# Patient Record
Sex: FEMALE | Race: ASIAN | NOT HISPANIC OR LATINO | Employment: UNEMPLOYED | URBAN - METROPOLITAN AREA
[De-identification: names, ages, dates, MRNs, and addresses within clinical notes are randomized per-mention and may not be internally consistent; named-entity substitution may affect disease eponyms.]

---

## 2021-04-27 ENCOUNTER — IMMUNIZATION (OUTPATIENT)
Dept: NURSING | Facility: CLINIC | Age: 26
End: 2021-04-27
Payer: COMMERCIAL

## 2021-04-27 PROCEDURE — 0001A PR COVID VAC PFIZER DIL RECON 30 MCG/0.3 ML IM: CPT

## 2021-04-27 PROCEDURE — 91300 PR COVID VAC PFIZER DIL RECON 30 MCG/0.3 ML IM: CPT

## 2021-05-02 ENCOUNTER — HEALTH MAINTENANCE LETTER (OUTPATIENT)
Age: 26
End: 2021-05-02

## 2021-05-21 ENCOUNTER — IMMUNIZATION (OUTPATIENT)
Dept: NURSING | Facility: CLINIC | Age: 26
End: 2021-05-21
Attending: INTERNAL MEDICINE
Payer: COMMERCIAL

## 2021-05-21 PROCEDURE — 91300 PR COVID VAC PFIZER DIL RECON 30 MCG/0.3 ML IM: CPT

## 2021-05-21 PROCEDURE — 0002A PR COVID VAC PFIZER DIL RECON 30 MCG/0.3 ML IM: CPT

## 2021-10-05 ENCOUNTER — APPOINTMENT (OUTPATIENT)
Dept: RADIOLOGY | Facility: HOSPITAL | Age: 26
DRG: 305 | End: 2021-10-05
Attending: EMERGENCY MEDICINE
Payer: COMMERCIAL

## 2021-10-05 ENCOUNTER — HOSPITAL ENCOUNTER (INPATIENT)
Facility: HOSPITAL | Age: 26
LOS: 4 days | Discharge: HOME | DRG: 305 | End: 2021-10-09
Attending: EMERGENCY MEDICINE | Admitting: INTERNAL MEDICINE
Payer: COMMERCIAL

## 2021-10-05 ENCOUNTER — APPOINTMENT (OUTPATIENT)
Dept: ULTRASOUND IMAGING | Facility: HOSPITAL | Age: 26
DRG: 305 | End: 2021-10-05
Attending: INTERNAL MEDICINE
Payer: COMMERCIAL

## 2021-10-05 DIAGNOSIS — G43.909 MIGRAINE WITHOUT STATUS MIGRAINOSUS, NOT INTRACTABLE, UNSPECIFIED MIGRAINE TYPE: ICD-10-CM

## 2021-10-05 DIAGNOSIS — N17.9 ACUTE KIDNEY INJURY (H): ICD-10-CM

## 2021-10-05 DIAGNOSIS — I10 ACCELERATED HYPERTENSION: ICD-10-CM

## 2021-10-05 DIAGNOSIS — I10 BENIGN ESSENTIAL HYPERTENSION: Primary | ICD-10-CM

## 2021-10-05 LAB
ALBUMIN MFR UR ELPH: 448.9 MG/DL
ALBUMIN SERPL-MCNC: 2.6 G/DL (ref 3.5–5)
ALBUMIN UR-MCNC: 300 MG/DL
ALP SERPL-CCNC: 66 U/L (ref 45–120)
ALT SERPL W P-5'-P-CCNC: <9 U/L (ref 0–45)
ANION GAP SERPL CALCULATED.3IONS-SCNC: 7 MMOL/L (ref 5–18)
APPEARANCE UR: ABNORMAL
AST SERPL W P-5'-P-CCNC: 12 U/L (ref 0–40)
ATRIAL RATE - MUSE: 105 BPM
BACTERIA #/AREA URNS HPF: ABNORMAL /HPF
BASOPHILS # BLD AUTO: 0.1 10E3/UL (ref 0–0.2)
BASOPHILS # BLD AUTO: 0.1 10E3/UL (ref 0–0.2)
BASOPHILS NFR BLD AUTO: 1 %
BASOPHILS NFR BLD AUTO: 1 %
BILIRUB SERPL-MCNC: 0.3 MG/DL (ref 0–1)
BILIRUB UR QL STRIP: NEGATIVE
BNP SERPL-MCNC: 149 PG/ML (ref 0–64)
BUN SERPL-MCNC: 35 MG/DL (ref 8–22)
C3 SERPL-MCNC: 140 MG/DL (ref 83–177)
C4 SERPL-MCNC: 42 MG/DL (ref 19–59)
CALCIUM SERPL-MCNC: 8.9 MG/DL (ref 8.5–10.5)
CHLORIDE BLD-SCNC: 110 MMOL/L (ref 98–107)
CO2 SERPL-SCNC: 21 MMOL/L (ref 22–31)
COLOR UR AUTO: ABNORMAL
CREAT SERPL-MCNC: 5.72 MG/DL (ref 0.6–1.1)
CREAT UR-MCNC: 38 MG/DL
DIASTOLIC BLOOD PRESSURE - MUSE: NORMAL MMHG
EOSINOPHIL # BLD AUTO: 0.1 10E3/UL (ref 0–0.7)
EOSINOPHIL # BLD AUTO: 0.1 10E3/UL (ref 0–0.7)
EOSINOPHIL NFR BLD AUTO: 1 %
EOSINOPHIL NFR BLD AUTO: 2 %
ERYTHROCYTE [DISTWIDTH] IN BLOOD BY AUTOMATED COUNT: 13.3 % (ref 10–15)
ERYTHROCYTE [DISTWIDTH] IN BLOOD BY AUTOMATED COUNT: 13.4 % (ref 10–15)
GFR SERPL CREATININE-BSD FRML MDRD: 9 ML/MIN/1.73M2
GLUCOSE BLD-MCNC: 90 MG/DL (ref 70–125)
GLUCOSE UR STRIP-MCNC: NEGATIVE MG/DL
HAPTOGLOB SERPL-MCNC: 149 MG/DL (ref 33–171)
HBV SURFACE AG SERPL QL IA: NONREACTIVE
HCG SERPL QL: NEGATIVE
HCT VFR BLD AUTO: 36.1 % (ref 35–47)
HCT VFR BLD AUTO: 39.2 % (ref 35–47)
HGB BLD-MCNC: 11.3 G/DL (ref 11.7–15.7)
HGB BLD-MCNC: 12.3 G/DL (ref 11.7–15.7)
HGB UR QL STRIP: ABNORMAL
HIV 1+2 AB+HIV1 P24 AG SERPL QL IA: NEGATIVE
HOLD SPECIMEN: NORMAL
IMM GRANULOCYTES # BLD: 0 10E3/UL
IMM GRANULOCYTES # BLD: 0.1 10E3/UL
IMM GRANULOCYTES NFR BLD: 0 %
IMM GRANULOCYTES NFR BLD: 1 %
INTERPRETATION ECG - MUSE: NORMAL
KETONES UR STRIP-MCNC: NEGATIVE MG/DL
LDH SERPL L TO P-CCNC: 296 U/L (ref 125–220)
LEUKOCYTE ESTERASE UR QL STRIP: NEGATIVE
LYMPHOCYTES # BLD AUTO: 1.4 10E3/UL (ref 0.8–5.3)
LYMPHOCYTES # BLD AUTO: 1.6 10E3/UL (ref 0.8–5.3)
LYMPHOCYTES NFR BLD AUTO: 14 %
LYMPHOCYTES NFR BLD AUTO: 20 %
MCH RBC QN AUTO: 26.9 PG (ref 26.5–33)
MCH RBC QN AUTO: 27 PG (ref 26.5–33)
MCHC RBC AUTO-ENTMCNC: 31.3 G/DL (ref 31.5–36.5)
MCHC RBC AUTO-ENTMCNC: 31.4 G/DL (ref 31.5–36.5)
MCV RBC AUTO: 86 FL (ref 78–100)
MCV RBC AUTO: 86 FL (ref 78–100)
MONOCYTES # BLD AUTO: 0.4 10E3/UL (ref 0–1.3)
MONOCYTES # BLD AUTO: 0.5 10E3/UL (ref 0–1.3)
MONOCYTES NFR BLD AUTO: 4 %
MONOCYTES NFR BLD AUTO: 6 %
MUCOUS THREADS #/AREA URNS LPF: PRESENT /LPF
NEUTROPHILS # BLD AUTO: 5.8 10E3/UL (ref 1.6–8.3)
NEUTROPHILS # BLD AUTO: 8.1 10E3/UL (ref 1.6–8.3)
NEUTROPHILS NFR BLD AUTO: 71 %
NEUTROPHILS NFR BLD AUTO: 79 %
NITRATE UR QL: NEGATIVE
NRBC # BLD AUTO: 0 10E3/UL
NRBC # BLD AUTO: 0 10E3/UL
NRBC BLD AUTO-RTO: 0 /100
NRBC BLD AUTO-RTO: 0 /100
P AXIS - MUSE: 52 DEGREES
PH UR STRIP: 7 [PH] (ref 5–7)
PLATELET # BLD AUTO: 270 10E3/UL (ref 150–450)
PLATELET # BLD AUTO: 273 10E3/UL (ref 150–450)
POTASSIUM BLD-SCNC: 4.6 MMOL/L (ref 3.5–5)
PR INTERVAL - MUSE: 112 MS
PROT SERPL-MCNC: 6.4 G/DL (ref 6–8)
PROT/CREAT 24H UR: 11.81 MG/MG CR
QRS DURATION - MUSE: 66 MS
QT - MUSE: 342 MS
QTC - MUSE: 452 MS
R AXIS - MUSE: -18 DEGREES
RBC # BLD AUTO: 4.19 10E6/UL (ref 3.8–5.2)
RBC # BLD AUTO: 4.57 10E6/UL (ref 3.8–5.2)
RBC URINE: 27 /HPF
RETICS # AUTO: 0.08 10E6/UL (ref 0.01–0.11)
RETICS/RBC NFR AUTO: 1.7 % (ref 0.8–2.7)
SARS-COV-2 RNA RESP QL NAA+PROBE: NEGATIVE
SODIUM SERPL-SCNC: 138 MMOL/L (ref 136–145)
SP GR UR STRIP: 1.01 (ref 1–1.03)
SQUAMOUS EPITHELIAL: 29 /HPF
SYSTOLIC BLOOD PRESSURE - MUSE: NORMAL MMHG
T AXIS - MUSE: 59 DEGREES
TROPONIN I SERPL-MCNC: 0.01 NG/ML (ref 0–0.29)
TROPONIN I SERPL-MCNC: 0.02 NG/ML (ref 0–0.29)
TROPONIN I SERPL-MCNC: 0.02 NG/ML (ref 0–0.29)
UROBILINOGEN UR STRIP-MCNC: <2 MG/DL
VENTRICULAR RATE- MUSE: 105 BPM
WBC # BLD AUTO: 10.1 10E3/UL (ref 4–11)
WBC # BLD AUTO: 8.1 10E3/UL (ref 4–11)
WBC URINE: 4 /HPF

## 2021-10-05 PROCEDURE — 86160 COMPLEMENT ANTIGEN: CPT | Performed by: INTERNAL MEDICINE

## 2021-10-05 PROCEDURE — 250N000011 HC RX IP 250 OP 636: Performed by: INTERNAL MEDICINE

## 2021-10-05 PROCEDURE — 250N000013 HC RX MED GY IP 250 OP 250 PS 637: Performed by: INTERNAL MEDICINE

## 2021-10-05 PROCEDURE — 36415 COLL VENOUS BLD VENIPUNCTURE: CPT | Performed by: INTERNAL MEDICINE

## 2021-10-05 PROCEDURE — 86256 FLUORESCENT ANTIBODY TITER: CPT | Performed by: INTERNAL MEDICINE

## 2021-10-05 PROCEDURE — 84484 ASSAY OF TROPONIN QUANT: CPT | Performed by: INTERNAL MEDICINE

## 2021-10-05 PROCEDURE — 86255 FLUORESCENT ANTIBODY SCREEN: CPT | Performed by: INTERNAL MEDICINE

## 2021-10-05 PROCEDURE — 84156 ASSAY OF PROTEIN URINE: CPT | Performed by: INTERNAL MEDICINE

## 2021-10-05 PROCEDURE — 258N000003 HC RX IP 258 OP 636: Performed by: INTERNAL MEDICINE

## 2021-10-05 PROCEDURE — 80053 COMPREHEN METABOLIC PANEL: CPT | Performed by: EMERGENCY MEDICINE

## 2021-10-05 PROCEDURE — 99222 1ST HOSP IP/OBS MODERATE 55: CPT | Performed by: INTERNAL MEDICINE

## 2021-10-05 PROCEDURE — 71046 X-RAY EXAM CHEST 2 VIEWS: CPT

## 2021-10-05 PROCEDURE — 84155 ASSAY OF PROTEIN SERUM: CPT | Performed by: INTERNAL MEDICINE

## 2021-10-05 PROCEDURE — 84703 CHORIONIC GONADOTROPIN ASSAY: CPT | Performed by: INTERNAL MEDICINE

## 2021-10-05 PROCEDURE — 99285 EMERGENCY DEPT VISIT HI MDM: CPT | Mod: 25

## 2021-10-05 PROCEDURE — 83615 LACTATE (LD) (LDH) ENZYME: CPT | Performed by: INTERNAL MEDICINE

## 2021-10-05 PROCEDURE — 86038 ANTINUCLEAR ANTIBODIES: CPT | Performed by: INTERNAL MEDICINE

## 2021-10-05 PROCEDURE — 96376 TX/PRO/DX INJ SAME DRUG ADON: CPT

## 2021-10-05 PROCEDURE — 83010 ASSAY OF HAPTOGLOBIN QUANT: CPT | Performed by: INTERNAL MEDICINE

## 2021-10-05 PROCEDURE — 81001 URINALYSIS AUTO W/SCOPE: CPT | Performed by: INTERNAL MEDICINE

## 2021-10-05 PROCEDURE — 87635 SARS-COV-2 COVID-19 AMP PRB: CPT | Performed by: EMERGENCY MEDICINE

## 2021-10-05 PROCEDURE — 87389 HIV-1 AG W/HIV-1&-2 AB AG IA: CPT | Performed by: INTERNAL MEDICINE

## 2021-10-05 PROCEDURE — 85025 COMPLETE CBC W/AUTO DIFF WBC: CPT | Performed by: INTERNAL MEDICINE

## 2021-10-05 PROCEDURE — C9803 HOPD COVID-19 SPEC COLLECT: HCPCS

## 2021-10-05 PROCEDURE — 36415 COLL VENOUS BLD VENIPUNCTURE: CPT | Performed by: EMERGENCY MEDICINE

## 2021-10-05 PROCEDURE — 85045 AUTOMATED RETICULOCYTE COUNT: CPT | Performed by: INTERNAL MEDICINE

## 2021-10-05 PROCEDURE — 99223 1ST HOSP IP/OBS HIGH 75: CPT | Mod: AI | Performed by: INTERNAL MEDICINE

## 2021-10-05 PROCEDURE — 83516 IMMUNOASSAY NONANTIBODY: CPT | Performed by: INTERNAL MEDICINE

## 2021-10-05 PROCEDURE — 84484 ASSAY OF TROPONIN QUANT: CPT | Performed by: EMERGENCY MEDICINE

## 2021-10-05 PROCEDURE — 99207 PR CONSULT E&M CHANGED TO INITIAL LEVEL: CPT | Performed by: INTERNAL MEDICINE

## 2021-10-05 PROCEDURE — 200N000001 HC R&B ICU

## 2021-10-05 PROCEDURE — 93005 ELECTROCARDIOGRAM TRACING: CPT | Performed by: EMERGENCY MEDICINE

## 2021-10-05 PROCEDURE — 85025 COMPLETE CBC W/AUTO DIFF WBC: CPT | Performed by: EMERGENCY MEDICINE

## 2021-10-05 PROCEDURE — 250N000009 HC RX 250: Performed by: INTERNAL MEDICINE

## 2021-10-05 PROCEDURE — 76770 US EXAM ABDO BACK WALL COMP: CPT

## 2021-10-05 PROCEDURE — 84165 PROTEIN E-PHORESIS SERUM: CPT | Mod: TC | Performed by: INTERNAL MEDICINE

## 2021-10-05 PROCEDURE — 250N000011 HC RX IP 250 OP 636: Performed by: EMERGENCY MEDICINE

## 2021-10-05 PROCEDURE — 87340 HEPATITIS B SURFACE AG IA: CPT | Performed by: INTERNAL MEDICINE

## 2021-10-05 PROCEDURE — 86803 HEPATITIS C AB TEST: CPT | Performed by: INTERNAL MEDICINE

## 2021-10-05 PROCEDURE — 86225 DNA ANTIBODY NATIVE: CPT | Performed by: INTERNAL MEDICINE

## 2021-10-05 PROCEDURE — 86706 HEP B SURFACE ANTIBODY: CPT | Performed by: INTERNAL MEDICINE

## 2021-10-05 PROCEDURE — 93975 VASCULAR STUDY: CPT

## 2021-10-05 PROCEDURE — 96374 THER/PROPH/DIAG INJ IV PUSH: CPT

## 2021-10-05 PROCEDURE — 83880 ASSAY OF NATRIURETIC PEPTIDE: CPT | Performed by: EMERGENCY MEDICINE

## 2021-10-05 PROCEDURE — 250N000013 HC RX MED GY IP 250 OP 250 PS 637: Performed by: EMERGENCY MEDICINE

## 2021-10-05 RX ORDER — HEPARIN SODIUM 5000 [USP'U]/.5ML
5000 INJECTION, SOLUTION INTRAVENOUS; SUBCUTANEOUS EVERY 8 HOURS
Status: DISCONTINUED | OUTPATIENT
Start: 2021-10-05 | End: 2021-10-06

## 2021-10-05 RX ORDER — AMOXICILLIN 250 MG
2 CAPSULE ORAL 2 TIMES DAILY PRN
Status: DISCONTINUED | OUTPATIENT
Start: 2021-10-05 | End: 2021-10-09 | Stop reason: HOSPADM

## 2021-10-05 RX ORDER — LIDOCAINE 40 MG/G
CREAM TOPICAL
Status: DISCONTINUED | OUTPATIENT
Start: 2021-10-05 | End: 2021-10-09 | Stop reason: HOSPADM

## 2021-10-05 RX ORDER — ONDANSETRON 4 MG/1
4 TABLET, ORALLY DISINTEGRATING ORAL EVERY 6 HOURS PRN
Status: DISCONTINUED | OUTPATIENT
Start: 2021-10-05 | End: 2021-10-09 | Stop reason: HOSPADM

## 2021-10-05 RX ORDER — CARVEDILOL 12.5 MG/1
12.5 TABLET ORAL 2 TIMES DAILY WITH MEALS
Status: DISCONTINUED | OUTPATIENT
Start: 2021-10-05 | End: 2021-10-09 | Stop reason: HOSPADM

## 2021-10-05 RX ORDER — ATENOLOL 25 MG/1
25 TABLET ORAL DAILY
Status: DISCONTINUED | OUTPATIENT
Start: 2021-10-05 | End: 2021-10-05

## 2021-10-05 RX ORDER — METOPROLOL TARTRATE 25 MG/1
25 TABLET, FILM COATED ORAL ONCE
Status: COMPLETED | OUTPATIENT
Start: 2021-10-05 | End: 2021-10-05

## 2021-10-05 RX ORDER — HYDRALAZINE HYDROCHLORIDE 20 MG/ML
2 INJECTION INTRAMUSCULAR; INTRAVENOUS ONCE
Status: COMPLETED | OUTPATIENT
Start: 2021-10-05 | End: 2021-10-05

## 2021-10-05 RX ORDER — IBUPROFEN 200 MG
200-600 TABLET ORAL EVERY 4 HOURS PRN
Status: ON HOLD | COMMUNITY
End: 2021-10-08

## 2021-10-05 RX ORDER — AMLODIPINE BESYLATE 5 MG/1
10 TABLET ORAL DAILY
Status: DISCONTINUED | OUTPATIENT
Start: 2021-10-05 | End: 2021-10-09 | Stop reason: HOSPADM

## 2021-10-05 RX ORDER — HYDRALAZINE HYDROCHLORIDE 20 MG/ML
5 INJECTION INTRAMUSCULAR; INTRAVENOUS ONCE
Status: COMPLETED | OUTPATIENT
Start: 2021-10-05 | End: 2021-10-05

## 2021-10-05 RX ORDER — ACETAMINOPHEN 325 MG/1
325-650 TABLET ORAL EVERY 6 HOURS PRN
Status: ON HOLD | COMMUNITY
End: 2023-12-11

## 2021-10-05 RX ORDER — HYDRALAZINE HYDROCHLORIDE 20 MG/ML
10 INJECTION INTRAMUSCULAR; INTRAVENOUS EVERY 4 HOURS PRN
Status: DISCONTINUED | OUTPATIENT
Start: 2021-10-05 | End: 2021-10-09 | Stop reason: HOSPADM

## 2021-10-05 RX ORDER — AMOXICILLIN 250 MG
1 CAPSULE ORAL 2 TIMES DAILY PRN
Status: DISCONTINUED | OUTPATIENT
Start: 2021-10-05 | End: 2021-10-09 | Stop reason: HOSPADM

## 2021-10-05 RX ORDER — SODIUM CHLORIDE 9 MG/ML
INJECTION, SOLUTION INTRAVENOUS CONTINUOUS
Status: DISCONTINUED | OUTPATIENT
Start: 2021-10-05 | End: 2021-10-05

## 2021-10-05 RX ORDER — SUMATRIPTAN 50 MG/1
50 TABLET, FILM COATED ORAL
COMMUNITY
End: 2023-11-27

## 2021-10-05 RX ORDER — ONDANSETRON 2 MG/ML
4 INJECTION INTRAMUSCULAR; INTRAVENOUS EVERY 6 HOURS PRN
Status: DISCONTINUED | OUTPATIENT
Start: 2021-10-05 | End: 2021-10-09 | Stop reason: HOSPADM

## 2021-10-05 RX ORDER — CLONIDINE HYDROCHLORIDE 0.1 MG/1
0.1 TABLET ORAL EVERY 8 HOURS PRN
Status: DISCONTINUED | OUTPATIENT
Start: 2021-10-05 | End: 2021-10-09 | Stop reason: HOSPADM

## 2021-10-05 RX ORDER — ACETAMINOPHEN 325 MG/1
650 TABLET ORAL EVERY 4 HOURS PRN
Status: DISCONTINUED | OUTPATIENT
Start: 2021-10-05 | End: 2021-10-09 | Stop reason: HOSPADM

## 2021-10-05 RX ORDER — HYDRALAZINE HYDROCHLORIDE 20 MG/ML
10 INJECTION INTRAMUSCULAR; INTRAVENOUS EVERY 6 HOURS PRN
Status: DISCONTINUED | OUTPATIENT
Start: 2021-10-05 | End: 2021-10-05

## 2021-10-05 RX ADMIN — ACETAMINOPHEN 650 MG: 325 TABLET ORAL at 19:31

## 2021-10-05 RX ADMIN — NICARDIPINE HYDROCHLORIDE 2.5 MG/HR: 0.2 INJECTION, SOLUTION INTRAVENOUS at 18:28

## 2021-10-05 RX ADMIN — HYDRALAZINE HYDROCHLORIDE 2 MG: 20 INJECTION INTRAMUSCULAR; INTRAVENOUS at 12:16

## 2021-10-05 RX ADMIN — CARVEDILOL 12.5 MG: 12.5 TABLET, FILM COATED ORAL at 18:48

## 2021-10-05 RX ADMIN — ATENOLOL 25 MG: 25 TABLET ORAL at 17:18

## 2021-10-05 RX ADMIN — NICARDIPINE HYDROCHLORIDE 14 MG/HR: 0.2 INJECTION, SOLUTION INTRAVENOUS at 21:36

## 2021-10-05 RX ADMIN — SODIUM CHLORIDE: 9 INJECTION, SOLUTION INTRAVENOUS at 14:56

## 2021-10-05 RX ADMIN — METOPROLOL TARTRATE 25 MG: 25 TABLET, FILM COATED ORAL at 11:06

## 2021-10-05 RX ADMIN — ONDANSETRON 4 MG: 2 INJECTION INTRAMUSCULAR; INTRAVENOUS at 17:17

## 2021-10-05 RX ADMIN — AMLODIPINE BESYLATE 10 MG: 5 TABLET ORAL at 14:55

## 2021-10-05 RX ADMIN — HEPARIN SODIUM 5000 UNITS: 10000 INJECTION, SOLUTION INTRAVENOUS; SUBCUTANEOUS at 20:30

## 2021-10-05 RX ADMIN — HYDRALAZINE HYDROCHLORIDE 10 MG: 20 INJECTION INTRAMUSCULAR; INTRAVENOUS at 15:44

## 2021-10-05 RX ADMIN — HYDRALAZINE HYDROCHLORIDE 5 MG: 20 INJECTION INTRAMUSCULAR; INTRAVENOUS at 18:50

## 2021-10-05 ASSESSMENT — ACTIVITIES OF DAILY LIVING (ADL)
DIFFICULTY_COMMUNICATING: NO
DRESSING/BATHING_DIFFICULTY: NO
WALKING_OR_CLIMBING_STAIRS_DIFFICULTY: NO
TOILETING_ISSUES: NO
WEAR_GLASSES_OR_BLIND: NO
FALL_HISTORY_WITHIN_LAST_SIX_MONTHS: NO
CONCENTRATING,_REMEMBERING_OR_MAKING_DECISIONS_DIFFICULTY: NO
DOING_ERRANDS_INDEPENDENTLY_DIFFICULTY: NO
DIFFICULTY_EATING/SWALLOWING: NO
HEARING_DIFFICULTY_OR_DEAF: NO
PATIENT_/_FAMILY_COMMUNICATION_STYLE: SPOKEN LANGUAGE (ENGLISH OR BILINGUAL)

## 2021-10-05 NOTE — PHARMACY-ADMISSION MEDICATION HISTORY
Pharmacy Note - Admission Medication History    Pertinent Provider Information: none     ______________________________________________________________________    Prior To Admission (PTA) med list completed and updated in EMR.       PTA Med List   Medication Sig Last Dose     acetaminophen (TYLENOL) 325 MG tablet Take 325-650 mg by mouth every 6 hours as needed for mild pain Unknown at Unknown time     ibuprofen (ADVIL/MOTRIN) 200 MG tablet Take 200-600 mg by mouth every 4 hours as needed for mild pain Unknown at Unknown time     SUMAtriptan (IMITREX) 50 MG tablet Take 50 mg by mouth at onset of headache for migraine Unknown at Unknown time       Information source(s): Patient and CareEverywhere/SureScripts  Method of interview communication: in-person    Summary of Changes to PTA Med List  New: none  Discontinued: none  Changed: none    Patient was asked about OTC/herbal products specifically.  PTA med list reflects this.    In the past week, patient estimated taking medication this percent of the time:  N/A    Allergies were reviewed, assessed, and updated with the patient.      Patient did not bring any medications to the hospital and can't retrieve from home. No multi-dose medications are available for use during hospital stay.     The information provided in this note is only as accurate as the sources available at the time of the update(s).    Thank you for the opportunity to participate in the care of this patient.    Trever Banda [facility]  10/5/2021 1:43 PM

## 2021-10-05 NOTE — LETTER
[facility]  [address] [place] [address]  Phone: [phone]  Fax: [phone]    October 8, 2021        Nuzhat Lopez  [address] [place] [facility] [address]          To whom it may concern:    RE: Nuzhat Lopez    This is to verify that Nuzhat Lopez was admitted in [facility] from 10/5/21 to 10/8/21.    Please contact me for questions or concerns.      Sincerely,        Jax Larsno MD

## 2021-10-05 NOTE — ED PROVIDER NOTES
EMERGENCY DEPARTMENT ENCOUNTER            IMPRESSION:  Accelerated hypertension  Acute kidney injury  Chest pain      MEDICAL DECISION MAKING:  Patient evaluated for chest pain.  She has a history of migraines and has been taking Imitrex.  She recently noted elevated blood pressure.    On exam her blood pressure is significantly elevated.  She is in no distress.  Cardiopulmonary and neuro exam are normal.  No lower extremity edema    She was placed on cardiac monitor which showed persistent elevated blood pressure.    She was given an oral dose of Lopressor.    EKG showed sinus tachycardia but no ischemia or arrhythmia    Chemistry notable for elevated BUN and creatinine.    CBC shows subtle anemia    BNP elevated at 149, troponin detectable at 0.02 but negative    Chest x-ray unremarkable.    Patient's blood pressure remained elevated and she was given a dose of hydralazine.    Patient will be admitted to the hospitalist for inpatient evaluation      =================================================================  CHIEF COMPLAINT:  Chief Complaint   Patient presents with     Hypertension     Chest Pain         HPI  Nuzhat Lopez is a 25 year old female with a history of migraines who presents to the ED by walk in accompanied by her sister for evaluation of chest pain.    Patient states that she was started on Imitrex a couple of weeks ago for migraines. She takes the Imitrex as needed and she has only taken 2 doses since it was prescribed; she last took this medication 4 days ago. Shortly after taking the Imitrex each time, patient reports that she developed central chest pain, which she describes as her chest feeling tight. She denies any associated difficulty breathing. Her chest pain is unchanged with taking deep breaths. Patient notes that her chest pain is worse when she has a headache. She also feels nauseated currently. Additionally, patient states that she checked her blood pressure yesterday and it was  around 200 systolic. She denies leg swelling or additional symptoms at this time. No reported family history of heart problems. She denies chance of pregnancy.      Social history: Nonsmoker.        I, Deborah Virgen, am serving as a scribe to document services personally performed by Dr. Marco Moran MD, based on my observation and the provider's statements to me. I, Dr. Marco Moran MD attest that Deborah New is acting in a scribe capacity, has observed my performance of the services and has documented them in accordance with my direction.      REVIEW OF SYSTEMS   Constitutional: Denies fever, chills, unintentional weight loss or fatigue   Eyes: Denies visual changes or discharge    HENT: Denies sore throat, ear pain or neck pain  Respiratory: Denies cough or shortness of breath    Cardiovascular: Positive for chest pain and elevated blood pressure. Denies palpitations or leg swelling  GI: Positive for nausea. Denies abdominal pain, vomiting, or dark, bloody stools.    : Denies hematuria, dysuria, or flank pain  Musculoskeletal: Denies any new back pain or new muscle/joint pains  Skin: Denies rash or wound  Neurologic: Positive for headache. Denies new weakness, focal weakness, or sensory changes    Lymphatic: Denies swollen glands    Psychiatric: Denies depression, suicidal ideation or homicidal ideation.    Remainder of systems reviewed, unless noted in HPI all others negative.      PAST MEDICAL HISTORY:  History reviewed. No pertinent past medical history.    PAST SURGICAL HISTORY:  Past Surgical History:   Procedure Laterality Date     NO PAST SURGERIES           CURRENT MEDICATIONS:    HYDROcodone-acetaminophen 5-325 mg per tablet  ondansetron (ZOFRAN ODT) 4 MG disintegrating tablet        ALLERGIES:  No Known Allergies    FAMILY HISTORY:  History reviewed. No pertinent family history.    SOCIAL HISTORY:   Social History     Socioeconomic History     Marital status: Single     Spouse name: Not on file      Number of children: Not on file     Years of education: Not on file     Highest education level: Not on file   Occupational History     Not on file   Tobacco Use     Smoking status: Never Smoker   Substance and Sexual Activity     Alcohol use: No     Drug use: No     Sexual activity: Not on file   Other Topics Concern     Not on file   Social History Narrative     Not on file     Social Determinants of Health     Financial Resource Strain:      Difficulty of Paying Living Expenses:    Food Insecurity:      Worried About Running Out of Food in the Last Year:      Ran Out of Food in the Last Year:    Transportation Needs:      Lack of Transportation (Medical):      Lack of Transportation (Non-Medical):    Physical Activity:      Days of Exercise per Week:      Minutes of Exercise per Session:    Stress:      Feeling of Stress :    Social Connections:      Frequency of Communication with Friends and Family:      Frequency of Social Gatherings with Friends and Family:      Attends Jainism Services:      Active Member of Clubs or Organizations:      Attends Club or Organization Meetings:      Marital Status:    Intimate Partner Violence:      Fear of Current or Ex-Partner:      Emotionally Abused:      Physically Abused:      Sexually Abused:        PHYSICAL EXAM:    BP (!) 185/132   Pulse 79   Temp 98.7  F (37.1  C) (Tympanic)   Resp 18   Wt 81.6 kg (180 lb)   LMP 09/16/2021   SpO2 100%   BMI 34.01 kg/m      Constitutional: Awake, alert, in no acute distress  Head: Normocephalic, atraumatic.  ENT: Mucous membranes moist. Posterior oropharynx appears normal.  Eyes: Pupils midrange and reactive ,no conjunctival discharge  Neck: No lymphadenopathy, no stridor, supple, soft tissue swelling  Respiratory: Respirations even, unlabored. Lungs clear to ascultation bilaterally, in no acute respiratory distress.  Cardiovascular: Regular rate and rhythm.+2 radial pulses, equal bilaterally.  No murmurs.   GI: Abdomen soft,  non-tender to palpation in all 4 quadrants. No guarding or rebound. Bowel sounds intact on all 4 quadrants.   Back: No CVA tenderness.    Musculoskeletal: Moves all 4 extremities equally, strength symmetrical on bilateral uppers and lowers.  No peripheral edema  Integument: Warm, dry. No rash. No bruising or petechiae.  Lymphatic: No cervical lymphadenopathy  Neurologic: Alert & oriented x 3. Normal speech. Grossly normal motor and sensory function. No focal deficits noted.  Psychiatric: Normal mood and affect. Normal judgement.    ED COURSE:    10:32 AM I met with the patient for the initial interview and physical examination. Discussed plan for treatment and workup in the ED. PPE: Provider wore surgical mask.  12:00 PM I rechecked and updated the patient.  12:18 PM I discussed the case with hospitalist, Dr. Larson.       LAB:  All pertinent labs reviewed and interpreted.      RADIOLOGY:  Reviewed all pertinent imaging. Please see official radiology report.  XR Chest 2 Views   Final Result   IMPRESSION: Normal cardiac and mediastinal contours. The lungs are symmetrically inflated and are clear. No pleural effusion or pneumothorax. PATIENT       Upper abdomen is unremarkable.       CONCLUSION:    Normal chest.            EKG:    Performed at: 10:13    Impression: Sinus tachycardia. Otherwise normal ECG.     Rate: 105  Rhythm: sinus  Axis: -18  MT Interval: 112  QRS Interval: 66  QTc Interval: 452  Comparison: No previous ECGs available      I have independently reviewed and interpreted the EKG(s) documented above.      MEDICATIONS GIVEN IN THE EMERGENCY:  Medications   metoprolol tartrate (LOPRESSOR) tablet 25 mg (25 mg Oral Given 10/5/21 1106)   hydrALAZINE (APRESOLINE) injection 2 mg (2 mg Intravenous Given 10/5/21 1216)           NEW PRESCRIPTIONS STARTED AT TODAY'S ER VISIT:  New Prescriptions    No medications on file          FINAL DIAGNOSIS:    ICD-10-CM    1. Accelerated hypertension  I10    2. Acute kidney  injury (H)  N17.9             At the conclusion of the encounter I discussed the results of all of the tests and the disposition. The questions were answered. The patient or family acknowledged understanding and was agreeable with the care plan.     NAME: Nuzhat Lopez  AGE: 25 year old female  YOB: 1995  MRN: [id number]  EVALUATION DATE & TIME: 10/5/2021 10:22 AM    PCP: No primary care provider on file.    ED PROVIDER: Marco Moran M.D.      Deborah MAURO, am serving as a scribe to document services personally performed by Dr. Marco Moran based on my observation and the provider's statements to me. I, Marco Moran MD attest that Deborah New is acting in a scribe capacity, has observed my performance of the services and has documented them in accordance with my direction.    Marco Moran M.D.  Emergency Medicine  [facility] EMERGENCY DEPARTMENT  [facility] [facility] [address]  [phone]  Dept: [phone]  10/5/2021       Marco Moran MD  10/05/21 [id number]

## 2021-10-05 NOTE — H&P
[facility]    History and Physical - Hospitalist Service       Date of Admission:  10/5/2021    Assessment & Plan      Nuzhat Lopez is a 25 year old female with a history of migraine headache who presents to the ED for evaluation of elevated blood presure.     Hypertensive urgency: No history of hypertension.  Elevated to 201/121 in ER.   - Telemetry  - Start amlodipine and atenolol  - Hydralazine and clonidine prn for now. Adjust medication based on BP  - Echocardiogram     Acute kidney injury: Creatinine 5.72 on admission. Unknown baseline. Electrolytes are normal.  - Check UA  - US kidney and doppler evaluation of renal artery.   - Start gentle IVF  - Monitor creatinine  - Nephrology consult       Diet:  Renal diet  DVT Prophylaxis: Low Risk/Ambulatory with no VTE prophylaxis indicated  Lockhart Catheter: Not present  Central Lines: None  Code Status:  Full code      Disposition Plan   Expected discharge: 1-2 days     The patient's care was discussed with the Bedside Nurse, Care Coordinator/ and Patient.    Jax Larson MD  [facility]  Securely message with the Vocera Web Console (learn more here)  Text page via [employer] Paging/Directory      ______________________________________________________________________    Chief Complaint   Elevated blood pressure    History is obtained from the patient    History of Present Illness   Nuzhat Lopez is a 25 year old female with a history of migraine headache who presents to the ED for evaluation of elevated blood presure. Patient reports that she has a history of migraine and she normally takes Imitrex.  Patient reports that about 3 weeks ago, she had an episode of migraine headache associated with nauseous and vomiting.  She took Imitrex 50 mg.  After she took the Imitrex, she felt chest tightness.  4 days ago, her migraine headache returned.  She took another dose of Imitrex 50 mg.  Similar chest pain returned  afterwards. Patient reports that her sister is an NP.  Her sister told her that she needs to monitor her blood pressure.  She normally checks her blood pressure occasionally.  Yesterday, when she checked her blood pressure, she found that it was elevated to the 200s.  Last time she checked her blood pressure was about 2 months ago. At that time, her systolic blood pressure was 120s. She then decided to come to ER for evaluation.  In ER today, her blood pressure was elevated to 201/121. Lab test shows creatinine 5.72.  Patient reports no fever, chest pain, shortness of breath, nauseous, vomiting, diarrhea, edema, hematuria, joint swelling and joint pain.  She reports no family history of hypertension and chronic kidney disease.  She reports taking 500 mg of Tylenol and ibuprofen for her headache, but no chronic NSAID use.  She did not take any other medication.  She does not take oral contraceptives.  She reports no alcohol and recreational drug use.      Review of Systems    The 10 point Review of Systems is negative other than noted in the HPI or here.     Past Medical History    I have reviewed this patient's medical history and updated it with pertinent information if needed.   Migraine headache    Past Surgical History   I have reviewed this patient's surgical history and updated it with pertinent information if needed.  Past Surgical History:   Procedure Laterality Date     NO PAST SURGERIES         Social History   I have reviewed this patient's social history and updated it with pertinent information if needed.  Social History     Tobacco Use     Smoking status: Never Smoker   Substance Use Topics     Alcohol use: No     Drug use: No       Family History   No family history of hypertension, heart disease and CKD.    Prior to Admission Medications   Prior to Admission Medications   Prescriptions Last Dose Informant Patient Reported? Taking?   HYDROcodone-acetaminophen 5-325 mg per tablet   No No   Sig:  [HYDROCODONE-ACETAMINOPHEN 5-325 MG PER TABLET] Take 1 tablet by mouth every 8 (eight) hours as needed for pain.   ondansetron (ZOFRAN ODT) 4 MG disintegrating tablet   No No   Sig: [ONDANSETRON (ZOFRAN ODT) 4 MG DISINTEGRATING TABLET] Take 1 tablet (4 mg total) by mouth every 8 (eight) hours as needed for nausea.      Facility-Administered Medications: None     Allergies   No Known Allergies    Physical Exam   Vital Signs: Temp: 98.7  F (37.1  C) Temp src: Tympanic BP: (!) 185/132 Pulse: 79   Resp: 18 SpO2: 100 % O2 Device: None (Room air)    Weight: 180 lbs 0 oz    General appearance: not in acute distress  HEENT: PERRL, EOMI  Lungs: Clear breath sounds in bilateral lung fields  Cardiovascular: Regular rate and rhythm, normal S1-S2  Abdomen: Soft, non tender, no distension  Musculoskeletal: No joint swelling  Skin: No rash and no edema  Neurology: AAO ×3.  Cranial nerves II - XII normal.  Normal muscle strength in all four extremities.    Data   Data reviewed today: I reviewed all medications, new labs and imaging results over the last 24 hours. I    Recent Labs   Lab 10/05/21  1054   WBC 8.1   HGB 11.3*   MCV 86         POTASSIUM 4.6   CHLORIDE 110*   CO2 21*   BUN 35*   CR 5.72*   ANIONGAP 7   DEEPTI 8.9   GLC 90   ALBUMIN 2.6*   PROTTOTAL 6.4   BILITOTAL 0.3   ALKPHOS 66   ALT <9   AST 12     Chest XR: normal chest

## 2021-10-05 NOTE — PROGRESS NOTES
Pt arrived to the unit about 4pm. bp 189/117. Nephrology called around 515pm inquiring about pt. Pt c/o nausea, tightness in chest and headache, notified MD. Pt received prn zofran, emesis a short time later.  Re-check bp  was 175/124. Pt recei ed atenolol. Recheck 1745 was 193/128. MD paged.

## 2021-10-05 NOTE — CONSULTS
NEPHROLOGY CONSULTATION    Nuzhat Lopez  [address] [place] [address]  [phone] (home)   25 year old female  [id number]  PMD: No Ref-Primary, Physician    CC: I was asked to see Nuzhat Lopez by Dr. Larson to evaluate her hypertension, NAHUM.    ASSESSMENT AND RECOMMENDATIONS:  1. NAHUM: ?due to NSAIDS(less likely) vs malignant hypertension, vs. GN vs TMA. Her UA showed hematuria and 300 of proteinuria. Last known creatinine was 0.92mg/dl in 12/2017.    -Check UPCR, complement levels, VIANNEY, dsDNA, ANCA, anti-GBM, HIV, Hep B, Hep C, LDH, Hapto, peripheral smear, pregnancy test   -NPO after MN for kidney biopsy    2. Hypertensive urgency: Her blood pressure remains very elevated despite IV metoprolol and hydralazine.    -Perhaps related to her renal disease   -Stop atenolol   -Start coreg 12.5mg BID   -Continue amlodipine 10mg daily   -PRN metoprolol/hydralazine prn SBP>170   -I would transfer to the ICU for more aggressive BP control with nipride gtt given active chest pain, nausea, vomiting.       3. Chest pain: probably due to her uncontrolled hypertension. I would transfer to the ICU for nipride gtt.    Discussed with RN Dr. Fragoso, and Dr. Larson.      Matt Méndez MD  [facility] Office: [phone]    HPI: Nuzhat Lopez 25 year old woman who I am asked to see for management of NAHUM and HTN. She presented to the ED today with headaches and found to be quite hypertensive.. She has a history of migraines, the last of which was 3 weeks ago for which she takes imitrex. She also had n/v/chest pain at that time. She had onset again about 3-4 days ago of a headache. She checked her BP at home and systolic BP in 200s.  Her blood pressure was elevated to 201/121 in the ED today. Her creatinine was 5.72mg/dl.Her potassium 4.6. Her UA showed hematuria and proteinuria. Last known creatinine was 0.92 in 2017. CXR normal. She does not have a family history of kidney disease. She reports taking  Tylenol and ibuprofen for her headache, but does not endorse chronic NSAID use. She did not take any other medication. She denies illicit drug use. She reports current nausea, just vomited before I walked in the room. She also reports active chest pain. She denies fever, dyspnea, hemoptysis, hematuria, tea-colored urine, rash, joint swelling, and edema.     ROS: Other than above, a comprehensive ROS was negative.        PMH:  Patient Active Problem List   Diagnosis     Accelerated hypertension     Acute kidney injury (H)       Current Facility-Administered Medications   Medication     amLODIPine (NORVASC) tablet 10 mg     carvedilol (COREG) tablet 12.5 mg     cloNIDine (CATAPRES) tablet 0.1 mg     hydrALAZINE (APRESOLINE) injection 10 mg     lidocaine (LMX4) cream     lidocaine 1 % 0.1-1 mL     melatonin tablet 1 mg     ondansetron (ZOFRAN-ODT) ODT tab 4 mg    Or     ondansetron (ZOFRAN) injection 4 mg     senna-docusate (SENOKOT-S/PERICOLACE) 8.6-50 MG per tablet 1 tablet    Or     senna-docusate (SENOKOT-S/PERICOLACE) 8.6-50 MG per tablet 2 tablet     sodium chloride (PF) 0.9% PF flush 3 mL     sodium chloride (PF) 0.9% PF flush 3 mL     sodium chloride 0.9% infusion         Allergies: No Known Allergies    Social History:   Social History     Socioeconomic History     Marital status: Single     Spouse name: Not on file     Number of children: Not on file     Years of education: Not on file     Highest education level: Not on file   Occupational History     Not on file   Tobacco Use     Smoking status: Never Smoker   Substance and Sexual Activity     Alcohol use: No     Drug use: No     Sexual activity: Not on file   Other Topics Concern     Not on file   Social History Narrative     Not on file     Social Determinants of Health     Financial Resource Strain:      Difficulty of Paying Living Expenses:    Food Insecurity:      Worried About Running Out of Food in the Last Year:      Ran Out of Food in the Last  "Year:    Transportation Needs:      Lack of Transportation (Medical):      Lack of Transportation (Non-Medical):    Physical Activity:      Days of Exercise per Week:      Minutes of Exercise per Session:    Stress:      Feeling of Stress :    Social Connections:      Frequency of Communication with Friends and Family:      Frequency of Social Gatherings with Friends and Family:      Attends Church Services:      Active Member of Clubs or Organizations:      Attends Club or Organization Meetings:      Marital Status:    Intimate Partner Violence:      Fear of Current or Ex-Partner:      Emotionally Abused:      Physically Abused:      Sexually Abused:          Family History: no known kidney disease.    Physical Exam:  Vital signs:  Temp: 98.4  F (36.9  C) Temp src: Oral BP: (!) 175/124 Pulse: 108   Resp: 16 SpO2: 98 % O2 Device: None (Room air)     Weight: 81.6 kg (180 lb)  Estimated body mass index is 34.01 kg/m  as calculated from the following:    Height as of 12/11/17: 1.549 m (5' 1\").    Weight as of this encounter: 81.6 kg (180 lb).       GENERAL: NAD  HEENT: NCAT, pupils equal, sclerae not icteric, MMM  NECK: Supple.Trachea midline.   LYMPHATIC: No cervical lymphadenopathy  LUNGS: no respiratory distress  HEART: no leg edema.   ABDOMEN: Soft, NT,   PSYCH: Alert, normal affect  NEURO:  sensation to light touch intact  MUSC/SKEL: normal muscle mass, no joint effusions evident  SKIN: No rash     Potassium (mmol/L)   Date Value   10/05/2021 4.6     Chloride (mmol/L)   Date Value   10/05/2021 110 (H)     Urea Nitrogen (mg/dL)   Date Value   10/05/2021 35 (H)     Albumin (g/dL)   Date Value   10/05/2021 2.6 (L)     Hemoglobin (g/dL)   Date Value   10/05/2021 11.3 (L)   .    Above labs reviewed by me       Matt Méndez MD  "

## 2021-10-05 NOTE — CONSULTS
CRITICAL CARE CONSULT:    Assessment/Plan:  25F without significant PMhx, presented to the ER with high BP, nausea/vomiting, and headaches. Transferring to ICU with hypertensive emergency requiring nicardipine drip    RESP:  No issues, on room air    SpO2 goal 94-96%    Encourage OOB, PT/OT, push IS when able    CV:  Hypertensive emergency, SBP >200s with end-organ dysfunction/damage including neurologic disturances, renal failure, chest pain.     SBP goal <160, avoid rapid drops    Initiate nicardipine gtt ASAP    Check echo (ordered)    Troponin negative, continue to trend     Cont norvasc, coreg scheduled    Cont clonidine, hydralazine prn     NEURO:  Headaches due to HTN emergency.     Neuro exam non focal. No need for CT head at this time    BP control as above    Tylenol prn pain    Avoid NSAID    Cautious use of narcotics    Avoid benzo's if able    GI:  No issues other than n/v due to hypertensive emergency    Renal diet    NPO pMN for possible renal biopsy tomorrow    Bowel regimen prn     RENAL:  NAHUM, last baseline Scr 2017 was normal. Renal note reviewed, c/f SLE nephritis, malignant HTN, GN vs. TMA. UA showed hemturia, proteinuria. No obstructive uropathy on renal US    Appreciate renal eval    Follow Scr    Renal biopsy tomorrow? NPO pMN    No martinez needed, can use purewick if needed.     F/u multiple autoimmune serologies, complement levels, smear, HIV, hepatitis testing.    Cont NS @75cc/hr per renal     ID:  No issues    Monitor off abx    HEMATOLOGIC:  Mild anemia.    hgb >7    ENDOCRINE:  No issues currently.     FSBG checks, insulin sliding scale/drip per ICU protocol     ICU PROPHYLAXIS:    HSQ    Lines/Drains/Tubes:  PIV    CODE STATUS, DISPOSITION, FAMILY COMMUNICATION: full code  Updated pt and her sister    Restraints  Not needed        CCx: hypertensive emergency    HPI: 25F without significant PMhx, presented to the ER with high BP, nausea/vomiting, and headaches.  She is not pregnant.  No  significant medical or family history.   Normal renal function in 2017, with Scr 0.9  SBP 190s-200s despite beta blocker, clonidine, hydralazine, norvasc.  Transferring to ICU for cardene drip after [place] with [facility] [place]. She's been making urine; appears yellow.     Patient states she feels a bit better with SBP in 180s.     Past Medical History:  .History reviewed. No pertinent past medical history.      Past Surgical History:  Past Surgical History:   Procedure Laterality Date     NO PAST SURGERIES         Social History:  Social History     Socioeconomic History     Marital status: Single     Spouse name: Not on file     Number of children: Not on file     Years of education: Not on file     Highest education level: Not on file   Occupational History     Not on file   Tobacco Use     Smoking status: Never Smoker   Substance and Sexual Activity     Alcohol use: No     Drug use: No     Sexual activity: Not on file   Other Topics Concern     Not on file   Social History Narrative     Not on file     Social Determinants of Health     Financial Resource Strain:      Difficulty of Paying Living Expenses:    Food Insecurity:      Worried About Running Out of Food in the Last Year:      Ran Out of Food in the Last Year:    Transportation Needs:      Lack of Transportation (Medical):      Lack of Transportation (Non-Medical):    Physical Activity:      Days of Exercise per Week:      Minutes of Exercise per Session:    Stress:      Feeling of Stress :    Social Connections:      Frequency of Communication with Friends and Family:      Frequency of Social Gatherings with Friends and Family:      Attends [employer] Services:      Active Member of Clubs or Organizations:      Attends Club or Organization Meetings:      Marital Status:    Intimate Partner Violence:      Fear of Current or Ex-Partner:      Emotionally Abused:      Physically Abused:      Sexually Abused:        Family History:  History reviewed. No  pertinent family history.    Allergies:  No Known Allergies    MAR Reviewed      Physical Exam:  Vent settings for last 24 hours:     BP (!) 193/128   Pulse 110   Temp 98.4  F (36.9  C) (Oral)   Resp 16   Wt 81.6 kg (180 lb)   LMP 09/16/2021   SpO2 98%   BMI 34.01 kg/m      Intake/output data:  No intake or output data in the 24 hours ending 10/05/21 1826    Physical Exam  Gen: awake,alert, oriented, no distress  HEENT: NT, no JULIETA  CV: RRR, no m/g/r  Resp: CTAB  Abd: soft, nontender, BS+  Skin: no rashes or lesions  Ext: no edema  Neuro: PERRL, nonfocal exam    LAB:  @24HOURRESULTS@    IMAGING:  [unfilled]    Critical care attestation: not critically ill

## 2021-10-05 NOTE — ED TRIAGE NOTES
Patient arrives by private car for evaluation of hypertension.  Patient reports she was started on Imitrex a couple weeks ago and states she has taken 2 tablets and feels chest pain within 1 hour after taking the medication.  Reports chest pain now.

## 2021-10-06 ENCOUNTER — APPOINTMENT (OUTPATIENT)
Dept: CARDIOLOGY | Facility: HOSPITAL | Age: 26
DRG: 305 | End: 2021-10-06
Attending: INTERNAL MEDICINE
Payer: COMMERCIAL

## 2021-10-06 LAB
ALBUMIN SERPL-MCNC: 2.4 G/DL (ref 3.5–5)
ANION GAP SERPL CALCULATED.3IONS-SCNC: 6 MMOL/L (ref 5–18)
BI-PLANE LVEF ECHO: NORMAL
BUN SERPL-MCNC: 37 MG/DL (ref 8–22)
CALCIUM SERPL-MCNC: 8.7 MG/DL (ref 8.5–10.5)
CHLORIDE BLD-SCNC: 111 MMOL/L (ref 98–107)
CO2 SERPL-SCNC: 20 MMOL/L (ref 22–31)
CREAT SERPL-MCNC: 5.39 MG/DL (ref 0.6–1.1)
GFR SERPL CREATININE-BSD FRML MDRD: 10 ML/MIN/1.73M2
GLUCOSE BLD-MCNC: 107 MG/DL (ref 70–125)
HBV SURFACE AB SERPLBLD QL IA.RAPID: POSITIVE
HCV AB SERPL QL IA: NEGATIVE
PATH REPORT.COMMENTS IMP SPEC: NORMAL
PATH REPORT.COMMENTS IMP SPEC: NORMAL
PATH REPORT.FINAL DX SPEC: NORMAL
PATH REPORT.MICROSCOPIC SPEC OTHER STN: NORMAL
PATH REPORT.RELEVANT HX SPEC: NORMAL
PHOSPHATE SERPL-MCNC: 5.3 MG/DL (ref 2.5–4.5)
POTASSIUM BLD-SCNC: 4.9 MMOL/L (ref 3.5–5)
SODIUM SERPL-SCNC: 137 MMOL/L (ref 136–145)

## 2021-10-06 PROCEDURE — 250N000013 HC RX MED GY IP 250 OP 250 PS 637: Performed by: INTERNAL MEDICINE

## 2021-10-06 PROCEDURE — 99207 PR NO CHARGE FIRST FOLLOW UP PS: CPT | Performed by: INTERNAL MEDICINE

## 2021-10-06 PROCEDURE — 93306 TTE W/DOPPLER COMPLETE: CPT | Mod: 26 | Performed by: GENERAL ACUTE CARE HOSPITAL

## 2021-10-06 PROCEDURE — 210N000001 HC R&B IMCU HEART CARE

## 2021-10-06 PROCEDURE — 255N000002 HC RX 255 OP 636: Performed by: INTERNAL MEDICINE

## 2021-10-06 PROCEDURE — 80069 RENAL FUNCTION PANEL: CPT | Performed by: INTERNAL MEDICINE

## 2021-10-06 PROCEDURE — 85060 BLOOD SMEAR INTERPRETATION: CPT | Performed by: PATHOLOGY

## 2021-10-06 PROCEDURE — 36415 COLL VENOUS BLD VENIPUNCTURE: CPT | Performed by: INTERNAL MEDICINE

## 2021-10-06 PROCEDURE — 99233 SBSQ HOSP IP/OBS HIGH 50: CPT | Performed by: INTERNAL MEDICINE

## 2021-10-06 PROCEDURE — 250N000011 HC RX IP 250 OP 636: Performed by: INTERNAL MEDICINE

## 2021-10-06 RX ORDER — NALOXONE HYDROCHLORIDE 0.4 MG/ML
0.2 INJECTION, SOLUTION INTRAMUSCULAR; INTRAVENOUS; SUBCUTANEOUS
Status: DISCONTINUED | OUTPATIENT
Start: 2021-10-06 | End: 2021-10-09 | Stop reason: HOSPADM

## 2021-10-06 RX ORDER — OXYCODONE HYDROCHLORIDE 5 MG/1
5 TABLET ORAL EVERY 4 HOURS PRN
Status: DISCONTINUED | OUTPATIENT
Start: 2021-10-06 | End: 2021-10-09 | Stop reason: HOSPADM

## 2021-10-06 RX ORDER — HEPARIN SODIUM 5000 [USP'U]/.5ML
5000 INJECTION, SOLUTION INTRAVENOUS; SUBCUTANEOUS EVERY 8 HOURS
Status: CANCELLED | OUTPATIENT
Start: 2021-10-06 | End: 2021-10-06

## 2021-10-06 RX ORDER — NALOXONE HYDROCHLORIDE 0.4 MG/ML
0.4 INJECTION, SOLUTION INTRAMUSCULAR; INTRAVENOUS; SUBCUTANEOUS
Status: DISCONTINUED | OUTPATIENT
Start: 2021-10-06 | End: 2021-10-09 | Stop reason: HOSPADM

## 2021-10-06 RX ADMIN — CARVEDILOL 12.5 MG: 12.5 TABLET, FILM COATED ORAL at 09:35

## 2021-10-06 RX ADMIN — CARVEDILOL 12.5 MG: 12.5 TABLET, FILM COATED ORAL at 16:46

## 2021-10-06 RX ADMIN — ONDANSETRON 4 MG: 2 INJECTION INTRAMUSCULAR; INTRAVENOUS at 05:36

## 2021-10-06 RX ADMIN — AMLODIPINE BESYLATE 10 MG: 5 TABLET ORAL at 12:18

## 2021-10-06 RX ADMIN — ACETAMINOPHEN 650 MG: 325 TABLET ORAL at 09:46

## 2021-10-06 RX ADMIN — ACETAMINOPHEN 650 MG: 325 TABLET ORAL at 05:36

## 2021-10-06 RX ADMIN — HEPARIN SODIUM 5000 UNITS: 10000 INJECTION, SOLUTION INTRAVENOUS; SUBCUTANEOUS at 05:32

## 2021-10-06 RX ADMIN — HEPARIN SODIUM 5000 UNITS: 10000 INJECTION, SOLUTION INTRAVENOUS; SUBCUTANEOUS at 12:32

## 2021-10-06 RX ADMIN — PERFLUTREN 3 ML: 6.52 INJECTION, SUSPENSION INTRAVENOUS at 07:30

## 2021-10-06 ASSESSMENT — ACTIVITIES OF DAILY LIVING (ADL): DEPENDENT_IADLS:: INDEPENDENT

## 2021-10-06 NOTE — PROGRESS NOTES
Intensivist brief update    Patient off cardene drip.  BP much better  Improvement in her symptoms  Will transfer her back to cardiac tele so we can free up her bed to take ICU patients boarding in the ER.  hospitalist to assume care  Nephrology following  Pulm/CC will sign off, pls call with additional questions.    Loyd (Eugene) MD [place]  [facility]  Pager [phone]  Clinic [phone]  Fax [phone]

## 2021-10-06 NOTE — PROGRESS NOTES
RENAL PROGRESS NOTE     CC:     ROS: No new issues present. No nausea. No vomiting. No headache. No fevers. No angina. No shortness of breath. No itching. No uremic symptoms.     Assessment and Plan:    1. Acute Kidney Injury. Baseline creatinine presumably normal, creatinine was 0.92 (12/11/2017). Presented with serum creatinine 5.72 (10/5), some improvement with creatinine 5.39 (10/6). Urine output not quantified. Heavy proteinuria on urinalysis.     Etiology remains unclear, malignant hypertension, vs. GN vs. TMA.    Peripheral Blood Smear to assess for TMA pending (10/6)    Heavy proteinuria with RBCs on urinalysis.     Needs a kidney biopsy - blood pressure is adequate now.o    NSAIDs still a possibility but will be a diagnosis of exclusion once other more likely etiologies are evaluated.    Check UPCR, complement levels, VIANNEY, dsDNA, ANCA, anti-GBM, HIV, Hep B, Hep C, LDH, Hapto, peripheral smear, pregnancy test    NPO now for a kidney biopsy  2. Hypertensive urgency: Better blood pressure control on amlodipine 10 mg daily, Carvedilol 12.5 bid and clonidine, hydralazine prn.    Off nicardipine drip at present  3. Chest pain. Resolved. Due to uncontrolled hypertension.       Condition and plan of care discussed with patient and family at the bedside.    Jonnie Ramos MD  Nephrology    PHYSICAL EXAM  /78   Pulse 89   Temp 98.4  F (36.9  C)   Resp 21   Wt 78.1 kg (172 lb 3.2 oz)   LMP 09/16/2021   SpO2 100%   BMI 32.54 kg/m    /78   Pulse 89   Temp 98.4  F (36.9  C)   Resp 21   Wt 78.1 kg (172 lb 3.2 oz)   LMP 09/16/2021   SpO2 100%   BMI 32.54 kg/m        Intake/Output Summary (Last 24 hours) at 10/6/2021 0918  Last data filed at 10/6/2021 0300  Gross per 24 hour   Intake 1017.25 ml   Output --   Net 1017.25 ml     Resp: 21    Wt Readings from Last 3 Encounters:   10/06/21 78.1 kg (172 lb 3.2 oz)       GENERAL: No acute distress.  HEAD: Normal  HEENT: Equal pupils. Normal hearing.  No eyelid edema.  CARDIOVASCULAR: No JVD present.  No peripheral edema  PULMONARY: No respiratory distress. No cyanosis  GASTROINTESTINAL: No ascites present  MSK: No muscle mass, no joint deformities  NEURO: Alert, no gross focal findings  PSYCHIATRIC: Adequate mood and interaction  SKIN: Pale, no jaundice, no rash.    LABORATORIES  Recent Labs   Lab 10/05/21  1854 10/05/21  1054   WBC 10.1 8.1   HGB 12.3 11.3*   HCT 39.2 36.1    273     Recent Labs   Lab 10/06/21  0311 10/05/21  1054    138   CO2 20* 21*   BUN 37* 35*   ALKPHOS  --  66   ALT  --  <9   AST  --  12     No results for input(s): INR, PTT in the last 168 hours.    Invalid input(s): APTT  No results for input(s): ABORH in the last 168 hours.  Invalid input(s): MG    RADIOLOGY REPORTS    ECHOCARDIOGRAM    MEDICATIONS    amLODIPine  10 mg Oral Daily     carvedilol  12.5 mg Oral BID w/meals     heparin ANTICOAGULANT  5,000 Units Subcutaneous Q8H     sodium chloride (PF)  3 mL Intracatheter Q8H     acetaminophen, cloNIDine, hydrALAZINE, lidocaine 4%, lidocaine (buffered or not buffered), melatonin, ondansetron **OR** ondansetron, senna-docusate **OR** senna-docusate, sodium chloride (PF)          Above laboratories and medications were personally reviewed during evaluation today.

## 2021-10-06 NOTE — PROGRESS NOTES
[facility]    Medicine Progress Note - Hospitalist Service       Date of Admission:  10/5/2021    Assessment & Plan           Nuzhat Lopez is a 25 year old female with a history of migraine headache who presents to the ED for evaluation of elevated blood presure.      Hypertensive emergency: No history of hypertension.  Elevated to 201/121 on admission with chest pain and headache. Transferred to the ICU for nicardipine drip. BP now improved.  Echocardiogram is unremarkable.  - Continue amlodipine and carvidelol  - Hydralazine and clonidine prn. Adjust medication based on BP    Acute kidney injury: Creatinine 5.72 on admission. Unknown baseline. Electrolytes are normal. UA shows proteinuria and RBC. US shows atrophic kidneys and no evidence of a renal artery stenosis.  Creatinine improved to 5.39 today.   - Per nephrology: plan CT guided renal biopsy tomorrow  - Continue gentle IVF  - Monitor creatinine  - Workup sent: VIANNEY, dsDNA, ANCA, anti-GBM. Follow up results         Diet: NPO per Anesthesia Guidelines for Procedure/Surgery Except for: Meds  Renal Diet (non-dialysis)    DVT Prophylaxis: Low Risk/Ambulatory with no VTE prophylaxis indicated  Lockhart Catheter: Not present  Central Lines: None  Code Status: Full Code      Disposition Plan   Expected discharge: 10/08/2021       The patient's care was discussed with the Bedside Nurse, Care Coordinator/, Patient and Patient's Family.    Jax Larson MD  Hospitalist Service  [facility]  Securely message with the Vocera Web Console (learn more here)  Text page via [employer] Paging/Directory      ______________________________________________________________________    Interval History   Patient reports having headache today. Her chest pain has resolved. Patient's sister was by the bedside. Plan of care discussed.    Data reviewed today: I reviewed all medications, new labs and imaging results over the last 24  hours.     Physical Exam   Vital Signs: Temp: 97.8  F (36.6  C) Temp src: Axillary BP: 123/82 Pulse: 98   Resp: 18 SpO2: 97 % O2 Device: None (Room air)    Weight: 172 lbs 3.2 oz    General appearance: not in acute distress  HEENT: PERRL, EOMI  Lungs: Clear breath sounds in bilateral lung fields  Cardiovascular: Regular rate and rhythm, normal S1-S2  Abdomen: Soft, non tender, no distension  Musculoskeletal: No joint swelling  Skin: No rash and no edema  Neurology: AAO ×3.  Cranial nerves II - XII normal.  Normal muscle strength in all four extremities.    Data   Recent Labs   Lab 10/06/21  0311 10/05/21  1854 10/05/21  1054   WBC  --  10.1 8.1   HGB  --  12.3 11.3*   MCV  --  86 86   PLT  --  270 273     --  138   POTASSIUM 4.9  --  4.6   CHLORIDE 111*  --  110*   CO2 20*  --  21*   BUN 37*  --  35*   CR 5.39*  --  5.72*   ANIONGAP 6  --  7   DEEPTI 8.7  --  8.9     --  90   ALBUMIN 2.4*  --  2.6*   PROTTOTAL  --   --  6.4   BILITOTAL  --   --  0.3   ALKPHOS  --   --  66   ALT  --   --  <9   AST  --   --  12

## 2021-10-06 NOTE — PLAN OF CARE
Problem: Hypertension Acute  Goal: Blood Pressure Within Desired Range  Outcome: Improving IV drip off , po meds used  Intervention: Normalize Blood Pressure  Recent Flowsheet Documentation  Taken 10/6/2021 0800 by Mee Lopez RN  Sensory Stimulation Regulation: television on  Medication Review/Management: medications reviewed

## 2021-10-06 NOTE — PLAN OF CARE
"  Problem: Hypertension Acute  Goal: Blood Pressure Within Desired Range  Outcome: Improving   Nicardipine gtt infusing. NSR on monitor.  BP goal met and stable.  Pt with small H/a but states\"much better\".  Denies other c/o. Alert and oriented. Family updated via phone.     "

## 2021-10-07 ENCOUNTER — APPOINTMENT (OUTPATIENT)
Dept: CT IMAGING | Facility: HOSPITAL | Age: 26
DRG: 305 | End: 2021-10-07
Attending: INTERNAL MEDICINE
Payer: COMMERCIAL

## 2021-10-07 LAB
ALBUMIN MFR UR ELPH: 403.2 MG/DL
ALBUMIN SERPL-MCNC: 2.5 G/DL (ref 3.5–5)
ALBUMIN UR-MCNC: 300 MG/DL
ANA SER QL IF: NEGATIVE
ANCA AB PATTERN SER IF-IMP: NORMAL
ANION GAP SERPL CALCULATED.3IONS-SCNC: 7 MMOL/L (ref 5–18)
APPEARANCE UR: CLEAR
BACTERIA #/AREA URNS HPF: ABNORMAL /HPF
BILIRUB UR QL STRIP: NEGATIVE
BUN SERPL-MCNC: 35 MG/DL (ref 8–22)
C-ANCA TITR SER IF: NORMAL {TITER}
CALCIUM SERPL-MCNC: 8.6 MG/DL (ref 8.5–10.5)
CHLORIDE BLD-SCNC: 110 MMOL/L (ref 98–107)
CO2 SERPL-SCNC: 21 MMOL/L (ref 22–31)
COLOR UR AUTO: COLORLESS
CREAT SERPL-MCNC: 5.86 MG/DL (ref 0.6–1.1)
CREAT UR-MCNC: 83 MG/DL
DSDNA AB SER-ACNC: 1.4 IU/ML
GFR SERPL CREATININE-BSD FRML MDRD: 9 ML/MIN/1.73M2
GLUCOSE BLD-MCNC: 96 MG/DL (ref 70–125)
GLUCOSE UR STRIP-MCNC: NEGATIVE MG/DL
HGB UR QL STRIP: ABNORMAL
INR PPP: 0.94 (ref 0.9–1.15)
KETONES UR STRIP-MCNC: NEGATIVE MG/DL
LEUKOCYTE ESTERASE UR QL STRIP: NEGATIVE
NITRATE UR QL: NEGATIVE
PH UR STRIP: 7 [PH] (ref 5–7)
PHOSPHATE SERPL-MCNC: 5.6 MG/DL (ref 2.5–4.5)
POTASSIUM BLD-SCNC: 4.5 MMOL/L (ref 3.5–5)
PROT/CREAT 24H UR: 4.86 MG/MG CR
RBC URINE: 7 /HPF
SODIUM SERPL-SCNC: 138 MMOL/L (ref 136–145)
SP GR UR STRIP: 1.01 (ref 1–1.03)
SQUAMOUS EPITHELIAL: 2 /HPF
TOTAL PROTEIN SERUM FOR ELP: 6.3 G/DL (ref 6–8)
UROBILINOGEN UR STRIP-MCNC: <2 MG/DL
WBC URINE: 2 /HPF

## 2021-10-07 PROCEDURE — 250N000013 HC RX MED GY IP 250 OP 250 PS 637: Performed by: INTERNAL MEDICINE

## 2021-10-07 PROCEDURE — 84156 ASSAY OF PROTEIN URINE: CPT | Performed by: INTERNAL MEDICINE

## 2021-10-07 PROCEDURE — 88313 SPECIAL STAINS GROUP 2: CPT | Performed by: INTERNAL MEDICINE

## 2021-10-07 PROCEDURE — 80069 RENAL FUNCTION PANEL: CPT | Performed by: INTERNAL MEDICINE

## 2021-10-07 PROCEDURE — 250N000011 HC RX IP 250 OP 636: Performed by: RADIOLOGY

## 2021-10-07 PROCEDURE — 210N000001 HC R&B IMCU HEART CARE

## 2021-10-07 PROCEDURE — 77012 CT SCAN FOR NEEDLE BIOPSY: CPT

## 2021-10-07 PROCEDURE — 88305 TISSUE EXAM BY PATHOLOGIST: CPT | Performed by: INTERNAL MEDICINE

## 2021-10-07 PROCEDURE — 81001 URINALYSIS AUTO W/SCOPE: CPT | Performed by: INTERNAL MEDICINE

## 2021-10-07 PROCEDURE — 85610 PROTHROMBIN TIME: CPT | Performed by: RADIOLOGY

## 2021-10-07 PROCEDURE — 99232 SBSQ HOSP IP/OBS MODERATE 35: CPT | Performed by: INTERNAL MEDICINE

## 2021-10-07 PROCEDURE — 272N000431 CT RENAL BIOPSY PERCUTANEOUS

## 2021-10-07 PROCEDURE — 36415 COLL VENOUS BLD VENIPUNCTURE: CPT | Performed by: INTERNAL MEDICINE

## 2021-10-07 RX ORDER — FENTANYL CITRATE 50 UG/ML
INJECTION, SOLUTION INTRAMUSCULAR; INTRAVENOUS PRN
Status: COMPLETED | OUTPATIENT
Start: 2021-10-07 | End: 2021-10-07

## 2021-10-07 RX ADMIN — MIDAZOLAM 1 MG: 1 INJECTION INTRAMUSCULAR; INTRAVENOUS at 13:36

## 2021-10-07 RX ADMIN — CARVEDILOL 12.5 MG: 12.5 TABLET, FILM COATED ORAL at 16:47

## 2021-10-07 RX ADMIN — CARVEDILOL 12.5 MG: 12.5 TABLET, FILM COATED ORAL at 09:15

## 2021-10-07 RX ADMIN — FENTANYL CITRATE 50 MCG: 50 INJECTION, SOLUTION INTRAMUSCULAR; INTRAVENOUS at 13:32

## 2021-10-07 RX ADMIN — AMLODIPINE BESYLATE 10 MG: 5 TABLET ORAL at 09:15

## 2021-10-07 RX ADMIN — FENTANYL CITRATE 50 MCG: 50 INJECTION, SOLUTION INTRAMUSCULAR; INTRAVENOUS at 13:38

## 2021-10-07 RX ADMIN — OXYCODONE HYDROCHLORIDE 5 MG: 5 TABLET ORAL at 18:50

## 2021-10-07 RX ADMIN — MIDAZOLAM 1 MG: 1 INJECTION INTRAMUSCULAR; INTRAVENOUS at 13:30

## 2021-10-07 NOTE — PROGRESS NOTES
[facility]    Medicine Progress Note - Hospitalist Service       Date of Admission:  10/5/2021    Assessment & Plan           Nuzhat Lopez is a 25 year old female with a history of migraine headache who presents to the ED for evaluation of elevated blood presure.      Hypertensive emergency: No history of hypertension.  Elevated to 201/121 on admission with chest pain and headache. Transferred to the ICU for nicardipine drip. BP now improved.  Echocardiogram is unremarkable.  - Continue amlodipine and carvidelol  - Hydralazine and clonidine prn. Adjust medication based on BP    Acute kidney injury: Creatinine 5.72 on admission. Unknown baseline. Electrolytes are normal. UA shows proteinuria and RBC. US shows atrophic kidneys and no evidence of a renal artery stenosis.  Creatinine improved to 5.86 today. Completed renal biopsy today.  - Follow up biopsy result  - Workup sent: VIANNEY, dsDNA, ANCA, anti-GBM. Follow up results  - Monitor creatinine  - Nephrology input appreciated       Diet: Advance Diet as Tolerated: Regular Diet Adult    DVT Prophylaxis: Low Risk/Ambulatory with no VTE prophylaxis indicated  Lockhart Catheter: Not present  Central Lines: None  Code Status: Full Code      Disposition Plan   Expected discharge: 10/08/2021       The patient's care was discussed with the Bedside Nurse, Care Coordinator/, Patient and Patient's Family.    Jax Larson MD  Hospitalist Service  [facility]  Securely message with the Vocera Web Console (learn more here)  Text page via [employer] Paging/Directory      ______________________________________________________________________    Interval History   Patient reports feeling a lot better today. No headache and no chest pain. Patient's sister was by the bedside today. Plan of are discussed in details.     Data reviewed today: I reviewed all medications, new labs and imaging results over the last 24 hours.     Physical Exam    Vital Signs: Temp: 98  F (36.7  C) Temp src: Oral BP: 139/89 Pulse: 96   Resp: 18 SpO2: 100 % O2 Device: None (Room air) Oxygen Delivery: 2 LPM  Weight: 180 lbs 0 oz    General appearance: not in acute distress  HEENT: PERRL, EOMI  Lungs: Clear breath sounds in bilateral lung fields  Cardiovascular: Regular rate and rhythm, normal S1-S2  Abdomen: Soft, non tender, no distension  Musculoskeletal: No joint swelling  Skin: No rash and no edema  Neurology: AAO ×3.  Cranial nerves II - XII normal.  Normal muscle strength in all four extremities.    Data   Recent Labs   Lab 10/07/21  0439 10/06/21  0311 10/05/21  1854 10/05/21  1054 10/05/21  1054   WBC  --   --  10.1  --  8.1   HGB  --   --  12.3  --  11.3*   MCV  --   --  86  --  86   PLT  --   --  270  --  273   INR 0.94  --   --   --   --     137  --   --  138   POTASSIUM 4.5 4.9  --   --  4.6   CHLORIDE 110* 111*  --   --  110*   CO2 21* 20*  --   --  21*   BUN 35* 37*  --   --  35*   CR 5.86* 5.39*  --   --  5.72*   ANIONGAP 7 6  --   --  7   DEEPTI 8.6 8.7  --   --  8.9   GLC 96 107  --   --  90   ALBUMIN 2.5* 2.4*  --    < > 2.6*   PROTTOTAL  --   --   --   --  6.4   BILITOTAL  --   --   --   --  0.3   ALKPHOS  --   --   --   --  66   ALT  --   --   --   --  <9   AST  --   --   --   --  12    < > = values in this interval not displayed.

## 2021-10-07 NOTE — PROGRESS NOTES
Care Management Follow Up    Length of Stay (days): 2    Expected Discharge Date: 10/08/2021     Concerns to be Addressed:     Kidney biopsy today, elevated bp, monitor labs, Neph following  Patient plan of care discussed at interdisciplinary rounds: Yes    Anticipated Discharge Disposition:  home     Anticipated Discharge Services:  none  Anticipated Discharge DME:      Patient/family educated on Medicare website which has current facility and service quality ratings:    Education Provided on the Discharge Plan:    Patient/Family in Agreement with the Plan:      Referrals Placed by CM/SW:    Private pay costs discussed: Not applicable    Additional Information:  No home care needs identified at discharge.      Lizbeth Ballard RN        General

## 2021-10-07 NOTE — PRE-PROCEDURE
GENERAL PRE-PROCEDURE:   Procedure:  CT guided renal biopsy with moderate sedation  Date/Time:  10/7/2021 1:14 PM    Written consent obtained?: Yes    Risks and benefits: Risks, benefits and alternatives were discussed    Consent given by:  Patient  Patient states understanding of procedure being performed: Yes    Patient's understanding of procedure matches consent: Yes    Procedure consent matches procedure scheduled: Yes    Expected level of sedation:  Moderate  Appropriately NPO:  Yes  ASA Class:  2  Mallampati  :  Grade 2- soft palate, base of uvula, tonsillar pillars, and portion of posterior pharyngeal wall visible  Lungs:  Lungs clear with good breath sounds bilaterally  Heart:  Normal heart sounds and rate  History & Physical reviewed:  History and physical reviewed and no updates needed  Statement of review:  I have reviewed the lab findings, diagnostic data, medications, and the plan for sedation

## 2021-10-07 NOTE — PROGRESS NOTES
Pt transported back to room 311 post renal biopsy.  Report given to receiving RN without questions or concerns.

## 2021-10-07 NOTE — PROCEDURES
[facility]    Procedure: Imaging Procedure Note    Date/Time: 10/7/2021 1:09 PM  Performed by: Tano Banuelos DO  Authorized by: Tano Banuelos DO     UNIVERSAL PROTOCOL   Site Marked: Yes  Prior Images Obtained and Reviewed:  Yes  Required items: Required blood products, implants, devices and special equipment available    Patient identity confirmed:  Verbally with patient and provided demographic data  Patient was reevaluated immediately before administering moderate or deep sedation or anesthesia  Confirmation Checklist:  Patient's identity using two indicators, relevant allergies, procedure was appropriate and matched the consent or emergent situation and correct equipment/implants were available  Time out: Immediately prior to the procedure a time out was called    Universal Protocol: the Joint Commission Universal Protocol was followed    Preparation: Patient was prepped and draped in usual sterile fashion    ESBL (mL):  0         ANESTHESIA    Local Anesthetic: Lidocaine 1% without epinephrine  Anesthetic Total (mL):  10      SEDATION    Patient Sedated: Yes    Sedation:  Fentanyl and midazolam  Vital signs: Vital signs monitored during sedation    See dictated procedure note for full details.  PROCEDURE   Patient Tolerance:  Patient tolerated the procedure well with no immediate complications  Describe Procedure: EXAM:  1. LEFT RENAL BIOPSY PERCUTANEOUS  2. CT-GUIDANCE  3. CONSCIOUS SEDATION    LOCATION: [facility]  DATE/TIME: 10/7/2021 1:09 PM    INDICATION: Acute kidney injury, hematuria, proteinuria.  TECHNIQUE: Dose reduction techniques were used.    PROCEDURE: Informed consent obtained. Time out performed. The patient was placed into prone position. Left flank was prepped and draped in sterile fashion. In addition to moderate sedation, 10 mL of 1% lidocaine was infused into the local soft tissues. Under CT guidance, a 17 gauge guiding needle  was placed along the renal cortex. Coaxial 18 gauge needle was used to make 3 core biopsies. Tissue submitted in routine renal biopsy tubes.    No complications.    SEDATION: Versed 2 mg. Fentanyl 100 mcg. The procedure was performed with administration intravenous conscious sedation with appropriate preoperative, intraoperative, and postoperative evaluation.    15 minutes of supervised face to face conscious sedation time was provided by a radiology nurse under my direct supervision.    IMPRESSION:  Successful CT-guided left renal biopsy.      Length of time physician/provider present for 1:1 monitoring during sedation: 15

## 2021-10-07 NOTE — PLAN OF CARE
Problem: Adult Inpatient Plan of Care  Goal: Plan of Care Review  Outcome: Improving     Pt denies pain, NPO since midnight for Kidney Biopsy today.  Urine sample sent down to lab.  Pt verbalized understanding of POC.

## 2021-10-07 NOTE — PROGRESS NOTES
RENAL PROGRESS NOTE     CC:     ROS: Since last visit, underwent renal biopsy. Remains quite sleepy following the procedure. No back pain. Denies sob and uremic symptoms.      Assessment and Plan:    1. Acute Kidney Injury. Baseline creatinine presumably normal, creatinine was 0.92 (12/11/2017). Presented with serum creatinine 5.72 (10/5), some improvement with creatinine 5.39 (10/6). Urine output not quantified. Heavy proteinuria on urinalysis. Cr up a bit today, likely due to decreased renal perfusion with well controlled BP.    Etiology remains unclear, malignant hypertension, vs. GN vs. TMA.    Peripheral Blood Smear normal    Heavy proteinuria with RBCs on urinalysis.     Needs a kidney biopsy - completed 10/7, results pending    NSAIDs still a possibility but will be a diagnosis of exclusion once other more likely etiologies are evaluated.    Check UPCR nephrotic range >11, complement levels normal. HIV neg, Hep B neg, Hep C neg Pending serologies: VIANNEY, dsDNA, ANCA, anti-GBM, LDH, Hapto    Renal US with small kidneys, suggesting long standing renal disease. Unlikely to regain much renal function. Will need close follow up after discharge. Will likely require dialysis within the next couple of months. Should be referred to transplant clinic asap after discharge.   2. Hypertensive urgency: Blood pressure well controled on amlodipine 10 mg daily, Carvedilol 12.5 bid     Off nicardipine drip at present  3. Chest pain. Resolved. Due to uncontrolled hypertension.       Condition and plan of care discussed with patient, though she was quite sleepy.    Lucille Joshi, EVELYN  [facility]  [phone] (office)  [phone] ([facility])      PHYSICAL EXAM  BP (!) 121/93 (BP Location: Right arm)   Pulse 81   Temp 98.4  F (36.9  C) (Oral)   Resp 16   Wt 81.6 kg (180 lb)   LMP 09/16/2021   SpO2 99%   BMI 34.01 kg/m    BP (!) 121/93 (BP Location: Right arm)   Pulse 81   Temp 98.4  F (36.9  C)  (Oral)   Resp 16   Wt 81.6 kg (180 lb)   LMP 09/16/2021   SpO2 99%   BMI 34.01 kg/m        Intake/Output Summary (Last 24 hours) at 10/6/2021 0918  Last data filed at 10/6/2021 0300  Gross per 24 hour   Intake 1017.25 ml   Output --   Net 1017.25 ml     Resp: 16    Wt Readings from Last 3 Encounters:   10/07/21 81.6 kg (180 lb)       GENERAL: No acute distress, laying flat in bed, sleepy. Well appearing  HEAD: Normal  HEENT: Equal pupils. Normal hearing. No eyelid edema.  CARDIOVASCULAR: RRR No JVD present.  No peripheral edema  PULMONARY: CTA, bilaterally. No respiratory distress. No cyanosis  GASTROINTESTINAL: No ascites present  MSK: Normal muscle mass, no joint deformities  NEURO: Alert, no gross focal findings  PSYCHIATRIC: Adequate mood and interaction  SKIN: Pale, no jaundice, no rash.    LABORATORIES  Recent Labs   Lab 10/05/21  1854 10/05/21  1054   WBC 10.1 8.1   HGB 12.3 11.3*   HCT 39.2 36.1    273     Recent Labs   Lab 10/07/21  0439 10/06/21  0311 10/05/21  1054    137 138   CO2 21* 20* 21*   BUN 35* 37* 35*   ALKPHOS  --   --  66   ALT  --   --  <9   AST  --   --  12     Recent Labs   Lab 10/07/21  0439   INR 0.94     No results for input(s): ABORH in the last 168 hours.  Invalid input(s): MG    RADIOLOGY REPORTS    ECHOCARDIOGRAM    MEDICATIONS    amLODIPine  10 mg Oral Daily     carvedilol  12.5 mg Oral BID w/meals     [START ON 10/8/2021] influenza quadrivalent (PF) vacc  0.5 mL Intramuscular Prior to discharge     sodium chloride (PF)  3 mL Intracatheter Q8H     acetaminophen, cloNIDine, hydrALAZINE, lidocaine 4%, lidocaine (buffered or not buffered), melatonin, naloxone **OR** naloxone **OR** naloxone **OR** naloxone, ondansetron **OR** ondansetron, oxyCODONE, senna-docusate **OR** senna-docusate, sodium chloride (PF)          Above laboratories and medications were personally reviewed during evaluation today.

## 2021-10-07 NOTE — PLAN OF CARE
Problem: Adult Inpatient Plan of Care  Goal: Optimal Comfort and Wellbeing  Outcome: Improving     Problem: Hypertension Acute  Goal: Blood Pressure Within Desired Range  Outcome: Improving     Pt's blood pressures have been 120's / 80's this evening.  She reports ongoing headaches that come and go throughout the day but denied having any pain each time this writer was present in the room.  Telemetry showing normal sinus rhythm.  Plan for kidney biopsy tomorrow.  Pt educated about not eating/drinking after midnight - she verbalized understanding.

## 2021-10-07 NOTE — PLAN OF CARE
Problem: Adult Inpatient Plan of Care  Goal: Optimal Comfort and Wellbeing  Outcome: Improving     Problem: Hypertension Acute  Goal: Blood Pressure Within Desired Range  Outcome: Improving     Pt denied pain stating she feels much better today.  BP in the 130's systolic.  Pt currently having renal biopsy.  Will continue to monitor pt when she returns.

## 2021-10-08 LAB
ALBUMIN PERCENT: 48.8 % (ref 51–67)
ALBUMIN SERPL ELPH-MCNC: 3.1 G/DL (ref 3.2–4.7)
ALPHA 1 PERCENT: 2.2 % (ref 2–4)
ALPHA 2 PERCENT: 14.9 % (ref 5–13)
ALPHA1 GLOB SERPL ELPH-MCNC: 0.1 G/DL (ref 0.1–0.3)
ALPHA2 GLOB SERPL ELPH-MCNC: 0.9 G/DL (ref 0.4–0.9)
ANION GAP SERPL CALCULATED.3IONS-SCNC: 6 MMOL/L (ref 5–18)
B-GLOBULIN SERPL ELPH-MCNC: 1.1 G/DL (ref 0.7–1.2)
BETA PERCENT: 16.8 % (ref 10–17)
BUN SERPL-MCNC: 39 MG/DL (ref 8–22)
CALCIUM SERPL-MCNC: 8.8 MG/DL (ref 8.5–10.5)
CHLORIDE BLD-SCNC: 108 MMOL/L (ref 98–107)
CO2 SERPL-SCNC: 22 MMOL/L (ref 22–31)
CREAT SERPL-MCNC: 6.19 MG/DL (ref 0.6–1.1)
GAMMA GLOB SERPL ELPH-MCNC: 1.1 G/DL (ref 0.6–1.4)
GAMMA GLOBULIN PERCENT: 17.3 % (ref 9–20)
GBM IGG SER IA-ACNC: <2.4 U/ML
GBM IGG SER IA-ACNC: NEGATIVE
GFR SERPL CREATININE-BSD FRML MDRD: 9 ML/MIN/1.73M2
GLUCOSE BLD-MCNC: 113 MG/DL (ref 70–125)
PATH ICD:: ABNORMAL
POTASSIUM BLD-SCNC: 5.3 MMOL/L (ref 3.5–5)
PROT PATTERN SERPL ELPH-IMP: ABNORMAL
REVIEWING PATHOLOGIST: ABNORMAL
SODIUM SERPL-SCNC: 136 MMOL/L (ref 136–145)
TOTAL PROTEIN SERUM FOR ELP (SYNCED VALUE): 6.3 G/DL

## 2021-10-08 PROCEDURE — G0008 ADMIN INFLUENZA VIRUS VAC: HCPCS | Performed by: INTERNAL MEDICINE

## 2021-10-08 PROCEDURE — 120N000004 HC R&B MS OVERFLOW

## 2021-10-08 PROCEDURE — 36415 COLL VENOUS BLD VENIPUNCTURE: CPT | Performed by: INTERNAL MEDICINE

## 2021-10-08 PROCEDURE — 99232 SBSQ HOSP IP/OBS MODERATE 35: CPT | Performed by: INTERNAL MEDICINE

## 2021-10-08 PROCEDURE — 250N000013 HC RX MED GY IP 250 OP 250 PS 637: Performed by: INTERNAL MEDICINE

## 2021-10-08 PROCEDURE — 250N000011 HC RX IP 250 OP 636: Performed by: INTERNAL MEDICINE

## 2021-10-08 PROCEDURE — 90686 IIV4 VACC NO PRSV 0.5 ML IM: CPT | Performed by: INTERNAL MEDICINE

## 2021-10-08 PROCEDURE — 82310 ASSAY OF CALCIUM: CPT | Performed by: INTERNAL MEDICINE

## 2021-10-08 RX ORDER — CARVEDILOL 12.5 MG/1
12.5 TABLET ORAL 2 TIMES DAILY WITH MEALS
Qty: 60 TABLET | Refills: 0 | Status: ON HOLD | OUTPATIENT
Start: 2021-10-08 | End: 2023-12-11

## 2021-10-08 RX ORDER — OXYCODONE HYDROCHLORIDE 5 MG/1
5 TABLET ORAL EVERY 6 HOURS PRN
Qty: 8 TABLET | Refills: 0 | Status: SHIPPED | OUTPATIENT
Start: 2021-10-08 | End: 2023-11-28

## 2021-10-08 RX ORDER — AMLODIPINE BESYLATE 10 MG/1
10 TABLET ORAL DAILY
Qty: 30 TABLET | Refills: 0 | Status: SHIPPED | OUTPATIENT
Start: 2021-10-09 | End: 2023-11-28

## 2021-10-08 RX ADMIN — INFLUENZA A VIRUS A/VICTORIA/2570/2019 IVR-215 (H1N1) ANTIGEN (FORMALDEHYDE INACTIVATED), INFLUENZA A VIRUS A/TASMANIA/503/2020 IVR-221 (H3N2) ANTIGEN (FORMALDEHYDE INACTIVATED), INFLUENZA B VIRUS B/PHUKET/3073/2013 ANTIGEN (FORMALDEHYDE INACTIVATED), AND INFLUENZA B VIRUS B/WASHINGTON/02/2019 ANTIGEN (FORMALDEHYDE INACTIVATED) 0.5 ML: 15; 15; 15; 15 INJECTION, SUSPENSION INTRAMUSCULAR at 13:17

## 2021-10-08 RX ADMIN — AMLODIPINE BESYLATE 10 MG: 5 TABLET ORAL at 09:31

## 2021-10-08 RX ADMIN — CARVEDILOL 12.5 MG: 12.5 TABLET, FILM COATED ORAL at 17:19

## 2021-10-08 RX ADMIN — CARVEDILOL 12.5 MG: 12.5 TABLET, FILM COATED ORAL at 09:32

## 2021-10-08 NOTE — PROGRESS NOTES
Renal  See note earlier today.    Follow up creatinine 6.19 (10/8) up from 5.86 (10/7) and 5.39 (10/6).  Potassium 5.3 with good urine output.    Will cancel discharge and await renal creatinine on 10/9, pathologist will call me on 10/9 in the morning with preliminary results of biopsy.    Discharge plans for tomorrow pending renal function results.     Discussed with patient and sister at the bedside.    Jonnie Ramos MD

## 2021-10-08 NOTE — DISCHARGE INSTRUCTIONS
Diet for Chronic Kidney Disease   Following a special diet when you have kidney disease can help you stay as healthy as possible. Your healthcare provider or dietitian should make a special diet plan just for you.    Eating right  Here are some good eating rules to follow:    Protein. Eating protein is important for your body. But too much protein can put a strain on your kidneys. Eating less protein may slow the progression of chronic kidney disease. Foods high in protein include meat, fish, eggs, cheese, and other dairy products. A registered dietitian can help you plan a diet that has the right amount of protein for you.    Sodium. Having too much salt in your diet can make your body hold onto (retain) water. Ask your provider or dietitian how much sodium per day you are allowed. This will help you prevent fluid buildup in your body (fluid retention). It can also help control high blood pressure. Learn to read food labels to know how much sodium is in one serving. Foods high in salt include processed meats, canned and boxed foods, sauces, salted chips and snacks, pickled foods, frozen dinners, and restaurant and fast food.    Fluids. If you have advanced kidney disease, you will need to limit the water and fluids you drink. If you don t, then too much water will build up in your body. The exact amount of fluid you can drink depends on how well your kidneys are working. Ask your provider how much water you can safely drink each day.    Potassium. In advanced kidney disease, your potassium level can get dangerously high. This affects your heart. It can cause an irregular heartbeat (arrhythmia). Ask your provider or dietitian if you should limit potassium in your diet. Foods high in potassium include dairy products (milk, yogurt, cheese), dried beans, bananas, oranges, potatoes, tomatoes, spinach, cantaloupe, honeydew melon, dried fruits, and nuts. Some salt substitutes also contain potassium, so be sure to read  the label before using.    Calcium. Calcium is important to build strong bones. But foods high in calcium are also high in phosphorus, which can take calcium from your bones. Limiting foods high in phosphorus will help keep calcium in your bones. Ask your provider how much calcium you should get each day.    Phosphorus. In advanced kidney disease, your phosphorus level can get dangerously high. This affects many systems in the body and can damage your heart. Limit your intake of phosphorus-rich foods. These include dried beans and peas, nuts, peanut butter, cocoa, beer, cola drinks, and dairy products.  [employer] last reviewed this educational content on 10/1/2019    4697-1865 [employer]. All rights reserved. This information is not intended as a substitute for professional medical care. Always follow your healthcare professional's instructions.

## 2021-10-08 NOTE — PLAN OF CARE
Patient denied pain and was able to sleep for majority of the night. Tele NSR. Patient safe while independent in the room. Blood pressures have improved; 113/68 and 127/87. Potential for discharge today; TBD: sister to transport.     Problem: Adult Inpatient Plan of Care  Goal: Optimal Comfort and Wellbeing  Outcome: Improving     Problem: Risk for Delirium  Goal: Improved Sleep  Outcome: Improving     Problem: Hypertension Acute  Goal: Blood Pressure Within Desired Range  Outcome: Improving  Intervention: Normalize Blood Pressure  Recent Flowsheet Documentation  Taken 10/8/2021 0057 by Lizbeth Thomas, RN  Medication Review/Management: medications reviewed

## 2021-10-08 NOTE — PLAN OF CARE
Problem: Hypertension Acute  Goal: Blood Pressure Within Desired Range  Outcome: Improving  Intervention: Normalize Blood Pressure     Problem: Adult Inpatient Plan of Care  Goal: Optimal Comfort and Wellbeing  Outcome: Improving     Tele Rhythm- Normal Sinus Rhythm. SBP- 120-140's, on scheduled Coreg. Pt is awake, alert and oriented x 4 this shift. Tolerated Fluids, diet advance to regular but has poor appetite. Bedrest x 6H. Biopsy site is covered with bandaid, no bleeding noted. C/o dizziness upon getting up after bedrest but stable on ambulation to the bathroom. PRN Oxycodone was given for migraine headache, effective.

## 2021-10-08 NOTE — PROGRESS NOTES
RENAL PROGRESS NOTE     CC:     ROS: Since last visit, underwent renal biopsy on 10/7.   Patient tells me that she is doing well today, she denies any chest pain, shortness of breath, no nausea, no vomiting, no abdominal pain, no headache, no uremic symptoms.  Denies any peripheral edema.    Sister at the bedside, sister at the bedside who is a nurse practitioner tells me that her blood pressure was completely normal 2 months ago    Assessment and Plan:    1. Acute Kidney Injury. Baseline creatinine presumably normal, creatinine was 0.92 (12/11/2017). Presented with serum creatinine 5.72 (10/5), some improvement with creatinine 5.39 (10/6) Creatinine 5.86 (10/7). Urine output not quantified. Heavy proteinuria on urinalysis. Cr up a bit today, likely due to decreased renal perfusion with well controlled BP.    Etiology remains unclear, kidney biopsy done on 10/7, result will be available on 10/9  (I spoke with [facility] today).    Peripheral Blood Smear normal    Heavy proteinuria with RBCs on urinalysis.     Kidney biopsy - completed 10/7, results pending    NSAIDs still a possibility but will be a diagnosis of exclusion once other more likely etiologies are evaluated.    Check UPCR nephrotic range >11    Complement levels normal. HIV neg, Hep B neg, Hep C neg     VIANNEY negative, dsDNA negative, ANCA negative    Anti-GBM pending.    Renal US with small kidneys, suggesting long standing renal disease. Unlikely to regain much renal function. Will need close follow up after discharge. Will likely require dialysis within the next couple of months. Should be referred to transplant clinic asa after discharge.     Check BMP today (10/8) if renal function remains stable patient could be discharged today, out office will call patient to schedule follow up with Dr. Ramos and laboratories done this coming week (mid week).  2. Hypertensive urgency: Blood pressure well controled on amlodipine 10 mg daily, Carvedilol  12.5 bid     Off nicardipine drip at present  3. Chest pain. Resolved. Due to uncontrolled hypertension.       Condition and plan of care discussed with patient and sister (Nurse Practitioner) at the bedside.  Discussed with Dr. Neo Ramos MD  [facility]  [phone] (office)        PHYSICAL EXAM  /71 (BP Location: Right arm)   Pulse 89   Temp 98.1  F (36.7  C) (Oral)   Resp 16   Wt 80.6 kg (177 lb 9.6 oz)   LMP 09/16/2021   SpO2 97%   BMI 33.56 kg/m        Intake/Output Summary (Last 24 hours) at 10/6/2021 0918  Last data filed at 10/6/2021 0300  Gross per 24 hour   Intake 1017.25 ml   Output --   Net 1017.25 ml     Resp: 16    Wt Readings from Last 3 Encounters:   10/08/21 80.6 kg (177 lb 9.6 oz)       GENERAL: No acute distress, laying flat in bed, sleepy. Well appearing  HEAD: Normal  HEENT: Equal pupils. Normal hearing. No eyelid edema.  CARDIOVASCULAR: RRR No JVD present.  No peripheral edema  PULMONARY: CTA, bilaterally. No respiratory distress. No cyanosis  GASTROINTESTINAL: No ascites present  MSK: Normal muscle mass, no joint deformities  NEURO: Alert, no gross focal findings  PSYCHIATRIC: Adequate mood and interaction  SKIN: Pale, no jaundice, no rash.    LABORATORIES  Recent Labs   Lab 10/05/21  1854 10/05/21  1054   WBC 10.1 8.1   HGB 12.3 11.3*   HCT 39.2 36.1    273     Recent Labs   Lab 10/07/21  0439 10/06/21  0311 10/05/21  1054    137 138   CO2 21* 20* 21*   BUN 35* 37* 35*   ALKPHOS  --   --  66   ALT  --   --  <9   AST  --   --  12     Recent Labs   Lab 10/07/21  0439   INR 0.94     No results for input(s): ABORH in the last 168 hours.  Invalid input(s): MG    RADIOLOGY REPORTS    ECHOCARDIOGRAM    MEDICATIONS    amLODIPine  10 mg Oral Daily     carvedilol  12.5 mg Oral BID w/meals     sodium chloride (PF)  3 mL Intracatheter Q8H     acetaminophen, cloNIDine, hydrALAZINE, lidocaine 4%, lidocaine (buffered or not buffered),  melatonin, naloxone **OR** naloxone **OR** naloxone **OR** naloxone, ondansetron **OR** ondansetron, oxyCODONE, senna-docusate **OR** senna-docusate, sodium chloride (PF)          Above laboratories and medications were personally reviewed during evaluation today.

## 2021-10-08 NOTE — PROGRESS NOTES
NUTRITION EDUCATION      REASON FOR ASSESSMENT:  Consult to educate on renal diet    CURRENT DIET:  Regular    NUTRITION DIAGNOSIS:  Food- and nutrition-related knowledge deficit R/t Acute kidney injury as evidenced by consult for renal diet education    INTERVENTIONS:    Nutrition Prescription:  Low potassium, low phosphorus, low sodium, no excessive protein    Implementation:      *  Nutrition Education (Content):   A)  Provided handout Renal diet: food lists, potassium sheet, survival guidelines    B)  Discussed decreased sodium, potassium , phosphorus and sodium.  No excessive protein (~ 5oz meat or protein/day)      *  Nutrition Education (Application):   A)  Discussed current eating habits and recommended alternative food choices      *  Anticipate good compliance      *  Diet Education - refer to Education Flowsheet    Goals:      *  Patient will verbalize understanding of diet by asking appropriate questions on the renal diet-met      *  All of the above goals met during the education session    Follow Up/Monitoring:      *  Provided RD contact information for future questions      *  Recommended Out-Patient Nutrition Referral, if further diet instructions are needed

## 2021-10-08 NOTE — PLAN OF CARE
Problem: Hypertension Acute  Goal: Blood Pressure Within Desired Range  Outcome: Improving     Problem: Adult Inpatient Plan of Care  Goal: Optimal Comfort and Wellbeing  Outcome: Improving     Pt denied pain today.  BP WNL.  Writer went to discharge pt and after paperwork reviewed Dr. Ramos came in and ordered to cancel discharge due to further increase in creatinine.  Dr. Larson updated on this.  Discharge cancelled.  Ok to leave IV's out and pt off telemetry.  Pt and sister updated and ok with staying another night.  BMP ordered for am.  Renal biopsy still pending.  Will continue to monitor pt for any changes.

## 2021-10-08 NOTE — PROGRESS NOTES
[facility]    Medicine Progress Note - Hospitalist Service       Date of Admission:  10/5/2021    Assessment & Plan           Nuzhat Lopez is a 25 year old female with a history of migraine headache who presents to the ED for evaluation of elevated blood presure.      Hypertensive emergency: No history of hypertension.  Elevated to 201/121 on admission with chest pain and headache. Transferred to the ICU for nicardipine drip. BP now improved.  Echocardiogram is unremarkable.  - Continue amlodipine and carvidelol  - Hydralazine and clonidine prn. Adjust medication based on BP    Acute kidney injury: Creatinine 5.72 on admission. Unknown baseline. Electrolytes are normal. UA shows proteinuria and RBC. US shows atrophic kidneys and no evidence of a renal artery stenosis. Completed renal biopsy on 10/7/21.  Creatinine increased to 6.15 today.   - Follow up biopsy result  - Workup: C3, C4, VIANNEY, dsDNA, ANCA, anti-GBM, hepatitis panel are all normal.   - Monitor creatinine  - Nephrology input appreciated    Migraine: Imitrex and oxycodone prn.        Diet: Regular Diet Adult  Diet    DVT Prophylaxis: Low Risk/Ambulatory with no VTE prophylaxis indicated  Lockhart Catheter: Not present  Central Lines: None  Code Status: Full Code      Disposition Plan   Expected discharge: 10/09/2021       The patient's care was discussed with the Bedside Nurse, Care Coordinator/, Patient and Patient's Family.    Jax Larson MD  Hospitalist Service  [facility]  Securely message with the Vocera Web Console (learn more here)  Text page via WayConnected Paging/Directory      ______________________________________________________________________    Interval History   Patient reports feeling well today. No headache and no chest pain.    Data reviewed today: I reviewed all medications, new labs and imaging results over the last 24 hours.     Physical Exam   Vital Signs: Temp: 97.8  F (36.6  C)  Temp src: Oral BP: 136/84 Pulse: 85   Resp: 18 SpO2: 100 % O2 Device: None (Room air)    Weight: 177 lbs 9.6 oz    General appearance: not in acute distress  HEENT: PERRL, EOMI  Lungs: Clear breath sounds in bilateral lung fields  Cardiovascular: Regular rate and rhythm, normal S1-S2  Abdomen: Soft, non tender, no distension  Musculoskeletal: No joint swelling  Skin: No rash and no edema  Neurology: AAO ×3.  Cranial nerves II - XII normal.  Normal muscle strength in all four extremities.    Data   Recent Labs   Lab 10/08/21  1258 10/07/21  0439 10/06/21  0311 10/05/21  1854 10/05/21  1054 10/05/21  1054   WBC  --   --   --  10.1  --  8.1   HGB  --   --   --  12.3  --  11.3*   MCV  --   --   --  86  --  86   PLT  --   --   --  270  --  273   INR  --  0.94  --   --   --   --     138 137  --    < > 138   POTASSIUM 5.3* 4.5 4.9  --    < > 4.6   CHLORIDE 108* 110* 111*  --    < > 110*   CO2 22 21* 20*  --    < > 21*   BUN 39* 35* 37*  --    < > 35*   CR 6.19* 5.86* 5.39*  --    < > 5.72*   ANIONGAP 6 7 6  --    < > 7   DEEPTI 8.8 8.6 8.7  --    < > 8.9    96 107  --    < > 90   ALBUMIN  --  2.5* 2.4*  --    < > 2.6*   PROTTOTAL  --   --   --   --   --  6.4   BILITOTAL  --   --   --   --   --  0.3   ALKPHOS  --   --   --   --   --  66   ALT  --   --   --   --   --  <9   AST  --   --   --   --   --  12    < > = values in this interval not displayed.

## 2021-10-09 VITALS
HEART RATE: 92 BPM | RESPIRATION RATE: 18 BRPM | OXYGEN SATURATION: 97 % | BODY MASS INDEX: 33.58 KG/M2 | WEIGHT: 177.7 LBS | DIASTOLIC BLOOD PRESSURE: 90 MMHG | SYSTOLIC BLOOD PRESSURE: 128 MMHG | TEMPERATURE: 98.5 F

## 2021-10-09 LAB
ANION GAP SERPL CALCULATED.3IONS-SCNC: 7 MMOL/L (ref 5–18)
BUN SERPL-MCNC: 40 MG/DL (ref 8–22)
CALCIUM SERPL-MCNC: 8.5 MG/DL (ref 8.5–10.5)
CHLORIDE BLD-SCNC: 109 MMOL/L (ref 98–107)
CO2 SERPL-SCNC: 20 MMOL/L (ref 22–31)
CREAT SERPL-MCNC: 5.97 MG/DL (ref 0.6–1.1)
GFR SERPL CREATININE-BSD FRML MDRD: 9 ML/MIN/1.73M2
GLUCOSE BLD-MCNC: 94 MG/DL (ref 70–125)
HOLD SPECIMEN: NORMAL
POTASSIUM BLD-SCNC: 4.8 MMOL/L (ref 3.5–5)
SODIUM SERPL-SCNC: 136 MMOL/L (ref 136–145)

## 2021-10-09 PROCEDURE — 250N000013 HC RX MED GY IP 250 OP 250 PS 637: Performed by: INTERNAL MEDICINE

## 2021-10-09 PROCEDURE — 99239 HOSP IP/OBS DSCHRG MGMT >30: CPT | Performed by: INTERNAL MEDICINE

## 2021-10-09 PROCEDURE — 80048 BASIC METABOLIC PNL TOTAL CA: CPT | Performed by: INTERNAL MEDICINE

## 2021-10-09 PROCEDURE — 36415 COLL VENOUS BLD VENIPUNCTURE: CPT | Performed by: INTERNAL MEDICINE

## 2021-10-09 RX ADMIN — CARVEDILOL 12.5 MG: 12.5 TABLET, FILM COATED ORAL at 09:09

## 2021-10-09 RX ADMIN — AMLODIPINE BESYLATE 10 MG: 5 TABLET ORAL at 09:09

## 2021-10-09 NOTE — PLAN OF CARE
Problem: Adult Inpatient Plan of Care  Goal: Plan of Care Review  Outcome: Improving  Goal: Patient-Specific Goal (Individualized)  Outcome: Improving  Goal: Absence of Hospital-Acquired Illness or Injury  Outcome: Improving  Intervention: Identify and Manage Fall Risk  Recent Flowsheet Documentation  Taken 10/8/2021 1620 by Shellie Pendleton RN  Safety Promotion/Fall Prevention:   room organization consistent   safety round/check completed  Intervention: Prevent Skin Injury  Recent Flowsheet Documentation  Taken 10/8/2021 1620 by Shellie Pendleton RN  Body Position: position changed independently  Goal: Optimal Comfort and Wellbeing  Outcome: Improving     Problem: Hypertension Acute  Goal: Blood Pressure Within Desired Range  Outcome: Improving   Pt is alert and oriented x 4, c/o neck pain, rated 5/10, does not want Tylenol, offered warm pack with relief. SBP in the 120's to 130's, DBP in the 80's to 90's. Last BP was 124/87. Pt is independent in the room. She is voiding good amount of urine. Will continue to monitor.

## 2021-10-09 NOTE — PLAN OF CARE
Problem: Hypertension Acute  Goal: Blood Pressure Within Desired Range  Outcome: Improving     Problem: Adult Inpatient Plan of Care  Goal: Optimal Comfort and Wellbeing  10/9/2021 0340 by Shellie Pendleton RN  Outcome: Improving  Pt still has neck pain, rated 2/10, warm pack applied with some relief. Pt slept through the night. Possible discharge today.

## 2021-10-09 NOTE — PROGRESS NOTES
Reviewed discharge medications and paperwork with pt.  Prescriptions for Amlodipine and Coreg faxed to pt's pharmacy for pick-up.  Paper prescription given to pt for Oxycodone.  Pt to schedule her own follow-up with primary MD to be seen in 3-5 days.  Nephrology office to call pt and schedule follow-up as well.  Number provided for Dr. Ramos's office.  Pt verbalized an understanding and was brought out to her sister's car via w/c and left at 1240.

## 2021-10-09 NOTE — DISCHARGE SUMMARY
[facility]  Hospitalist Discharge Summary      Date of Admission:  10/5/2021  Date of Discharge:  10/9/2021  Discharging Provider: Jax Larson MD      Discharge Diagnoses     Principal Problem:    Hypertensive emergency  Active Problems:    Acute kidney injury (H)    Migraine headache      Follow-ups Needed After Discharge   Follow-up Appointments     Follow-up and recommended labs and tests       Follow up with primary care provider, within 3-5 days for hospital   follow- up.  The following labs/tests are recommended: BMP.  Recheck   kidney function and electrolytes.    Follow up with Dr Jonnie Ramos, [facility], in one   week. His office will call you about the appointment. Tel: [phone].             Discharge Disposition   Discharged to home  Condition at discharge: Stable      Hospital Course     Nuzhat Lopez is a 25 year old female with a history of migraine headache who presents to the ED for evaluation of elevated blood presure. Patient reports that when she checked her blood pressure at home, it was elevated to the 200s. Patient does not have history of hypertension. In ER, Patient's blood pressure was elevated up to to 201/121. She was also found to have elevated creatinine to 5.72. After admission, patient's blood pressure was not controlled by iv medication. She also developed chest pain and headache. Patient was then transferred to the ICU for nicardipine drip. Blood pressure then improved to safe range. Patient was started amlodipine and carvedilol. Echocardiogram is unremarkable. US shows atrophic bilateral kidneys, no renal artery stenosis. Nephrology was consulted. Blood tests reveal normal C3, C4, VIANNEY, dsDNA, ANCA, anti-GBM and hepatitis panel. Her creatine was stable at the level of 5.7-6.0. Patient received renal biopsy on 10/8/21. Patient was discharged home in a stable condition. Patient will follow up with nephrology outpatient for the renal  biopsy result.    Consultations This Hospital Stay   NEPHROLOGY IP CONSULT  NUTRITION SERVICES ADULT IP CONSULT    Code Status   Prior    Time Spent on this Encounter   I, Jax Larson MD, personally saw the patient today and spent greater than 30 minutes discharging this patient.       Jax Larson MD  [facility]  [address] [place] [id number]  Phone: [phone]  Fax: [phone]  ______________________________________________________________________    Physical Exam   Vital Signs: Temp: 98.5  F (36.9  C) Temp src: Oral BP: (!) 128/90 Pulse: 92   Resp: 18 SpO2: 97 % O2 Device: None (Room air)    Weight: 177 lbs 11.2 oz    General appearance: not in acute distress  HEENT: PERRL, EOMI  Lungs: Clear breath sounds in bilateral lung fields  Cardiovascular: Regular rate and rhythm, normal S1-S2  Abdomen: Soft, non tender, no distension  Musculoskeletal: No joint swelling  Skin: No rash and no edema  Neurology: AAO ×3.  Cranial nerves II - XII normal.  Normal muscle strength in all four extremities.       Primary Care Physician   Physician No Ref-Primary    Discharge Orders      Reason for your hospital stay    *Admitted for uncontrolled hypertension with chest pain and acute renal failure.     Follow-up and recommended labs and tests     Follow up with primary care provider, within 3-5 days for hospital follow- up.  The following labs/tests are recommended: BMP.  Recheck kidney function and electrolytes.    Follow up with Dr Jonnie Ramos, [facility], in one week. His office will call you about the appointment. Tel: [phone].     Activity    Your activity upon discharge: activity as tolerated     Monitor and record    blood pressure daily     Discharge Instructions    Avoid NSAIDs, such as ibuprofen.     Diet    Follow this diet upon discharge: Orders Placed This Encounter      Renal Diet Adult       Significant Results and Procedures   Most  Recent 3 CBC's:Recent Labs   Lab Test 10/05/21  1854 10/05/21  1054   WBC 10.1 8.1   HGB 12.3 11.3*   MCV 86 86    273     Most Recent 3 BMP's:Recent Labs   Lab Test 10/09/21  0513 10/08/21  1258 10/07/21  0439    136 138   POTASSIUM 4.8 5.3* 4.5   CHLORIDE 109* 108* 110*   CO2 20* 22 21*   BUN 40* 39* 35*   CR 5.97* 6.19* 5.86*   ANIONGAP 7 6 7   DEEPTI 8.5 8.8 8.6   GLC 94 113 96     US kidney:  RIGHT KIDNEY: 8.6 x 4.0 x 4.4 cm. Atrophic with increased parenchymal echogenicity. No hydronephrosis or masses.     LEFT KIDNEY 9.0 x 4.6 x 3.7 cm. Atrophic with increased parenchymal echogenicity. No hydronephrosis or masses..      BLADDER: Normal.     RENAL DUPLEX:  Aortic not visualized.  Right Renal Artery PSV: Normal, less than 200 cm/s (Normal considered less than 200 cm/s.)  Right Intrarenal Resistive Index: Normal, 0.7 or less  Left Renal Artery PSV Normal, less than 200 cm/s (Normal considered less than 200 cm/s.)  Left Intrarenal Resistive Index: Normal, 0.7 or less                                                                    IMPRESSION:   1.  The kidneys are echogenic and slightly atrophic suggestive of medical renal disease.  2.  No evidence of a renal artery stenosis.    Echocardiogram:  1. Left ventricular size is normal. Mild concentric increase in wall  thickness. Systolic function is normal. The calculated left ventricular  ejection fraction is 61%.  2. Right ventricular size and systolic function are normal.  3. No hemodynamically significant valvular abnormalities.  4. No prior study available for comparison.      Discharge Medications   Discharge Medication List as of 10/9/2021 12:21 PM      START taking these medications    Details   amLODIPine (NORVASC) 10 MG tablet Take 1 tablet (10 mg) by mouth daily, Disp-30 tablet, R-0, E-Prescribe      carvedilol (COREG) 12.5 MG tablet Take 1 tablet (12.5 mg) by mouth 2 times daily (with meals), Disp-60 tablet, R-0, E-Prescribe      oxyCODONE  (ROXICODONE) 5 MG tablet Take 1 tablet (5 mg) by mouth every 6 hours as needed for moderate to severe pain, Disp-8 tablet, R-0, Local Print         CONTINUE these medications which have NOT CHANGED    Details   acetaminophen (TYLENOL) 325 MG tablet Take 325-650 mg by mouth every 6 hours as needed for mild pain, Historical      SUMAtriptan (IMITREX) 50 MG tablet Take 50 mg by mouth at onset of headache for migraine, Historical         STOP taking these medications       ibuprofen (ADVIL/MOTRIN) 200 MG tablet Comments:   Reason for Stopping:             Allergies   No Known Allergies

## 2021-10-09 NOTE — PROGRESS NOTES
RENAL PROGRESS NOTE     CC:     ROS: Since last visit, patient had renal biopsy on 10/7, Dr. Ramos spoke with renal pathologist this morning, patient is clear to discharge today with plan to follow up with [facility], our office will call her to schedule lab and follow up.  She will need close follow-up with her primary care provider also.  Reports she is doing great.  Denies chest pain, weakness, sob, n/v, abd pain, and dizziness.  No edema.  Seen with her sister by the bedside, we discussed at length the treatment goal, prognosis, and care plan.  Care also discussed with her RN.         Assessment and Plan:    1. Acute Kidney Injury. Baseline creatinine presumably normal, creatinine was 0.92 (12/11/2017). Presented with serum creatinine 5.72 (10/5), some improvement with creatinine 5.39 (10/6) Creatinine 5.86 (10/7). Urine output not quantified, reports good urine output. Heavy proteinuria on urinalysis. Cr down today.    Kidney biopsy done on 10/7, result Dr. Ramos spoke with renal pathologist this morning, she has long standing IgA nephropathy, about 60% of the glomeruli show fibrosis, there is also ATN which is certainly due to rapid blood pressure drop this admission.  Creatinine could improve some more, but clearly will may not normalize again.  Patient is clear to discharge from a purely renal standpoint, our office will contact for follow up.  She will also need close follow-up care at her primary care.  I discussed this with the patient and her sister.  She should have renal function panel done when visiting her PCP        Peripheral Blood Smear normal    Heavy proteinuria with RBCs on urinalysis.     NSAIDs still a possibility but will be a diagnosis of exclusion once other more likely etiologies are evaluated.    Check UPCR nephrotic range >11    Complement levels normal. HIV neg, Hep B neg, Hep C neg     VIANNEY negative, dsDNA negative, ANCA negative    Anti-GBM pending.    Renal US with small kidneys,  suggesting long standing renal disease. Unlikely to regain much renal function. Will need close follow up after discharge. Will likely require dialysis within the next couple of months. Should be referred to transplant clinic asap after discharge.     Check okay to discharge as noted above, our office will call patient to schedule follow up with Dr. Ramos and laboratories done this coming week (mid week).  2. Hypertensive urgency: Blood pressure well controled on amlodipine 10 mg daily, Carvedilol 12.5 bid     Off nicardipine drip at present, avoid aggressive bp control to allow renal perfusion.    3. Chest pain. Resolved. Due to uncontrolled hypertension.       Okay to discharge from renal perspective, follow-up as noted above.      Candida Reyes, DNP, CNP, MARLENE  [facility]  Pager: [phone]        PHYSICAL EXAM  BP (!) 128/90 (BP Location: Left arm)   Pulse 92   Temp 98.5  F (36.9  C) (Oral)   Resp 18   Wt 80.6 kg (177 lb 11.2 oz)   LMP 09/16/2021   SpO2 97%   BMI 33.58 kg/m        Intake/Output Summary (Last 24 hours) at 10/6/2021 0918  Last data filed at 10/6/2021 0300  Gross per 24 hour   Intake 1017.25 ml   Output --   Net 1017.25 ml     Resp: 18    Wt Readings from Last 3 Encounters:   10/09/21 80.6 kg (177 lb 11.2 oz)       GENERAL: No acute distress, sitting in bed with sister by the bedside.  Alert and interactive. Well appearing  HEAD: Normal  HEENT: Equal pupils. Normal hearing. No eyelid edema.  CARDIOVASCULAR: RRR No JVD present.  No peripheral edema  PULMONARY: CTA, bilaterally. No respiratory distress. No cyanosis  GASTROINTESTINAL: No ascites present  MSK: Normal muscle mass, no joint deformities  NEURO: Alert, no gross focal findings  PSYCHIATRIC: Adequate mood and interaction, pleasant today.  SKIN: Pale, no jaundice, no rash.    LABORATORIES  Recent Labs   Lab 10/05/21  1854 10/05/21  1054   WBC 10.1 8.1   HGB 12.3 11.3*   HCT 39.2 36.1    273     Recent Labs    Lab 10/09/21  0513 10/08/21  1258 10/07/21  0439 10/06/21  0311 10/05/21  1054    136 138   < > 138   CO2 20* 22 21*   < > 21*   BUN 40* 39* 35*   < > 35*   ALKPHOS  --   --   --   --  66   ALT  --   --   --   --  <9   AST  --   --   --   --  12    < > = values in this interval not displayed.     Recent Labs   Lab 10/07/21  0439   INR 0.94     No results for input(s): ABORH in the last 168 hours.  Invalid input(s): MG    RADIOLOGY REPORTS    ECHOCARDIOGRAM    MEDICATIONS    amLODIPine  10 mg Oral Daily     carvedilol  12.5 mg Oral BID w/meals     sodium chloride (PF)  3 mL Intracatheter Q8H     acetaminophen, cloNIDine, hydrALAZINE, lidocaine 4%, lidocaine (buffered or not buffered), melatonin, naloxone **OR** naloxone **OR** naloxone **OR** naloxone, ondansetron **OR** ondansetron, oxyCODONE, senna-docusate **OR** senna-docusate, sodium chloride (PF)          Above laboratories and medications were personally reviewed during evaluation today.

## 2021-10-10 PROBLEM — G43.909 MIGRAINE HEADACHE: Status: ACTIVE | Noted: 2021-10-10

## 2021-10-10 PROBLEM — I16.1 HYPERTENSIVE EMERGENCY: Status: ACTIVE | Noted: 2021-10-05

## 2021-10-22 LAB — SPECIMEN STATUS: NORMAL

## 2021-11-12 ENCOUNTER — TRANSFERRED RECORDS (OUTPATIENT)
Dept: HEALTH INFORMATION MANAGEMENT | Facility: CLINIC | Age: 26
End: 2021-11-12
Payer: COMMERCIAL

## 2021-11-12 ENCOUNTER — MEDICAL CORRESPONDENCE (OUTPATIENT)
Dept: HEALTH INFORMATION MANAGEMENT | Facility: CLINIC | Age: 26
End: 2021-11-12
Payer: COMMERCIAL

## 2021-11-12 LAB
CREATININE (EXTERNAL): 5.3 MG/DL (ref 0.55–1.02)
GFR ESTIMATED (EXTERNAL): 10 ML/MIN
GFR ESTIMATED (IF AFRICAN AMERICAN) (EXTERNAL): 12 ML/MIN
GLUCOSE (EXTERNAL): 98 MG/DL (ref 74–106)
POTASSIUM (EXTERNAL): 4.6 MMOL/L (ref 3.5–5.1)

## 2021-11-15 ENCOUNTER — REFERRAL (OUTPATIENT)
Dept: TRANSPLANT | Facility: CLINIC | Age: 26
End: 2021-11-15
Payer: COMMERCIAL

## 2021-11-15 ENCOUNTER — TRANSFERRED RECORDS (OUTPATIENT)
Dept: HEALTH INFORMATION MANAGEMENT | Facility: CLINIC | Age: 26
End: 2021-11-15
Payer: COMMERCIAL

## 2021-11-15 DIAGNOSIS — N02.B9 IGA NEPHROPATHY: ICD-10-CM

## 2021-11-15 DIAGNOSIS — I10 ESSENTIAL HYPERTENSION: ICD-10-CM

## 2021-11-15 DIAGNOSIS — N18.5 CHRONIC KIDNEY DISEASE, STAGE 5, KIDNEY FAILURE (H): Primary | ICD-10-CM

## 2021-11-15 DIAGNOSIS — N18.5 CHRONIC KIDNEY DISEASE, STAGE V (H): ICD-10-CM

## 2021-11-15 DIAGNOSIS — Z01.818 PRE-TRANSPLANT EVALUATION FOR KIDNEY TRANSPLANT: ICD-10-CM

## 2021-11-15 NOTE — LETTER
November 30, 2021      Nuzhat Lopez  [address]  [facility] [place] [address]      Dear Nuzhat Simmons,    Thank you for your interest in the Transplant Center at [facility]. We look forward to being a part of your care team and assisting you through the transplant process.    As we discussed, your transplant coordinator is Elke Helton (Kidney).  You may call your coordinator at any time with questions or concerns.  Your first scheduled call will be on December 9, 2021.  If this needs to change, call [phone].    Please complete the following.    1. Fill out and return the enclosed forms    Authorization for Electronic Communication    Authorization to Discuss Protected Health Information    Authorization for Release of Protected Health Information    Authorization for Care Everywhere Release of Information    2. Sign up for:    AudioTag, access to your electronic medical record (see enclosed pamphlet)    [employer], a transplant education website    You can use these tools to learn more about your transplant, communicate with your care team, and track your medical details      Sincerely,      Solid Organ Transplant  [facility]    cc: Referring Physician PCP

## 2021-11-15 NOTE — LETTER
November 30, 2021    Nuzhat Lopez  [address]  [place] [address]    Dear Nuzhat Simmons,    Thank you for your interest in the Transplant Center at [facility]. We look forward to being a part of your care team and assisting you through the transplant process.    As we discussed, your transplant coordinator is Elke Helton (Kidney).  You may call your coordinator at any time with questions or concerns.  Your first scheduled call will be on December 9, 2021.  If this needs to change, call [phone].    Please complete the following.    1. Fill out and return the enclosed forms    Authorization for Electronic Communication    Authorization to Discuss Protected Health Information    Authorization for Release of Protected Health Information    Authorization for Care Everywhere Release of Information    2. Sign up for:    [employer], access to your electronic medical record (see enclosed pamphlet)    [employer], a transplant education website    You can use these tools to learn more about your transplant, communicate with your care team, and track your medical details    Sincerely,    Solid Organ Transplant  [facility]    cc: Referring Physician PCP

## 2021-11-23 ENCOUNTER — IMMUNIZATION (OUTPATIENT)
Dept: NURSING | Facility: CLINIC | Age: 26
End: 2021-11-23
Payer: COMMERCIAL

## 2021-11-23 VITALS — HEIGHT: 61 IN | WEIGHT: 170 LBS | BODY MASS INDEX: 32.1 KG/M2

## 2021-11-23 PROCEDURE — 0004A PR COVID VAC PFIZER DIL RECON 30 MCG/0.3 ML IM: CPT

## 2021-11-23 PROCEDURE — 91300 PR COVID VAC PFIZER DIL RECON 30 MCG/0.3 ML IM: CPT

## 2021-11-23 ASSESSMENT — MIFFLIN-ST. JEOR: SCORE: 1448.49

## 2021-11-23 NOTE — TELEPHONE ENCOUNTER
PCP: Dr. Roro Block   Referring Provider: Dr. Jonnie Ramos  Referring Diagnosis: CKD Stage 5  Biopsy proven IgA Nephropathy    GFR/Date: 9 on 10/9/21    Is patient under the age of 65? yes  Is patient diabetic? No  Is patient on insulin?   Was patient offered a pancreas transplant referral? No    Is patient in a group home/assisted living? no  Does patient have a guardian? no    Referral intake process completed.  Patient is aware that after financial approval is received, medical records will be requested.   Patient confirmed for a callback from transplant coordinator on December 9, 2021. (within 2 weeks)  Tentative evaluation date Jan. 4, 2022. (within 4 weeks)    Confirmed coordinator will discuss evaluation process in more detail at the time of their call.   Patient is aware of the need to arrange age appropriate cancer screening, vaccinations, and dental care.  Reminded patient to complete questionnaire, complete medical records release, and review packet prior to evaluation visit .  Assessed patient for special needs (ie-wheelchair, assistance, guardian, and ):  none   Patient instructed to call [phone] with questions.     Patient gave verbal consent during intake call to obtain medical records and documents outside of [facility]:  yes

## 2021-12-09 NOTE — TELEPHONE ENCOUNTER
Reviewed pt's chart for pre-kidney transplant evaluation planning. Pt lives in [place], [place]. Referred to our center by Dr. Ramos. Pt has CKD stage 5 due to IgA nephropathy. GFR 10 on 12/3/21.  Biopsy proven. Pt is not on dialysis. Pt is not diabetic. Admission in October for migraine caused by hypertension (201/121), found to have CKD with a creatinine of 5.7.  No other history. BMI 33 on 11/17/21. Encouraged update to health maintenance. Patient is not sexually active, no pap smear ever completed. No use of alcohol, drugs or tobacco. Pt is independent w/ ADLs.  Pt lives w/ family and has support following transplant.     I also introduced [employer] and asked pt to create an account and view pre-kidney transplant videos for review with me following evaluation. Confirmed STD 1/4/22. Informed pt they will hear from scheduling to arrange the evaluation. Smartset orders entered.

## 2021-12-31 NOTE — PROGRESS NOTES
"Pt evaluated via billable telephone visit d/t COVID-19 restrictions. Time spent: 15 min    [facility] Solid Organ Transplant  Outpatient MNT: Kidney Transplant Evaluation    Current BMI: 32.1 (HT 61 in,  lbs/77 kg)- data from 11/23  BMI is within recommendation of <35 for kidney transplant     Time Spent: 15 minutes  Visit Type: Initial   Referring Physician: Eugene   Pt accompanied by: self     History of previous txp: none   Dialysis: no     Nutrition Assessment  Renal diet ed while IP in Oct. She reports eating a typical Asian diet w/ soy and oyster sauce, now avoiding a lot of these traditional foods since following a renal diet.     - Appetite: \"okay\", but food doesn't taste as good/less appealing than before   - Food allergies/intolerances: no   - Meal prep & grocery shopping: pt and mom cook   - Previous RD education: yes while IP  - Issues chewing or swallowing: no   - N/V/D/C: no   - Food access concerns: none     Vitamins, Supplements, Pertinent Meds: none   Herbal Medicines/Supplements: none     Edema: none     Weight hx: stable     Diet Recall  Breakfast None    Lunch White bread w/ jam    Dinner White rice and cabbage; some proteins- 1-2x/week    Snacks None; normally would snack on chips but has eliminated these with renal diet   Beverages Water    Alcohol None    Dining out None since following a renal diet      Physical Activity  Walking, 1-2x/week for 20-30 min (outside)     Labs  12/3 Phos 4.3 K 4.6     Nutrition Diagnosis  No nutrition diagnosis identified at this time     Nutrition Intervention  Nutrition education provided:  Discussed sodium intake (low sodium foods and drinks, seasoning food without salt and tips for low sodium diet).  Reviewed OK to have some chips in moderation, as she is eating very little and very low sodium. Reviewed need for protein daily and risks to cutting out protein, including muscle wasting, low energy, etc.     Reviewed post txp diet guidelines in brief " (will review in further detail post txp):  (1) Review of proper food safety measures d/t immunosuppressant therapy post-op and increased risk for food-borne illness    (2) Avoid the following post txp d/t risk for rejection, unknown effects on the organs, and/or potential interactions with immunosuppressants:  - Herbal, Chinese, holistic, chiropractic, natural, alternative medicines and supplements  - Detoxes and cleanses  - Weight loss pills  - Protein powders or other products with extracts or herbs (ie green tea extract)    (3) Med regimen and possible side effects    Patient Understanding: Pt verbalized understanding of education provided.  Expected Engagement: Good  Follow-Up Plans: PRN     Nutrition Goals  No nutrition goals identified at this time     Dione Murrieta, RD, LD, CCTD

## 2022-01-03 NOTE — PROGRESS NOTES
TRANSPLANT NEPHROLOGY RECIPIENT EVALUATION NOTE    Assessment and Plan:  # Kidney Transplant Evaluation: Patient is a excellent candidate overall. Benefits of a living donor transplant were discussed.    # CKD secondary to biopsy-proven IgA nephropathy: kidney biopsy completed on 10/7/2021 that showed:    IgA nephropathy with marked chronic changes including increased global  glomerulosclerosis and extensive tubular atrophy and interstitial fibrosis of the cortex sampled with associated chronic  inflammation. A component acute tubular necrosis is also present. No crescents/necrotizing lesions are observed.      sCr have since been ranging the in mid 5 range with correlating GFRs of 12-15 with nephrotic proteinuria of 3 g as of last month. Many potential donors.     # Cardiac Risk: asymptomatic with exertion. Needs ECHO and EKG only at this time given age and lack of other risk factors.       # BMI 32    # Health Maintenance: PAP: Not up to date and Dental: Up to date.    Discussed the risks and benefits of a transplant, including the risk of surgery and immunosuppression medications.  Patient's overall evaluation will be discussed in the Transplant Program's regular meeting with a final recommendation on the patients suitability for transplant to be made at that time.    Pending completion of the full evaluation, patient presently appears to be enough of an acceptable kidney transplant recipient candidate to have any potential kidney donors start the evaluation process.      Evaluation:  Nuzhat Lopez was seen in consultation at the request of Dr. Dino Knight for evaluation as a potential kidney transplant recipient.    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    History of Present Illness:         Kidney Disease Hx:    Nuzhat Lopez is a 26-year-old English-speaking Hmong female who was born in [facility] and moved to the   as a young child. She has history of CKD secondary to biopsy-proven IgA nephropathy. She has history of migraines. Her sister is a stroke nurse practitioner and encouraged her to check her blood pressures which she reports had been slightly elevated over the past 4 years. This past October she found her blood pressure to be significantly elevated at home and went to ED and was found to have a presenting serum creatinine 5.8 and heavy proteinuria of 3 g. Kidneys atrophic with increased echogenicity bilaterally on US measuring 8 x 4 cm on right and 9 x 4 cm on left.  A kidney biopsy completed on 10/7/2021 that showed:    IgA nephropathy with marked chronic changes including increased global  glomerulosclerosis and extensive tubular atrophy and interstitial fibrosis of the cortex sampled with associated chronic  inflammation. A component acute tubular necrosis is also present. No crescents/necrotizing lesions are observed.      Her sCr have since been ranging the the in mid 5 range with correlating GFRs of 12-15 with nephrotic proteinuria of 3 g as of last month.  Following by Dr. Finney.  She complains of worsening fatigue and decreased appetite but denies nausea and vomiting. Hypertension has since been well controlled with SBPs ranging 120s-130s while taking Amlodipine and Carvedilol. She has multiple potential donors in her family.            Kidney Disease Dx: IgA nephropathy       Biopsy Proven: Yes; as noted above         On Dialysis: No       Primary Nephrologist: Dr. Finney       H/o Kidney Stones: No       H/o Recurrent/Frequent UTI: No         Diabetic Hx: None           Cardiac/Vascular Disease Risk Factors:        Cardiac Risk Factors: Hypertension and CKD       Known CAD: No       Known PAD/Caludication Symptoms: No       Known Heart Failure: No       Arrhythmia: No       Pulmonary Hypertension: No       Valvular Disease: No       Other: None         Viral Serology Status       CMV IgG Antibody: Unknown        EBV IgG Antibody: Unknown         Volume Status/Weight:        Volume status: Not assessed       Weight:  Acceptable BMI       BMI: There is no height or weight on file to calculate BMI.         Functional Capacity/Frailty:    Working full time as a  for the homeless community. Not participating in regular strenuous exercise, but has no limitations with walking and can go up and down a slight of stair without exertional symptoms.     Fatigue/Decreased Energy: [] No [x] Yes    Chest Pain or SOB with Exertion: [x] No [] Yes    Significant Weight Change: [x] No [] Yes    Nausea, Vomiting or Diarrhea: [x] No [] Yes    Fever, Sweats or Chills:  [x] No [] Yes    Leg Swelling [x] No [] Yes        History of Cancer: None    Other Significant Medical Issues: None    # COVID Vaccination Up To Date: Yes    Potential Living Kidney Donors: Yes    Review of Systems:  A comprehensive review of systems was obtained and negative, except as noted in the HPI or PMH.    Past Medical History:   Medical record was reviewed and PMH was discussed with patient and noted below.  No past medical history on file.    Past Social History:   Past Surgical History:   Procedure Laterality Date     BIOPSY  2021    [facility]     NO PAST SURGERIES       Personal history of bleeding or anesthesia problems: No    Family History:  No family history on file.    Personal History:   Social History     Socioeconomic History     Marital status: Single     Spouse name: Not on file     Number of children: Not on file     Years of education: Not on file     Highest education level: Not on file   Occupational History     Not on file   Tobacco Use     Smoking status: Never Smoker     Smokeless tobacco: Never Used   Substance and Sexual Activity     Alcohol use: No     Drug use: No     Sexual activity: Not on file   Other Topics Concern     Parent/sibling w/ CABG, MI or angioplasty before 65F 55M? Not Asked   Social History Narrative      Not on file     Social Determinants of Health     Financial Resource Strain: Not on file   Food Insecurity: Not on file   Transportation Needs: Not on file   Physical Activity: Not on file   Stress: Not on file   Social Connections: Not on file   Intimate Partner Violence: Not on file   Housing Stability: Not on file       Allergies:  No Known Allergies    Medications:  Current Outpatient Medications   Medication Sig     acetaminophen (TYLENOL) 325 MG tablet Take 325-650 mg by mouth every 6 hours as needed for mild pain     amLODIPine (NORVASC) 10 MG tablet Take 1 tablet (10 mg) by mouth daily     carvedilol (COREG) 12.5 MG tablet Take 1 tablet (12.5 mg) by mouth 2 times daily (with meals)     oxyCODONE (ROXICODONE) 5 MG tablet Take 1 tablet (5 mg) by mouth every 6 hours as needed for moderate to severe pain     SUMAtriptan (IMITREX) 50 MG tablet Take 50 mg by mouth at onset of headache for migraine     No current facility-administered medications for this visit.       Vitals:  There were no vitals taken for this visit.    Exam:  GENERAL APPEARANCE: alert and no distress  HENT: no obvious abnormalities on appearance  RESP: breathing appears unremarkable with normal rate, no audible wheezing or cough and no apparent shortness of breath with conversation  MS: extremities normal - no gross deformities noted  SKIN: no apparent rash and normal skin tone  NEURO: speech is clear with no obvious neurological deficits  PSYCH: mentation appears normal and affect normal      Results:   No results found for this or any previous visit (from the past 336 hour(s)).

## 2022-01-04 ENCOUNTER — DOCUMENTATION ONLY (OUTPATIENT)
Dept: TRANSPLANT | Facility: CLINIC | Age: 27
End: 2022-01-04

## 2022-01-04 ENCOUNTER — VIRTUAL VISIT (OUTPATIENT)
Dept: TRANSPLANT | Facility: CLINIC | Age: 27
End: 2022-01-04
Attending: PHYSICIAN ASSISTANT
Payer: COMMERCIAL

## 2022-01-04 DIAGNOSIS — I10 ESSENTIAL HYPERTENSION: ICD-10-CM

## 2022-01-04 DIAGNOSIS — Z01.818 PRE-TRANSPLANT EVALUATION FOR KIDNEY TRANSPLANT: ICD-10-CM

## 2022-01-04 DIAGNOSIS — N02.B9 IGA NEPHROPATHY: ICD-10-CM

## 2022-01-04 DIAGNOSIS — N18.5 CHRONIC KIDNEY DISEASE, STAGE 5, KIDNEY FAILURE (H): ICD-10-CM

## 2022-01-04 DIAGNOSIS — N18.5 CHRONIC KIDNEY DISEASE, STAGE V (H): ICD-10-CM

## 2022-01-04 DIAGNOSIS — N18.5 CHRONIC KIDNEY DISEASE, STAGE V (H): Primary | ICD-10-CM

## 2022-01-04 PROCEDURE — 99204 OFFICE O/P NEW MOD 45 MIN: CPT | Mod: GT | Performed by: TRANSPLANT SURGERY

## 2022-01-04 PROCEDURE — 99215 OFFICE O/P EST HI 40 MIN: CPT | Mod: GT

## 2022-01-04 PROCEDURE — 99207 PR NO CHARGE COORDINATED CARE PS: CPT

## 2022-01-04 RX ORDER — SODIUM BICARBONATE 650 MG/1
TABLET ORAL
COMMUNITY
Start: 2021-11-15 | End: 2023-11-27

## 2022-01-04 NOTE — PROGRESS NOTES
Nuzhat Simmons is a 26 year old who is being evaluated via a billable video visit.      How would you like to obtain your AVS? MyChart  If the video visit is dropped, the invitation should be resent by: Text to cell phone: [phone]  Will anyone else be joining your video visit? No      Video Start Time: 8:30 am   Video-Visit Details    Type of service:  Video Visit    Video End Time:9:00 am     Originating Location (pt. Location): Home    Distant Location (provider location):  [facility]     Platform used for Video Visit: [employer]

## 2022-01-04 NOTE — PROGRESS NOTES
Kidney Transplant Referral - 11/15/2021  Discussed with patient the evaluation today. She stated her questions were answered by providers during PKE. She has not yet watched the MTP videos. A link was sent to her email with instructions on how to access the content.      Summary    Team s concerns/comments:  1) cardiac risk, no risk factors    Candidacy category: Green    Action/Plan:   1) complete eval with in person surgery visit, EKG, echo, chest x-ray and lab work   *no surgeon note    Expected Selection Meeting Discussion: 1/12/22

## 2022-01-04 NOTE — LETTER
1/4/2022     RE: Nuzhat Lopez  [address]  [facility] [address]    Dear Colleague,    Thank you for referring your patient, Nuzhat Lopez, to the [facility]. Please see a copy of my visit note below.    Nuzhat Simmons is a 26 year old who is being evaluated via a billable video visit.      How would you like to obtain your AVS? MyChart  If the video visit is dropped, the invitation should be resent by: Text to cell phone: [phone]  Will anyone else be joining your video visit? No    Video Start Time: 9:40am  Video-Visit Details  Type of service:  Video Visit  Video End Time:10:05am  Originating Location (pt. Location): Home  Distant Location (provider location):  [facility]   Platform used for Video Visit: [employer]    Transplant Surgery Consult Note     Medical record number: [id number]  YOB: 1995,   Consult requested by Dr. Ramos for evaluation of kidney transplant candidacy.    Assessment and Recommendations:Ms. Lopez appears to be a excellent candidate for kidney transplantation and has a good understanding of the risks and benefits of this approach to the management of renal failure. The following issues should be addressed prior to finalizing her transplant candidacy:     Ms. 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    Dietician consult ordered: Yes  Social work consult ordered: Yes  Transplant listing labs ordered to include HLA, ABOx2, Cr, etc.  Obtain past medical records  Up-to-date cancer screening    The majority of our visit was spent in counselling, discussing the medical and surgical risks of kidney transplantation. We talked about the pros and cons of transplantation vs. dialysis.  We discussed the fact that it was important to think about the pros and cons of each treatment option and make  an active decision.  We also discussed the fact that the two were interconnected and that if the transplant failed, it is possible that dialysis might be necessary before another transplant.  We also discussed the need for a lifetime commitment to immunosuppressive therapy and the risks associated with the anti-rejection drugs.     We also discussed the fact that if choosing to have a transplant, a second important decision was a living versus a  donor transplant.  We talked about the pros and cons of each option - the advantage of a  donor transplant being not asking someone to go through the living donor operation, the disadvantage, 5-6 years waiting; the advantage of a living donor transplant, much shorter time to transplant and significantly better long-term outcomes, the disadvantage being the risk to the donor.  Although I didn't express an opinion regarding transplantation or dialysis, I suggested that if opting for a transplant, we would strongly recommend a living donor transplant.  She says there are a number of potential donors.    We discussed the benefit of a preemptive transplant.     We discussed the fact that a living donor does not have to be a direct match and that if there was a donor who met the criteria but was not a match, there was an option of participating in the national paired exchange system.  I described how the system would work.    She has potential donors, but is hesitant to have them go through the donor procedure.  We discussed donor risks and our donor evaluation process.     Ms. Lopez asked good questions and her candidacy will be reviewed at our Multidisciplinary Selection Committee. Thank you for the opportunity to participate in Ms. Lopez's care.    Total time: 35 minutes        Dino Knight MD  Surgical Director, Kidney Transplantation                                                                                                     ---------------------------------------------------------------------------------------------------    Past medical history includes:  CKD, stage 5  Ig A nephropathy   Kidney biopsy   migraine     Past Medical History:   Diagnosis Date     Anemia in chronic kidney disease      CKD (chronic kidney disease) stage 5, GFR less than 15 ml/min (H)      HTN (hypertension)      IgA nephropathy      Metabolic acidosis      Past Surgical History:   Procedure Laterality Date     BIOPSY  2021    [facility]     NO PAST SURGERIES       Family History   Problem Relation Age of Onset     Impaired Fasting Glucose Mother      Kidney Disease No family hx of      Hypertension No family hx of      Cancer No family hx of      Social History     Socioeconomic History     Marital status: Single     Spouse name: Not on file     Number of children: Not on file     Years of education: Not on file     Highest education level: Not on file   Occupational History     Not on file   Tobacco Use     Smoking status: Never Smoker     Smokeless tobacco: Never Used   Substance and Sexual Activity     Alcohol use: No     Drug use: No     Sexual activity: Not on file   Other Topics Concern     Parent/sibling w/ CABG, MI or angioplasty before 65F 55M? Not Asked   Social History Narrative     Not on file     Social Determinants of Health     Financial Resource Strain: Not on file   Food Insecurity: Not on file   Transportation Needs: Not on file   Physical Activity: Not on file   Stress: Not on file   Social Connections: Not on file   Intimate Partner Violence: Not on file   Housing Stability: Not on file       ROS:   CONSTITUTIONAL:  No fevers or chills  EYES: negative for icterus  ENT:  negative for hearing loss, tinnitus and sore throat  RESPIRATORY:  negative for cough, sputum, dyspnea  CARDIOVASCULAR:  negative for chest pain   GASTROINTESTINAL:  negative for nausea, vomiting, diarrhea or constipation  GENITOURINARY:  negative for  incontinence, dysuria, bladder emptying problems  HEME:  No easy bruising  INTEGUMENT:  negative for rash and pruritus  NEURO:  Negative for headache, seizure disorder  Allergies:   No Known Allergies  Medications:  Prescription Medications as of 1/11/2022       Rx Number Disp Refills Start End Last Dispensed Date Next Fill Date Owning Pharmacy    acetaminophen (TYLENOL) 325 MG tablet            Sig: Take 325-650 mg by mouth every 6 hours as needed for mild pain    Class: Historical    Route: Oral    amLODIPine (NORVASC) 10 MG tablet  30 tablet 0 10/9/2021    [facility] [address] [place] [address] [facility]    Sig: Take 1 tablet (10 mg) by mouth daily    Class: E-Prescribe    Route: Oral    carvedilol (COREG) 12.5 MG tablet  60 tablet 0 10/8/2021    [facility] [address] [place] [address] [facility]    Sig: Take 1 tablet (12.5 mg) by mouth 2 times daily (with meals)    Class: E-Prescribe    Route: Oral    oxyCODONE (ROXICODONE) 5 MG tablet  8 tablet 0 10/8/2021        Sig: Take 1 tablet (5 mg) by mouth every 6 hours as needed for moderate to severe pain    Class: Local Print    Earliest Fill Date: 10/8/2021    Route: Oral    sodium bicarbonate 650 MG tablet    11/15/2021    [facility] [address] [place], [place] [address] [facility]    Sig: sodium bicarbonate 650 mg tablet    Class: Historical    SUMAtriptan (IMITREX) 50 MG tablet            Sig: Take 50 mg by mouth at onset of headache for migraine    Class: Historical    Route: Oral      Exam:   Constitutional - A&O in NAD.   Eyes - no redness or discharge.  Sclera anicteric  Respiratory - no cough, no labored breathing  Musculoskeletal - range of motion normal  Skin - no discoloration, no jaundice  Neurological - no tremors.  No facial droop or dysarthria  Psychiatric - normal mood and affect  The rest of a comprehensive physical examination is  deferred due to PHE (public health emergency) video visit restrictions     Diagnostics:   No results found for this or any previous visit (from the past 672 hour(s)).  No results found for: CPRA    Again, thank you for allowing me to participate in the care of your patient.    Sincerely,  MARIA EUGENIA

## 2022-01-04 NOTE — LETTER
1/4/2022     RE: Nuzhat Lopez  [address] [facility] [address]    Dear Colleague,    Thank you for referring your patient, Nuzhat Lopez, to the [facility]. Please see a copy of my visit note below.    Psychosocial Assessment For Kidney Transplantation  Patient Name/ Age: Nuzhat Lopez 26 year old   Medical Record #: [id number]  Duration of Interview:     30 min  Process:   Phone Interview                (counseling < 50%)   Present at Appointment: Patient         : Ngozi Méndez   [facility]  Outpatient Kidney/Pancreas/[facility] Transplant Program   [address] [place] [id number]  [email or web address]  Office: [phone] I Fax: [phone] Date:  January 4, 2022        Type of transplant: Kidney     Donor type:   Nuzhat has several family members that have expressed interest in donation   relative and friend   Prior Transplants:    No Status of Transplant:           Current Living Situation    Location:   [address] [place] [address]  With Whom: lives with their family       Family/ Social Support:    Nuzhat resides with her parents and 10 siblings (oldest 30 and youngest 8 years) available, helpful   Committed Relationship:    Nuzhat is single  Stable/Supportive   Other Supports:   Nuzhat has several relatives and friends that live locally ([place]/[facility]) available, helpful       Activities/ Functional Ability    Current Level: independent with ADL's     Transportation drives self       Vocational/Employment/Financial     Employment   full time   Job Description       Income  Nuzhat works full time as a   Salary/wages   Insurance    PREFERRED ONE     At this time, patient can afford medication costs:  Yes  Private Insurance       Medical Status    Current Mode of Treatment for ESRD None   Complications None       Behavioral    Tobacco Use No Chemical Dependency No          Psychiatric Impairment No      Reading Ability: Good  Education Level: Masters Degree Recent Legal History No      Coping Style/Strategies: Music, singing, relaxation        Ability to Adhere to Complex Medical Regime: Yes     Adherence History:        Education  _X_ Medicare  _X_ Rehabilitation  _X_ Donor issues  _X_ Community resources  _X_ Post discharge housing  _X_ Financial resources  _X_ Medical insurance options  _X_ Psych adjustment  _X_ Family adjustment  _X_ Health Care Directive Provided Education   Psychosocial Risks of Transplant Reviewed and Discussed:  _X_ Increased stress related to emotional,            family, social, employment or financial           situation  _X_ Effect on work and/or disability benefits  _X_ Effect on future health and life           insurance  _X_ Transplant outcome expectations may           not be met  _X_ Mental Health Risks: anxiety,           depression, PTSD, guilt, grief and           chronic fatigue     Notable Items:   None noted.       Final Evaluation/Assessment   Patient seemed to process information well. Appeared well informed, motivated and able to follow post transplant requirements. Behavior was appropriate during interview. Has adequate income and insurance coverage. Adequate social support. No major contraindications noted for transplant.  At this time patient appears to understand the risks and benefits of transplant.    Nuzhat was alert, cooperative and engaged. Nuzhat has a vast support system of many relatives and friends. Nuzhat recently graduated with her Masters in Social Work and works full time as a .       Recommendation  Acceptable- Nuzhat is a very appropriate transplant candidate with emotional and financial supports in place.    Selection Criteria Met:  Plan for support Yes   Chemical Dependence Yes   Smoking No  Mental Health No  Adequate Finances Yes    Signature: Ngozi [place] [facility]  Outpatient Kidney/Pancreas/[facility] [place]  Transplant Program   [address] [place] [address]  [place] [address]  [email or web address]  [email or web address]  Office: [phone] I Fax: [phone]   Title: Clinical    Again, thank you for allowing me to participate in the care of your patient.      Sincerely,    MARIA EUGENIA

## 2022-01-04 NOTE — LETTER
1/4/2022     RE: Nuzhat Lopez  [address]  [facility] [address]    Dear Colleague,    Thank you for referring your patient, Nuzhat Lopez, to the [facility] TRANSPLANT [facility]IN[facility]. Please see a copy of my visit note below.    TRANSPLANT NEPHROLOGY RECIPIENT EVALUATION NOTE    Assessment and Plan:  # Kidney Transplant Evaluation: Patient is a excellent candidate overall. Benefits of a living donor transplant were discussed.    # CKD secondary to biopsy-proven IgA nephropathy: kidney biopsy completed on 10/7/2021 that showed:    IgA nephropathy with marked chronic changes including increased global  glomerulosclerosis and extensive tubular atrophy and interstitial fibrosis of the cortex sampled with associated chronic  inflammation. A component acute tubular necrosis is also present. No crescents/necrotizing lesions are observed.      sCr have since been ranging the in mid 5 range with correlating GFRs of 12-15 with nephrotic proteinuria of 3 g as of last month. Many potential donors.     # Cardiac Risk: asymptomatic with exertion. Needs ECHO and EKG only at this time given age and lack of other risk factors.       # BMI 32    # Health Maintenance: PAP: Not up to date and Dental: Up to date.    Discussed the risks and benefits of a transplant, including the risk of surgery and immunosuppression medications.  Patient's overall evaluation will be discussed in the Transplant Program's regular meeting with a final recommendation on the patients suitability for transplant to be made at that time.    Pending completion of the full evaluation, patient presently appears to be enough of an acceptable kidney transplant recipient candidate to have any potential kidney donors start the evaluation process.      Evaluation:  Nuzhat Lopez was seen in consultation at the request of Dr. Dino Knight for evaluation as a potential kidney transplant recipient.    Reason for Visit:  Nuzhat Lopez is a 26 year old female with ESKD  from IgA nephropathy, who presents for kidney transplant evaluation.    History of Present Illness:         Kidney Disease Hx:    Nuzhat Lopez is a 26-year-old English-speaking Hmong female who was born in [facility] and moved [place] the [place] a young child. She has history of CKD secondary to biopsy-proven IgA nephropathy. She has history of migraines. Her sister is a stroke nurse practitioner and encouraged her to check her blood pressures which she reports had been slightly elevated over the past 4 years. This past October she found her blood pressure to be significantly elevated at home and went to ED and was found to have a presenting serum creatinine 5.8 and heavy proteinuria of 3 g. Kidneys atrophic with increased echogenicity bilaterally on US measuring 8 x 4 cm on right and 9 x 4 cm on left.  A kidney biopsy completed on 10/7/2021 that showed:    IgA nephropathy with marked chronic changes including increased global  glomerulosclerosis and extensive tubular atrophy and interstitial fibrosis of the cortex sampled with associated chronic  inflammation. A component acute tubular necrosis is also present. No crescents/necrotizing lesions are observed.      Her sCr have since been ranging the the in mid 5 range with correlating GFRs of 12-15 with nephrotic proteinuria of 3 g as of last month.  Following by Dr. Finney.  She complains of worsening fatigue and decreased appetite but denies nausea and vomiting. Hypertension has since been well controlled with SBPs ranging 120s-130s while taking Amlodipine and Carvedilol. She has multiple potential donors in her family.            Kidney Disease Dx: IgA nephropathy       Biopsy Proven: Yes; as noted above         On Dialysis: No       Primary Nephrologist: Dr. Finney       H/o Kidney Stones: No       H/o Recurrent/Frequent UTI: No         Diabetic Hx: None           Cardiac/Vascular Disease Risk Factors:        Cardiac Risk Factors: Hypertension and CKD       Known CAD:  No       Known PAD/Caludication Symptoms: No       Known Heart Failure: No       Arrhythmia: No       Pulmonary Hypertension: No       Valvular Disease: No       Other: None         Viral Serology Status       CMV IgG Antibody: Unknown       EBV IgG Antibody: Unknown         Volume Status/Weight:        Volume status: Not assessed       Weight:  Acceptable BMI       BMI: There is no height or weight on file to calculate BMI.         Functional Capacity/Frailty:    Working full time as a  for the homeless community. Not participating in regular strenuous exercise, but has no limitations with walking and can go up and down a slight of stair without exertional symptoms.     Fatigue/Decreased Energy: [] No [x] Yes    Chest Pain or SOB with Exertion: [x] No [] Yes    Significant Weight Change: [x] No [] Yes    Nausea, Vomiting or Diarrhea: [x] No [] Yes    Fever, Sweats or Chills:  [x] No [] Yes    Leg Swelling [x] No [] Yes        History of Cancer: None    Other Significant Medical Issues: None    # COVID Vaccination Up To Date: Yes    Potential Living Kidney Donors: Yes    Review of Systems:  A comprehensive review of systems was obtained and negative, except as noted in the HPI or PMH.    Past Medical History:   Medical record was reviewed and PMH was discussed with patient and noted below.  No past medical history on file.    Past Social History:   Past Surgical History:   Procedure Laterality Date     BIOPSY  2021    [facility]     NO PAST SURGERIES       Personal history of bleeding or anesthesia problems: No    Family History:  No family history on file.    Personal History:   Social History     Socioeconomic History     Marital status: Single     Spouse name: Not on file     Number of children: Not on file     Years of education: Not on file     Highest education level: Not on file   Occupational History     Not on file   Tobacco Use     Smoking status: Never Smoker     Smokeless tobacco:  Never Used   Substance and Sexual Activity     Alcohol use: No     Drug use: No     Sexual activity: Not on file   Other Topics Concern     Parent/sibling w/ CABG, MI or angioplasty before 65F 55M? Not Asked   Social History Narrative     Not on file     Social Determinants of Health     Financial Resource Strain: Not on file   Food Insecurity: Not on file   Transportation Needs: Not on file   Physical Activity: Not on file   Stress: Not on file   Social Connections: Not on file   Intimate Partner Violence: Not on file   Housing Stability: Not on file       Allergies:  No Known Allergies    Medications:  Current Outpatient Medications   Medication Sig     acetaminophen (TYLENOL) 325 MG tablet Take 325-650 mg by mouth every 6 hours as needed for mild pain     amLODIPine (NORVASC) 10 MG tablet Take 1 tablet (10 mg) by mouth daily     carvedilol (COREG) 12.5 MG tablet Take 1 tablet (12.5 mg) by mouth 2 times daily (with meals)     oxyCODONE (ROXICODONE) 5 MG tablet Take 1 tablet (5 mg) by mouth every 6 hours as needed for moderate to severe pain     SUMAtriptan (IMITREX) 50 MG tablet Take 50 mg by mouth at onset of headache for migraine     No current facility-administered medications for this visit.       Vitals:  There were no vitals taken for this visit.    Exam:  GENERAL APPEARANCE: alert and no distress  HENT: no obvious abnormalities on appearance  RESP: breathing appears unremarkable with normal rate, no audible wheezing or cough and no apparent shortness of breath with conversation  MS: extremities normal - no gross deformities noted  SKIN: no apparent rash and normal skin tone  NEURO: speech is clear with no obvious neurological deficits  PSYCH: mentation appears normal and affect normal    Results:   No results found for this or any previous visit (from the past 336 hour(s)).    Nuzhat Simmons is a 26 year old who is being evaluated via a billable video visit.      How would you like to obtain your AVS? MyChart  If  the video visit is dropped, the invitation should be resent by: Text to cell phone: [phone]  Will anyone else be joining your video visit? No    Video Start Time: 8:30 am   Video-Visit Details  Type of service:  Video Visit  Video End Time:9:00 am   Originating Location (pt. Location): Home  Distant Location (provider location):  [facility] TRANSPLANT [facility]IN[facility]   Platform used for Video Visit: VIPerks    Again, thank you for allowing me to participate in the care of your patient.      Sincerely,    MARIA EUGENIA

## 2022-01-04 NOTE — PROGRESS NOTES
Nuzhat Simmons is a 26 year old who is being evaluated via a billable video visit.      How would you like to obtain your AVS? MyChart  If the video visit is dropped, the invitation should be resent by: Text to cell phone: [phone]  Will anyone else be joining your video visit? No      Video Start Time: 9:40am  Video-Visit Details    Type of service:  Video Visit    Video End Time:10:05am    Originating Location (pt. Location): Home    Distant Location (provider location):  [facility] TRANSPLANT CLIN[facility]     Platform used for Video Visit: [facility]    Transplant Surgery Consult Note     Medical record number: [id number]  YOB: 1995,   Consult requested by Dr. Ramos for evaluation of kidney transplant candidacy.    Assessment and Recommendations:Ms. Lopez appears to be a excellent candidate for kidney transplantation and has a good understanding of the risks and benefits of this approach to the management of renal failure. The following issues should be addressed prior to finalizing her transplant candidacy:     Ms. 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    Dietician consult ordered: Yes  Social work consult ordered: Yes  Transplant listing labs ordered to include HLA, ABOx2, Cr, etc.  Obtain past medical records  Up-to-date cancer screening      The majority of our visit was spent in counselling, discussing the medical and surgical risks of kidney transplantation. We talked about the pros and cons of transplantation vs. dialysis.  We discussed the fact that it was important to think about the pros and cons of each treatment option and make an active decision.  We also discussed the fact that the two were interconnected and that if the transplant failed, it is possible that dialysis might be necessary before another transplant.  We also discussed the need  for a lifetime commitment to immunosuppressive therapy and the risks associated with the anti-rejection drugs.     We also discussed the fact that if choosing to have a transplant, a second important decision was a living versus a  donor transplant.  We talked about the pros and cons of each option - the advantage of a  donor transplant being not asking someone to go through the living donor operation, the disadvantage, 5-6 years waiting; the advantage of a living donor transplant, much shorter time to transplant and significantly better long-term outcomes, the disadvantage being the risk to the donor.  Although I didn't express an opinion regarding transplantation or dialysis, I suggested that if opting for a transplant, we would strongly recommend a living donor transplant.  She says there are a number of potential donors.    We discussed the benefit of a preemptive transplant.     We discussed the fact that a living donor does not have to be a direct match and that if there was a donor who met the criteria but was not a match, there was an option of participating in the national paired exchange system.  I described how the system would work.    She has potential donors, but is hesitant to have them go through the donor procedure.  We discussed donor risks and our donor evaluation process.     Ms. Lopez asked good questions and her candidacy will be reviewed at our Multidisciplinary Selection Committee. Thank you for the opportunity to participate in Ms. Lopez's care.    Total time: 35 minutes          Dino Knight MD  Surgical Director, Kidney Transplantation                                                                                                      ---------------------------------------------------------------------------------------------------    Past medical history includes:  CKD, stage 5  Ig A nephropathy   Kidney biopsy   migraine     Past Medical History:   Diagnosis Date     Anemia  in chronic kidney disease      CKD (chronic kidney disease) stage 5, GFR less than 15 ml/min (H)      HTN (hypertension)      IgA nephropathy      Metabolic acidosis      Past Surgical History:   Procedure Laterality Date     BIOPSY  2021    [facility]     NO PAST SURGERIES       Family History   Problem Relation Age of Onset     Impaired Fasting Glucose Mother      Kidney Disease No family hx of      Hypertension No family hx of      Cancer No family hx of      Social History     Socioeconomic History     Marital status: Single     Spouse name: Not on file     Number of children: Not on file     Years of education: Not on file     Highest education level: Not on file   Occupational History     Not on file   Tobacco Use     Smoking status: Never Smoker     Smokeless tobacco: Never Used   Substance and Sexual Activity     Alcohol use: No     Drug use: No     Sexual activity: Not on file   Other Topics Concern     Parent/sibling w/ CABG, MI or angioplasty before 65F 55M? Not Asked   Social History Narrative     Not on file     Social Determinants of Health     Financial Resource Strain: Not on file   Food Insecurity: Not on file   Transportation Needs: Not on file   Physical Activity: Not on file   Stress: Not on file   Social Connections: Not on file   Intimate Partner Violence: Not on file   Housing Stability: Not on file       ROS:   CONSTITUTIONAL:  No fevers or chills  EYES: negative for icterus  ENT:  negative for hearing loss, tinnitus and sore throat  RESPIRATORY:  negative for cough, sputum, dyspnea  CARDIOVASCULAR:  negative for chest pain   GASTROINTESTINAL:  negative for nausea, vomiting, diarrhea or constipation  GENITOURINARY:  negative for incontinence, dysuria, bladder emptying problems  HEME:  No easy bruising  INTEGUMENT:  negative for rash and pruritus  NEURO:  Negative for headache, seizure disorder  Allergies:   No Known Allergies  Medications:  Prescription Medications as of 1/11/2022        Rx Number Disp Refills Start End Last Dispensed Date Next Fill Date Owning Pharmacy    acetaminophen (TYLENOL) 325 MG tablet            Sig: Take 325-650 mg by mouth every 6 hours as needed for mild pain    Class: Historical    Route: Oral    amLODIPine (NORVASC) 10 MG tablet  30 tablet 0 10/9/2021    [facility] [address] [place], [place] [address] [address] [facility]    Sig: Take 1 tablet (10 mg) by mouth daily    Class: E-Prescribe    Route: Oral    carvedilol (COREG) 12.5 MG tablet  60 tablet 0 10/8/2021    [facility] [address] [place], [place] [address] [address] [facility]    Sig: Take 1 tablet (12.5 mg) by mouth 2 times daily (with meals)    Class: E-Prescribe    Route: Oral    oxyCODONE (ROXICODONE) 5 MG tablet  8 tablet 0 10/8/2021        Sig: Take 1 tablet (5 mg) by mouth every 6 hours as needed for moderate to severe pain    Class: Local Print    Earliest Fill Date: 10/8/2021    Route: Oral    sodium bicarbonate 650 MG tablet    11/15/2021    [facility] [address] [place], [place] [address] [address]    Sig: sodium bicarbonate 650 mg tablet    Class: Historical    SUMAtriptan (IMITREX) 50 MG tablet            Sig: Take 50 mg by mouth at onset of headache for migraine    Class: Historical    Route: Oral        Exam:   Constitutional - A&O in NAD.   Eyes - no redness or discharge.  Sclera anicteric  Respiratory - no cough, no labored breathing  Musculoskeletal - range of motion normal  Skin - no discoloration, no jaundice  Neurological - no tremors.  No facial droop or dysarthria  Psychiatric - normal mood and affect  The rest of a comprehensive physical examination is deferred due to PHE (public health emergency) video visit restrictions     Diagnostics:   No results found for this or any previous visit (from the past 672 hour(s)).  No results found for: CPRA

## 2022-01-04 NOTE — LETTER
"  1/4/2022     RE: Nuzhat Lopez  [address]  [facility] [address]    Dear Colleague,    Thank you for referring your patient, Nuzhat Lopez, to the [facility] TRANSPLANT [facility]. Please see a copy of my visit note below.    Pt evaluated via billable telephone visit d/t COVID-19 restrictions. Time spent: 15 min    [facility]  Outpatient MNT: Kidney Transplant Evaluation    Current BMI: 32.1 (HT 61 in,  lbs/77 kg)- data from 11/23  BMI is within recommendation of <35 for kidney transplant     Time Spent: 15 minutes  Visit Type: Initial   Referring Physician: Eugene   Pt accompanied by: self     History of previous txp: none   Dialysis: no     Nutrition Assessment  Renal diet ed while IP in Oct. She reports eating a typical Asian diet w/ soy and oyster sauce, now avoiding a lot of these traditional foods since following a renal diet.     - Appetite: \"okay\", but food doesn't taste as good/less appealing than before   - Food allergies/intolerances: no   - Meal prep & grocery shopping: pt and mom cook   - Previous RD education: yes while IP  - Issues chewing or swallowing: no   - N/V/D/C: no   - Food access concerns: none     Vitamins, Supplements, Pertinent Meds: none   Herbal Medicines/Supplements: none     Edema: none     Weight hx: stable     Diet Recall  Breakfast None    Lunch White bread w/ jam    Dinner White rice and cabbage; some proteins- 1-2x/week    Snacks None; normally would snack on chips but has eliminated these with renal diet   Beverages Water    Alcohol None    Dining out None since following a renal diet      Physical Activity  Walking, 1-2x/week for 20-30 min (outside)     Labs  12/3 Phos 4.3 K 4.6     Nutrition Diagnosis  No nutrition diagnosis identified at this time     Nutrition Intervention  Nutrition education provided:  Discussed sodium intake (low sodium foods and drinks, seasoning food without salt and tips for low sodium diet).  Reviewed OK to " have some chips in moderation, as she is eating very little and very low sodium. Reviewed need for protein daily and risks to cutting out protein, including muscle wasting, low energy, etc.     Reviewed post txp diet guidelines in brief (will review in further detail post txp):  (1) Review of proper food safety measures d/t immunosuppressant therapy post-op and increased risk for food-borne illness    (2) Avoid the following post txp d/t risk for rejection, unknown effects on the organs, and/or potential interactions with immunosuppressants:  - Herbal, Chinese, holistic, chiropractic, natural, alternative medicines and supplements  - Detoxes and cleanses  - Weight loss pills  - Protein powders or other products with extracts or herbs (ie green tea extract)    (3) Med regimen and possible side effects    Patient Understanding: Pt verbalized understanding of education provided.  Expected Engagement: Good  Follow-Up Plans: PRN     Nutrition Goals  No nutrition goals identified at this time     Dione Murrieta, RD, LD, CCTD

## 2022-01-04 NOTE — PROGRESS NOTES
Psychosocial Assessment For Kidney Transplantation  Patient Name/ Age: Nuzhat Lopez 26 year old   Medical Record #: [id number]  Duration of Interview:     30 min  Process:   Phone Interview                (counseling < 50%)   Present at Appointment: Patient         : [facility] [place] [facility]  Outpatient Kidney/Pancreas/Auto Islet Transplant Program   [address] [place] [address]  [place] [address]  [email or web address]  Office: [phone] I Fax: [phone] Date:  January 4, 2022        Type of transplant: Kidney     Donor type:   Nuzhat has several family members that have expressed interest in donation   relative and friend   Prior Transplants:    No Status of Transplant:           Current Living Situation    Location:   [address] [place] [id number]  With Whom: lives with their family       Family/ Social Support:    Nuzhat resides with her parents and 10 siblings (oldest 30 and youngest 8 years) available, helpful   Committed Relationship:    Nuzhat is single  Stable/Supportive   Other Supports:   Nuzhat has several relatives and friends that live locally ([place]) available, helpful       Activities/ Functional Ability    Current Level: independent with ADL's     Transportation drives self       Vocational/Employment/Financial     Employment   full time   Job Description       Income  Nuzhat works full time as a   Salary/wages   Insurance    PREFERRED ONE     At this time, patient can afford medication costs:  Yes  Private Insurance       Medical Status    Current Mode of Treatment for ESRD None   Complications None       Behavioral    Tobacco Use No Chemical Dependency No         Psychiatric Impairment No      Reading Ability: Good  Education Level: Masters Degree Recent Legal History No      Coping Style/Strategies: Music, singing, relaxation        Ability to Adhere to Complex Medical Regime: Yes     Adherence  History:        Education  _X_ Medicare  _X_ Rehabilitation  _X_ Donor issues  _X_ Community resources  _X_ Post discharge housing  _X_ Financial resources  _X_ Medical insurance options  _X_ Psych adjustment  _X_ Family adjustment  _X_ Health Care Directive Provided Education   Psychosocial Risks of Transplant Reviewed and Discussed:  _X_ Increased stress related to emotional,            family, social, employment or financial           situation  _X_ Effect on work and/or disability benefits  _X_ Effect on future health and life           insurance  _X_ Transplant outcome expectations may           not be met  _X_ Mental Health Risks: anxiety,           depression, PTSD, guilt, grief and           chronic fatigue     Notable Items:   None noted.       Final Evaluation/Assessment   Patient seemed to process information well. Appeared well informed, motivated and able to follow post transplant requirements. Behavior was appropriate during interview. Has adequate income and insurance coverage. Adequate social support. No major contraindications noted for transplant.  At this time patient appears to understand the risks and benefits of transplant.    Nuzhat was alert, cooperative and engaged. Nuzhat has a vast support system of many relatives and friends. Nuzhat recently graduated with her Masters in Social Work and works full time as a .       Recommendation  Acceptable- Nuzhat is a very appropriate transplant candidate with emotional and financial supports in place.    Selection Criteria Met:  Plan for support Yes   Chemical Dependence Yes   Smoking No  Mental Health No  Adequate Finances Yes    Signature: [facility] [place] [facility]  Outpatient Kidney/Pancreas/Auto Islet Transplant Program   [address] [address]  [email or web address]  Office: [phone] I Fax: [phone]   Title: Clinical

## 2022-01-05 PROBLEM — N02.B9 IGA NEPHROPATHY: Status: ACTIVE | Noted: 2022-01-05

## 2022-01-05 PROBLEM — I16.1 HYPERTENSIVE EMERGENCY: Status: RESOLVED | Noted: 2021-10-05 | Resolved: 2022-01-05

## 2022-01-05 PROBLEM — D63.1 ANEMIA IN CHRONIC KIDNEY DISEASE: Status: ACTIVE | Noted: 2022-01-05

## 2022-01-05 PROBLEM — I10 HTN (HYPERTENSION): Status: ACTIVE | Noted: 2022-01-05

## 2022-01-05 PROBLEM — N18.5 CHRONIC KIDNEY DISEASE, STAGE V (H): Status: ACTIVE | Noted: 2022-01-05

## 2022-01-05 PROBLEM — N18.9 ANEMIA IN CHRONIC KIDNEY DISEASE: Status: ACTIVE | Noted: 2022-01-05

## 2022-01-10 ENCOUNTER — DOCUMENTATION ONLY (OUTPATIENT)
Dept: HEALTH INFORMATION MANAGEMENT | Facility: CLINIC | Age: 27
End: 2022-01-10
Payer: COMMERCIAL

## 2022-01-12 ENCOUNTER — COMMITTEE REVIEW (OUTPATIENT)
Dept: TRANSPLANT | Facility: CLINIC | Age: 27
End: 2022-01-12
Payer: COMMERCIAL

## 2022-01-12 NOTE — COMMITTEE REVIEW
Abdominal Committee Review Note     Evaluation Date: 1/4/2022  Committee Review Date: 1/12/2022    Organ being evaluated for: Kidney    Transplant Phase: Evaluation  Transplant Status: Active    Transplant Coordinator: Elke Helton  Transplant Surgeon:       Referring Physician: Jonnie Ramos    Primary Diagnosis:   Secondary Diagnosis:     Committee Review Members:  Lexus, Awais Murrieta, RD   Nephrology Leno Whitfield MD, Sedrick Hall MD, Cheryl Robbins MD   Pharmacist Zenaida Arteaga, [facility]    - Clinical Miley Sonja Ferrara, [facility]   Transplant Cait Michael, RN, Kavya Dowling, RN, Ngozi Feldman, RN, Ania Virk, BRETT, Ania Gallego, RN, Michell Abdullahi, RN, Elke Helton, RN, Janny Alexander RN   Transplant Surgery Dino Knight MD       Transplant Eligibility: Irreversible chronic kidney disease treated w/dialysis or expected need for dialysis    Committee Review Decision: Approved    Relative Contraindications:     Absolute Contraindications: None    Committee Chair Dino Knight MD verbally attested to the committee's decision.    Committee Discussion Details: Reviewed pt's medical status and evaluation results to date.  Pt is determined to be an excellent candidate for kidney transplant. Dr. Knight recommends the following items be completed as part of the pt's evaluation:   1. Complete in person evaluation: surgery consult, EKG, echo, CXR, lab work  2. List inactive when lab work results  3. Update health maintenance    Will list the pt inactive status once her immunology lab work is resulted.  Pt will be re-discussed once the above are complete for consideration of active status.

## 2022-01-14 ENCOUNTER — TELEPHONE (OUTPATIENT)
Dept: TRANSPLANT | Facility: CLINIC | Age: 27
End: 2022-01-14
Payer: COMMERCIAL

## 2022-01-14 DIAGNOSIS — N02.B9 IGA NEPHROPATHY: ICD-10-CM

## 2022-01-14 DIAGNOSIS — N18.9 CHRONIC RENAL FAILURE: ICD-10-CM

## 2022-01-14 DIAGNOSIS — Z76.82 ORGAN TRANSPLANT CANDIDATE: ICD-10-CM

## 2022-01-14 DIAGNOSIS — Z01.818 PRE-TRANSPLANT EVALUATION FOR KIDNEY TRANSPLANT: ICD-10-CM

## 2022-01-14 NOTE — TELEPHONE ENCOUNTER
Called pt and reviewed outcome of Selection Committee.  Explained that she is approved as a candidate and that once her labs are completed she will be listed inactive status on the kidney wait list.  We discussed that once she completes her echo, EKG, chest xray, Pap smear, and an in-person consult w/ a transplant surgeon, the Committee will review her case for active listing status.  Orders placed for labs, EKG, chest xray, echo, and surgeon consult.  She will let Elke know when she completes her pap smear.  She also stated she has insurance updates - I provided her w/ the PFR phone number.  She had no further questions.  Summary letter sent.

## 2022-01-14 NOTE — LETTER
01/14/22        Nuzhat Lopez  [address]  [place] [address]        Dear Nuzhat Simmons,    It was a pleasure to see you recently for consideration of kidney transplantation. Your pre-transplant evaluation results were reviewed at our Multidisciplinary Selection Committee on 1/12/22. The Committee has approved you as a candidate for kidney transplant and is requesting the following items are completed as part of your evaluation:    1. Listing labs - once these are resulted, you will be place on the kidney wait list inactive status - our schedulers will call you to arrange a lab draw    2. In-person visit with transplant surgeon - our schedulers will call you to arrange this appointment    3. EKG, echo, and chest xray - our schedulers will call you to arrange these tests    4. Please work with your primary care team to have a Pap Smear completed, results may be faxed to: [phone]    Once the above items are completed, your case will be reviewed by the Committee to be considered for active listing status on the kidney transplant wait list.  For any questions, please contact the Transplant Office at [phone].    Sincerely,  Kavya Dowling, DEVIN (on behalf of Elke Helton RN)  Solid Organ Transplant  [facility], [facility]    CC: Dr. Roro Block, Dr. Jonnie Finney

## 2022-02-28 ENCOUNTER — LAB (OUTPATIENT)
Dept: LAB | Facility: CLINIC | Age: 27
End: 2022-02-28
Attending: NURSE PRACTITIONER
Payer: COMMERCIAL

## 2022-02-28 ENCOUNTER — ANCILLARY PROCEDURE (OUTPATIENT)
Dept: GENERAL RADIOLOGY | Facility: CLINIC | Age: 27
End: 2022-02-28
Attending: PHYSICIAN ASSISTANT
Payer: COMMERCIAL

## 2022-02-28 ENCOUNTER — ANCILLARY PROCEDURE (OUTPATIENT)
Dept: CARDIOLOGY | Facility: CLINIC | Age: 27
End: 2022-02-28
Attending: PHYSICIAN ASSISTANT
Payer: COMMERCIAL

## 2022-02-28 ENCOUNTER — OFFICE VISIT (OUTPATIENT)
Dept: TRANSPLANT | Facility: CLINIC | Age: 27
End: 2022-02-28
Attending: NURSE PRACTITIONER
Payer: COMMERCIAL

## 2022-02-28 VITALS
SYSTOLIC BLOOD PRESSURE: 124 MMHG | OXYGEN SATURATION: 99 % | WEIGHT: 162 LBS | DIASTOLIC BLOOD PRESSURE: 83 MMHG | HEIGHT: 62 IN | BODY MASS INDEX: 29.81 KG/M2 | HEART RATE: 96 BPM

## 2022-02-28 DIAGNOSIS — N02.B9 IGA NEPHROPATHY: ICD-10-CM

## 2022-02-28 DIAGNOSIS — N18.5 CHRONIC KIDNEY DISEASE, STAGE V (H): ICD-10-CM

## 2022-02-28 DIAGNOSIS — Z76.82 ORGAN TRANSPLANT CANDIDATE: ICD-10-CM

## 2022-02-28 DIAGNOSIS — N18.5 CHRONIC KIDNEY DISEASE, STAGE 5, KIDNEY FAILURE (H): ICD-10-CM

## 2022-02-28 DIAGNOSIS — Z01.818 PRE-TRANSPLANT EVALUATION FOR KIDNEY TRANSPLANT: ICD-10-CM

## 2022-02-28 DIAGNOSIS — I10 ESSENTIAL HYPERTENSION: ICD-10-CM

## 2022-02-28 DIAGNOSIS — N18.9 CHRONIC RENAL FAILURE: ICD-10-CM

## 2022-02-28 LAB
A1 AB TITR SERPL: 32 {TITER}
A1 AB TITR SERPL: 32 {TITER}
A1 AG RBC QL: NEGATIVE
A2 AB TITR SERPL: 2 {TITER}
A2 AB TITR SERPL: 4 {TITER}
ABO/RH(D): NORMAL
ABO/RH(D): NORMAL
ALBUMIN SERPL-MCNC: 3.5 G/DL (ref 3.4–5)
ALBUMIN UR-MCNC: 300 MG/DL
ALP SERPL-CCNC: 78 U/L (ref 40–150)
ALT SERPL W P-5'-P-CCNC: 11 U/L (ref 0–50)
ANION GAP SERPL CALCULATED.3IONS-SCNC: 12 MMOL/L (ref 3–14)
ANTIBODY SCREEN: NEGATIVE
APPEARANCE UR: ABNORMAL
APTT PPP: 32 SECONDS (ref 22–38)
AST SERPL W P-5'-P-CCNC: 12 U/L (ref 0–45)
BACTERIA #/AREA URNS HPF: ABNORMAL /HPF
BASOPHILS # BLD AUTO: 0 10E3/UL (ref 0–0.2)
BASOPHILS NFR BLD AUTO: 1 %
BILIRUB SERPL-MCNC: 0.2 MG/DL (ref 0.2–1.3)
BILIRUB UR QL STRIP: NEGATIVE
BUN SERPL-MCNC: 63 MG/DL (ref 7–30)
CALCIUM SERPL-MCNC: 8.5 MG/DL (ref 8.5–10.1)
CHLORIDE BLD-SCNC: 106 MMOL/L (ref 94–109)
CO2 SERPL-SCNC: 21 MMOL/L (ref 20–32)
COLOR UR AUTO: YELLOW
CREAT SERPL-MCNC: 7.65 MG/DL (ref 0.52–1.04)
EOSINOPHIL # BLD AUTO: 0.2 10E3/UL (ref 0–0.7)
EOSINOPHIL NFR BLD AUTO: 3 %
ERYTHROCYTE [DISTWIDTH] IN BLOOD BY AUTOMATED COUNT: 12.4 % (ref 10–15)
FACTOR 2 INTERPRETATION: NORMAL
FACTOR V INTERPRETATION: NORMAL
GFR SERPL CREATININE-BSD FRML MDRD: 7 ML/MIN/1.73M2
GLUCOSE BLD-MCNC: 102 MG/DL (ref 70–99)
GLUCOSE UR STRIP-MCNC: NEGATIVE MG/DL
HBV CORE AB SERPL QL IA: NONREACTIVE
HBV SURFACE AB SERPL IA-ACNC: 46.05 M[IU]/ML
HBV SURFACE AG SERPL QL IA: NONREACTIVE
HCG SERPL QL: NEGATIVE
HCT VFR BLD AUTO: 31.3 % (ref 35–47)
HCV AB SERPL QL IA: NONREACTIVE
HGB BLD-MCNC: 10 G/DL (ref 11.7–15.7)
HGB UR QL STRIP: ABNORMAL
HIV 1+2 AB+HIV1 P24 AG SERPL QL IA: NONREACTIVE
IMM GRANULOCYTES # BLD: 0 10E3/UL
IMM GRANULOCYTES NFR BLD: 0 %
INR PPP: 0.93 (ref 0.85–1.15)
KETONES UR STRIP-MCNC: NEGATIVE MG/DL
LAB DIRECTOR COMMENTS: NORMAL
LAB DIRECTOR DISCLAIMER: NORMAL
LAB DIRECTOR INTERPRETATION: NORMAL
LAB DIRECTOR METHODOLOGY: NORMAL
LAB DIRECTOR RESULTS: NORMAL
LEUKOCYTE ESTERASE UR QL STRIP: ABNORMAL
LVEF ECHO: NORMAL
LYMPHOCYTES # BLD AUTO: 1.8 10E3/UL (ref 0.8–5.3)
LYMPHOCYTES NFR BLD AUTO: 24 %
MCH RBC QN AUTO: 26.3 PG (ref 26.5–33)
MCHC RBC AUTO-ENTMCNC: 31.9 G/DL (ref 31.5–36.5)
MCV RBC AUTO: 82 FL (ref 78–100)
MONOCYTES # BLD AUTO: 0.4 10E3/UL (ref 0–1.3)
MONOCYTES NFR BLD AUTO: 5 %
MUCOUS THREADS #/AREA URNS LPF: PRESENT /LPF
NEUTROPHILS # BLD AUTO: 5 10E3/UL (ref 1.6–8.3)
NEUTROPHILS NFR BLD AUTO: 67 %
NITRATE UR QL: NEGATIVE
NRBC # BLD AUTO: 0 10E3/UL
NRBC BLD AUTO-RTO: 0 /100
PH UR STRIP: 7 [PH] (ref 5–7)
PLATELET # BLD AUTO: 264 10E3/UL (ref 150–450)
POTASSIUM BLD-SCNC: 4.2 MMOL/L (ref 3.4–5.3)
PROT SERPL-MCNC: 8 G/DL (ref 6.8–8.8)
RBC # BLD AUTO: 3.8 10E6/UL (ref 3.8–5.2)
RBC URINE: 7 /HPF
SODIUM SERPL-SCNC: 139 MMOL/L (ref 133–144)
SP GR UR STRIP: 1.01 (ref 1–1.03)
SPECIMEN DESCRIPTION: NORMAL
SPECIMEN EXPIRATION DATE: NORMAL
SPERM #/AREA URNS HPF: PRESENT /HPF
SQUAMOUS EPITHELIAL: 10 /HPF
T PALLIDUM AB SER QL: NONREACTIVE
UROBILINOGEN UR STRIP-MCNC: NORMAL MG/DL
WBC # BLD AUTO: 7.4 10E3/UL (ref 4–11)
WBC URINE: 6 /HPF

## 2022-02-28 PROCEDURE — 85390 FIBRINOLYSINS SCREEN I&R: CPT | Mod: 26 | Performed by: PATHOLOGY

## 2022-02-28 PROCEDURE — 86886 COOMBS TEST INDIRECT TITER: CPT | Mod: 90 | Performed by: PATHOLOGY

## 2022-02-28 PROCEDURE — 86147 CARDIOLIPIN ANTIBODY EA IG: CPT | Mod: 90 | Performed by: PATHOLOGY

## 2022-02-28 PROCEDURE — 85670 THROMBIN TIME PLASMA: CPT | Mod: 90 | Performed by: PATHOLOGY

## 2022-02-28 PROCEDURE — 86644 CMV ANTIBODY: CPT | Mod: 90 | Performed by: PATHOLOGY

## 2022-02-28 PROCEDURE — 86900 BLOOD TYPING SEROLOGIC ABO: CPT | Mod: 90 | Performed by: PATHOLOGY

## 2022-02-28 PROCEDURE — 81001 URINALYSIS AUTO W/SCOPE: CPT | Performed by: PATHOLOGY

## 2022-02-28 PROCEDURE — 86833 HLA CLASS II HIGH DEFIN QUAL: CPT | Performed by: PATHOLOGY

## 2022-02-28 PROCEDURE — 36415 COLL VENOUS BLD VENIPUNCTURE: CPT | Performed by: PATHOLOGY

## 2022-02-28 PROCEDURE — 87340 HEPATITIS B SURFACE AG IA: CPT | Mod: 90 | Performed by: PATHOLOGY

## 2022-02-28 PROCEDURE — 85730 THROMBOPLASTIN TIME PARTIAL: CPT | Mod: 90 | Performed by: PATHOLOGY

## 2022-02-28 PROCEDURE — 85613 RUSSELL VIPER VENOM DILUTED: CPT | Mod: 90 | Performed by: PATHOLOGY

## 2022-02-28 PROCEDURE — 99000 SPECIMEN HANDLING OFFICE-LAB: CPT | Performed by: PATHOLOGY

## 2022-02-28 PROCEDURE — 86704 HEP B CORE ANTIBODY TOTAL: CPT | Mod: 90 | Performed by: PATHOLOGY

## 2022-02-28 PROCEDURE — 86780 TREPONEMA PALLIDUM: CPT | Mod: 90 | Performed by: PATHOLOGY

## 2022-02-28 PROCEDURE — 86665 EPSTEIN-BARR CAPSID VCA: CPT | Mod: 90 | Performed by: PATHOLOGY

## 2022-02-28 PROCEDURE — 86787 VARICELLA-ZOSTER ANTIBODY: CPT | Mod: 90 | Performed by: PATHOLOGY

## 2022-02-28 PROCEDURE — 86832 HLA CLASS I HIGH DEFIN QUAL: CPT | Performed by: PATHOLOGY

## 2022-02-28 PROCEDURE — 81378 HLA I & II TYPING HR: CPT | Performed by: PATHOLOGY

## 2022-02-28 PROCEDURE — 86481 TB AG RESPONSE T-CELL SUSP: CPT | Mod: 90 | Performed by: PATHOLOGY

## 2022-02-28 PROCEDURE — 86706 HEP B SURFACE ANTIBODY: CPT | Mod: 90 | Performed by: PATHOLOGY

## 2022-02-28 PROCEDURE — G0452 MOLECULAR PATHOLOGY INTERPR: HCPCS | Mod: 26 | Performed by: PATHOLOGY

## 2022-02-28 PROCEDURE — 86850 RBC ANTIBODY SCREEN: CPT | Mod: 90 | Performed by: PATHOLOGY

## 2022-02-28 PROCEDURE — 81240 F2 GENE: CPT | Performed by: NURSE PRACTITIONER

## 2022-02-28 PROCEDURE — 86803 HEPATITIS C AB TEST: CPT | Mod: 90 | Performed by: PATHOLOGY

## 2022-02-28 PROCEDURE — 86905 BLOOD TYPING RBC ANTIGENS: CPT | Mod: 90 | Performed by: PATHOLOGY

## 2022-02-28 PROCEDURE — 71046 X-RAY EXAM CHEST 2 VIEWS: CPT | Mod: GC | Performed by: RADIOLOGY

## 2022-02-28 PROCEDURE — 85610 PROTHROMBIN TIME: CPT | Performed by: PATHOLOGY

## 2022-02-28 PROCEDURE — 84703 CHORIONIC GONADOTROPIN ASSAY: CPT | Performed by: PATHOLOGY

## 2022-02-28 PROCEDURE — 93306 TTE W/DOPPLER COMPLETE: CPT | Performed by: STUDENT IN AN ORGANIZED HEALTH CARE EDUCATION/TRAINING PROGRAM

## 2022-02-28 PROCEDURE — 99214 OFFICE O/P EST MOD 30 MIN: CPT | Mod: GC | Performed by: SURGERY

## 2022-02-28 PROCEDURE — 85025 COMPLETE CBC W/AUTO DIFF WBC: CPT | Performed by: PATHOLOGY

## 2022-02-28 PROCEDURE — 81382 HLA II TYPING 1 LOC HR: CPT | Performed by: PATHOLOGY

## 2022-02-28 PROCEDURE — 80053 COMPREHEN METABOLIC PANEL: CPT | Performed by: PATHOLOGY

## 2022-02-28 PROCEDURE — 86901 BLOOD TYPING SEROLOGIC RH(D): CPT | Mod: 90 | Performed by: PATHOLOGY

## 2022-02-28 NOTE — NURSING NOTE
"Chief Complaint   Patient presents with     Consult     PKE     Blood pressure 124/83, pulse 96, height 1.575 m (5' 2\"), weight 73.5 kg (162 lb), SpO2 99 %.    Inés Eldridge on 2/28/2022 at 1:53 PM    "

## 2022-02-28 NOTE — PROGRESS NOTES
Transplant Surgery Consult Note     Medical record number: [id number]  YOB: 1995,   Consult requested  for evaluation of kidney transplant candidacy.    Assessment and Recommendations:Ms. Lopez appears to be a excellent candidate for kidney transplantation and has a good understanding of the risks and benefits of this approach to the management of renal failure. The following issues should be addressed prior to finalizing her transplant candidacy:     Transplant order: Ms. 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  Recommend he be considered as a candidate for .  Cardiology consult for cardiac risk stratification to be ordered: Yes, pending ECHO today  CT abdomen and pelvis without contrast to be ordered for assessment of vascular targets: Yes  Transplant listing labs ordered to include HLA, ABOx2, Cr, etc.  Dietician consult ordered: Yes  Social work consult ordered: Yes  Imaging reports reviewed:  None  Radiology images reviewed:None  Recipient suitable to move forward with work up of living donors:  Yes  She will have discussion with her siblings regarding living donation. Patient counseled.     The majority of our visit was spent in counselling, discussing the medical and surgical risks of kidney transplantation. We discussed approximate wait time and how that is influenced by issues such as blood type and sensitization (PRA) and access to a living donor. I contrasted potential waiting time for living vs  donor kidneys from  normal (0-85%) or higher (%) kidney donor profile index (KDPI) donors and their associated outcomes. I would not recommend this individual to consider kidneys from high KDPI donors. The reason for this decision is best summarized as: not on dialysis yet. Potential surgical complications of kidney transplantation  include bleeding, superficial or deep wound complications (infection, hernia, lymphocele), ureteral anastomotic failure (leak or stenosis), graft thrombosis, need for reoperation and other issues such as cardiac complications, pneumonia, deep venous thrombosis, pulmonary embolism, post transplant diabetes and death. The potential for recurrent disease or need for retransplantation was also addressed. We discussed the possible need for ureteral stent (and subsequent removal), and the utility of protocol biopsy and laboratory studies to evaluate for rejection or recurrent disease. We discussed the risk of graft rejection, our center's average graft and patient survival rates, immunosuppression protocols, as well as the potential opportunity to participate in clinical trials.  We also discussed the average length of stay, recovery process, and posttransplant lab and monitoring protocol.  I emphasized the need for strict immunosuppression medication adherence and the potential for complications of immunosuppression such as skin cancer or lymphoma, as well as a very low but not zero risk of donor-derived disease transmission risks (infection, cancer). Ms. Lopez asked good questions and her candidacy will be reviewed at our Multidisciplinary Selection Committee. Thank you for the opportunity to participate in Ms. Lopez's care.    Lyle Guzmán MD  Transplant Surgery Fellow    I have reviewed history, examined patient and discussed plan with the fellow/resident/MARLENE.  I concur with the findings in this note.       Risks of the surgical procedure including but not limited to the rare risk of mortality discussed in detail. Patient verbalized good understanding and had several pertinent questions which were answered satisfactorily.     Immunosuppressive regimen, management and long term risks discussed in detail.       Total time: 40 minutes  Counselling time: 30  minutes    .    ---------------------------------------------------------------------------------------------------    HPI: Ms. 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 The patient is non-diabetic.       The patient is not on dialysis.    Has potential kidney donors:  Yes .  Interested in participation in paired exchange if a donor is willing: Yes     The patient has the following pertinent history:       No    Yes  Dialysis:    [x]      [] via:       Blood Transfusion                  [x]      []  Number of units:   Most recently:  Pregnancy:    [x]      [] Number:       Previous Transplant:  [x]      [] Details:    Cancer    [x]      [] Comment:   Kidney stones   [x]      [] Comment:      Recurrent infections  [x]      []  Type:                  Bladder dysfunction  [x]      [] Cause:    Claudication   [x]      [] Distance:    Previous Amputation  [x]      [] Cause:     Chronic anticoagulation  [x]      [] Indication:   Latter day  [x]      []      Past Medical History:   Diagnosis Date     Anemia in chronic kidney disease      CKD (chronic kidney disease) stage 5, GFR less than 15 ml/min (H)      HTN (hypertension)      IgA nephropathy      Metabolic acidosis      Past Surgical History:   Procedure Laterality Date     BIOPSY  2021    [facility]     NO PAST SURGERIES       Family History   Problem Relation Age of Onset     Impaired Fasting Glucose Mother      Kidney Disease No family hx of      Hypertension No family hx of      Cancer No family hx of      Social History     Socioeconomic History     Marital status: Single     Spouse name: Not on file     Number of children: Not on file     Years of education: Not on file     Highest education level: Not on file   Occupational History     Not on file   Tobacco Use     Smoking status: Never Smoker     Smokeless tobacco: Never Used   Substance and Sexual Activity     Alcohol use: No     Drug use: No     Sexual activity: Not on file   Other  Topics Concern     Parent/sibling w/ CABG, MI or angioplasty before 65F 55M? Not Asked   Social History Narrative     Not on file     Social Determinants of Health     Financial Resource Strain: Not on file   Food Insecurity: Not on file   Transportation Needs: Not on file   Physical Activity: Not on file   Stress: Not on file   Social Connections: Not on file   Intimate Partner Violence: Not on file   Housing Stability: Not on file       ROS:   CONSTITUTIONAL:  No fevers or chills  EYES: negative for icterus  ENT:  negative for hearing loss, tinnitus and sore throat  RESPIRATORY:  negative for cough, sputum, dyspnea  CARDIOVASCULAR:  negative for chest pain None  GASTROINTESTINAL:  negative for nausea, vomiting, diarrhea or constipation  GENITOURINARY:  negative for incontinence, dysuria, bladder emptying problems  HEME:  No easy bruising  INTEGUMENT:  negative for rash and pruritus  NEURO:  Negative for headache, seizure disorder  Allergies:   No Known Allergies  Medications:  Prescription Medications as of 2/28/2022       Rx Number Disp Refills Start End Last Dispensed Date Next Fill Date Owning Pharmacy    acetaminophen (TYLENOL) 325 MG tablet            Sig: Take 325-650 mg by mouth every 6 hours as needed for mild pain     Class: Historical    Route: Oral    amLODIPine (NORVASC) 10 MG tablet  30 tablet 0 10/9/2021    [facility] [address] [place] [address]  AT [facility]    Sig: Take 1 tablet (10 mg) by mouth daily    Class: E-Prescribe    Route: Oral    carvedilol (COREG) 12.5 MG tablet  60 tablet 0 10/8/2021    [facility] [address] [place] [address]  AT [facility]    Sig: Take 1 tablet (12.5 mg) by mouth 2 times daily (with meals)    Class: E-Prescribe    Route: Oral    oxyCODONE (ROXICODONE) 5 MG tablet  8 tablet 0 10/8/2021        Sig: Take 1 tablet (5 mg) by mouth every 6 hours as needed for moderate to severe pain    Class: Local  "Print    Earliest Fill Date: 10/8/2021    Route: Oral    sodium bicarbonate 650 MG tablet    11/15/2021    [id number] [place], [place] - [address] [facility]  AT [facility]    Sig: sodium bicarbonate 650 mg tablet    Class: Historical    SUMAtriptan (IMITREX) 50 MG tablet            Sig: Take 50 mg by mouth at onset of headache for migraine    Class: Historical    Route: Oral        Exam:     /83   Pulse 96   Ht 1.575 m (5' 2\")   Wt 73.5 kg (162 lb)   SpO2 99%   BMI 29.63 kg/m    Appearance: in no apparent distress.   Skin: normal, warm  Eyes:  no redness or discharge.  Sclera anicteric  Head and Neck: Normal, no rashes or jaundice  Respiratory: easy respirations, no audible wheezing.  Abdomen: rounded, No distention and Normal bowel sounds   Extremeties: femoral 4+/4+, Edema, none  Neuro: without deficit   Psychiatric: Normal mood and affect    Diagnostics:   Recent Results (from the past 672 hour(s))   EKG 12-lead, tracing only [EKG1]    Collection Time: 02/28/22 12:48 PM   Result Value Ref Range    Systolic Blood Pressure  mmHg    Diastolic Blood Pressure  mmHg    Ventricular Rate 93 BPM    Atrial Rate 93 BPM    CT Interval 140 ms    QRS Duration 70 ms     ms    QTc 435 ms    P Axis 51 degrees    R AXIS -12 degrees    T Axis 82 degrees    Interpretation ECG       Sinus rhythm  Normal ECG  When compared with ECG of 05-OCT-2021 10:13,  Nonspecific T wave abnormality, worse in Lateral leads     CBC with platelets and differential    Collection Time: 02/28/22  1:03 PM   Result Value Ref Range    WBC Count 7.4 4.0 - 11.0 10e3/uL    RBC Count 3.80 3.80 - 5.20 10e6/uL    Hemoglobin 10.0 (L) 11.7 - 15.7 g/dL    Hematocrit 31.3 (L) 35.0 - 47.0 %    MCV 82 78 - 100 fL    MCH 26.3 (L) 26.5 - 33.0 pg    MCHC 31.9 31.5 - 36.5 g/dL    RDW 12.4 10.0 - 15.0 %    Platelet Count 264 150 - 450 10e3/uL    % Neutrophils 67 %    % Lymphocytes 24 %    % Monocytes 5 %    % Eosinophils 3 %    % " Basophils 1 %    % Immature Granulocytes 0 %    NRBCs per 100 WBC 0 <1 /100    Absolute Neutrophils 5.0 1.6 - 8.3 10e3/uL    Absolute Lymphocytes 1.8 0.8 - 5.3 10e3/uL    Absolute Monocytes 0.4 0.0 - 1.3 10e3/uL    Absolute Eosinophils 0.2 0.0 - 0.7 10e3/uL    Absolute Basophils 0.0 0.0 - 0.2 10e3/uL    Absolute Immature Granulocytes 0.0 <=0.4 10e3/uL    Absolute NRBCs 0.0 10e3/uL   Comprehensive metabolic panel [LAB17]    Collection Time: 02/28/22  1:08 PM   Result Value Ref Range    Sodium 139 133 - 144 mmol/L    Potassium 4.2 3.4 - 5.3 mmol/L    Chloride 106 94 - 109 mmol/L    Carbon Dioxide (CO2) 21 20 - 32 mmol/L    Anion Gap 12 3 - 14 mmol/L    Urea Nitrogen 63 (H) 7 - 30 mg/dL    Creatinine 7.65 (H) 0.52 - 1.04 mg/dL    Calcium 8.5 8.5 - 10.1 mg/dL    Glucose 102 (H) 70 - 99 mg/dL    Alkaline Phosphatase 78 40 - 150 U/L    AST 12 0 - 45 U/L    ALT 11 0 - 50 U/L    Protein Total 8.0 6.8 - 8.8 g/dL    Albumin 3.5 3.4 - 5.0 g/dL    Bilirubin Total 0.2 0.2 - 1.3 mg/dL    GFR Estimate 7 (L) >60 mL/min/1.73m2   INR [HCN0835]    Collection Time: 02/28/22  1:08 PM   Result Value Ref Range    INR 0.93 0.85 - 1.15   Partial thromboplastin time [LAB56]    Collection Time: 02/28/22  1:08 PM   Result Value Ref Range    aPTT 32 22 - 38 Seconds   HCG qualitative [RCC554]    Collection Time: 02/28/22  1:08 PM   Result Value Ref Range    hCG Serum Qualitative Negative Negative   UA with Microscopic reflex to Culture    Collection Time: 02/28/22  1:14 PM    Specimen: Urine, Midstream   Result Value Ref Range    Color Urine Yellow Colorless, Straw, Light Yellow, Yellow    Appearance Urine Slightly Cloudy (A) Clear    Glucose Urine Negative Negative mg/dL    Bilirubin Urine Negative Negative    Ketones Urine Negative Negative mg/dL    Specific Gravity Urine 1.015 1.003 - 1.035    Blood Urine Moderate (A) Negative    pH Urine 7.0 5.0 - 7.0    Protein Albumin Urine 300  (A) Negative mg/dL    Urobilinogen Urine Normal Normal, 2.0  mg/dL    Nitrite Urine Negative Negative    Leukocyte Esterase Urine Trace (A) Negative    Bacteria Urine Few (A) None Seen /HPF    Mucus Urine Present (A) None Seen /LPF    Sperm Urine Present (A) None Seen /HPF    RBC Urine 7 (H) <=2 /HPF    WBC Urine 6 (H) <=5 /HPF    Squamous Epithelials Urine 10 (H) <=1 /HPF     No results found for: CPRA

## 2022-02-28 NOTE — LETTER
2022     RE: Nuzhat Lopez  [address] [facility] [address]    Dear Colleague,    Thank you for referring your patient, Nuzhat Lopez, to the [facility]. Please see a copy of my visit note below.    Transplant Surgery Consult Note     Medical record number: [id number]  YOB: 1995,   Consult requested  for evaluation of kidney transplant candidacy.    Assessment and Recommendations:Ms. Lopez appears to be a excellent candidate for kidney transplantation and has a good understanding of the risks and benefits of this approach to the management of renal failure. The following issues should be addressed prior to finalizing her transplant candidacy:     Transplant order: Ms. 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  Recommend he be considered as a candidate for .  Cardiology consult for cardiac risk stratification to be ordered: Yes, pending ECHO today  CT abdomen and pelvis without contrast to be ordered for assessment of vascular targets: Yes  Transplant listing labs ordered to include HLA, ABOx2, Cr, etc.  Dietician consult ordered: Yes  Social work consult ordered: Yes  Imaging reports reviewed:  None  Radiology images reviewed:None  Recipient suitable to move forward with work up of living donors:  Yes  She will have discussion with her siblings regarding living donation. Patient counseled.     The majority of our visit was spent in counselling, discussing the medical and surgical risks of kidney transplantation. We discussed approximate wait time and how that is influenced by issues such as blood type and sensitization (PRA) and access to a living donor. I contrasted potential waiting time for living vs  donor kidneys from  normal (0-85%) or higher (%) kidney donor profile index (KDPI) donors and their associated  outcomes. I would not recommend this individual to consider kidneys from high KDPI donors. The reason for this decision is best summarized as: not on dialysis yet. Potential surgical complications of kidney transplantation include bleeding, superficial or deep wound complications (infection, hernia, lymphocele), ureteral anastomotic failure (leak or stenosis), graft thrombosis, need for reoperation and other issues such as cardiac complications, pneumonia, deep venous thrombosis, pulmonary embolism, post transplant diabetes and death. The potential for recurrent disease or need for retransplantation was also addressed. We discussed the possible need for ureteral stent (and subsequent removal), and the utility of protocol biopsy and laboratory studies to evaluate for rejection or recurrent disease. We discussed the risk of graft rejection, our center's average graft and patient survival rates, immunosuppression protocols, as well as the potential opportunity to participate in clinical trials.  We also discussed the average length of stay, recovery process, and posttransplant lab and monitoring protocol.  I emphasized the need for strict immunosuppression medication adherence and the potential for complications of immunosuppression such as skin cancer or lymphoma, as well as a very low but not zero risk of donor-derived disease transmission risks (infection, cancer). Ms. Lopez asked good questions and her candidacy will be reviewed at our Multidisciplinary Selection Committee. Thank you for the opportunity to participate in Ms. Lopez's care.    Lyle Guzmán MD  Transplant Surgery Fellow    I have reviewed history, examined patient and discussed plan with the fellow/resident/MARLENE.  I concur with the findings in this note.       Risks of the surgical procedure including but not limited to the rare risk of mortality discussed in detail. Patient verbalized good understanding and had several pertinent questions which were  answered satisfactorily.     Immunosuppressive regimen, management and long term risks discussed in detail.       Total time: 40 minutes  Counselling time: 30 minutes    .    ---------------------------------------------------------------------------------------------------    HPI: Ms. 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 The patient is non-diabetic.       The patient is not on dialysis.    Has potential kidney donors:  Yes .  Interested in participation in paired exchange if a donor is willing: Yes     The patient has the following pertinent history:       No    Yes  Dialysis:    [x]      [] via:       Blood Transfusion                  [x]      []  Number of units:   Most recently:  Pregnancy:    [x]      [] Number:       Previous Transplant:  [x]      [] Details:    Cancer    [x]      [] Comment:   Kidney stones   [x]      [] Comment:      Recurrent infections  [x]      []  Type:                  Bladder dysfunction  [x]      [] Cause:    Claudication   [x]      [] Distance:    Previous Amputation  [x]      [] Cause:     Chronic anticoagulation  [x]      [] Indication:   Scientology  [x]      []      Past Medical History:   Diagnosis Date     Anemia in chronic kidney disease      CKD (chronic kidney disease) stage 5, GFR less than 15 ml/min (H)      HTN (hypertension)      IgA nephropathy      Metabolic acidosis      Past Surgical History:   Procedure Laterality Date     BIOPSY  2021    [facility]     NO PAST SURGERIES       Family History   Problem Relation Age of Onset     Impaired Fasting Glucose Mother      Kidney Disease No family hx of      Hypertension No family hx of      Cancer No family hx of      Social History     Socioeconomic History     Marital status: Single     Spouse name: Not on file     Number of children: Not on file     Years of education: Not on file     Highest education level: Not on file   Occupational History     Not on file   Tobacco Use     Smoking  status: Never Smoker     Smokeless tobacco: Never Used   Substance and Sexual Activity     Alcohol use: No     Drug use: No     Sexual activity: Not on file   Other Topics Concern     Parent/sibling w/ CABG, MI or angioplasty before 65F 55M? Not Asked   Social History Narrative     Not on file     Social Determinants of Health     Financial Resource Strain: Not on file   Food Insecurity: Not on file   Transportation Needs: Not on file   Physical Activity: Not on file   Stress: Not on file   Social Connections: Not on file   Intimate Partner Violence: Not on file   Housing Stability: Not on file       ROS:   CONSTITUTIONAL:  No fevers or chills  EYES: negative for icterus  ENT:  negative for hearing loss, tinnitus and sore throat  RESPIRATORY:  negative for cough, sputum, dyspnea  CARDIOVASCULAR:  negative for chest pain None  GASTROINTESTINAL:  negative for nausea, vomiting, diarrhea or constipation  GENITOURINARY:  negative for incontinence, dysuria, bladder emptying problems  HEME:  No easy bruising  INTEGUMENT:  negative for rash and pruritus  NEURO:  Negative for headache, seizure disorder  Allergies:   No Known Allergies  Medications:  Prescription Medications as of 2/28/2022       Rx Number Disp Refills Start End Last Dispensed Date Next Fill Date Owning Pharmacy    acetaminophen (TYLENOL) 325 MG tablet            Sig: Take 325-650 mg by mouth every 6 hours as needed for mild pain     Class: Historical    Route: Oral    amLODIPine (NORVASC) 10 MG tablet  30 tablet 0 10/9/2021    [facility] [address]  AT [facility]    Sig: Take 1 tablet (10 mg) by mouth daily    Class: E-Prescribe    Route: Oral    carvedilol (COREG) 12.5 MG tablet  60 tablet 0 10/8/2021    [facility] [address]  AT [facility]    Sig: Take 1 tablet (12.5 mg) by mouth 2 times daily (with meals)    Class: E-Prescribe    Route: Oral     "oxyCODONE (ROXICODONE) 5 MG tablet  8 tablet 0 10/8/2021        Sig: Take 1 tablet (5 mg) by mouth every 6 hours as needed for moderate to severe pain    Class: Local Print    Earliest Fill Date: 10/8/2021    Route: Oral    sodium bicarbonate 650 MG tablet    11/15/2021    [id number] - [place], [place] - [address]  AT [facility]    Sig: sodium bicarbonate 650 mg tablet    Class: Historical    SUMAtriptan (IMITREX) 50 MG tablet            Sig: Take 50 mg by mouth at onset of headache for migraine    Class: Historical    Route: Oral        Exam:     /83   Pulse 96   Ht 1.575 m (5' 2\")   Wt 73.5 kg (162 lb)   SpO2 99%   BMI 29.63 kg/m    Appearance: in no apparent distress.   Skin: normal, warm  Eyes:  no redness or discharge.  Sclera anicteric  Head and Neck: Normal, no rashes or jaundice  Respiratory: easy respirations, no audible wheezing.  Abdomen: rounded, No distention and Normal bowel sounds   Extremeties: femoral 4+/4+, Edema, none  Neuro: without deficit   Psychiatric: Normal mood and affect    Diagnostics:   Recent Results (from the past 672 hour(s))   EKG 12-lead, tracing only [EKG1]    Collection Time: 02/28/22 12:48 PM   Result Value Ref Range    Systolic Blood Pressure  mmHg    Diastolic Blood Pressure  mmHg    Ventricular Rate 93 BPM    Atrial Rate 93 BPM    MN Interval 140 ms    QRS Duration 70 ms     ms    QTc 435 ms    P Axis 51 degrees    R AXIS -12 degrees    T Axis 82 degrees    Interpretation ECG       Sinus rhythm  Normal ECG  When compared with ECG of 05-OCT-2021 10:13,  Nonspecific T wave abnormality, worse in Lateral leads     CBC with platelets and differential    Collection Time: 02/28/22  1:03 PM   Result Value Ref Range    WBC Count 7.4 4.0 - 11.0 10e3/uL    RBC Count 3.80 3.80 - 5.20 10e6/uL    Hemoglobin 10.0 (L) 11.7 - 15.7 g/dL    Hematocrit 31.3 (L) 35.0 - 47.0 %    MCV 82 78 - 100 fL    MCH 26.3 (L) 26.5 - 33.0 pg    MCHC 31.9 31.5 " - 36.5 g/dL    RDW 12.4 10.0 - 15.0 %    Platelet Count 264 150 - 450 10e3/uL    % Neutrophils 67 %    % Lymphocytes 24 %    % Monocytes 5 %    % Eosinophils 3 %    % Basophils 1 %    % Immature Granulocytes 0 %    NRBCs per 100 WBC 0 <1 /100    Absolute Neutrophils 5.0 1.6 - 8.3 10e3/uL    Absolute Lymphocytes 1.8 0.8 - 5.3 10e3/uL    Absolute Monocytes 0.4 0.0 - 1.3 10e3/uL    Absolute Eosinophils 0.2 0.0 - 0.7 10e3/uL    Absolute Basophils 0.0 0.0 - 0.2 10e3/uL    Absolute Immature Granulocytes 0.0 <=0.4 10e3/uL    Absolute NRBCs 0.0 10e3/uL   Comprehensive metabolic panel [LAB17]    Collection Time: 02/28/22  1:08 PM   Result Value Ref Range    Sodium 139 133 - 144 mmol/L    Potassium 4.2 3.4 - 5.3 mmol/L    Chloride 106 94 - 109 mmol/L    Carbon Dioxide (CO2) 21 20 - 32 mmol/L    Anion Gap 12 3 - 14 mmol/L    Urea Nitrogen 63 (H) 7 - 30 mg/dL    Creatinine 7.65 (H) 0.52 - 1.04 mg/dL    Calcium 8.5 8.5 - 10.1 mg/dL    Glucose 102 (H) 70 - 99 mg/dL    Alkaline Phosphatase 78 40 - 150 U/L    AST 12 0 - 45 U/L    ALT 11 0 - 50 U/L    Protein Total 8.0 6.8 - 8.8 g/dL    Albumin 3.5 3.4 - 5.0 g/dL    Bilirubin Total 0.2 0.2 - 1.3 mg/dL    GFR Estimate 7 (L) >60 mL/min/1.73m2   INR [KRR5534]    Collection Time: 02/28/22  1:08 PM   Result Value Ref Range    INR 0.93 0.85 - 1.15   Partial thromboplastin time [LAB56]    Collection Time: 02/28/22  1:08 PM   Result Value Ref Range    aPTT 32 22 - 38 Seconds   HCG qualitative [EFD391]    Collection Time: 02/28/22  1:08 PM   Result Value Ref Range    hCG Serum Qualitative Negative Negative   UA with Microscopic reflex to Culture    Collection Time: 02/28/22  1:14 PM    Specimen: Urine, Midstream   Result Value Ref Range    Color Urine Yellow Colorless, Straw, Light Yellow, Yellow    Appearance Urine Slightly Cloudy (A) Clear    Glucose Urine Negative Negative mg/dL    Bilirubin Urine Negative Negative    Ketones Urine Negative Negative mg/dL    Specific Gravity Urine 1.015  1.003 - 1.035    Blood Urine Moderate (A) Negative    pH Urine 7.0 5.0 - 7.0    Protein Albumin Urine 300  (A) Negative mg/dL    Urobilinogen Urine Normal Normal, 2.0 mg/dL    Nitrite Urine Negative Negative    Leukocyte Esterase Urine Trace (A) Negative    Bacteria Urine Few (A) None Seen /HPF    Mucus Urine Present (A) None Seen /LPF    Sperm Urine Present (A) None Seen /HPF    RBC Urine 7 (H) <=2 /HPF    WBC Urine 6 (H) <=5 /HPF    Squamous Epithelials Urine 10 (H) <=1 /HPF     No results found for: CPRA    Again, thank you for allowing me to participate in the care of your patient.      Sincerely,    Osbaldo Hurtado MD

## 2022-03-01 LAB
CMV IGG SERPL IA-ACNC: 0.7 U/ML
CMV IGG SERPL IA-ACNC: ABNORMAL
DRVVT SCREEN RATIO: 1
EBV VCA IGG SER IA-ACNC: 466 U/ML
EBV VCA IGG SER IA-ACNC: POSITIVE
LA PPP-IMP: NEGATIVE
LUPUS INTERPRETATION: NORMAL
PTT RATIO: 1.05
QUANTIFERON MITOGEN: 10 IU/ML
QUANTIFERON NIL TUBE: 0.09 IU/ML
QUANTIFERON TB1 TUBE: 0.08 IU/ML
QUANTIFERON TB2 TUBE: 0.09
THROMBIN TIME: 16.4 SECONDS (ref 13–19)
VZV IGG SER QL IA: 582.3 INDEX
VZV IGG SER QL IA: POSITIVE

## 2022-03-02 LAB
CARDIOLIPIN IGG SER IA-ACNC: 2.2 GPL-U/ML
CARDIOLIPIN IGG SER IA-ACNC: NEGATIVE
CARDIOLIPIN IGM SER IA-ACNC: 3.8 MPL-U/ML
CARDIOLIPIN IGM SER IA-ACNC: NEGATIVE
GAMMA INTERFERON BACKGROUND BLD IA-ACNC: 0.09 IU/ML
M TB IFN-G BLD-IMP: NEGATIVE
M TB IFN-G CD4+ BCKGRND COR BLD-ACNC: 9.91 IU/ML
MITOGEN IGNF BCKGRD COR BLD-ACNC: -0.01 IU/ML
MITOGEN IGNF BCKGRD COR BLD-ACNC: 0 IU/ML

## 2022-03-04 LAB
A*: NORMAL
A*LOCUS SEROLOGIC EQUIVALENT: 203
A*LOCUS: NORMAL
A*SEROLOGIC EQUIVALENT: 11
ABTEST METHOD: NORMAL
B*: NORMAL
B*LOCUS SEROLOGIC EQUIVALENT: 75
B*LOCUS: NORMAL
B*SEROLOGIC EQUIVALENT: 46
BW-1: NORMAL
C*: NORMAL
C*LOCUS SEROLOGIC EQUIVALENT: 1
C*LOCUS: NORMAL
C*SEROLOGIC EQUIVALENT: 8
DPA1*: NORMAL
DPB1*: NORMAL
DQA1*LOCUS: NORMAL
DQB1*LOCUS SEROLOGIC EQUIVALENT: 9
DQB1*LOCUS: NORMAL
DRB1*LOCUS SEROLOGIC EQUIVALENT: 9
DRB1*LOCUS: NORMAL
DRB4*LOCUS SEROLOGIC EQUIVALENT: 53
DRB4*LOCUS: NORMAL
DRSSO TEST METHOD: NORMAL

## 2022-03-05 LAB
PROTOCOL CUTOFF: NORMAL
SA 1 CELL: NORMAL
SA 1 TEST METHOD: NORMAL
SA 2 CELL: NORMAL
SA 2 TEST METHOD: NORMAL
SA1 HI RISK ABY: NORMAL
SA1 MOD RISK ABY: NORMAL
SA2 HI RISK ABY: NORMAL
SA2 MOD RISK ABY: NORMAL
UNACCEPTABLE ANTIGENS: NORMAL
UNOS CPRA: 0
ZZZSA 1  COMMENTS: NORMAL
ZZZSA 2 COMMENTS: NORMAL

## 2022-04-08 ENCOUNTER — MEDICAL CORRESPONDENCE (OUTPATIENT)
Dept: HEALTH INFORMATION MANAGEMENT | Facility: CLINIC | Age: 27
End: 2022-04-08
Payer: COMMERCIAL

## 2022-04-22 DIAGNOSIS — N18.5 CHRONIC KIDNEY DISEASE, STAGE V (H): ICD-10-CM

## 2022-04-22 DIAGNOSIS — D63.1 ANEMIA IN CHRONIC KIDNEY DISEASE: ICD-10-CM

## 2022-04-22 DIAGNOSIS — I10 HTN (HYPERTENSION): ICD-10-CM

## 2022-04-22 DIAGNOSIS — N18.9 ANEMIA IN CHRONIC KIDNEY DISEASE: ICD-10-CM

## 2022-04-22 DIAGNOSIS — N02.B9 IGA NEPHROPATHY: Primary | ICD-10-CM

## 2022-04-22 RX ORDER — ALBUTEROL SULFATE 90 UG/1
1-2 AEROSOL, METERED RESPIRATORY (INHALATION)
Status: CANCELLED
Start: 2022-04-22

## 2022-04-22 RX ORDER — METHYLPREDNISOLONE SODIUM SUCCINATE 125 MG/2ML
125 INJECTION, POWDER, LYOPHILIZED, FOR SOLUTION INTRAMUSCULAR; INTRAVENOUS
Status: CANCELLED
Start: 2022-04-22

## 2022-04-22 RX ORDER — NALOXONE HYDROCHLORIDE 0.4 MG/ML
0.2 INJECTION, SOLUTION INTRAMUSCULAR; INTRAVENOUS; SUBCUTANEOUS
Status: CANCELLED | OUTPATIENT
Start: 2022-04-22

## 2022-04-22 RX ORDER — ALBUTEROL SULFATE 0.83 MG/ML
2.5 SOLUTION RESPIRATORY (INHALATION)
Status: CANCELLED | OUTPATIENT
Start: 2022-04-22

## 2022-04-22 RX ORDER — MEPERIDINE HYDROCHLORIDE 25 MG/ML
25 INJECTION INTRAMUSCULAR; INTRAVENOUS; SUBCUTANEOUS EVERY 30 MIN PRN
Status: CANCELLED | OUTPATIENT
Start: 2022-04-22

## 2022-04-22 RX ORDER — EPINEPHRINE 1 MG/ML
0.3 INJECTION, SOLUTION INTRAMUSCULAR; SUBCUTANEOUS EVERY 5 MIN PRN
Status: CANCELLED | OUTPATIENT
Start: 2022-04-22

## 2022-04-22 RX ORDER — DIPHENHYDRAMINE HYDROCHLORIDE 50 MG/ML
50 INJECTION INTRAMUSCULAR; INTRAVENOUS
Status: CANCELLED
Start: 2022-04-22

## 2022-04-22 RX ORDER — HEPARIN SODIUM (PORCINE) LOCK FLUSH IV SOLN 100 UNIT/ML 100 UNIT/ML
5 SOLUTION INTRAVENOUS
Status: CANCELLED | OUTPATIENT
Start: 2022-04-22

## 2022-04-22 RX ORDER — HEPARIN SODIUM,PORCINE 10 UNIT/ML
5 VIAL (ML) INTRAVENOUS
Status: CANCELLED | OUTPATIENT
Start: 2022-04-22

## 2022-05-05 ENCOUNTER — TELEPHONE (OUTPATIENT)
Dept: INFUSION THERAPY | Facility: CLINIC | Age: 27
End: 2022-05-05
Payer: COMMERCIAL

## 2022-05-05 ENCOUNTER — MEDICAL CORRESPONDENCE (OUTPATIENT)
Dept: HEALTH INFORMATION MANAGEMENT | Facility: CLINIC | Age: 27
End: 2022-05-05
Payer: COMMERCIAL

## 2022-05-05 NOTE — TELEPHONE ENCOUNTER
I left a message for Nuzhat Simmons to inform her that her insurance doesn't cover Fereheme medication and the appointment will be canceled.    If iron is needed fort he patient we will need to have orders for Venofer placed on her behave from Dr. Finney, who is the ordering provider for iron infusions for the patient.    Once those orders are placed we will be able to call Nuzhat Simmons to get her infusions scheduled    [facility]   [facility]  [phone]

## 2022-05-06 DIAGNOSIS — D63.1 ANEMIA IN CHRONIC KIDNEY DISEASE: ICD-10-CM

## 2022-05-06 DIAGNOSIS — N18.9 ANEMIA IN CHRONIC KIDNEY DISEASE: ICD-10-CM

## 2022-05-06 DIAGNOSIS — N18.5 CHRONIC KIDNEY DISEASE, STAGE V (H): Primary | ICD-10-CM

## 2022-05-06 RX ORDER — ALBUTEROL SULFATE 0.83 MG/ML
2.5 SOLUTION RESPIRATORY (INHALATION)
Status: CANCELLED | OUTPATIENT
Start: 2022-05-06

## 2022-05-06 RX ORDER — DIPHENHYDRAMINE HYDROCHLORIDE 50 MG/ML
50 INJECTION INTRAMUSCULAR; INTRAVENOUS
Status: CANCELLED
Start: 2022-05-06

## 2022-05-06 RX ORDER — METHYLPREDNISOLONE SODIUM SUCCINATE 125 MG/2ML
125 INJECTION, POWDER, LYOPHILIZED, FOR SOLUTION INTRAMUSCULAR; INTRAVENOUS
Status: CANCELLED
Start: 2022-05-06

## 2022-05-06 RX ORDER — MEPERIDINE HYDROCHLORIDE 25 MG/ML
25 INJECTION INTRAMUSCULAR; INTRAVENOUS; SUBCUTANEOUS EVERY 30 MIN PRN
Status: CANCELLED | OUTPATIENT
Start: 2022-05-06

## 2022-05-06 RX ORDER — NALOXONE HYDROCHLORIDE 0.4 MG/ML
0.2 INJECTION, SOLUTION INTRAMUSCULAR; INTRAVENOUS; SUBCUTANEOUS
Status: CANCELLED | OUTPATIENT
Start: 2022-05-06

## 2022-05-06 RX ORDER — HEPARIN SODIUM (PORCINE) LOCK FLUSH IV SOLN 100 UNIT/ML 100 UNIT/ML
5 SOLUTION INTRAVENOUS
Status: CANCELLED | OUTPATIENT
Start: 2022-05-06

## 2022-05-06 RX ORDER — ALBUTEROL SULFATE 90 UG/1
1-2 AEROSOL, METERED RESPIRATORY (INHALATION)
Status: CANCELLED
Start: 2022-05-06

## 2022-05-06 RX ORDER — HEPARIN SODIUM,PORCINE 10 UNIT/ML
5 VIAL (ML) INTRAVENOUS
Status: CANCELLED | OUTPATIENT
Start: 2022-05-06

## 2022-05-06 RX ORDER — EPINEPHRINE 1 MG/ML
0.3 INJECTION, SOLUTION INTRAMUSCULAR; SUBCUTANEOUS EVERY 5 MIN PRN
Status: CANCELLED | OUTPATIENT
Start: 2022-05-06

## 2022-05-29 ENCOUNTER — HEALTH MAINTENANCE LETTER (OUTPATIENT)
Age: 27
End: 2022-05-29

## 2022-06-03 ENCOUNTER — INFUSION THERAPY VISIT (OUTPATIENT)
Dept: INFUSION THERAPY | Facility: CLINIC | Age: 27
End: 2022-06-03
Attending: INTERNAL MEDICINE
Payer: COMMERCIAL

## 2022-06-03 VITALS
HEART RATE: 81 BPM | RESPIRATION RATE: 16 BRPM | DIASTOLIC BLOOD PRESSURE: 72 MMHG | TEMPERATURE: 98.2 F | OXYGEN SATURATION: 98 % | SYSTOLIC BLOOD PRESSURE: 105 MMHG

## 2022-06-03 DIAGNOSIS — I15.1 HYPERTENSION SECONDARY TO OTHER RENAL DISORDERS: Primary | ICD-10-CM

## 2022-06-03 DIAGNOSIS — N18.5 CHRONIC KIDNEY DISEASE, STAGE V (H): ICD-10-CM

## 2022-06-03 DIAGNOSIS — N02.B9 IGA NEPHROPATHY: ICD-10-CM

## 2022-06-03 DIAGNOSIS — N18.9 ANEMIA IN CHRONIC KIDNEY DISEASE: ICD-10-CM

## 2022-06-03 DIAGNOSIS — D63.1 ANEMIA IN CHRONIC KIDNEY DISEASE: ICD-10-CM

## 2022-06-03 PROCEDURE — 96374 THER/PROPH/DIAG INJ IV PUSH: CPT

## 2022-06-03 PROCEDURE — 258N000003 HC RX IP 258 OP 636: Performed by: INTERNAL MEDICINE

## 2022-06-03 PROCEDURE — 250N000011 HC RX IP 250 OP 636: Performed by: INTERNAL MEDICINE

## 2022-06-03 RX ORDER — NALOXONE HYDROCHLORIDE 0.4 MG/ML
0.2 INJECTION, SOLUTION INTRAMUSCULAR; INTRAVENOUS; SUBCUTANEOUS
Status: CANCELLED | OUTPATIENT
Start: 2022-06-05

## 2022-06-03 RX ORDER — DIPHENHYDRAMINE HYDROCHLORIDE 50 MG/ML
50 INJECTION INTRAMUSCULAR; INTRAVENOUS
Status: CANCELLED
Start: 2022-06-05

## 2022-06-03 RX ORDER — EPINEPHRINE 1 MG/ML
0.3 INJECTION, SOLUTION, CONCENTRATE INTRAVENOUS EVERY 5 MIN PRN
Status: CANCELLED | OUTPATIENT
Start: 2022-06-05 | Stop reason: HOSPADM

## 2022-06-03 RX ORDER — HEPARIN SODIUM,PORCINE 10 UNIT/ML
5 VIAL (ML) INTRAVENOUS
Status: CANCELLED | OUTPATIENT
Start: 2022-06-05

## 2022-06-03 RX ORDER — NALOXONE HYDROCHLORIDE 0.4 MG/ML
0.2 INJECTION, SOLUTION INTRAMUSCULAR; INTRAVENOUS; SUBCUTANEOUS
Status: DISCONTINUED | OUTPATIENT
Start: 2022-06-03 | End: 2022-06-03

## 2022-06-03 RX ORDER — EPINEPHRINE 1 MG/ML
0.3 INJECTION, SOLUTION, CONCENTRATE INTRAVENOUS EVERY 5 MIN PRN
Status: CANCELLED | OUTPATIENT
Start: 2022-06-05

## 2022-06-03 RX ORDER — EPINEPHRINE 1 MG/ML
0.3 INJECTION, SOLUTION, CONCENTRATE INTRAVENOUS EVERY 5 MIN PRN
Status: DISCONTINUED | OUTPATIENT
Start: 2022-06-03 | End: 2022-06-03

## 2022-06-03 RX ORDER — ALBUTEROL SULFATE 0.83 MG/ML
2.5 SOLUTION RESPIRATORY (INHALATION)
Status: CANCELLED | OUTPATIENT
Start: 2022-06-05

## 2022-06-03 RX ORDER — ALBUTEROL SULFATE 90 UG/1
1-2 AEROSOL, METERED RESPIRATORY (INHALATION)
Status: DISCONTINUED | OUTPATIENT
Start: 2022-06-03 | End: 2022-06-03

## 2022-06-03 RX ORDER — ALBUTEROL SULFATE 90 UG/1
1-2 AEROSOL, METERED RESPIRATORY (INHALATION)
Status: CANCELLED
Start: 2022-06-05

## 2022-06-03 RX ORDER — HEPARIN SODIUM,PORCINE 10 UNIT/ML
5 VIAL (ML) INTRAVENOUS
Status: DISCONTINUED | OUTPATIENT
Start: 2022-06-03 | End: 2022-06-03 | Stop reason: HOSPADM

## 2022-06-03 RX ORDER — DIPHENHYDRAMINE HYDROCHLORIDE 50 MG/ML
50 INJECTION INTRAMUSCULAR; INTRAVENOUS
Status: CANCELLED
Start: 2022-06-05 | Stop reason: HOSPADM

## 2022-06-03 RX ORDER — ALBUTEROL SULFATE 0.83 MG/ML
2.5 SOLUTION RESPIRATORY (INHALATION)
Status: DISCONTINUED | OUTPATIENT
Start: 2022-06-03 | End: 2022-06-03

## 2022-06-03 RX ORDER — METHYLPREDNISOLONE SODIUM SUCCINATE 125 MG/2ML
125 INJECTION, POWDER, LYOPHILIZED, FOR SOLUTION INTRAMUSCULAR; INTRAVENOUS
Status: DISCONTINUED | OUTPATIENT
Start: 2022-06-03 | End: 2022-06-03

## 2022-06-03 RX ORDER — HEPARIN SODIUM,PORCINE 10 UNIT/ML
5 VIAL (ML) INTRAVENOUS
Status: CANCELLED | OUTPATIENT
Start: 2022-06-05 | Stop reason: HOSPADM

## 2022-06-03 RX ORDER — DIPHENHYDRAMINE HYDROCHLORIDE 50 MG/ML
50 INJECTION INTRAMUSCULAR; INTRAVENOUS
Status: DISCONTINUED | OUTPATIENT
Start: 2022-06-03 | End: 2022-06-03

## 2022-06-03 RX ORDER — MEPERIDINE HYDROCHLORIDE 50 MG/ML
25 INJECTION INTRAMUSCULAR; INTRAVENOUS; SUBCUTANEOUS EVERY 30 MIN PRN
Status: CANCELLED | OUTPATIENT
Start: 2022-06-05 | Stop reason: HOSPADM

## 2022-06-03 RX ORDER — ALBUTEROL SULFATE 0.83 MG/ML
2.5 SOLUTION RESPIRATORY (INHALATION)
Status: CANCELLED | OUTPATIENT
Start: 2022-06-05 | Stop reason: HOSPADM

## 2022-06-03 RX ORDER — MEPERIDINE HYDROCHLORIDE 50 MG/ML
25 INJECTION INTRAMUSCULAR; INTRAVENOUS; SUBCUTANEOUS EVERY 30 MIN PRN
Status: DISCONTINUED | OUTPATIENT
Start: 2022-06-03 | End: 2022-06-03

## 2022-06-03 RX ORDER — METHYLPREDNISOLONE SODIUM SUCCINATE 125 MG/2ML
125 INJECTION, POWDER, LYOPHILIZED, FOR SOLUTION INTRAMUSCULAR; INTRAVENOUS
Status: CANCELLED
Start: 2022-06-05 | Stop reason: HOSPADM

## 2022-06-03 RX ORDER — HEPARIN SODIUM (PORCINE) LOCK FLUSH IV SOLN 100 UNIT/ML 100 UNIT/ML
5 SOLUTION INTRAVENOUS
Status: CANCELLED | OUTPATIENT
Start: 2022-06-05

## 2022-06-03 RX ORDER — METHYLPREDNISOLONE SODIUM SUCCINATE 125 MG/2ML
125 INJECTION, POWDER, LYOPHILIZED, FOR SOLUTION INTRAMUSCULAR; INTRAVENOUS
Status: CANCELLED
Start: 2022-06-05

## 2022-06-03 RX ORDER — HEPARIN SODIUM (PORCINE) LOCK FLUSH IV SOLN 100 UNIT/ML 100 UNIT/ML
5 SOLUTION INTRAVENOUS
Status: CANCELLED | OUTPATIENT
Start: 2022-06-05 | Stop reason: HOSPADM

## 2022-06-03 RX ORDER — MEPERIDINE HYDROCHLORIDE 50 MG/ML
25 INJECTION INTRAMUSCULAR; INTRAVENOUS; SUBCUTANEOUS EVERY 30 MIN PRN
Status: CANCELLED | OUTPATIENT
Start: 2022-06-05

## 2022-06-03 RX ORDER — ALBUTEROL SULFATE 90 UG/1
1-2 AEROSOL, METERED RESPIRATORY (INHALATION)
Status: CANCELLED
Start: 2022-06-05 | Stop reason: HOSPADM

## 2022-06-03 RX ORDER — NALOXONE HYDROCHLORIDE 0.4 MG/ML
0.2 INJECTION, SOLUTION INTRAMUSCULAR; INTRAVENOUS; SUBCUTANEOUS
Status: CANCELLED | OUTPATIENT
Start: 2022-06-05 | Stop reason: HOSPADM

## 2022-06-03 RX ORDER — HEPARIN SODIUM (PORCINE) LOCK FLUSH IV SOLN 100 UNIT/ML 100 UNIT/ML
5 SOLUTION INTRAVENOUS
Status: DISCONTINUED | OUTPATIENT
Start: 2022-06-03 | End: 2022-06-03 | Stop reason: HOSPADM

## 2022-06-03 RX ADMIN — SODIUM CHLORIDE 250 ML: 9 INJECTION, SOLUTION INTRAVENOUS at 14:30

## 2022-06-03 RX ADMIN — IRON SUCROSE 200 MG: 20 INJECTION, SOLUTION INTRAVENOUS at 14:33

## 2022-06-03 NOTE — PROGRESS NOTES
Infusion Nursing Note:  Nuzhat Lopez presents today for venofer.    Patient seen by provider today: No   present during visit today: Not Applicable.    Note: N/A.      Intravenous Access:  Peripheral IV placed.    Treatment Conditions:  Not Applicable.      Post Infusion Assessment:  Patient tolerated infusion without incident.       Discharge Plan:   Patient discharged in stable condition accompanied by:self. Medication and side effects reviewed with the patient.       LES HERNANDEZ RN

## 2022-06-14 ENCOUNTER — MEDICAL CORRESPONDENCE (OUTPATIENT)
Dept: HEALTH INFORMATION MANAGEMENT | Facility: CLINIC | Age: 27
End: 2022-06-14

## 2022-06-17 ENCOUNTER — DOCUMENTATION ONLY (OUTPATIENT)
Dept: TRANSPLANT | Facility: CLINIC | Age: 27
End: 2022-06-17
Payer: COMMERCIAL

## 2022-07-11 ENCOUNTER — APPOINTMENT (OUTPATIENT)
Dept: CT IMAGING | Facility: HOSPITAL | Age: 27
End: 2022-07-11
Payer: COMMERCIAL

## 2022-07-11 ENCOUNTER — APPOINTMENT (OUTPATIENT)
Dept: RADIOLOGY | Facility: HOSPITAL | Age: 27
End: 2022-07-11
Attending: EMERGENCY MEDICINE
Payer: COMMERCIAL

## 2022-07-11 ENCOUNTER — HOSPITAL ENCOUNTER (EMERGENCY)
Facility: HOSPITAL | Age: 27
Discharge: HOME | End: 2022-07-11
Admitting: PHYSICIAN ASSISTANT
Payer: COMMERCIAL

## 2022-07-11 VITALS
OXYGEN SATURATION: 100 % | HEART RATE: 82 BPM | RESPIRATION RATE: 18 BRPM | TEMPERATURE: 97.8 F | DIASTOLIC BLOOD PRESSURE: 88 MMHG | BODY MASS INDEX: 28.53 KG/M2 | WEIGHT: 156 LBS | SYSTOLIC BLOOD PRESSURE: 139 MMHG

## 2022-07-11 DIAGNOSIS — N18.6 ESRD (END STAGE RENAL DISEASE) ON DIALYSIS (H): ICD-10-CM

## 2022-07-11 DIAGNOSIS — Z99.2 ESRD (END STAGE RENAL DISEASE) ON DIALYSIS (H): ICD-10-CM

## 2022-07-11 DIAGNOSIS — R07.81 PLEURITIC CHEST PAIN: ICD-10-CM

## 2022-07-11 PROBLEM — N18.5 STAGE 5 CHRONIC KIDNEY DISEASE (H): Status: ACTIVE | Noted: 2021-11-21

## 2022-07-11 PROBLEM — E83.30 DISORDER OF PHOSPHORUS METABOLISM, UNSPECIFIED: Status: ACTIVE | Noted: 2022-06-03

## 2022-07-11 PROBLEM — R21 SKIN ERUPTION: Status: ACTIVE | Noted: 2017-08-07

## 2022-07-11 PROBLEM — R06.02 SHORTNESS OF BREATH: Status: ACTIVE | Noted: 2022-06-07

## 2022-07-11 PROBLEM — N02.8 RECURRENT AND PERSISTENT HEMATURIA WITH OTHER MORPHOLOGIC CHANGES: Status: ACTIVE | Noted: 2021-10-09

## 2022-07-11 PROBLEM — I10 PRIMARY HYPERTENSION: Status: ACTIVE | Noted: 2021-10-12

## 2022-07-11 PROBLEM — N17.9 ACUTE KIDNEY INJURY (H): Status: ACTIVE | Noted: 2021-10-05

## 2022-07-11 PROBLEM — G43.909 MIGRAINE: Status: ACTIVE | Noted: 2021-10-12

## 2022-07-11 PROBLEM — D50.9 IRON DEFICIENCY ANEMIA, UNSPECIFIED: Status: ACTIVE | Noted: 2022-06-03

## 2022-07-11 PROBLEM — N02.B9 IGA NEPHROPATHY: Status: ACTIVE | Noted: 2021-10-09

## 2022-07-11 LAB
ANION GAP SERPL CALCULATED.3IONS-SCNC: 9 MMOL/L (ref 5–18)
BASOPHILS # BLD AUTO: 0.1 10E3/UL (ref 0–0.2)
BASOPHILS NFR BLD AUTO: 1 %
BNP SERPL-MCNC: 919 PG/ML (ref 0–64)
BUN SERPL-MCNC: 68 MG/DL (ref 8–22)
CALCIUM SERPL-MCNC: 9.4 MG/DL (ref 8.5–10.5)
CHLORIDE BLD-SCNC: 102 MMOL/L (ref 98–107)
CO2 SERPL-SCNC: 26 MMOL/L (ref 22–31)
CREAT SERPL-MCNC: 10.38 MG/DL (ref 0.6–1.1)
EOSINOPHIL # BLD AUTO: 0.2 10E3/UL (ref 0–0.7)
EOSINOPHIL NFR BLD AUTO: 2 %
ERYTHROCYTE [DISTWIDTH] IN BLOOD BY AUTOMATED COUNT: 14.9 % (ref 10–15)
GFR SERPL CREATININE-BSD FRML MDRD: 5 ML/MIN/1.73M2
GLUCOSE BLD-MCNC: 85 MG/DL (ref 70–125)
HCG SERPL QL: NEGATIVE
HCT VFR BLD AUTO: 39.3 % (ref 35–47)
HGB BLD-MCNC: 12.3 G/DL (ref 11.7–15.7)
IMM GRANULOCYTES # BLD: 0 10E3/UL
IMM GRANULOCYTES NFR BLD: 0 %
LYMPHOCYTES # BLD AUTO: 1.4 10E3/UL (ref 0.8–5.3)
LYMPHOCYTES NFR BLD AUTO: 16 %
MCH RBC QN AUTO: 28 PG (ref 26.5–33)
MCHC RBC AUTO-ENTMCNC: 31.3 G/DL (ref 31.5–36.5)
MCV RBC AUTO: 90 FL (ref 78–100)
MONOCYTES # BLD AUTO: 0.3 10E3/UL (ref 0–1.3)
MONOCYTES NFR BLD AUTO: 4 %
NEUTROPHILS # BLD AUTO: 6.4 10E3/UL (ref 1.6–8.3)
NEUTROPHILS NFR BLD AUTO: 77 %
NRBC # BLD AUTO: 0 10E3/UL
NRBC BLD AUTO-RTO: 0 /100
PLATELET # BLD AUTO: 201 10E3/UL (ref 150–450)
POTASSIUM BLD-SCNC: 5 MMOL/L (ref 3.5–5)
RBC # BLD AUTO: 4.39 10E6/UL (ref 3.8–5.2)
SODIUM SERPL-SCNC: 137 MMOL/L (ref 136–145)
TROPONIN I SERPL-MCNC: 0.01 NG/ML (ref 0–0.29)
WBC # BLD AUTO: 8.3 10E3/UL (ref 4–11)

## 2022-07-11 PROCEDURE — 250N000011 HC RX IP 250 OP 636: Performed by: PHYSICIAN ASSISTANT

## 2022-07-11 PROCEDURE — 71046 X-RAY EXAM CHEST 2 VIEWS: CPT

## 2022-07-11 PROCEDURE — 84703 CHORIONIC GONADOTROPIN ASSAY: CPT | Performed by: PHYSICIAN ASSISTANT

## 2022-07-11 PROCEDURE — 36415 COLL VENOUS BLD VENIPUNCTURE: CPT | Performed by: EMERGENCY MEDICINE

## 2022-07-11 PROCEDURE — 99285 EMERGENCY DEPT VISIT HI MDM: CPT | Mod: 25

## 2022-07-11 PROCEDURE — 84484 ASSAY OF TROPONIN QUANT: CPT | Performed by: EMERGENCY MEDICINE

## 2022-07-11 PROCEDURE — 80048 BASIC METABOLIC PNL TOTAL CA: CPT | Performed by: EMERGENCY MEDICINE

## 2022-07-11 PROCEDURE — 85014 HEMATOCRIT: CPT | Performed by: EMERGENCY MEDICINE

## 2022-07-11 PROCEDURE — 71275 CT ANGIOGRAPHY CHEST: CPT

## 2022-07-11 PROCEDURE — 83880 ASSAY OF NATRIURETIC PEPTIDE: CPT | Performed by: EMERGENCY MEDICINE

## 2022-07-11 PROCEDURE — 93005 ELECTROCARDIOGRAM TRACING: CPT | Performed by: STUDENT IN AN ORGANIZED HEALTH CARE EDUCATION/TRAINING PROGRAM

## 2022-07-11 RX ORDER — IOPAMIDOL 755 MG/ML
75 INJECTION, SOLUTION INTRAVASCULAR ONCE
Status: COMPLETED | OUTPATIENT
Start: 2022-07-11 | End: 2022-07-11

## 2022-07-11 RX ADMIN — IOPAMIDOL 75 ML: 755 INJECTION, SOLUTION INTRAVENOUS at 16:41

## 2022-07-11 ASSESSMENT — ENCOUNTER SYMPTOMS
SHORTNESS OF BREATH: 1
CHEST TIGHTNESS: 1
VOMITING: 0
LIGHT-HEADEDNESS: 1
NAUSEA: 0
DIAPHORESIS: 0
HEADACHES: 1

## 2022-07-11 NOTE — DISCHARGE INSTRUCTIONS
Your BNP is elevated today which can be just your fluid status as you are due for dialysis today. The rest of your work up today is reassuring. CT scan does not show any concerning findings in your chest including no blood clots, pneumonia, pleural effusion, etc. You can try pepcid as it could be heart burn. If you develop any new or worsening symptoms including shortness of breath, worsening pain, fevers, you need to return to ED. Otherwise, follow up with your primary care provider this week.

## 2022-07-11 NOTE — ED PROVIDER NOTES
EMERGENCY DEPARTMENT ENCOUNTER      NAME: Nuzhat Lopez  AGE: 26 year old female  YOB: 1995  MRN: [id number]  EVALUATION DATE & TIME: No admission date for patient encounter.    PCP: Roro Block    ED PROVIDER: Stephen Keys PA-C      Chief Complaint   Patient presents with     Chest Pain         FINAL IMPRESSION:  1. Pleuritic chest pain          MEDICAL DECISION MAKING:    Pertinent Labs & Imaging studies reviewed. (See chart for details)  26 year old female presents to the Emergency Department for evaluation of pleuritic chest pain that started at 3 AM.    After obtaining history present illness, reviewing vitals, and previous medical records and examining patient decided to further assess in addition to the plain chest x-ray with a CT scan of the chest ruling out PE.  I did not feel that D-dimer would be useful as her renal function would likely leave this elevated and based on her description of pleuritic chest pain and acute in onset I felt that ruling out PE would be necessary.    Patient will be signed out to Meg Trinidad with CT scan of the chest and final disposition pending.  If this is negative I feel that she can safely be discharged to home where she can do her at home dialysis run today.        ED COURSE  12:47 PM I met with the patient, obtained history, performed an initial exam, and discussed options and plan for diagnostics and treatment here in the ED.    At the conclusion of the encounter I discussed the results of all of the tests and the disposition. The questions were answered. The patient or family acknowledged understanding and was agreeable with the care plan.     MEDICATIONS GIVEN IN THE EMERGENCY:  Medications - No data to display    NEW PRESCRIPTIONS STARTED AT TODAY'S ER VISIT  New Prescriptions    No medications on file       =================================================================    HPI    Patient information was obtained from: patient and patient's  "sister    Use of Interpretor: N/A      Nuzhat Lopez is a 26 year old female with a pertinent history of acute kidney injury, IgA nephropathy, HTN, stage 5 chronic kidney disease, anemia, and shortness of breath who presents to this ED by private vehicle for evaluation of chest pain.    At ~3:00 AM this morning, the patient experienced a sudden onset of chest pain. She described the pain as a tight, squeezing pain which radiated into her left shoulder and arm. The pain lasted for 2 hours and then subsided without intervention. She is still experiencing chest pain which is provoked when she walks or takes a deep breath. The patient also reports an associated headache, aching pain in her left arm, and lightheadedness. The patient is not a smoker and has no history of diabetes or thrombosis, though reports that she \"clots easily\". She denies any nausea, emesis, or diaphoresis. Patient denies additional medical concerns or complaints at this time.     Of note, the patient has a history of IgA nephropathy and is on a waiting list for a kidney transplant. She is on at-home dialysis which is completed 4 times per week with the assistance of her sister. She is planning to have dialysis done later today.      REVIEW OF SYSTEMS   Review of Systems   Constitutional: Negative for diaphoresis.   Respiratory: Positive for chest tightness and shortness of breath.         Positive for pain with deep breaths   Cardiovascular: Positive for chest pain (tight, squeezing pain).   Gastrointestinal: Negative for nausea and vomiting.   Musculoskeletal:        Positive for left arm and shoulder pain/ache   Neurological: Positive for light-headedness and headaches.   All other systems reviewed and are negative.    PAST MEDICAL HISTORY:  Past Medical History:   Diagnosis Date     Anemia in chronic kidney disease      CKD (chronic kidney disease) stage 5, GFR less than 15 ml/min (H)      HTN (hypertension)      IgA nephropathy      Metabolic " acidosis        PAST SURGICAL HISTORY:  Past Surgical History:   Procedure Laterality Date     BIOPSY  2021    [facility]     NO PAST SURGERIES           CURRENT MEDICATIONS:    No current facility-administered medications for this encounter.    Current Outpatient Medications:      acetaminophen (TYLENOL) 325 MG tablet, Take 325-650 mg by mouth every 6 hours as needed for mild pain , Disp: , Rfl:      amLODIPine (NORVASC) 10 MG tablet, Take 1 tablet (10 mg) by mouth daily, Disp: 30 tablet, Rfl: 0     carvedilol (COREG) 12.5 MG tablet, Take 1 tablet (12.5 mg) by mouth 2 times daily (with meals), Disp: 60 tablet, Rfl: 0     oxyCODONE (ROXICODONE) 5 MG tablet, Take 1 tablet (5 mg) by mouth every 6 hours as needed for moderate to severe pain (Patient not taking: Reported on 1/4/2022), Disp: 8 tablet, Rfl: 0     sodium bicarbonate 650 MG tablet, sodium bicarbonate 650 mg tablet, Disp: , Rfl:      SUMAtriptan (IMITREX) 50 MG tablet, Take 50 mg by mouth at onset of headache for migraine (Patient not taking: Reported on 1/4/2022), Disp: , Rfl:       ALLERGIES:  No Known Allergies    FAMILY HISTORY:  Family History   Problem Relation Age of Onset     Impaired Fasting Glucose Mother      Kidney Disease No family hx of      Hypertension No family hx of      Cancer No family hx of        SOCIAL HISTORY:   Social History     Socioeconomic History     Marital status: Single   Tobacco Use     Smoking status: Never Smoker     Smokeless tobacco: Never Used   Substance and Sexual Activity     Alcohol use: No     Drug use: No       VITALS:  Patient Vitals for the past 24 hrs:   BP Temp Temp src Pulse Resp SpO2 Weight   07/11/22 1621 139/88 -- -- 82 -- 100 % --   07/11/22 1028 (!) 158/97 97.8  F (36.6  C) Tympanic 89 18 100 % 70.8 kg (156 lb)       PHYSICAL EXAM    Physical Exam  Vitals and nursing note reviewed.   Constitutional:       General: She is not in acute distress.     Appearance: She is normal weight. She is not  toxic-appearing or diaphoretic.   HENT:      Head: Normocephalic.      Right Ear: External ear normal.      Left Ear: External ear normal.   Eyes:      Conjunctiva/sclera: Conjunctivae normal.   Cardiovascular:      Rate and Rhythm: Normal rate.      Pulses: Normal pulses.   Pulmonary:      Effort: Pulmonary effort is normal. No respiratory distress.      Breath sounds: No wheezing.   Abdominal:      General: Abdomen is flat. Bowel sounds are normal.      Tenderness: There is no abdominal tenderness. There is no guarding or rebound.   Musculoskeletal:         General: No deformity or signs of injury. Normal range of motion.      Cervical back: Normal range of motion.      Right lower leg: No edema.      Left lower leg: No edema.   Skin:     General: Skin is warm and dry.      Findings: No rash.   Neurological:      General: No focal deficit present.      Mental Status: She is alert. Mental status is at baseline.      Sensory: No sensory deficit.      Motor: No weakness.          LAB:  All pertinent labs reviewed and interpreted.  Results for orders placed or performed during the hospital encounter of 07/11/22   XR Chest 2 Views    Impression    IMPRESSION: Negative chest.   Basic metabolic panel   Result Value Ref Range    Sodium 137 136 - 145 mmol/L    Potassium 5.0 3.5 - 5.0 mmol/L    Chloride 102 98 - 107 mmol/L    Carbon Dioxide (CO2) 26 22 - 31 mmol/L    Anion Gap 9 5 - 18 mmol/L    Urea Nitrogen 68 (H) 8 - 22 mg/dL    Creatinine 10.38 (HH) 0.60 - 1.10 mg/dL    Calcium 9.4 8.5 - 10.5 mg/dL    Glucose 85 70 - 125 mg/dL    GFR Estimate 5 (L) >60 mL/min/1.73m2   Result Value Ref Range    Troponin I 0.01 0.00 - 0.29 ng/mL   B-Type Natriuretic Peptide ([facility] Only)   Result Value Ref Range     (H) 0 - 64 pg/mL   HCG qualitative Blood   Result Value Ref Range    hCG Serum Qualitative Negative Negative   CBC with platelets and differential   Result Value Ref Range    WBC Count 8.3 4.0 - 11.0 10e3/uL    RBC  Count 4.39 3.80 - 5.20 10e6/uL    Hemoglobin 12.3 11.7 - 15.7 g/dL    Hematocrit 39.3 35.0 - 47.0 %    MCV 90 78 - 100 fL    MCH 28.0 26.5 - 33.0 pg    MCHC 31.3 (L) 31.5 - 36.5 g/dL    RDW 14.9 10.0 - 15.0 %    Platelet Count 201 150 - 450 10e3/uL    % Neutrophils 77 %    % Lymphocytes 16 %    % Monocytes 4 %    % Eosinophils 2 %    % Basophils 1 %    % Immature Granulocytes 0 %    NRBCs per 100 WBC 0 <1 /100    Absolute Neutrophils 6.4 1.6 - 8.3 10e3/uL    Absolute Lymphocytes 1.4 0.8 - 5.3 10e3/uL    Absolute Monocytes 0.3 0.0 - 1.3 10e3/uL    Absolute Eosinophils 0.2 0.0 - 0.7 10e3/uL    Absolute Basophils 0.1 0.0 - 0.2 10e3/uL    Absolute Immature Granulocytes 0.0 <=0.4 10e3/uL    Absolute NRBCs 0.0 10e3/uL       RADIOLOGY:  Reviewed all pertinent imaging. Please see official radiology report.  XR Chest 2 Views   Final Result   IMPRESSION: Negative chest.      CT Chest Pulmonary Embolism w Contrast    (Results Pending)       EKG:    Performed at: 10:39 AM    The EKG showing a sinus rhythm at a rate of 83 bpm.  ST segments are normal with no acute elevation or depression noted.  T waves are upright.  QTC measured at 451.    I have independently reviewed and interpreted the EKG(s) documented above.      PROCEDURES:   None      I, Bev Robledo, am serving as a scribe to document services personally performed by Stephen Keys PA-C based on my observation and the provider's statements to me. I, Stephen Keys PA-C attest that Bev Robledo is acting in a scribe capacity, has observed my performance of the services and has documented them in accordance with my direction.    Stephen Keys PA-C  Emergency Medicine  [facility]     Stephen Keys PA-C  07/11/22 2313

## 2022-07-11 NOTE — ED NOTES
eMERGENCY dEPARTMENT PROGRESS NOTE        FINAL IMPRESSION    1. Pleuritic chest pain    2. ESRD (end stage renal disease) on dialysis (H)          MEDICAL DECISION MAKING  Nuzhat Lopez is a 26 year old female who presented to the ED for evaluation of pleuritic chest pain. On dialysis.  Signed out to myself awaiting CTA PE. If unremarkable, discharge home.     Anticipate discharge to home.    I obtained my own brief history of patient who reports at ~3:00 AM this morning, the patient experienced a sudden onset of chest pain. She described the pain as a tight, squeezing pain which radiated into her left shoulder and arm. The pain lasted for 2 hours and then subsided without intervention. She is still experiencing chest pain which is provoked when she walks or takes a deep breath. The patient is not a smoker and has no history of diabetes or thrombosis.    EKG with no acute ischemic changes. Normal intervals. Troponin WNL. CP onset >6 hours ago, thus no indication for delta troponin with low suspicion for ACS. BNP elevated at 919. She does not appear fluid overloaded with no crackles, rales, lower extremity edema, shortness of breath. Electrolytes WNL. Cr 10.38, consistent with ESRD on dialysis. Hgb 12.3. No leukocytosis. CTA chest with no acute findings, specifically, no PE, signs of pulmonary edema or pneumonia. Patient felt comfortable with discharge home. Close PCP follow up and return precautions.     ED COURSE  4:08 PM  Patient signed out to me by Stephen Keys PA-C.  5:03 PM  Checked on patient and updated on results. Patient discharged after being provided with extensive anticipatory guidance and given return precautions, importance of PCP follow-up emphasized.    At the conclusion of the encounter I discussed the results of all of the tests and the disposition. The questions were answered. The patient and family acknowledged understanding and were agreeable with the care plan.       DISCHARGE  PRESCRIPTIONS  Discharge Medication List as of 7/11/2022  5:12 PM        ______________________________________________________________________    PHYSICAL EXAM  Patient Vitals for the past 24 hrs:   BP Temp Temp src Pulse Resp SpO2 Weight   07/11/22 1621 139/88 -- -- 82 -- 100 % --   07/11/22 1028 (!) 158/97 97.8  F (36.6  C) Tympanic 89 18 100 % 70.8 kg (156 lb)       General: Appears in no acute distress, awake, alert, interactive.  Eyes: Conjunctivae non-injected. Sclera anicteric.  HENT: Atraumatic.  Neck: Supple.  Respiratory/Chest: Respiration unlabored.  Abdomen: non distended  Musculoskeletal: Normal extremities. No edema or erythema.  Skin: Normal color. No rash or diaphoresis.  Neurologic: Face symmetric, moves all extremities spontaneously. Speech clear.  Psychiatric: Oriented to person, place, and time. Affect appropriate.      LAB:  All pertinent labs reviewed and interpreted.  Recent Results (from the past 24 hour(s))   Basic metabolic panel    Collection Time: 07/11/22  1:08 PM   Result Value Ref Range    Sodium 137 136 - 145 mmol/L    Potassium 5.0 3.5 - 5.0 mmol/L    Chloride 102 98 - 107 mmol/L    Carbon Dioxide (CO2) 26 22 - 31 mmol/L    Anion Gap 9 5 - 18 mmol/L    Urea Nitrogen 68 (H) 8 - 22 mg/dL    Creatinine 10.38 (HH) 0.60 - 1.10 mg/dL    Calcium 9.4 8.5 - 10.5 mg/dL    Glucose 85 70 - 125 mg/dL    GFR Estimate 5 (L) >60 mL/min/1.73m2   Troponin I    Collection Time: 07/11/22  1:08 PM   Result Value Ref Range    Troponin I 0.01 0.00 - 0.29 ng/mL   B-Type Natriuretic Peptide ([facility] Only)    Collection Time: 07/11/22  1:08 PM   Result Value Ref Range     (H) 0 - 64 pg/mL   HCG qualitative Blood    Collection Time: 07/11/22  1:08 PM   Result Value Ref Range    hCG Serum Qualitative Negative Negative   CBC with platelets and differential    Collection Time: 07/11/22  1:08 PM   Result Value Ref Range    WBC Count 8.3 4.0 - 11.0 10e3/uL    RBC Count 4.39 3.80 - 5.20 10e6/uL    Hemoglobin  12.3 11.7 - 15.7 g/dL    Hematocrit 39.3 35.0 - 47.0 %    MCV 90 78 - 100 fL    MCH 28.0 26.5 - 33.0 pg    MCHC 31.3 (L) 31.5 - 36.5 g/dL    RDW 14.9 10.0 - 15.0 %    Platelet Count 201 150 - 450 10e3/uL    % Neutrophils 77 %    % Lymphocytes 16 %    % Monocytes 4 %    % Eosinophils 2 %    % Basophils 1 %    % Immature Granulocytes 0 %    NRBCs per 100 WBC 0 <1 /100    Absolute Neutrophils 6.4 1.6 - 8.3 10e3/uL    Absolute Lymphocytes 1.4 0.8 - 5.3 10e3/uL    Absolute Monocytes 0.3 0.0 - 1.3 10e3/uL    Absolute Eosinophils 0.2 0.0 - 0.7 10e3/uL    Absolute Basophils 0.1 0.0 - 0.2 10e3/uL    Absolute Immature Granulocytes 0.0 <=0.4 10e3/uL    Absolute NRBCs 0.0 10e3/uL         RADIOLOGY:  Reviewed all pertinent imaging. Please see official radiology report.  CT Chest Pulmonary Embolism w Contrast   Final Result   IMPRESSION:   1.  No acute findings to explain patient's pain.      2.  No dissection, aneurysm, or pulmonary emboli.      XR Chest 2 Views   Final Result   IMPRESSION: Negative chest.             Meg Trinidad PA-C  07/12/22 1111

## 2022-07-12 ENCOUNTER — DOCUMENTATION ONLY (OUTPATIENT)
Dept: TRANSPLANT | Facility: CLINIC | Age: 27
End: 2022-07-12

## 2022-07-12 LAB
ATRIAL RATE - MUSE: 83 BPM
DIASTOLIC BLOOD PRESSURE - MUSE: NORMAL MMHG
INTERPRETATION ECG - MUSE: NORMAL
P AXIS - MUSE: 42 DEGREES
PR INTERVAL - MUSE: 130 MS
QRS DURATION - MUSE: 76 MS
QT - MUSE: 384 MS
QTC - MUSE: 451 MS
R AXIS - MUSE: -28 DEGREES
SYSTOLIC BLOOD PRESSURE - MUSE: NORMAL MMHG
T AXIS - MUSE: 60 DEGREES
VENTRICULAR RATE- MUSE: 83 BPM

## 2022-07-13 ENCOUNTER — TELEPHONE (OUTPATIENT)
Dept: TRANSPLANT | Facility: CLINIC | Age: 27
End: 2022-07-13

## 2022-07-13 NOTE — TELEPHONE ENCOUNTER
Called pt to introduce self as waitlist coordinator. Reviewed pt needs updated dental visit. Message sent to providers regarding most recent PAP, will update pt once response received. Left VM with direct line for return call.

## 2022-07-26 ENCOUNTER — DOCUMENTATION ONLY (OUTPATIENT)
Dept: TRANSPLANT | Facility: CLINIC | Age: 27
End: 2022-07-26

## 2022-07-27 ENCOUNTER — TELEPHONE (OUTPATIENT)
Dept: TRANSPLANT | Facility: CLINIC | Age: 27
End: 2022-07-27

## 2022-07-27 NOTE — TELEPHONE ENCOUNTER
Email sent to pt requesting update if dental visit was completed.     Email received from pt, completed dental at [facility]. Will request dental letter.     [address], [place] [address]  [phone]

## 2022-07-29 ENCOUNTER — TELEPHONE (OUTPATIENT)
Dept: TRANSPLANT | Facility: CLINIC | Age: 27
End: 2022-07-29

## 2022-07-29 NOTE — TELEPHONE ENCOUNTER
[facility] to check in on dental clearance for transplant. Requested letter be faxed to our clinic that she is cleared of any infections and no further dental work up is needed for transplant. Left VM with direct line for return call.

## 2022-08-16 ENCOUNTER — TELEPHONE (OUTPATIENT)
Dept: TRANSPLANT | Facility: CLINIC | Age: 27
End: 2022-08-16

## 2022-08-16 NOTE — TELEPHONE ENCOUNTER
Email sent to pt to check in on if wisdom teeth have been removed. Received dental letter that she needs them removed otherwise no other concerns.

## 2022-08-17 ENCOUNTER — COMMITTEE REVIEW (OUTPATIENT)
Dept: TRANSPLANT | Facility: CLINIC | Age: 27
End: 2022-08-17

## 2022-08-17 ENCOUNTER — TELEPHONE (OUTPATIENT)
Dept: TRANSPLANT | Facility: CLINIC | Age: 27
End: 2022-08-17

## 2022-08-17 NOTE — COMMITTEE REVIEW
Abdominal Committee Review Note     Evaluation Date: 1/4/2022  Committee Review Date: 8/17/2022    Organ being evaluated for: Kidney    Transplant Phase: Waitlist  Transplant Status: Inactive    Transplant Coordinator: Ania Gallego  Transplant Surgeon:       Referring Physician: Jonnie Ramos    Primary Diagnosis: IgA Nephropathy  Secondary Diagnosis:     Committee Review Members:  Dietitian, Awais Murrieta, RD   Nephrology Leno Whitfield MD, Kaz Cifuentes, APRN CNP, Cheryl Robbins MD, Mike Noguera MD   Pharmacist Zenaida Arteaga, [facility]    - Clinical Miley Sonja Ferrara, [facility]   Transplant Jennifer Rodriguez PA-C, Cait Michael, RN, Kavya Dowling, RN, Ngozi Feldman, RN, Rukhsana Figueroa, RN, Elke Helton, RN, Janny Alexander, RN   Transplant Surgery Giovanny Chan MD, Tressa Schilling MD, MD       Transplant Eligibility: Irreversible chronic kidney disease treated w/dialysis or expected need for dialysis    Committee Review Decision: Make Active    Relative Contraindications:     Absolute Contraindications:     Committee Chair Tressa Schilling MD verbally attested to the committee's decision.    Committee Discussion Details: Review the following:     -ESKD 2/2 IgA nephropathy on HD since 4/2022     -Cards: risk assessment not recommended due to age and lack of risk factors. Normal EKG and Echo 02/2022     -No other medical history.      Health maintenance: PCP sent letter in June, PAP was attempted 5/27/22 lab unable to result specimen and exam was difficult and painful for the pt, requesting we bypass exam for transplant as she is low risk for HPV and cervical cancer. Dental letter mentioned possible need for wisdom tooth extraction, pt does not have any pain.       Committee determined that pt since pt is unable to complete PAP she will need the Gardasil vaccine. Once completed pt okay for active status on the transplant list. [place] tooth removal  recommended but not required prior to transplant.

## 2022-08-17 NOTE — TELEPHONE ENCOUNTER
Email sent to pt with update from committee decision. [place] tooth extraction not required but recommended for transplant. Pt needs Gardasil vaccine since unable to complete PAP, once completed okay for active status on the waitlist.

## 2022-09-07 ENCOUNTER — TELEPHONE (OUTPATIENT)
Dept: TRANSPLANT | Facility: CLINIC | Age: 27
End: 2022-09-07

## 2022-09-07 NOTE — TELEPHONE ENCOUNTER
Left message asking for return call regarding the status of her Guardisil vaccination in lieu of a Pap smear.

## 2022-09-16 ENCOUNTER — TELEPHONE (OUTPATIENT)
Dept: TRANSPLANT | Facility: CLINIC | Age: 27
End: 2022-09-16

## 2022-10-02 ENCOUNTER — HEALTH MAINTENANCE LETTER (OUTPATIENT)
Age: 27
End: 2022-10-02

## 2023-05-17 NOTE — CONSULTS
Clinical Nutrition Therapy Note    Received consult to educate pt on Renal diet.  Pt just transferred from ICU to the floor.  Lights out in room and curtain drawn.  Will follow up and educate pt on Renal diet, as needed, prior to discharge.   FAXED 05/17/23 01:25 PM.

## 2023-06-04 ENCOUNTER — HEALTH MAINTENANCE LETTER (OUTPATIENT)
Age: 28
End: 2023-06-04

## 2023-07-20 ENCOUNTER — TELEPHONE (OUTPATIENT)
Dept: TRANSPLANT | Facility: CLINIC | Age: 28
End: 2023-07-20
Payer: MEDICARE

## 2023-07-20 NOTE — TELEPHONE ENCOUNTER
Provider Call: General  Route to LPN    Reason for call: Tashia  from [facility] would like a call back regarding status update on Nuzhat     Call back needed? Yes    Return Call Needed  Same as documented in contacts section  When to return call?: Greater than one day: Route standard priority

## 2023-08-31 ENCOUNTER — TELEPHONE (OUTPATIENT)
Dept: TRANSPLANT | Facility: CLINIC | Age: 28
End: 2023-08-31
Payer: MEDICARE

## 2023-08-31 DIAGNOSIS — N18.6 ESRD (END STAGE RENAL DISEASE) (H): Primary | ICD-10-CM

## 2023-08-31 DIAGNOSIS — Z76.82 ORGAN TRANSPLANT CANDIDATE: ICD-10-CM

## 2023-08-31 NOTE — TELEPHONE ENCOUNTER
Called pt regarding Guardisil vaccine. Pt has completed the three doses and will send email with records. PRA kits sent to pt house to draw. Will verify insurance for active status. Pt verbalized understanding of information and has no further questions. Encouraged to reach out if questions arise.

## 2023-09-01 ENCOUNTER — TELEPHONE (OUTPATIENT)
Dept: TRANSPLANT | Facility: CLINIC | Age: 28
End: 2023-09-01
Payer: MEDICARE

## 2023-09-01 ENCOUNTER — DOCUMENTATION ONLY (OUTPATIENT)
Dept: TRANSPLANT | Facility: CLINIC | Age: 28
End: 2023-09-01

## 2023-09-01 NOTE — TELEPHONE ENCOUNTER
Called patient to inform them of status change to ACTIVE  on 9/1/23. Status change letter and PRA orders to be mailed. Left VM with direct line for return call.

## 2023-10-06 ENCOUNTER — DOCUMENTATION ONLY (OUTPATIENT)
Dept: TRANSPLANT | Facility: CLINIC | Age: 28
End: 2023-10-06

## 2023-10-21 ENCOUNTER — HEALTH MAINTENANCE LETTER (OUTPATIENT)
Age: 28
End: 2023-10-21

## 2023-10-31 ENCOUNTER — DOCUMENTATION ONLY (OUTPATIENT)
Dept: TRANSPLANT | Facility: CLINIC | Age: 28
End: 2023-10-31
Payer: MEDICARE

## 2023-11-07 ENCOUNTER — LAB (OUTPATIENT)
Dept: LAB | Facility: CLINIC | Age: 28
End: 2023-11-07
Payer: MEDICARE

## 2023-11-07 ENCOUNTER — TELEPHONE (OUTPATIENT)
Dept: TRANSPLANT | Facility: CLINIC | Age: 28
End: 2023-11-07
Payer: MEDICARE

## 2023-11-07 DIAGNOSIS — N18.6 ESRD (END STAGE RENAL DISEASE) (H): ICD-10-CM

## 2023-11-07 DIAGNOSIS — Z76.82 ORGAN TRANSPLANT CANDIDATE: ICD-10-CM

## 2023-11-07 PROCEDURE — 36415 COLL VENOUS BLD VENIPUNCTURE: CPT

## 2023-11-07 PROCEDURE — 86833 HLA CLASS II HIGH DEFIN QUAL: CPT

## 2023-11-07 PROCEDURE — 86832 HLA CLASS I HIGH DEFIN QUAL: CPT

## 2023-11-07 NOTE — TELEPHONE ENCOUNTER
Called pt regarding PRA, pt will go today or tomorrow to [facility] to get this drawn.

## 2023-11-16 ENCOUNTER — ORGAN (OUTPATIENT)
Dept: TRANSPLANT | Facility: CLINIC | Age: 28
End: 2023-11-16
Payer: MEDICARE

## 2023-11-16 DIAGNOSIS — Z32.00 ENCOUNTER FOR PREGNANCY TEST: Primary | ICD-10-CM

## 2023-11-16 DIAGNOSIS — R73.03 PRE-DIABETES: ICD-10-CM

## 2023-11-16 DIAGNOSIS — N18.6 ESRD (END STAGE RENAL DISEASE) (H): ICD-10-CM

## 2023-11-16 DIAGNOSIS — Z76.82 AWAITING ORGAN TRANSPLANT: ICD-10-CM

## 2023-11-22 ENCOUNTER — TEAM CONFERENCE (OUTPATIENT)
Dept: TRANSPLANT | Facility: CLINIC | Age: 28
End: 2023-11-22
Payer: MEDICARE

## 2023-11-22 NOTE — TELEPHONE ENCOUNTER
Surgical presentation at \[facility]\"" as pt is scheduled for LDKT on 12/8/23 with Dr. Hurtado.     Attendees: Dr. Keita and coordinators     ABO B, PRA 0, CMV pos, EBV pos     -ESKD 2/2 IgA nephropathy on HD since 4/2022     -Cards: risk assessment not recommended due to age and lack of risk factors. Normal EKG and Echo 02/2022     -No other medical history.     -Hep B antibody +, donor is core pos + and DNA quant pos has been on antivirals and doing okay since. ID involved in post transplant recs.     Pt acceptable to proceed with pending pre op appts.

## 2023-11-26 LAB
ABO/RH(D): NORMAL
ANTIBODY SCREEN: NEGATIVE
SPECIMEN EXPIRATION DATE: NORMAL

## 2023-11-27 ENCOUNTER — OFFICE VISIT (OUTPATIENT)
Dept: TRANSPLANT | Facility: CLINIC | Age: 28
End: 2023-11-27
Attending: SURGERY
Payer: MEDICARE

## 2023-11-27 ENCOUNTER — LAB (OUTPATIENT)
Dept: LAB | Facility: CLINIC | Age: 28
End: 2023-11-27
Attending: SURGERY
Payer: MEDICARE

## 2023-11-27 ENCOUNTER — LAB (OUTPATIENT)
Dept: LAB | Facility: CLINIC | Age: 28
End: 2023-11-27
Payer: MEDICARE

## 2023-11-27 VITALS
WEIGHT: 169 LBS | HEIGHT: 62 IN | BODY MASS INDEX: 31.1 KG/M2 | RESPIRATION RATE: 16 BRPM | HEART RATE: 81 BPM | DIASTOLIC BLOOD PRESSURE: 94 MMHG | SYSTOLIC BLOOD PRESSURE: 151 MMHG | OXYGEN SATURATION: 100 %

## 2023-11-27 DIAGNOSIS — Z32.00 ENCOUNTER FOR PREGNANCY TEST: ICD-10-CM

## 2023-11-27 DIAGNOSIS — R73.03 PRE-DIABETES: ICD-10-CM

## 2023-11-27 DIAGNOSIS — Z76.82 AWAITING ORGAN TRANSPLANT: ICD-10-CM

## 2023-11-27 DIAGNOSIS — N18.6 ESRD (END STAGE RENAL DISEASE) (H): ICD-10-CM

## 2023-11-27 DIAGNOSIS — Z76.82 AWAITING ORGAN TRANSPLANT: Primary | ICD-10-CM

## 2023-11-27 DIAGNOSIS — Z76.82 ORGAN TRANSPLANT CANDIDATE: ICD-10-CM

## 2023-11-27 DIAGNOSIS — Z32.00 ENCOUNTER FOR PREGNANCY TEST, RESULT UNKNOWN: ICD-10-CM

## 2023-11-27 LAB
ALBUMIN SERPL BCG-MCNC: 4 G/DL (ref 3.5–5.2)
ALBUMIN UR-MCNC: 70 MG/DL
ALP SERPL-CCNC: 81 U/L (ref 40–150)
ALT SERPL W P-5'-P-CCNC: 6 U/L (ref 0–50)
ANION GAP SERPL CALCULATED.3IONS-SCNC: 9 MMOL/L (ref 7–15)
APPEARANCE UR: CLEAR
AST SERPL W P-5'-P-CCNC: 14 U/L (ref 0–45)
BILIRUB SERPL-MCNC: 0.5 MG/DL
BILIRUB UR QL STRIP: NEGATIVE
BUN SERPL-MCNC: 36.8 MG/DL (ref 6–20)
CALCIUM SERPL-MCNC: 9.1 MG/DL (ref 8.6–10)
CHLORIDE SERPL-SCNC: 106 MMOL/L (ref 98–107)
COLOR UR AUTO: ABNORMAL
CREAT SERPL-MCNC: 10.9 MG/DL (ref 0.51–0.95)
DEPRECATED HCO3 PLAS-SCNC: 23 MMOL/L (ref 22–29)
EGFRCR SERPLBLD CKD-EPI 2021: 4 ML/MIN/1.73M2
ERYTHROCYTE [DISTWIDTH] IN BLOOD BY AUTOMATED COUNT: 13.1 % (ref 10–15)
FERRITIN SERPL-MCNC: 1549 NG/ML (ref 6–175)
GLUCOSE SERPL-MCNC: 94 MG/DL (ref 70–99)
GLUCOSE UR STRIP-MCNC: 150 MG/DL
HBA1C MFR BLD: 4.6 %
HCG SERPL QL: NEGATIVE
HCT VFR BLD AUTO: 37.3 % (ref 35–47)
HGB BLD-MCNC: 11.8 G/DL (ref 11.7–15.7)
HGB UR QL STRIP: ABNORMAL
INR PPP: 1.09 (ref 0.85–1.15)
IRON BINDING CAPACITY (ROCHE): 175 UG/DL (ref 240–430)
IRON SATN MFR SERPL: 42 % (ref 15–46)
IRON SERPL-MCNC: 73 UG/DL (ref 37–145)
KETONES UR STRIP-MCNC: NEGATIVE MG/DL
LEUKOCYTE ESTERASE UR QL STRIP: NEGATIVE
MCH RBC QN AUTO: 30.5 PG (ref 26.5–33)
MCHC RBC AUTO-ENTMCNC: 31.6 G/DL (ref 31.5–36.5)
MCV RBC AUTO: 96 FL (ref 78–100)
MUCOUS THREADS #/AREA URNS LPF: PRESENT /LPF
NITRATE UR QL: NEGATIVE
PH UR STRIP: 8 [PH] (ref 5–7)
PLATELET # BLD AUTO: 213 10E3/UL (ref 150–450)
POTASSIUM SERPL-SCNC: 4.9 MMOL/L (ref 3.4–5.3)
PROT SERPL-MCNC: 8 G/DL (ref 6.4–8.3)
PTH-INTACT SERPL-MCNC: 214 PG/ML (ref 15–65)
RBC # BLD AUTO: 3.87 10E6/UL (ref 3.8–5.2)
RBC URINE: 9 /HPF
SODIUM SERPL-SCNC: 138 MMOL/L (ref 135–145)
SP GR UR STRIP: 1.01 (ref 1–1.03)
SQUAMOUS EPITHELIAL: 5 /HPF
UROBILINOGEN UR STRIP-MCNC: NORMAL MG/DL
VIT D+METAB SERPL-MCNC: 19 NG/ML (ref 20–50)
WBC # BLD AUTO: 6.9 10E3/UL (ref 4–11)
WBC URINE: 3 /HPF

## 2023-11-27 PROCEDURE — 86706 HEP B SURFACE ANTIBODY: CPT | Performed by: SURGERY

## 2023-11-27 PROCEDURE — 99000 SPECIMEN HANDLING OFFICE-LAB: CPT | Performed by: PATHOLOGY

## 2023-11-27 PROCEDURE — 80053 COMPREHEN METABOLIC PANEL: CPT | Performed by: PATHOLOGY

## 2023-11-27 PROCEDURE — 86665 EPSTEIN-BARR CAPSID VCA: CPT | Mod: 59 | Performed by: SURGERY

## 2023-11-27 PROCEDURE — 85610 PROTHROMBIN TIME: CPT | Performed by: PATHOLOGY

## 2023-11-27 PROCEDURE — 86665 EPSTEIN-BARR CAPSID VCA: CPT | Performed by: SURGERY

## 2023-11-27 PROCEDURE — 36415 COLL VENOUS BLD VENIPUNCTURE: CPT | Performed by: PATHOLOGY

## 2023-11-27 PROCEDURE — 99214 OFFICE O/P EST MOD 30 MIN: CPT | Mod: GC | Performed by: SURGERY

## 2023-11-27 PROCEDURE — 87086 URINE CULTURE/COLONY COUNT: CPT | Performed by: SURGERY

## 2023-11-27 PROCEDURE — 99207 PR NO BILLABLE SERVICE THIS VISIT: CPT | Performed by: NURSE PRACTITIONER

## 2023-11-27 PROCEDURE — 86833 HLA CLASS II HIGH DEFIN QUAL: CPT | Performed by: SURGERY

## 2023-11-27 PROCEDURE — 82306 VITAMIN D 25 HYDROXY: CPT | Performed by: SURGERY

## 2023-11-27 PROCEDURE — 83036 HEMOGLOBIN GLYCOSYLATED A1C: CPT | Performed by: SURGERY

## 2023-11-27 PROCEDURE — 87517 HEPATITIS B DNA QUANT: CPT

## 2023-11-27 PROCEDURE — 83550 IRON BINDING TEST: CPT | Performed by: PATHOLOGY

## 2023-11-27 PROCEDURE — 93000 ELECTROCARDIOGRAM COMPLETE: CPT | Performed by: INTERNAL MEDICINE

## 2023-11-27 PROCEDURE — 86825 HLA X-MATH NON-CYTOTOXIC: CPT | Performed by: SURGERY

## 2023-11-27 PROCEDURE — 81001 URINALYSIS AUTO W/SCOPE: CPT | Performed by: PATHOLOGY

## 2023-11-27 PROCEDURE — 86645 CMV ANTIBODY IGM: CPT | Performed by: SURGERY

## 2023-11-27 PROCEDURE — 86901 BLOOD TYPING SEROLOGIC RH(D): CPT | Performed by: SURGERY

## 2023-11-27 PROCEDURE — 86644 CMV ANTIBODY: CPT | Performed by: SURGERY

## 2023-11-27 PROCEDURE — 84703 CHORIONIC GONADOTROPIN ASSAY: CPT | Performed by: PATHOLOGY

## 2023-11-27 PROCEDURE — 86832 HLA CLASS I HIGH DEFIN QUAL: CPT | Performed by: SURGERY

## 2023-11-27 PROCEDURE — G0463 HOSPITAL OUTPT CLINIC VISIT: HCPCS | Performed by: NURSE PRACTITIONER

## 2023-11-27 PROCEDURE — 82728 ASSAY OF FERRITIN: CPT | Performed by: PATHOLOGY

## 2023-11-27 PROCEDURE — 83970 ASSAY OF PARATHORMONE: CPT | Performed by: PATHOLOGY

## 2023-11-27 PROCEDURE — 85027 COMPLETE CBC AUTOMATED: CPT | Performed by: PATHOLOGY

## 2023-11-27 PROCEDURE — 83540 ASSAY OF IRON: CPT | Performed by: PATHOLOGY

## 2023-11-27 NOTE — PROGRESS NOTES
Transplant Surgery - Pre-op H&P    Date of Visit: 11/27/2023    Transplants:  N/A. Tentative LDKT on 12/8/202  ASSESMENT AND PLAN:   Patient is acceptable for surgery.  Kidney transplant pre-op labs and EKG in process/ordered. Will order hep B quant  Of note she has a heparin allergy, will plan on using an alternative agent like argatroban   Consent obtained  Pending no other major changes will plan for surgery on 12/8/2023    Discussed with staff Dr. Genesis Cox MD PGY3  General Surgery Resident      I have reviewed history, examined patient and discussed plan with the fellow/resident/MARLENE.  I concur with the findings in this note.    Get HbsAB quant    Immunosuppressive regimen, management and long term risks discussed in detail. Changes, when applicable made as per orders.    Total time: 30 min  Counseling time: 20 min                 ========================================================    Date: November 27, 2023  Transplant: Pending kidney transplant  History of Present Illness:  Ms Lopez is a 28 yo F with hx of chronic renal failure 2/2 IgA nephropathy. She underwent pre-op assessment for kidney transplant with Dr. Hurtado on 2/28/22 at which time we was considered to be a good candidate for kidney transplant pending ECHO. She had her echo done which was grossly without any abnormalities.  Currently she is in normal health without any major changes in recent history.  She denies any fever/chills/sweats. Denies any cp or sob. Denies any issues with eating or stooling. >4 Mets.  She is on at home dialysis. 4 times a week via a Right upper extremity fistula. TTFS.  No prior abdominal surgeries.   ABO: B+. 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    Secondary hyperparathyroidism of renal origin (H24)    Shortness of breath    Skin eruption    Primary hypertension    Migraine     SOCIAL /FAMILY HISTORY: [x]                  No recent change    Past Medical History:   Diagnosis Date    Anemia in chronic kidney disease     CKD (chronic kidney disease) stage 5, GFR less than 15 ml/min (H)     HTN (hypertension)     IgA nephropathy     Metabolic acidosis      Past Surgical History:   Procedure Laterality Date    BIOPSY  2021    [facility]    NO PAST SURGERIES       Social History     Socioeconomic History    Marital status: Single     Spouse name: Not on file    Number of children: Not on file    Years of education: Not on file    Highest education level: Not on file   Occupational History    Not on file   Tobacco Use    Smoking status: Never    Smokeless tobacco: Never   Substance and Sexual Activity    Alcohol use: No    Drug use: No    Sexual activity: Not on file   Other Topics Concern    Parent/sibling w/ CABG, MI or angioplasty before 65F 55M? Not Asked   Social History Narrative    Not on file     Social Determinants of Health     Financial Resource Strain: Not on file   Food Insecurity: Not on file   Transportation Needs: Not on file   Physical Activity: Not on file   Stress: Not on file   Social Connections: Not on file   Interpersonal Safety: Not on file   Housing Stability: Not on file     Prescription Medications as of 11/27/2023         Rx Number Disp Refills Start End Last Dispensed Date Next Fill Date Owning Pharmacy    acetaminophen (TYLENOL) 325 MG tablet            Sig: Take 325-650 mg by mouth every 6 hours as needed for mild pain     Class: Historical    Route: Oral    carvedilol  (COREG) 12.5 MG tablet  60 tablet 0 10/8/2021    [facility][id number] - [facility] [address] AT [facility]    Sig: Take 1 tablet (12.5 mg) by mouth 2 times daily (with meals)    Class: E-Prescribe    Route: Oral          Cephalexin and Heparin flush   REVIEW OF SYSTEMS (check box if normal)  [x]               GENERAL  [x]                 PULMONARY [x]                GENITOURINARY  [x]                CNS                 [x]                 CARDIAC  [x]                 ENDOCRINE  [x]                EARS,NOSE,THROAT [x]                 GASTROINTESTINAL [x]                 NEUROLOGIC    [x]                MUSCLOSKELTAL  [x]                  HEMATOLOGY      PHYSICAL EXAM (check box if normal)There were no vitals taken for this visit.    NAD  RRR  CTAB  Soft, nt, nd  Palpable femoral arteries bilaterally  Extremities - RUE fistula with strong thrill, somewhat discolored hand compared to her left. Otherwise WWP.      [x]            GENERAL:    [x]       EYES:  ICTERIC   []        YES  []                    NO  [x]           EXTREMITIES: Clubbing []       Y     [x]           N    [x]           EARS, NOSE, THROAT: Membranes Moist    YES   [x]                   NO []                  [x]           LUNGS:  CLEAR    YES       [x]                  NO    []                                [x]           SKIN: Jaundice           YES       []                  NO    [x]                   Rash: YES       []                  NO    [x]                                     [x]             HEART: Regular Rate          YES       [x]                  NO    []                   Incision Clean:  YES       [x]                  NO    []                                [x]                    ABDOMEN: Organomegaly YES       []                  NO    [x]                       [x]                    NEUROLOGICAL:  Nonfocal  YES       [x]                  NO    []                       [x]                    Hernia YES        []                  NO    [x]                   PSYCHIATRIC:  Appropriate  YES       [x]                  NO    []                       OTHER:                                                                                                   PAIN SCALE:: 0

## 2023-11-27 NOTE — NURSING NOTE
"Chief Complaint   Patient presents with    Pre-Op Exam     Kidney transplant preop       BP (!) 151/94 (BP Location: Left arm, Patient Position: Sitting, Cuff Size: Adult Large)   Pulse 81   Resp 16   Ht 1.568 m (5' 1.75\")   Wt 76.7 kg (169 lb)   SpO2 100%   BMI 31.16 kg/m      SELAM JOHNSON RN on 11/27/2023 at 3:17 PM    "

## 2023-11-27 NOTE — LETTER
11/27/2023         RE: Nuzhat Lopez  [address] [id number]        Dear Colleague,    Thank you for referring your patient, Nuzhat Lopez, to the [facility]. Please see a copy of my visit note below.    Transplant Surgery - Pre-op H&P    Date of Visit: 11/27/2023    Transplants:  N/A. Tentative LDKT on 12/8/202  ASSESMENT AND PLAN:   Patient is acceptable for surgery.  Kidney transplant pre-op labs and EKG in process/ordered. Will order hep B quant  Of note she has a heparin allergy, will plan on using an alternative agent like argatroban   Consent obtained  Pending no other major changes will plan for surgery on 12/8/2023    Discussed with staff Dr. Genesis Cox MD PGY3  General Surgery Resident      I have reviewed history, examined patient and discussed plan with the fellow/resident/MARLENE.  I concur with the findings in this note.    Get HbsAB quant    Immunosuppressive regimen, management and long term risks discussed in detail. Changes, when applicable made as per orders.    Total time: 30 min  Counseling time: 20 min                 ========================================================    Date: November 27, 2023  Transplant: Pending kidney transplant  History of Present Illness:  Ms Lopez is a 28 yo F with hx of chronic renal failure 2/2 IgA nephropathy. She underwent pre-op assessment for kidney transplant with Dr. Hurtado on 2/28/22 at which time we was considered to be a good candidate for kidney transplant pending ECHO. She had her echo done which was grossly without any abnormalities.  Currently she is in normal health without any major changes in recent history.  She denies any fever/chills/sweats. Denies any cp or sob. Denies any issues with eating or stooling. >4 Mets.  She is on at home dialysis. 4 times a week via a Right upper extremity fistula. TTFS.  No prior abdominal surgeries.   ABO: B+. 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     Secondary hyperparathyroidism of renal origin (H24)     Shortness of breath     Skin eruption     Primary hypertension     Migraine     SOCIAL /FAMILY HISTORY: [x]                  No recent change    Past Medical History:   Diagnosis Date     Anemia in chronic kidney disease      CKD (chronic kidney disease) stage 5, GFR less than 15 ml/min (H)      HTN (hypertension)      IgA nephropathy      Metabolic acidosis      Past Surgical History:   Procedure Laterality Date     BIOPSY  2021    [facility]     NO PAST SURGERIES       Social History     Socioeconomic History     Marital status: Single     Spouse name: Not on file     Number of children: Not on file     Years of education: Not on file     Highest education level: Not on file   Occupational History     Not on file   Tobacco Use     Smoking status: Never     Smokeless tobacco: Never   Substance and Sexual Activity     Alcohol use: No     Drug use: No     Sexual activity: Not on file   Other Topics Concern     Parent/sibling w/ CABG, MI or angioplasty before 65F 55M? Not Asked   Social History Narrative     Not on file     Social Determinants of Health     Financial Resource Strain: Not on file   Food Insecurity: Not on file   Transportation Needs: Not on file   Physical Activity: Not on file   Stress: Not on file   Social Connections: Not on file   Interpersonal Safety: Not on file   Housing Stability: Not on file     Prescription Medications as of  11/27/2023         Rx Number Disp Refills Start End Last Dispensed Date Next Fill Date Owning Pharmacy    acetaminophen (TYLENOL) 325 MG tablet            Sig: Take 325-650 mg by mouth every 6 hours as needed for mild pain     Class: Historical    Route: Oral    carvedilol (COREG) 12.5 MG tablet  60 tablet 0 10/8/2021    [facility] #[id number] - [place] [id number] [facility]  AT [facility]    Sig: Take 1 tablet (12.5 mg) by mouth 2 times daily (with meals)    Class: E-Prescribe    Route: Oral          Cephalexin and Heparin flush   REVIEW OF SYSTEMS (check box if normal)  [x]               GENERAL  [x]                 PULMONARY [x]                GENITOURINARY  [x]                CNS                 [x]                 CARDIAC  [x]                 ENDOCRINE  [x]                EARS,NOSE,THROAT [x]                 GASTROINTESTINAL [x]                 NEUROLOGIC    [x]                MUSCLOSKELTAL  [x]                  HEMATOLOGY      PHYSICAL EXAM (check box if normal)There were no vitals taken for this visit.    NAD  RRR  CTAB  Soft, nt, nd  Palpable femoral arteries bilaterally  Extremities - RUE fistula with strong thrill, somewhat discolored hand compared to her left. Otherwise WWP.      [x]            GENERAL:    [x]       EYES:  ICTERIC   []        YES  []                    NO  [x]           EXTREMITIES: Clubbing []       Y     [x]           N    [x]           EARS, NOSE, THROAT: Membranes Moist    YES   [x]                   NO []                  [x]           LUNGS:  CLEAR    YES       [x]                  NO    []                                [x]           SKIN: Jaundice           YES       []                  NO    [x]                   Rash: YES       []                  NO    [x]                                     [x]             HEART: Regular Rate          YES       [x]                  NO    []                   Incision Clean:  YES       [x]                  NO    []                                 [x]                    ABDOMEN: Organomegaly YES       []                  NO    [x]                       [x]                    NEUROLOGICAL:  Nonfocal  YES       [x]                  NO    []                       [x]                    Hernia YES       []                  NO    [x]                   PSYCHIATRIC:  Appropriate  YES       [x]                  NO    []                       OTHER:                                                                                                   PAIN SCALE:: 0      Again, thank you for allowing me to participate in the care of your patient.        Sincerely,        Osbaldo Hurtado MD

## 2023-11-27 NOTE — LETTER
11/27/2023         RE: Nuzhat Lopez  [address] [facility], [place] [address]        Dear Colleague,    Thank you for referring your patient, Nuzhat Lopez, to the [facility]. Please see a copy of my visit note below.    Transplant Surgery H&P:                           HPI:      Ms. Lopez is a 27 year o female who comes to clinic today for preop prior to planned living donor kidney transplantation. The patient was previously reviewed by the multidisciplinary selection committee and found to be medically and psychosocially appropriate for kidney transplantation. 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     The patient is on dialysis.      If on dialysis, modality: Right upper arm fistula  Dialysis days: Tuesday, Thursday, Friday, Saturday                 YES  NO   Chronic anticoagulation  []      [x] Indication:   Recurrent infections  []      [x]  Type:                  Bladder dysfunction  []      [x] Cause:  Claudication   []      [x] Distance:    Previous Amputation  []      [x] Cause:       Health events since transplant evaluation: None    Special considerations:  PONV: no  Confucianist: No    MEDICAL HISTORY:      Patient Active Problem List    Diagnosis Date Noted    Shortness of breath 06/07/2022     Priority: Medium    Disorder of phosphorus metabolism, unspecified 06/03/2022     Priority: Medium    End stage renal disease (H) 06/03/2022     Priority: Medium    Iron deficiency anemia, unspecified 06/03/2022     Priority: Medium    Secondary hyperparathyroidism of renal origin (H24) 06/03/2022     Priority: Medium    HTN (hypertension) 01/05/2022     Priority: Medium    Anemia in chronic kidney disease 01/05/2022     Priority: Medium    Stage 5 chronic kidney disease (H) 11/21/2021     Priority: Medium    Primary hypertension 10/12/2021     Priority: Medium    Migraine 10/12/2021     Priority: Medium    Migraine headache 10/10/2021     Priority: Medium    IgA nephropathy 10/09/2021      Priority: Medium    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 10/09/2021     Priority: Medium    Acute kidney injury (H24) 10/05/2021     Priority: Medium    Skin eruption 08/07/2017     Priority: Medium    Influenza immunization not administered due to inavailability of vaccine 05/18/2016     Priority: Medium    Eczema 05/18/2016     Priority: Medium    Encounter for childhood immunizations appropriate for age 05/18/2016     Priority: Medium      Past Medical History:   Diagnosis Date    Anemia in chronic kidney disease     CKD (chronic kidney disease) stage 5, GFR less than 15 ml/min (H)     HTN (hypertension)     IgA nephropathy     Metabolic acidosis      Past Surgical History:   Procedure Laterality Date    BIOPSY  2021    [facility]    NO PAST SURGERIES       Current Outpatient Medications   Medication Sig Dispense Refill    acetaminophen (TYLENOL) 325 MG tablet Take 325-650 mg by mouth every 6 hours as needed for mild pain       amLODIPine (NORVASC) 10 MG tablet Take 1 tablet (10 mg) by mouth daily 30 tablet 0    carvedilol (COREG) 12.5 MG tablet Take 1 tablet (12.5 mg) by mouth 2 times daily (with meals) 60 tablet 0    oxyCODONE (ROXICODONE) 5 MG tablet Take 1 tablet (5 mg) by mouth every 6 hours as needed for moderate to severe pain (Patient not taking: Reported on 1/4/2022) 8 tablet 0    sodium bicarbonate 650 MG tablet sodium bicarbonate 650 mg tablet      SUMAtriptan (IMITREX) 50 MG tablet Take 50 mg by mouth at onset of headache for migraine (Patient not taking: Reported on 1/4/2022)       OTC products: None, except as noted above  No Known Allergies   Social History     Tobacco Use    Smoking status: Never    Smokeless tobacco: Never   Substance Use Topics    Alcohol use: No     OR  Social History     Socioeconomic History    Marital status: Single     Spouse name: Not on file    Number of children: Not on file    Years of education: Not on file    Highest education  level: Not on file   Occupational History    Not on file   Tobacco Use    Smoking status: Never    Smokeless tobacco: Never   Substance and Sexual Activity    Alcohol use: No    Drug use: No    Sexual activity: Not on file   Other Topics Concern    Parent/sibling w/ CABG, MI or angioplasty before 65F 55M? Not Asked   Social History Narrative    Not on file     Social Determinants of Health     Financial Resource Strain: Not on file   Food Insecurity: Not on file   Transportation Needs: Not on file   Physical Activity: Not on file   Stress: Not on file   Social Connections: Not on file   Interpersonal Safety: Not on file   Housing Stability: Not on file     History   Drug Use No     Family History   Problem Relation Age of Onset    Impaired Fasting Glucose Mother     Kidney Disease No family hx of     Hypertension No family hx of     Cancer No family hx of        REVIEW OF SYSTEMS:        CONSTITUTIONAL: NEGATIVE for fever, chills, change in weight  INTEGUMENTARY/SKIN: NEGATIVE for worrisome rashes, moles or lesions  EYES: NEGATIVE for vision changes or irritation  ENT/MOUTH: NEGATIVE for ear, mouth and throat problems  RESP: NEGATIVE for significant cough or SOB  CV: NEGATIVE for chest pain, palpitations or peripheral edema  GI: NEGATIVE for nausea, abdominal pain, heartburn, or change in bowel habits  : NEGATIVE for frequency, dysuria, or hematuria  MUSCULOSKELETAL: NEGATIVE for significant arthralgias or myalgia  NEURO: NEGATIVE for weakness, dizziness or paresthesias  ENDOCRINE: NEGATIVE for temperature intolerance, skin/hair changes  HEME: NEGATIVE for bleeding problems  PSYCHIATRIC: NEGATIVE for changes in mood or affect    EXAM:                            GENERAL APPEARANCE: healthy, alert and no distress     EYES: EOMI, PERRL     HENT: ear canals and TM's normal and nose and mouth without ulcers or lesions     NECK: no adenopathy, no asymmetry, masses, or scars and thyroid normal to palpation     RESP:  lungs clear to auscultation - no rales, rhonchi or wheezes     CV: regular rates and rhythm, normal S1 S2, no S3 or S4 and no murmur, click or rub     ABDOMEN:  soft, nontender, no HSM or masses and bowel sounds normal     MS: extremities normal- no gross deformities noted, no evidence of inflammation in joints, FROM in all extremities.     SKIN: no suspicious lesions or rashes     NEURO: Normal strength and tone, sensory exam grossly normal, mentation intact and speech normal     PSYCH: mentation appears normal. and affect normal/bright     LYMPHATICS: No cervical adenopathy      DIAGNOSTICS:                EKG: appears normal, NSR, normal axis, normal intervals, no acute ST/T changes c/w ischemia, no LVH by voltage criteria, unchanged from previous tracings  Chest XRay  Labs Resulted Today:   Results for orders placed or performed in visit on 11/27/23   EKG 12-lead complete w/read - Clinics     Status: None (Preliminary result)   Result Value Ref Range    Systolic Blood Pressure  mmHg    Diastolic Blood Pressure  mmHg    Ventricular Rate 86 BPM    Atrial Rate 86 BPM    MN Interval 128 ms    QRS Duration 78 ms     ms    QTc 442 ms    P Axis 42 degrees    R AXIS -18 degrees    T Axis 55 degrees    Interpretation ECG       Sinus rhythm  Normal ECG  When compared with ECG of 11-JUL-2022 10:39,  No significant change was found     Results for orders placed or performed in visit on 11/27/23   CBC with platelets     Status: Normal   Result Value Ref Range    WBC Count 6.9 4.0 - 11.0 10e3/uL    RBC Count 3.87 3.80 - 5.20 10e6/uL    Hemoglobin 11.8 11.7 - 15.7 g/dL    Hematocrit 37.3 35.0 - 47.0 %    MCV 96 78 - 100 fL    MCH 30.5 26.5 - 33.0 pg    MCHC 31.6 31.5 - 36.5 g/dL    RDW 13.1 10.0 - 15.0 %    Platelet Count 213 150 - 450 10e3/uL   Comprehensive metabolic panel     Status: Abnormal   Result Value Ref Range    Sodium 138 135 - 145 mmol/L    Potassium 4.9 3.4 - 5.3 mmol/L    Carbon Dioxide (CO2) 23 22 - 29  mmol/L    Anion Gap 9 7 - 15 mmol/L    Urea Nitrogen 36.8 (H) 6.0 - 20.0 mg/dL    Creatinine 10.90 (H) 0.51 - 0.95 mg/dL    GFR Estimate 4 (L) >60 mL/min/1.73m2    Calcium 9.1 8.6 - 10.0 mg/dL    Chloride 106 98 - 107 mmol/L    Glucose 94 70 - 99 mg/dL    Alkaline Phosphatase 81 40 - 150 U/L    AST 14 0 - 45 U/L    ALT 6 0 - 50 U/L    Protein Total 8.0 6.4 - 8.3 g/dL    Albumin 4.0 3.5 - 5.2 g/dL    Bilirubin Total 0.5 <=1.2 mg/dL   Ferritin     Status: Abnormal   Result Value Ref Range    Ferritin 1,549 (H) 6 - 175 ng/mL   INR     Status: Normal   Result Value Ref Range    INR 1.09 0.85 - 1.15   Iron and iron binding capacity     Status: Abnormal   Result Value Ref Range    Iron 73 37 - 145 ug/dL    Iron Binding Capacity 175 (L) 240 - 430 ug/dL    Iron Sat Index 42 15 - 46 %   Parathyroid Hormone Intact     Status: Abnormal   Result Value Ref Range    Parathyroid Hormone Intact 214 (H) 15 - 65 pg/mL    Narrative    This result was obtained with the Roche Elecsys PTH STAT assay.   This reference range differs from PTH assays used in other [facility] laboratories.   Routine UA with microscopic     Status: Abnormal   Result Value Ref Range    Color Urine Straw Colorless, Straw, Light Yellow, Yellow    Appearance Urine Clear Clear    Glucose Urine 150 (A) Negative mg/dL    Bilirubin Urine Negative Negative    Ketones Urine Negative Negative mg/dL    Specific Gravity Urine 1.008 1.003 - 1.035    Blood Urine Small (A) Negative    pH Urine 8.0 (H) 5.0 - 7.0    Protein Albumin Urine 70 (A) Negative mg/dL    Urobilinogen Urine Normal Normal, 2.0 mg/dL    Nitrite Urine Negative Negative    Leukocyte Esterase Urine Negative Negative    Mucus Urine Present (A) None Seen /LPF    RBC Urine 9 (H) <=2 /HPF    WBC Urine 3 <=5 /HPF    Squamous Epithelials Urine 5 (H) <=1 /HPF   hCG Qualitative Pregnancy     Status: Normal   Result Value Ref Range    hCG Serum Qualitative Negative Negative   Adult Type and Screen     Status:  None (Preliminary result)   Result Value Ref Range    SPECIMEN EXPIRATION DATE [id number]    ABO/Rh type and screen     Status: None (In process)    Narrative    The following orders were created for panel order ABO/Rh type and screen.  Procedure                               Abnormality         Status                     ---------                               -----------         ------                     Adult Type and Screen[[id number]]                            Preliminary result           Please view results for these tests on the individual orders.   HLA Final Crossmatch Recipient     Status: None (In process)    Narrative    The following orders were created for panel order HLA Final Crossmatch Recipient.  Procedure                               Abnormality         Status                     ---------                               -----------         ------                     HLA Final Crossmatch Rec...[[id number]]                      In process                   Please view results for these tests on the individual orders.     Labs Drawn and in Process:   Unresulted Labs Ordered in the Past 30 Days of this Admission       Date and Time Order Name Status Description    11/27/2023  1:56 PM VITAMIN D DEFICIENCY SCREENING In process     11/27/2023  1:56 PM URINE CULTURE In process     11/27/2023  1:56 PM HEMOGLOBIN A1C In process     11/27/2023  1:56 PM EBV CAPSID ANTIBODY IGM In process     11/27/2023  1:56 PM EBV CAPSID ANTIBODY IGG In process     11/27/2023  1:56 PM CMV ANTIBODY IGM In process     11/27/2023  1:56 PM CMV ANTIBODY IGG In process           Recent Labs   Lab Test 11/27/23  1406 07/11/22  1308 02/28/22  1308   HGB 11.8 12.3  --     201  --    INR 1.09  --  0.93    137 139   POTASSIUM 4.9 5.0 4.2   CR 10.90* 10.38* 7.65*     UNOS CPRA   Date Value Ref Range Status   11/07/2023 0  Final     B POS    ASSESSMENT:                 Ms. Lopez is a 27 year old female who is appropriate  for elective living donor kidney transplantation with the following pertinent issues:    1. Labs reviewed. Findings requiring additional evaluation before surgery: No.  Reviewed.   2. EKG (11/27/2023): appears normal, NSR  3. ECHO (2/28/2022); Interpretation Summary  Global and regional left ventricular function is normal with an EF of 60-65%.  Global right ventricular function is normal. The right ventricle is normal  size.  No significant valvular abnormalities.  The estimated PA systolic pressure is 18 mmHg.  This study was compared with the study from 10/06/2021 (resting images from  stress study). No significant changes noted.  4. ABO= B POS  5. Paired Exchange case: Yes  6. Outstanding issues: Final ABO and cross match, COVID-19 test    PLAN:                 1. Consent: Done  2. Outstanding issues:  Final ABO and cross match, COVID-19 test    Signed Electronically by: Ania Virk NP         Again, thank you for allowing me to participate in the care of your patient.        Sincerely,        Ania Virk NP

## 2023-11-27 NOTE — PROGRESS NOTES
Transplant Surgery H&P:                           HPI:      Ms. Lopez is a 27 year o female who comes to clinic today for preop prior to planned living donor kidney transplantation. The patient was previously reviewed by the multidisciplinary selection committee and found to be medically and psychosocially appropriate for kidney transplantation. 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     The patient is on dialysis.      If on dialysis, modality: Right upper arm fistula  Dialysis days: Tuesday, Thursday, Friday, Saturday                 YES  NO   Chronic anticoagulation  []      [x] Indication:   Recurrent infections  []      [x]  Type:                  Bladder dysfunction  []      [x] Cause:  Claudication   []      [x] Distance:    Previous Amputation  []      [x] Cause:       Health events since transplant evaluation: None    Special considerations:  PONV: no  Confucianism: No    MEDICAL HISTORY:      Patient Active Problem List    Diagnosis Date Noted    Shortness of breath 06/07/2022     Priority: Medium    Disorder of phosphorus metabolism, unspecified 06/03/2022     Priority: Medium    End stage renal disease (H) 06/03/2022     Priority: Medium    Iron deficiency anemia, unspecified 06/03/2022     Priority: Medium    Secondary hyperparathyroidism of renal origin (H24) 06/03/2022     Priority: Medium    HTN (hypertension) 01/05/2022     Priority: Medium    Anemia in chronic kidney disease 01/05/2022     Priority: Medium    Stage 5 chronic kidney disease (H) 11/21/2021     Priority: Medium    Primary hypertension 10/12/2021     Priority: Medium    Migraine 10/12/2021     Priority: Medium    Migraine headache 10/10/2021     Priority: Medium    IgA nephropathy 10/09/2021     Priority: Medium    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 10/09/2021     Priority: Medium    Acute kidney injury (H24) 10/05/2021     Priority: Medium    Skin eruption 08/07/2017     Priority: Medium     Influenza immunization not administered due to inavailability of vaccine 05/18/2016     Priority: Medium    Eczema 05/18/2016     Priority: Medium    Encounter for childhood immunizations appropriate for age 05/18/2016     Priority: Medium      Past Medical History:   Diagnosis Date    Anemia in chronic kidney disease     CKD (chronic kidney disease) stage 5, GFR less than 15 ml/min (H)     HTN (hypertension)     IgA nephropathy     Metabolic acidosis      Past Surgical History:   Procedure Laterality Date    BIOPSY  2021    [facility]    NO PAST SURGERIES       Current Outpatient Medications   Medication Sig Dispense Refill    acetaminophen (TYLENOL) 325 MG tablet Take 325-650 mg by mouth every 6 hours as needed for mild pain       amLODIPine (NORVASC) 10 MG tablet Take 1 tablet (10 mg) by mouth daily 30 tablet 0    carvedilol (COREG) 12.5 MG tablet Take 1 tablet (12.5 mg) by mouth 2 times daily (with meals) 60 tablet 0    oxyCODONE (ROXICODONE) 5 MG tablet Take 1 tablet (5 mg) by mouth every 6 hours as needed for moderate to severe pain (Patient not taking: Reported on 1/4/2022) 8 tablet 0    sodium bicarbonate 650 MG tablet sodium bicarbonate 650 mg tablet      SUMAtriptan (IMITREX) 50 MG tablet Take 50 mg by mouth at onset of headache for migraine (Patient not taking: Reported on 1/4/2022)       OTC products: None, except as noted above  No Known Allergies   Social History     Tobacco Use    Smoking status: Never    Smokeless tobacco: Never   Substance Use Topics    Alcohol use: No     OR  Social History     Socioeconomic History    Marital status: Single     Spouse name: Not on file    Number of children: Not on file    Years of education: Not on file    Highest education level: Not on file   Occupational History    Not on file   Tobacco Use    Smoking status: Never    Smokeless tobacco: Never   Substance and Sexual Activity    Alcohol use: No    Drug use: No    Sexual activity: Not on file   Other  Topics Concern    Parent/sibling w/ CABG, MI or angioplasty before 65F 55M? Not Asked   Social History Narrative    Not on file     Social Determinants of Health     Financial Resource Strain: Not on file   Food Insecurity: Not on file   Transportation Needs: Not on file   Physical Activity: Not on file   Stress: Not on file   Social Connections: Not on file   Interpersonal Safety: Not on file   Housing Stability: Not on file     History   Drug Use No     Family History   Problem Relation Age of Onset    Impaired Fasting Glucose Mother     Kidney Disease No family hx of     Hypertension No family hx of     Cancer No family hx of        REVIEW OF SYSTEMS:        CONSTITUTIONAL: NEGATIVE for fever, chills, change in weight  INTEGUMENTARY/SKIN: NEGATIVE for worrisome rashes, moles or lesions  EYES: NEGATIVE for vision changes or irritation  ENT/MOUTH: NEGATIVE for ear, mouth and throat problems  RESP: NEGATIVE for significant cough or SOB  CV: NEGATIVE for chest pain, palpitations or peripheral edema  GI: NEGATIVE for nausea, abdominal pain, heartburn, or change in bowel habits  : NEGATIVE for frequency, dysuria, or hematuria  MUSCULOSKELETAL: NEGATIVE for significant arthralgias or myalgia  NEURO: NEGATIVE for weakness, dizziness or paresthesias  ENDOCRINE: NEGATIVE for temperature intolerance, skin/hair changes  HEME: NEGATIVE for bleeding problems  PSYCHIATRIC: NEGATIVE for changes in mood or affect    EXAM:                            GENERAL APPEARANCE: healthy, alert and no distress     EYES: EOMI, PERRL     HENT: ear canals and TM's normal and nose and mouth without ulcers or lesions     NECK: no adenopathy, no asymmetry, masses, or scars and thyroid normal to palpation     RESP: lungs clear to auscultation - no rales, rhonchi or wheezes     CV: regular rates and rhythm, normal S1 S2, no S3 or S4 and no murmur, click or rub     ABDOMEN:  soft, nontender, no HSM or masses and bowel sounds normal     MS:  extremities normal- no gross deformities noted, no evidence of inflammation in joints, FROM in all extremities.     SKIN: no suspicious lesions or rashes     NEURO: Normal strength and tone, sensory exam grossly normal, mentation intact and speech normal     PSYCH: mentation appears normal. and affect normal/bright     LYMPHATICS: No cervical adenopathy      DIAGNOSTICS:                EKG: appears normal, NSR, normal axis, normal intervals, no acute ST/T changes c/w ischemia, no LVH by voltage criteria, unchanged from previous tracings  Chest XRay  Labs Resulted Today:   Results for orders placed or performed in visit on 11/27/23   EKG 12-lead complete w/read - Clinics     Status: None (Preliminary result)   Result Value Ref Range    Systolic Blood Pressure  mmHg    Diastolic Blood Pressure  mmHg    Ventricular Rate 86 BPM    Atrial Rate 86 BPM    OK Interval 128 ms    QRS Duration 78 ms     ms    QTc 442 ms    P Axis 42 degrees    R AXIS -18 degrees    T Axis 55 degrees    Interpretation ECG       Sinus rhythm  Normal ECG  When compared with ECG of 11-JUL-2022 10:39,  No significant change was found     Results for orders placed or performed in visit on 11/27/23   CBC with platelets     Status: Normal   Result Value Ref Range    WBC Count 6.9 4.0 - 11.0 10e3/uL    RBC Count 3.87 3.80 - 5.20 10e6/uL    Hemoglobin 11.8 11.7 - 15.7 g/dL    Hematocrit 37.3 35.0 - 47.0 %    MCV 96 78 - 100 fL    MCH 30.5 26.5 - 33.0 pg    MCHC 31.6 31.5 - 36.5 g/dL    RDW 13.1 10.0 - 15.0 %    Platelet Count 213 150 - 450 10e3/uL   Comprehensive metabolic panel     Status: Abnormal   Result Value Ref Range    Sodium 138 135 - 145 mmol/L    Potassium 4.9 3.4 - 5.3 mmol/L    Carbon Dioxide (CO2) 23 22 - 29 mmol/L    Anion Gap 9 7 - 15 mmol/L    Urea Nitrogen 36.8 (H) 6.0 - 20.0 mg/dL    Creatinine 10.90 (H) 0.51 - 0.95 mg/dL    GFR Estimate 4 (L) >60 mL/min/1.73m2    Calcium 9.1 8.6 - 10.0 mg/dL    Chloride 106 98 - 107 mmol/L     Glucose 94 70 - 99 mg/dL    Alkaline Phosphatase 81 40 - 150 U/L    AST 14 0 - 45 U/L    ALT 6 0 - 50 U/L    Protein Total 8.0 6.4 - 8.3 g/dL    Albumin 4.0 3.5 - 5.2 g/dL    Bilirubin Total 0.5 <=1.2 mg/dL   Ferritin     Status: Abnormal   Result Value Ref Range    Ferritin 1,549 (H) 6 - 175 ng/mL   INR     Status: Normal   Result Value Ref Range    INR 1.09 0.85 - 1.15   Iron and iron binding capacity     Status: Abnormal   Result Value Ref Range    Iron 73 37 - 145 ug/dL    Iron Binding Capacity 175 (L) 240 - 430 ug/dL    Iron Sat Index 42 15 - 46 %   Parathyroid Hormone Intact     Status: Abnormal   Result Value Ref Range    Parathyroid Hormone Intact 214 (H) 15 - 65 pg/mL    Narrative    This result was obtained with the Roche Elecsys PTH STAT assay.   This reference range differs from PTH assays used in other [facility] laboratories.   Routine UA with microscopic     Status: Abnormal   Result Value Ref Range    Color Urine Straw Colorless, Straw, Light Yellow, Yellow    Appearance Urine Clear Clear    Glucose Urine 150 (A) Negative mg/dL    Bilirubin Urine Negative Negative    Ketones Urine Negative Negative mg/dL    Specific Gravity Urine 1.008 1.003 - 1.035    Blood Urine Small (A) Negative    pH Urine 8.0 (H) 5.0 - 7.0    Protein Albumin Urine 70 (A) Negative mg/dL    Urobilinogen Urine Normal Normal, 2.0 mg/dL    Nitrite Urine Negative Negative    Leukocyte Esterase Urine Negative Negative    Mucus Urine Present (A) None Seen /LPF    RBC Urine 9 (H) <=2 /HPF    WBC Urine 3 <=5 /HPF    Squamous Epithelials Urine 5 (H) <=1 /HPF   hCG Qualitative Pregnancy     Status: Normal   Result Value Ref Range    hCG Serum Qualitative Negative Negative   Adult Type and Screen     Status: None (Preliminary result)   Result Value Ref Range    SPECIMEN EXPIRATION DATE [id number]    ABO/Rh type and screen     Status: None (In process)    Narrative    The following orders were created for panel order ABO/Rh type  and screen.  Procedure                               Abnormality         Status                     ---------                               -----------         ------                     Adult Type and Screen[[id number]]                            Preliminary result           Please view results for these tests on the individual orders.   HLA Final Crossmatch Recipient     Status: None (In process)    Narrative    The following orders were created for panel order HLA Final Crossmatch Recipient.  Procedure                               Abnormality         Status                     ---------                               -----------         ------                     HLA Final Crossmatch Rec...[[id number]]                      In process                   Please view results for these tests on the individual orders.     Labs Drawn and in Process:   Unresulted Labs Ordered in the Past 30 Days of this Admission       Date and Time Order Name Status Description    11/27/2023  1:56 PM VITAMIN D DEFICIENCY SCREENING In process     11/27/2023  1:56 PM URINE CULTURE In process     11/27/2023  1:56 PM HEMOGLOBIN A1C In process     11/27/2023  1:56 PM EBV CAPSID ANTIBODY IGM In process     11/27/2023  1:56 PM EBV CAPSID ANTIBODY IGG In process     11/27/2023  1:56 PM CMV ANTIBODY IGM In process     11/27/2023  1:56 PM CMV ANTIBODY IGG In process           Recent Labs   Lab Test 11/27/23  1406 07/11/22  1308 02/28/22  1308   HGB 11.8 12.3  --     201  --    INR 1.09  --  0.93    137 139   POTASSIUM 4.9 5.0 4.2   CR 10.90* 10.38* 7.65*     UNOS CPRA   Date Value Ref Range Status   11/07/2023 0  Final     B POS    ASSESSMENT:                 Ms. Lopez is a 27 year old female who is appropriate for elective living donor kidney transplantation with the following pertinent issues:    1. Labs reviewed. Findings requiring additional evaluation before surgery: No.  Reviewed.   2. EKG (11/27/2023): appears normal, NSR  3. ECHO  (2/28/2022); Interpretation Summary  Global and regional left ventricular function is normal with an EF of 60-65%.  Global right ventricular function is normal. The right ventricle is normal  size.  No significant valvular abnormalities.  The estimated PA systolic pressure is 18 mmHg.  This study was compared with the study from 10/06/2021 (resting images from  stress study). No significant changes noted.  4. ABO= B POS  5. Paired Exchange case: Yes  6. Outstanding issues: Final ABO and cross match, COVID-19 test    PLAN:                 1. Consent: Done  2. Outstanding issues:  Final ABO and cross match, COVID-19 test    Signed Electronically by: Ania Virk NP

## 2023-11-28 LAB
ATRIAL RATE - MUSE: 86 BPM
BACTERIA UR CULT: NORMAL
CMV IGG SERPL IA-ACNC: 1.1 U/ML
CMV IGG SERPL IA-ACNC: ABNORMAL
DIASTOLIC BLOOD PRESSURE - MUSE: NORMAL MMHG
EBV VCA IGG SER IA-ACNC: >750 U/ML
EBV VCA IGG SER IA-ACNC: POSITIVE
HBV DNA SERPL NAA+PROBE-ACNC: NOT DETECTED IU/ML
INTERPRETATION ECG - MUSE: NORMAL
P AXIS - MUSE: 42 DEGREES
PR INTERVAL - MUSE: 128 MS
QRS DURATION - MUSE: 78 MS
QT - MUSE: 370 MS
QTC - MUSE: 442 MS
R AXIS - MUSE: -18 DEGREES
SYSTOLIC BLOOD PRESSURE - MUSE: NORMAL MMHG
T AXIS - MUSE: 55 DEGREES
VENTRICULAR RATE- MUSE: 86 BPM

## 2023-11-29 LAB
CMV IGM SERPL IA-ACNC: <8 AU/ML
CMV IGM SERPL IA-ACNC: NEGATIVE
EBV VCA IGM SER IA-ACNC: <10 U/ML
EBV VCA IGM SER IA-ACNC: NORMAL
HBV SURFACE AB SERPL IA-ACNC: 53.41 M[IU]/ML
HBV SURFACE AB SERPL IA-ACNC: REACTIVE M[IU]/ML
PROTOCOL CUTOFF: NORMAL
SA 1 CELL: NORMAL
SA 1 TEST METHOD: NORMAL
SA 2 CELL: NORMAL
SA 2 TEST METHOD: NORMAL
SA1 HI RISK ABY: NORMAL
SA1 MOD RISK ABY: NORMAL
SA2 HI RISK ABY: NORMAL
SA2 MOD RISK ABY: NORMAL
UNACCEPTABLE ANTIGENS: NORMAL
UNOS CPRA: 0
ZZZSA 1  COMMENTS: NORMAL
ZZZSA 2 COMMENTS: NORMAL

## 2023-11-30 LAB
CELL TYPE ALLO: NORMAL
CELL TYPE AUTO: NORMAL
CHANNELSHIFTALLOB1: -36
CHANNELSHIFTALLOB2: -29
CHANNELSHIFTALLOT1: -25
CHANNELSHIFTALLOT2: -24
CHANNELSHIFTAUTOB1: -30
CHANNELSHIFTAUTOB2: -44
CHANNELSHIFTAUTOT1: -30
CHANNELSHIFTAUTOT2: -35
CROSSMATCHDATEALLO: NORMAL
CROSSMATCHDATEAUTO: NORMAL
DONOR ALLO: NORMAL
DONOR AUTO: NORMAL
DONORCELLDATE ALLO: NORMAL
DONORCELLDATE AUTO: NORMAL
POS CUT OFF ALLO B: >69
POS CUT OFF ALLO T: >53
POS CUT OFF AUTO B: >69
POS CUT OFF AUTO T: >53
RESULT ALLO B1: NORMAL
RESULT ALLO B2: NORMAL
RESULT ALLO T1: NORMAL
RESULT ALLO T2: NORMAL
RESULT AUTO B1: NORMAL
RESULT AUTO B2: NORMAL
RESULT AUTO T1: NORMAL
RESULT AUTO T2: NORMAL
SERUM DATE ALLO B1: NORMAL
SERUM DATE ALLO B2: NORMAL
SERUM DATE ALLO T1: NORMAL
SERUM DATE ALLO T2: NORMAL
SERUM DATE AUTO B1: NORMAL
SERUM DATE AUTO B2: NORMAL
SERUM DATE AUTO T1: NORMAL
SERUM DATE AUTO T2: NORMAL
TESTMETHODALLO: NORMAL
TESTMETHODAUTO: NORMAL
TREATMENT ALLO B1: NORMAL
TREATMENT ALLO B2: NORMAL
TREATMENT ALLO T1: NORMAL
TREATMENT ALLO T2: NORMAL
TREATMENT AUTO B1: NORMAL
TREATMENT AUTO B2: NORMAL
TREATMENT AUTO T1: NORMAL
TREATMENT AUTO T2: NORMAL
ZZZCOMMENT ALLOB2: NORMAL

## 2023-12-04 ENCOUNTER — TELEPHONE (OUTPATIENT)
Dept: TRANSPLANT | Facility: CLINIC | Age: 28
End: 2023-12-04
Payer: MEDICARE

## 2023-12-04 DIAGNOSIS — B19.10 HEPATITIS B INFECTION WITHOUT DELTA AGENT WITHOUT HEPATIC COMA, UNSPECIFIED CHRONICITY: Primary | ICD-10-CM

## 2023-12-04 RX ORDER — ENTECAVIR 0.5 MG/1
0.5 TABLET, FILM COATED ORAL
Qty: 30 TABLET | Refills: 0 | Status: ON HOLD | OUTPATIENT
Start: 2023-12-04 | End: 2023-12-11

## 2023-12-04 NOTE — TELEPHONE ENCOUNTER
Note from hepatology Dr Leventhal and Dr Zhao: Plan for transplant:    Repeating HBV vaccine for recipient boosts intrinsic immunity and gives them best chance of clearing any limited virus they would be exposed to   - For cost effective care in a low risk of transmission scenario: not sure HBIg is warranted.      - Lets get a cheap antiviral on board for the recipient a few days prior to scheduled transplant.  This creates a toxic environment minute one of allograft, and should further supress/kill off any limited viral load present.      - Given the degree of induction you will use, would continue on antiviral for at least 6 months, then at 6 months send to hepatology and we will get full serologies and help further the plan   - Recommend starting renally dosed entecavir three days pre-transplant for the recipient and continuing this medication for at least 6 months; she should be seen in hepatology clinic at 6 months to discuss taking her off at this time - happy to see her.   - Post transplant she should get: liver enzymes at least monthly and HBsAg, HBsAb and HBV DNA every 3 months for the first year

## 2023-12-04 NOTE — TELEPHONE ENCOUNTER
Called pt regarding hepatology plan. Pt will start antiviral Tuesday or Wednesday. Dr Zhao sent to her pharmacy and she is to take it once weekly. Pt will see hepatology post transplant to create plan for antiviral after kidney tx. Discussed with transplant surgery and immunology and it is too close to transplant for pt to receive hep B booster. Pt verbalized understanding of information and has no further questions. Encouraged to reach out if questions arise.

## 2023-12-06 ENCOUNTER — LAB (OUTPATIENT)
Dept: LAB | Facility: CLINIC | Age: 28
End: 2023-12-06
Attending: SURGERY
Payer: MEDICARE

## 2023-12-06 ENCOUNTER — ANCILLARY PROCEDURE (OUTPATIENT)
Dept: GENERAL RADIOLOGY | Facility: CLINIC | Age: 28
End: 2023-12-06
Attending: SURGERY
Payer: MEDICARE

## 2023-12-06 DIAGNOSIS — R73.03 PRE-DIABETES: ICD-10-CM

## 2023-12-06 DIAGNOSIS — N18.6 ESRD (END STAGE RENAL DISEASE) (H): ICD-10-CM

## 2023-12-06 DIAGNOSIS — Z76.82 AWAITING ORGAN TRANSPLANT: ICD-10-CM

## 2023-12-06 DIAGNOSIS — Z32.00 ENCOUNTER FOR PREGNANCY TEST: ICD-10-CM

## 2023-12-06 LAB — SARS-COV-2 RNA RESP QL NAA+PROBE: NEGATIVE

## 2023-12-06 PROCEDURE — 87635 SARS-COV-2 COVID-19 AMP PRB: CPT | Performed by: SURGERY

## 2023-12-06 PROCEDURE — 99000 SPECIMEN HANDLING OFFICE-LAB: CPT | Performed by: PATHOLOGY

## 2023-12-06 PROCEDURE — 71046 X-RAY EXAM CHEST 2 VIEWS: CPT | Performed by: RADIOLOGY

## 2023-12-08 ENCOUNTER — ANESTHESIA (OUTPATIENT)
Dept: SURGERY | Facility: CLINIC | Age: 28
DRG: 652 | End: 2023-12-08
Payer: MEDICARE

## 2023-12-08 ENCOUNTER — ANESTHESIA EVENT (OUTPATIENT)
Dept: SURGERY | Facility: CLINIC | Age: 28
DRG: 652 | End: 2023-12-08
Payer: MEDICARE

## 2023-12-08 ENCOUNTER — APPOINTMENT (OUTPATIENT)
Dept: GENERAL RADIOLOGY | Facility: CLINIC | Age: 28
DRG: 652 | End: 2023-12-08
Attending: SURGERY
Payer: MEDICARE

## 2023-12-08 ENCOUNTER — HOSPITAL ENCOUNTER (INPATIENT)
Facility: CLINIC | Age: 28
LOS: 3 days | Discharge: HOME | DRG: 652 | End: 2023-12-11
Attending: SURGERY | Admitting: SURGERY
Payer: MEDICARE

## 2023-12-08 ENCOUNTER — APPOINTMENT (OUTPATIENT)
Dept: ULTRASOUND IMAGING | Facility: CLINIC | Age: 28
DRG: 652 | End: 2023-12-08
Attending: SURGERY
Payer: MEDICARE

## 2023-12-08 ENCOUNTER — TELEPHONE (OUTPATIENT)
Dept: TRANSPLANT | Facility: CLINIC | Age: 28
End: 2023-12-08
Payer: MEDICARE

## 2023-12-08 ENCOUNTER — DOCUMENTATION ONLY (OUTPATIENT)
Dept: TRANSPLANT | Facility: CLINIC | Age: 28
End: 2023-12-08

## 2023-12-08 DIAGNOSIS — B19.10 HEPATITIS B INFECTION WITHOUT DELTA AGENT WITHOUT HEPATIC COMA, UNSPECIFIED CHRONICITY: ICD-10-CM

## 2023-12-08 DIAGNOSIS — E83.39 HYPOPHOSPHATEMIA: ICD-10-CM

## 2023-12-08 DIAGNOSIS — Z94.0 KIDNEY TRANSPLANT RECIPIENT: Primary | ICD-10-CM

## 2023-12-08 LAB
ABO/RH(D): NORMAL
ANTIBODY SCREEN: NEGATIVE
BASE EXCESS BLDV CALC-SCNC: -2.6 MMOL/L (ref -8.1–1.9)
BASE EXCESS BLDV CALC-SCNC: -4.3 MMOL/L (ref -8.1–1.9)
BASE EXCESS BLDV CALC-SCNC: -4.3 MMOL/L (ref -8.1–1.9)
BASE EXCESS BLDV CALC-SCNC: -4.7 MMOL/L (ref -8.1–1.9)
BASE EXCESS BLDV CALC-SCNC: -4.8 MMOL/L (ref -8.1–1.9)
CA-I BLD-MCNC: 4.2 MG/DL (ref 4.4–5.2)
CA-I BLD-MCNC: 4.2 MG/DL (ref 4.4–5.2)
CA-I BLD-MCNC: 4.3 MG/DL (ref 4.4–5.2)
CA-I BLD-MCNC: 4.8 MG/DL (ref 4.4–5.2)
CA-I BLD-MCNC: 6.2 MG/DL (ref 4.4–5.2)
GLUCOSE BLD-MCNC: 122 MG/DL (ref 70–99)
GLUCOSE BLD-MCNC: 124 MG/DL (ref 70–99)
GLUCOSE BLD-MCNC: 150 MG/DL (ref 70–99)
GLUCOSE BLD-MCNC: 218 MG/DL (ref 70–99)
GLUCOSE BLD-MCNC: 256 MG/DL (ref 70–99)
GLUCOSE BLDC GLUCOMTR-MCNC: 114 MG/DL (ref 70–99)
GLUCOSE BLDC GLUCOMTR-MCNC: 83 MG/DL (ref 70–99)
HCG INTACT+B SERPL-ACNC: <1 MIU/ML
HCO3 BLDV-SCNC: 21 MMOL/L (ref 21–28)
HCO3 BLDV-SCNC: 22 MMOL/L (ref 21–28)
HCO3 BLDV-SCNC: 23 MMOL/L (ref 21–28)
HGB BLD-MCNC: 10.1 G/DL (ref 11.7–15.7)
HGB BLD-MCNC: 10.3 G/DL (ref 11.7–15.7)
HGB BLD-MCNC: 10.5 G/DL (ref 11.7–15.7)
HGB BLD-MCNC: 11.1 G/DL (ref 11.7–15.7)
HGB BLD-MCNC: 11.6 G/DL (ref 11.7–15.7)
LACTATE BLD-SCNC: 2.4 MMOL/L
LACTATE BLD-SCNC: 2.4 MMOL/L
LACTATE BLD-SCNC: 2.9 MMOL/L
LACTATE BLD-SCNC: 3.9 MMOL/L
LACTATE BLD-SCNC: 4 MMOL/L
O2/TOTAL GAS SETTING VFR VENT: 50 %
O2/TOTAL GAS SETTING VFR VENT: 60 %
PCO2 BLDV: 39 MM HG (ref 40–50)
PCO2 BLDV: 39 MM HG (ref 40–50)
PCO2 BLDV: 40 MM HG (ref 40–50)
PCO2 BLDV: 41 MM HG (ref 40–50)
PCO2 BLDV: 41 MM HG (ref 40–50)
PH BLDV: 7.32 [PH] (ref 7.32–7.43)
PH BLDV: 7.33 [PH] (ref 7.32–7.43)
PH BLDV: 7.33 [PH] (ref 7.32–7.43)
PH BLDV: 7.34 [PH] (ref 7.32–7.43)
PH BLDV: 7.36 [PH] (ref 7.32–7.43)
PO2 BLDV: 60 MM HG (ref 25–47)
PO2 BLDV: 61 MM HG (ref 25–47)
PO2 BLDV: 61 MM HG (ref 25–47)
PO2 BLDV: 62 MM HG (ref 25–47)
PO2 BLDV: 62 MM HG (ref 25–47)
POTASSIUM BLD-SCNC: 4.6 MMOL/L (ref 3.5–5)
POTASSIUM BLD-SCNC: 4.8 MMOL/L (ref 3.5–5)
POTASSIUM BLD-SCNC: 5.8 MMOL/L (ref 3.5–5)
POTASSIUM BLD-SCNC: 6.8 MMOL/L (ref 3.5–5)
POTASSIUM BLD-SCNC: 7.1 MMOL/L (ref 3.5–5)
SODIUM BLD-SCNC: 124 MMOL/L (ref 135–145)
SODIUM BLD-SCNC: 126 MMOL/L (ref 135–145)
SODIUM BLD-SCNC: 128 MMOL/L (ref 135–145)
SODIUM BLD-SCNC: 132 MMOL/L (ref 135–145)
SODIUM BLD-SCNC: 134 MMOL/L (ref 135–145)
SPECIMEN EXPIRATION DATE: NORMAL

## 2023-12-08 PROCEDURE — 84702 CHORIONIC GONADOTROPIN TEST: CPT | Performed by: NURSE PRACTITIONER

## 2023-12-08 PROCEDURE — 74018 RADEX ABDOMEN 1 VIEW: CPT | Mod: 26 | Performed by: RADIOLOGY

## 2023-12-08 PROCEDURE — 84295 ASSAY OF SERUM SODIUM: CPT

## 2023-12-08 PROCEDURE — 76776 US EXAM K TRANSPL W/DOPPLER: CPT | Mod: 26 | Performed by: RADIOLOGY

## 2023-12-08 PROCEDURE — 120N000011 HC R&B TRANSPLANT UMMC

## 2023-12-08 PROCEDURE — 250N000011 HC RX IP 250 OP 636: Performed by: SURGERY

## 2023-12-08 PROCEDURE — 258N000003 HC RX IP 258 OP 636: Performed by: NURSE PRACTITIONER

## 2023-12-08 PROCEDURE — 87340 HEPATITIS B SURFACE AG IA: CPT | Performed by: NURSE PRACTITIONER

## 2023-12-08 PROCEDURE — 250N000011 HC RX IP 250 OP 636: Performed by: NURSE ANESTHETIST, CERTIFIED REGISTERED

## 2023-12-08 PROCEDURE — 250N000009 HC RX 250: Performed by: NURSE ANESTHETIST, CERTIFIED REGISTERED

## 2023-12-08 PROCEDURE — 86900 BLOOD TYPING SEROLOGIC ABO: CPT | Performed by: NURSE PRACTITIONER

## 2023-12-08 PROCEDURE — 36415 COLL VENOUS BLD VENIPUNCTURE: CPT | Performed by: NURSE PRACTITIONER

## 2023-12-08 PROCEDURE — 250N000011 HC RX IP 250 OP 636: Mod: JZ | Performed by: NURSE ANESTHETIST, CERTIFIED REGISTERED

## 2023-12-08 PROCEDURE — 370N000017 HC ANESTHESIA TECHNICAL FEE, PER MIN: Performed by: SURGERY

## 2023-12-08 PROCEDURE — 999N000065 XR CHEST PORT 1 VIEW

## 2023-12-08 PROCEDURE — 83735 ASSAY OF MAGNESIUM: CPT

## 2023-12-08 PROCEDURE — 87516 HEPATITIS B DNA AMP PROBE: CPT | Performed by: NURSE PRACTITIONER

## 2023-12-08 PROCEDURE — 258N000003 HC RX IP 258 OP 636: Performed by: SURGERY

## 2023-12-08 PROCEDURE — 86803 HEPATITIS C AB TEST: CPT | Performed by: NURSE PRACTITIONER

## 2023-12-08 PROCEDURE — 360N000077 HC SURGERY LEVEL 4, PER MIN: Performed by: SURGERY

## 2023-12-08 PROCEDURE — 250N000011 HC RX IP 250 OP 636: Performed by: NURSE PRACTITIONER

## 2023-12-08 PROCEDURE — 84100 ASSAY OF PHOSPHORUS: CPT

## 2023-12-08 PROCEDURE — 250N000011 HC RX IP 250 OP 636

## 2023-12-08 PROCEDURE — 0TY10Z0 TRANSPLANTATION OF LEFT KIDNEY, ALLOGENEIC, OPEN APPROACH: ICD-10-PCS | Performed by: STUDENT IN AN ORGANIZED HEALTH CARE EDUCATION/TRAINING PROGRAM

## 2023-12-08 PROCEDURE — 272N000001 HC OR GENERAL SUPPLY STERILE: Performed by: SURGERY

## 2023-12-08 PROCEDURE — 258N000003 HC RX IP 258 OP 636: Performed by: NURSE ANESTHETIST, CERTIFIED REGISTERED

## 2023-12-08 PROCEDURE — 71045 X-RAY EXAM CHEST 1 VIEW: CPT | Mod: 26 | Performed by: RADIOLOGY

## 2023-12-08 PROCEDURE — 710N000010 HC RECOVERY PHASE 1, LEVEL 2, PER MIN: Performed by: SURGERY

## 2023-12-08 PROCEDURE — 87389 HIV-1 AG W/HIV-1&-2 AB AG IA: CPT | Performed by: NURSE PRACTITIONER

## 2023-12-08 PROCEDURE — 999N000063 XR ABDOMEN PORT 1 VIEW

## 2023-12-08 PROCEDURE — 82803 BLOOD GASES ANY COMBINATION: CPT

## 2023-12-08 PROCEDURE — 85027 COMPLETE CBC AUTOMATED: CPT

## 2023-12-08 PROCEDURE — 811N000001 HC ACQUISITION KIDNEY LIVING DONOR

## 2023-12-08 PROCEDURE — P9045 ALBUMIN (HUMAN), 5%, 250 ML: HCPCS | Mod: JZ | Performed by: NURSE ANESTHETIST, CERTIFIED REGISTERED

## 2023-12-08 PROCEDURE — 250N000025 HC SEVOFLURANE, PER MIN: Performed by: SURGERY

## 2023-12-08 PROCEDURE — 76776 US EXAM K TRANSPL W/DOPPLER: CPT

## 2023-12-08 PROCEDURE — 999N000141 HC STATISTIC PRE-PROCEDURE NURSING ASSESSMENT: Performed by: SURGERY

## 2023-12-08 PROCEDURE — 86704 HEP B CORE ANTIBODY TOTAL: CPT | Performed by: NURSE PRACTITIONER

## 2023-12-08 PROCEDURE — 258N000001 HC RX 258: Performed by: NURSE ANESTHETIST, CERTIFIED REGISTERED

## 2023-12-08 PROCEDURE — 86790 VIRUS ANTIBODY NOS: CPT | Performed by: NURSE PRACTITIONER

## 2023-12-08 PROCEDURE — 86706 HEP B SURFACE ANTIBODY: CPT | Performed by: NURSE PRACTITIONER

## 2023-12-08 RX ORDER — SODIUM CHLORIDE, SODIUM LACTATE, POTASSIUM CHLORIDE, CALCIUM CHLORIDE 600; 310; 30; 20 MG/100ML; MG/100ML; MG/100ML; MG/100ML
INJECTION, SOLUTION INTRAVENOUS CONTINUOUS PRN
Status: DISCONTINUED | OUTPATIENT
Start: 2023-12-08 | End: 2023-12-08

## 2023-12-08 RX ORDER — SODIUM CHLORIDE, SODIUM GLUCONATE, SODIUM ACETATE, POTASSIUM CHLORIDE AND MAGNESIUM CHLORIDE 526; 502; 368; 37; 30 MG/100ML; MG/100ML; MG/100ML; MG/100ML; MG/100ML
INJECTION, SOLUTION INTRAVENOUS CONTINUOUS PRN
Status: DISCONTINUED | OUTPATIENT
Start: 2023-12-08 | End: 2023-12-08

## 2023-12-08 RX ORDER — DEXTROSE, SODIUM CHLORIDE, SODIUM LACTATE, POTASSIUM CHLORIDE, AND CALCIUM CHLORIDE 5; .6; .31; .03; .02 G/100ML; G/100ML; G/100ML; G/100ML; G/100ML
INJECTION, SOLUTION INTRAVENOUS CONTINUOUS
Status: DISCONTINUED | OUTPATIENT
Start: 2023-12-09 | End: 2023-12-09

## 2023-12-08 RX ORDER — LEVOFLOXACIN 5 MG/ML
500 INJECTION, SOLUTION INTRAVENOUS ONCE
Status: COMPLETED | OUTPATIENT
Start: 2023-12-08 | End: 2023-12-08

## 2023-12-08 RX ORDER — SODIUM CHLORIDE 9 MG/ML
1000 INJECTION, SOLUTION INTRAVENOUS CONTINUOUS PRN
Status: DISCONTINUED | OUTPATIENT
Start: 2023-12-08 | End: 2023-12-09

## 2023-12-08 RX ORDER — LIDOCAINE 40 MG/G
CREAM TOPICAL
Status: DISCONTINUED | OUTPATIENT
Start: 2023-12-08 | End: 2023-12-08 | Stop reason: HOSPADM

## 2023-12-08 RX ORDER — FUROSEMIDE 10 MG/ML
INJECTION INTRAMUSCULAR; INTRAVENOUS PRN
Status: DISCONTINUED | OUTPATIENT
Start: 2023-12-08 | End: 2023-12-08

## 2023-12-08 RX ORDER — MANNITOL 20 G/100ML
INJECTION, SOLUTION INTRAVENOUS PRN
Status: DISCONTINUED | OUTPATIENT
Start: 2023-12-08 | End: 2023-12-08

## 2023-12-08 RX ORDER — ONDANSETRON 4 MG/1
4 TABLET, ORALLY DISINTEGRATING ORAL EVERY 30 MIN PRN
Status: DISCONTINUED | OUTPATIENT
Start: 2023-12-08 | End: 2023-12-09 | Stop reason: HOSPADM

## 2023-12-08 RX ORDER — SODIUM CHLORIDE 450 MG/100ML
INJECTION, SOLUTION INTRAVENOUS CONTINUOUS PRN
Status: DISCONTINUED | OUTPATIENT
Start: 2023-12-08 | End: 2023-12-09

## 2023-12-08 RX ORDER — ONDANSETRON 2 MG/ML
INJECTION INTRAMUSCULAR; INTRAVENOUS PRN
Status: DISCONTINUED | OUTPATIENT
Start: 2023-12-08 | End: 2023-12-08

## 2023-12-08 RX ORDER — LIDOCAINE HYDROCHLORIDE 20 MG/ML
INJECTION, SOLUTION INFILTRATION; PERINEURAL PRN
Status: DISCONTINUED | OUTPATIENT
Start: 2023-12-08 | End: 2023-12-08

## 2023-12-08 RX ORDER — CALCIUM CHLORIDE 100 MG/ML
INJECTION INTRAVENOUS; INTRAVENTRICULAR PRN
Status: DISCONTINUED | OUTPATIENT
Start: 2023-12-08 | End: 2023-12-08

## 2023-12-08 RX ORDER — ALBUMIN HUMAN 5 %
INTRAVENOUS SOLUTION INTRAVENOUS PRN
Status: DISCONTINUED | OUTPATIENT
Start: 2023-12-08 | End: 2023-12-08

## 2023-12-08 RX ORDER — HYDROMORPHONE HYDROCHLORIDE 1 MG/ML
0.4 INJECTION, SOLUTION INTRAMUSCULAR; INTRAVENOUS; SUBCUTANEOUS EVERY 5 MIN PRN
Status: DISCONTINUED | OUTPATIENT
Start: 2023-12-08 | End: 2023-12-09 | Stop reason: HOSPADM

## 2023-12-08 RX ORDER — PROPOFOL 10 MG/ML
INJECTION, EMULSION INTRAVENOUS PRN
Status: DISCONTINUED | OUTPATIENT
Start: 2023-12-08 | End: 2023-12-08

## 2023-12-08 RX ORDER — DIPHENHYDRAMINE HYDROCHLORIDE 50 MG/ML
INJECTION INTRAMUSCULAR; INTRAVENOUS PRN
Status: DISCONTINUED | OUTPATIENT
Start: 2023-12-08 | End: 2023-12-08

## 2023-12-08 RX ORDER — SODIUM CHLORIDE, SODIUM LACTATE, POTASSIUM CHLORIDE, CALCIUM CHLORIDE 600; 310; 30; 20 MG/100ML; MG/100ML; MG/100ML; MG/100ML
INJECTION, SOLUTION INTRAVENOUS CONTINUOUS
Status: DISCONTINUED | OUTPATIENT
Start: 2023-12-08 | End: 2023-12-09 | Stop reason: HOSPADM

## 2023-12-08 RX ORDER — ONDANSETRON 2 MG/ML
4 INJECTION INTRAMUSCULAR; INTRAVENOUS EVERY 30 MIN PRN
Status: DISCONTINUED | OUTPATIENT
Start: 2023-12-08 | End: 2023-12-09 | Stop reason: HOSPADM

## 2023-12-08 RX ORDER — DEXAMETHASONE SODIUM PHOSPHATE 4 MG/ML
INJECTION, SOLUTION INTRA-ARTICULAR; INTRALESIONAL; INTRAMUSCULAR; INTRAVENOUS; SOFT TISSUE PRN
Status: DISCONTINUED | OUTPATIENT
Start: 2023-12-08 | End: 2023-12-08

## 2023-12-08 RX ORDER — DEXTROSE MONOHYDRATE 25 G/50ML
INJECTION, SOLUTION INTRAVENOUS PRN
Status: DISCONTINUED | OUTPATIENT
Start: 2023-12-08 | End: 2023-12-08

## 2023-12-08 RX ORDER — HYDROMORPHONE HYDROCHLORIDE 1 MG/ML
0.2 INJECTION, SOLUTION INTRAMUSCULAR; INTRAVENOUS; SUBCUTANEOUS EVERY 5 MIN PRN
Status: DISCONTINUED | OUTPATIENT
Start: 2023-12-08 | End: 2023-12-09 | Stop reason: HOSPADM

## 2023-12-08 RX ORDER — FENTANYL CITRATE 50 UG/ML
50 INJECTION, SOLUTION INTRAMUSCULAR; INTRAVENOUS EVERY 5 MIN PRN
Status: DISCONTINUED | OUTPATIENT
Start: 2023-12-08 | End: 2023-12-09 | Stop reason: HOSPADM

## 2023-12-08 RX ORDER — FENTANYL CITRATE 50 UG/ML
25 INJECTION, SOLUTION INTRAMUSCULAR; INTRAVENOUS EVERY 5 MIN PRN
Status: DISCONTINUED | OUTPATIENT
Start: 2023-12-08 | End: 2023-12-09 | Stop reason: HOSPADM

## 2023-12-08 RX ORDER — FENTANYL CITRATE 50 UG/ML
INJECTION, SOLUTION INTRAMUSCULAR; INTRAVENOUS PRN
Status: DISCONTINUED | OUTPATIENT
Start: 2023-12-08 | End: 2023-12-08

## 2023-12-08 RX ADMIN — Medication 10 MG: at 20:36

## 2023-12-08 RX ADMIN — SODIUM CHLORIDE, POTASSIUM CHLORIDE, SODIUM LACTATE AND CALCIUM CHLORIDE: 600; 310; 30; 20 INJECTION, SOLUTION INTRAVENOUS at 17:50

## 2023-12-08 RX ADMIN — CALCIUM CHLORIDE INJECTION 400 MG: 100 INJECTION, SOLUTION INTRAVENOUS at 22:48

## 2023-12-08 RX ADMIN — MANNITOL 50 G: 20 INJECTION, SOLUTION INTRAVENOUS at 21:07

## 2023-12-08 RX ADMIN — PROPOFOL 150 MG: 10 INJECTION, EMULSION INTRAVENOUS at 17:59

## 2023-12-08 RX ADMIN — HYDROMORPHONE HYDROCHLORIDE 0.5 MG: 1 INJECTION, SOLUTION INTRAMUSCULAR; INTRAVENOUS; SUBCUTANEOUS at 20:54

## 2023-12-08 RX ADMIN — DEXTROSE 50 % IN WATER (D50W) INTRAVENOUS SYRINGE 12.5 G: at 21:55

## 2023-12-08 RX ADMIN — MYCOPHENOLATE MOFETIL 1000 MG: 500 INJECTION, POWDER, LYOPHILIZED, FOR SOLUTION INTRAVENOUS at 18:44

## 2023-12-08 RX ADMIN — ALBUMIN (HUMAN) 250 ML: 12.5 SOLUTION INTRAVENOUS at 22:24

## 2023-12-08 RX ADMIN — LEVOFLOXACIN 500 MG: 5 INJECTION, SOLUTION INTRAVENOUS at 18:00

## 2023-12-08 RX ADMIN — DEXTROSE 50 % IN WATER (D50W) INTRAVENOUS SYRINGE 25 G: at 21:31

## 2023-12-08 RX ADMIN — SODIUM CHLORIDE, SODIUM LACTATE, POTASSIUM CHLORIDE, CALCIUM CHLORIDE AND DEXTROSE MONOHYDRATE: 5; 600; 310; 30; 20 INJECTION, SOLUTION INTRAVENOUS at 23:30

## 2023-12-08 RX ADMIN — FENTANYL CITRATE 100 MCG: 50 INJECTION INTRAMUSCULAR; INTRAVENOUS at 19:35

## 2023-12-08 RX ADMIN — Medication 50 MG: at 18:00

## 2023-12-08 RX ADMIN — SODIUM CHLORIDE 20 MG: 9 INJECTION, SOLUTION INTRAVENOUS at 18:44

## 2023-12-08 RX ADMIN — Medication 10 MG: at 22:00

## 2023-12-08 RX ADMIN — Medication 10 MG: at 19:30

## 2023-12-08 RX ADMIN — FENTANYL CITRATE 50 MCG: 50 INJECTION INTRAMUSCULAR; INTRAVENOUS at 17:54

## 2023-12-08 RX ADMIN — FUROSEMIDE 40 MG: 10 INJECTION, SOLUTION INTRAVENOUS at 21:32

## 2023-12-08 RX ADMIN — SODIUM CHLORIDE, SODIUM GLUCONATE, SODIUM ACETATE, POTASSIUM CHLORIDE AND MAGNESIUM CHLORIDE: 526; 502; 368; 37; 30 INJECTION, SOLUTION INTRAVENOUS at 21:13

## 2023-12-08 RX ADMIN — MIDAZOLAM 2 MG: 1 INJECTION INTRAMUSCULAR; INTRAVENOUS at 17:46

## 2023-12-08 RX ADMIN — DEXAMETHASONE SODIUM PHOSPHATE 8 MG: 4 INJECTION, SOLUTION INTRA-ARTICULAR; INTRALESIONAL; INTRAMUSCULAR; INTRAVENOUS; SOFT TISSUE at 18:09

## 2023-12-08 RX ADMIN — SUGAMMADEX 200 MG: 100 INJECTION, SOLUTION INTRAVENOUS at 22:54

## 2023-12-08 RX ADMIN — LIDOCAINE HYDROCHLORIDE 100 MG: 20 INJECTION, SOLUTION INFILTRATION; PERINEURAL at 18:00

## 2023-12-08 RX ADMIN — CALCIUM CHLORIDE INJECTION 300 MG: 100 INJECTION, SOLUTION INTRAVENOUS at 22:22

## 2023-12-08 RX ADMIN — FUROSEMIDE 60 MG: 10 INJECTION, SOLUTION INTRAVENOUS at 21:07

## 2023-12-08 RX ADMIN — DIPHENHYDRAMINE HYDROCHLORIDE 50 MG: 50 INJECTION, SOLUTION INTRAMUSCULAR; INTRAVENOUS at 18:45

## 2023-12-08 RX ADMIN — FENTANYL CITRATE 50 MCG: 50 INJECTION INTRAMUSCULAR; INTRAVENOUS at 18:13

## 2023-12-08 RX ADMIN — CALCIUM CHLORIDE INJECTION 300 MG: 100 INJECTION, SOLUTION INTRAVENOUS at 21:56

## 2023-12-08 RX ADMIN — SODIUM CHLORIDE 500 MG: 9 INJECTION, SOLUTION INTRAVENOUS at 18:00

## 2023-12-08 RX ADMIN — HYDROMORPHONE HYDROCHLORIDE 0.5 MG: 1 INJECTION, SOLUTION INTRAMUSCULAR; INTRAVENOUS; SUBCUTANEOUS at 22:57

## 2023-12-08 RX ADMIN — FENTANYL CITRATE 50 MCG: 50 INJECTION INTRAMUSCULAR; INTRAVENOUS at 20:07

## 2023-12-08 RX ADMIN — ONDANSETRON 4 MG: 2 INJECTION INTRAMUSCULAR; INTRAVENOUS at 22:22

## 2023-12-08 ASSESSMENT — ACTIVITIES OF DAILY LIVING (ADL)
ADLS_ACUITY_SCORE: 20
ADLS_ACUITY_SCORE: 20
ADLS_ACUITY_SCORE: 18
ADLS_ACUITY_SCORE: 20
ADLS_ACUITY_SCORE: 20

## 2023-12-08 NOTE — TELEPHONE ENCOUNTER
Waitlist Wrap Up Call    LDKT scheduled for 12/8/23  Reminded pt that check in time is at 1530    Final XM reviewed Yes    COVID STATUS: Negative Date Reviewed: 12/6/23     CHEST XRAY REVIEWED: Yes    PREOP APPTS REVIEWED: Yes    Questions/concerns addressed with pt. Explained that pt will have post coordinator assigned to them post transplant. Reminded pt that if they would like to send letter to donor to contact [facility] when ready. Pt verbalized understanding of information and has no further questions. Encouraged to reach out if questions arise.

## 2023-12-08 NOTE — ANESTHESIA PREPROCEDURE EVALUATION
Anesthesia Pre-Procedure Evaluation    Patient: Nuzhat Lopez   MRN: [id number] : 1995        Procedure : Procedure(s):  Kidney Transplant Non-Directed Recipient          Past Medical History:   Diagnosis Date     Anemia in chronic kidney disease      CKD (chronic kidney disease) stage 5, GFR less than 15 ml/min (H)      HTN (hypertension)      IgA nephropathy      Metabolic acidosis       Past Surgical History:   Procedure Laterality Date     BIOPSY      [facility]     NO PAST SURGERIES        Allergies   Allergen Reactions     Cephalexin Rash     Heparin Flush Rash      Social History     Tobacco Use     Smoking status: Never     Smokeless tobacco: Never   Substance Use Topics     Alcohol use: No      Wt Readings from Last 1 Encounters:   23 75.3 kg (166 lb 0.1 oz)        Anesthesia Evaluation   Pt has not had prior anesthetic         ROS/MED HX  ENT/Pulmonary:       Neurologic:       Cardiovascular:     (+) hypertension-----    METS/Exercise Tolerance:     Hematologic:       Musculoskeletal:       GI/Hepatic:       Renal/Genitourinary:     (+) renal disease,     Endo:       Psychiatric/Substance Use:       Infectious Disease:       Malignancy:       Other:            Physical Exam    Airway             Respiratory Devices and Support         Dental       (+) Minor Abnormalities - some fillings, tiny chips      Cardiovascular             Pulmonary                   OUTSIDE LABS:  CBC:   Lab Results   Component Value Date    WBC 6.9 2023    WBC 8.3 2022    HGB 11.8 2023    HGB 12.3 2022    HCT 37.3 2023    HCT 39.3 2022     2023     2022     BMP:   Lab Results   Component Value Date     2023     2022    POTASSIUM 4.9 2023    POTASSIUM 5.0 2022    CHLORIDE 106 2023    CHLORIDE 102 2022    CO2 23 2023    CO2 26 2022    BUN 36.8 (H) 2023    BUN 68 (H) 2022    CR  "10.90 (H) 11/27/2023    CR 10.38 (HH) 07/11/2022    GLC 83 12/08/2023    GLC 94 11/27/2023     COAGS:   Lab Results   Component Value Date    PTT 32 02/28/2022    INR 1.09 11/27/2023     POC:   Lab Results   Component Value Date    HCGS Negative 11/27/2023     HEPATIC:   Lab Results   Component Value Date    ALBUMIN 4.0 11/27/2023    PROTTOTAL 8.0 11/27/2023    ALT 6 11/27/2023    AST 14 11/27/2023    ALKPHOS 81 11/27/2023    BILITOTAL 0.5 11/27/2023     OTHER:   Lab Results   Component Value Date    A1C 4.6 11/27/2023    DEEPTI 9.1 11/27/2023    PHOS 5.6 (H) 10/07/2021       Anesthesia Plan    ASA Status:  3, emergent       Anesthesia Type: General.     - Airway: ETT   Induction: Intravenous.   Maintenance: Balanced.   Techniques and Equipment:     - Lines/Monitors: Central Line     Consents    Anesthesia Plan(s) and associated risks, benefits, and realistic alternatives discussed. Questions answered and patient/representative(s) expressed understanding.     - Discussed: Risks, Benefits and Alternatives for BOTH SEDATION and the PROCEDURE were discussed     - Discussed with:  Patient    Use of blood products discussed: Yes.     Postoperative Care    Pain management: IV analgesics.   PONV prophylaxis: Ondansetron (or other 5HT-3)     Comments:               Michelle Lemons MD    I have reviewed the pertinent notes and labs in the chart from the past 30 days and (re)examined the patient.  Any updates or changes from those notes are reflected in this note.              # Obesity: Estimated body mass index is 31.37 kg/m  as calculated from the following:    Height as of this encounter: 1.549 m (5' 1\").    Weight as of this encounter: 75.3 kg (166 lb 0.1 oz).    "

## 2023-12-08 NOTE — PROGRESS NOTES
Transplant Surgery  Inpatient Daily Progress Note  12/08/2023    Assessment & Plan: 26yo with history of ESRD on hemodialysis secondary to IgA nephropathy, HTN, and migraine. S/p living donor kidney transplant on 12/8/23. Donor is hepatitis B core antibody positive.    Graft function: POD #1  Kidney: Cr decreased to 5.13. UO 5.6L. Decreased 20 ml/hr past hour. Renal US stat: patent doppler.  Fluid bolus 1L x 2 ordered.     Immunosuppressed status secondary to medications:   -cPRA 0  Basiliximab: POD 0 and 4  Steroids: Solumedrol 500 mg, 250 mg, and 100 mg, followed by prednisone taper  MMF: 750mg BID  Tacro: Goal level 8-10    Hematology:   Anemia of chronic disease: Hgb stable    Cardiorespiratory:   HTN: PTA carvedilol. Hold restart of medication.    GI/Nutrition:   Diet: Regular  Bowel regimen: Senna, PEG    Endocrine:   Steroid hyperglycemia: sliding scale insulin ACHS    Fluid/Electrolytes: MIVF: Straight rate, LR @ 75 ml/hr.    : Lockhart to remain due to new surgical anastomosis x 3 days.    Infectious disease:  Donor HBcAb positive: Plan discussed by Hepatology prior to admission. Entecavir started 12/4/23.  - Continue antiviral for at least 6 months, then at 6 months send to Hepatology for further plan. Entecavir ordered  - Post-transplant: Liver enzymes at least monthly and HBsAg, HBsAb and HBV DNA every 3 months for the first year.    Prophylaxis: DVT, fall, GI, viral (Valcyte), pneumocystis (Bactrim)    Disposition: 7A    MARLENE/Fellow/Resident Provider: Araceli De La Torre PA-C    Faculty: Osbaldo Hurtado MD   _________________________________________________________________    Interval History: History from patient and/or EMR  Overnight events: Pain controlled. Mild nausea. Limited intake due to nausea. No flatus/BM. Family at bedside.    ROS:   A 10-point review of systems was negative except as noted above.    Meds:      Physical Exam:     Admit      Current vitals:   There were no vitals taken for this  visit.    Vital sign ranges:         General Appearance: in no apparent distress.   Skin: Warm, perfused  Heart: RRR  Lungs: BCTA  Abdomen: The abdomen is soft, non distended, appropriately ttp around surgical incision. Incision covered with surgical dressing, dry shadowing  : martinez is present.  Urine is pink.  Extremities: edema: trace  Neurologic: awake, alert, and oriented. Tremor absent.     Data:   CMPNo lab results found in last 7 days.  CBCNo lab results found in last 7 days.

## 2023-12-09 ENCOUNTER — APPOINTMENT (OUTPATIENT)
Dept: ULTRASOUND IMAGING | Facility: CLINIC | Age: 28
DRG: 652 | End: 2023-12-09
Attending: SURGERY
Payer: MEDICARE

## 2023-12-09 LAB
ANION GAP SERPL CALCULATED.3IONS-SCNC: 14 MMOL/L (ref 7–15)
ANION GAP SERPL CALCULATED.3IONS-SCNC: 15 MMOL/L (ref 7–15)
BASOPHILS # BLD AUTO: 0 10E3/UL (ref 0–0.2)
BASOPHILS NFR BLD AUTO: 0 %
BUN SERPL-MCNC: 27.4 MG/DL (ref 6–20)
BUN SERPL-MCNC: 32.4 MG/DL (ref 6–20)
CALCIUM SERPL-MCNC: 10.6 MG/DL (ref 8.6–10)
CALCIUM SERPL-MCNC: 9.4 MG/DL (ref 8.6–10)
CHLORIDE SERPL-SCNC: 96 MMOL/L (ref 98–107)
CHLORIDE SERPL-SCNC: 96 MMOL/L (ref 98–107)
CMV DNA SPEC NAA+PROBE-ACNC: NOT DETECTED IU/ML
CREAT SERPL-MCNC: 5.13 MG/DL (ref 0.51–0.95)
CREAT SERPL-MCNC: 7.41 MG/DL (ref 0.51–0.95)
DEPRECATED HCO3 PLAS-SCNC: 23 MMOL/L (ref 22–29)
DEPRECATED HCO3 PLAS-SCNC: 23 MMOL/L (ref 22–29)
EGFRCR SERPLBLD CKD-EPI 2021: 11 ML/MIN/1.73M2
EGFRCR SERPLBLD CKD-EPI 2021: 7 ML/MIN/1.73M2
EOSINOPHIL # BLD AUTO: 0 10E3/UL (ref 0–0.7)
EOSINOPHIL NFR BLD AUTO: 0 %
ERYTHROCYTE [DISTWIDTH] IN BLOOD BY AUTOMATED COUNT: 12.6 % (ref 10–15)
ERYTHROCYTE [DISTWIDTH] IN BLOOD BY AUTOMATED COUNT: 12.8 % (ref 10–15)
GLUCOSE BLDC GLUCOMTR-MCNC: 127 MG/DL (ref 70–99)
GLUCOSE BLDC GLUCOMTR-MCNC: 136 MG/DL (ref 70–99)
GLUCOSE BLDC GLUCOMTR-MCNC: 169 MG/DL (ref 70–99)
GLUCOSE BLDC GLUCOMTR-MCNC: 170 MG/DL (ref 70–99)
GLUCOSE SERPL-MCNC: 119 MG/DL (ref 70–99)
GLUCOSE SERPL-MCNC: 157 MG/DL (ref 70–99)
HBV CORE AB SERPL QL IA: NONREACTIVE
HBV SURFACE AB SERPL IA-ACNC: 43.23 M[IU]/ML
HBV SURFACE AB SERPL IA-ACNC: REACTIVE M[IU]/ML
HBV SURFACE AG SERPL QL IA: NONREACTIVE
HCT VFR BLD AUTO: 35.3 % (ref 35–47)
HCT VFR BLD AUTO: 35.6 % (ref 35–47)
HCV AB SERPL QL IA: NONREACTIVE
HGB BLD-MCNC: 10.1 G/DL (ref 11.7–15.7)
HGB BLD-MCNC: 10.7 G/DL (ref 11.7–15.7)
HGB BLD-MCNC: 10.7 G/DL (ref 11.7–15.7)
HGB BLD-MCNC: 10.9 G/DL (ref 11.7–15.7)
HGB BLD-MCNC: 11.4 G/DL (ref 11.7–15.7)
HGB BLD-MCNC: 11.8 G/DL (ref 11.7–15.7)
HIV 1+2 AB+HIV1 P24 AG SERPL QL IA: NONREACTIVE
IMM GRANULOCYTES # BLD: 0.1 10E3/UL
IMM GRANULOCYTES NFR BLD: 1 %
LACTATE SERPL-SCNC: 1.7 MMOL/L (ref 0.7–2)
LYMPHOCYTES # BLD AUTO: 1 10E3/UL (ref 0.8–5.3)
LYMPHOCYTES NFR BLD AUTO: 8 %
MAGNESIUM SERPL-MCNC: 1.9 MG/DL (ref 1.7–2.3)
MAGNESIUM SERPL-MCNC: 2.2 MG/DL (ref 1.7–2.3)
MCH RBC QN AUTO: 30.9 PG (ref 26.5–33)
MCH RBC QN AUTO: 31.2 PG (ref 26.5–33)
MCHC RBC AUTO-ENTMCNC: 32 G/DL (ref 31.5–36.5)
MCHC RBC AUTO-ENTMCNC: 33.4 G/DL (ref 31.5–36.5)
MCV RBC AUTO: 93 FL (ref 78–100)
MCV RBC AUTO: 97 FL (ref 78–100)
MONOCYTES # BLD AUTO: 0.4 10E3/UL (ref 0–1.3)
MONOCYTES NFR BLD AUTO: 3 %
NEUTROPHILS # BLD AUTO: 10.8 10E3/UL (ref 1.6–8.3)
NEUTROPHILS NFR BLD AUTO: 88 %
NRBC # BLD AUTO: 0 10E3/UL
NRBC BLD AUTO-RTO: 0 /100
PHOSPHATE SERPL-MCNC: 3.6 MG/DL (ref 2.5–4.5)
PHOSPHATE SERPL-MCNC: 3.7 MG/DL (ref 2.5–4.5)
PLATELET # BLD AUTO: 174 10E3/UL (ref 150–450)
PLATELET # BLD AUTO: 202 10E3/UL (ref 150–450)
POTASSIUM SERPL-SCNC: 3.9 MMOL/L (ref 3.4–5.3)
POTASSIUM SERPL-SCNC: 4 MMOL/L (ref 3.4–5.3)
POTASSIUM SERPL-SCNC: 4 MMOL/L (ref 3.4–5.3)
POTASSIUM SERPL-SCNC: 4.1 MMOL/L (ref 3.4–5.3)
POTASSIUM SERPL-SCNC: 4.2 MMOL/L (ref 3.4–5.3)
POTASSIUM SERPL-SCNC: 4.2 MMOL/L (ref 3.4–5.3)
POTASSIUM SERPL-SCNC: 4.3 MMOL/L (ref 3.4–5.3)
RBC # BLD AUTO: 3.69 10E6/UL (ref 3.8–5.2)
RBC # BLD AUTO: 3.78 10E6/UL (ref 3.8–5.2)
SODIUM SERPL-SCNC: 133 MMOL/L (ref 135–145)
SODIUM SERPL-SCNC: 134 MMOL/L (ref 135–145)
WBC # BLD AUTO: 10.8 10E3/UL (ref 4–11)
WBC # BLD AUTO: 12.3 10E3/UL (ref 4–11)

## 2023-12-09 PROCEDURE — 85018 HEMOGLOBIN: CPT

## 2023-12-09 PROCEDURE — 36415 COLL VENOUS BLD VENIPUNCTURE: CPT | Performed by: SURGERY

## 2023-12-09 PROCEDURE — 258N000003 HC RX IP 258 OP 636: Performed by: SURGERY

## 2023-12-09 PROCEDURE — 83605 ASSAY OF LACTIC ACID: CPT | Performed by: SURGERY

## 2023-12-09 PROCEDURE — 83735 ASSAY OF MAGNESIUM: CPT

## 2023-12-09 PROCEDURE — 258N000003 HC RX IP 258 OP 636: Performed by: PHYSICIAN ASSISTANT

## 2023-12-09 PROCEDURE — 76776 US EXAM K TRANSPL W/DOPPLER: CPT

## 2023-12-09 PROCEDURE — 258N000002 HC RX IP 258 OP 250

## 2023-12-09 PROCEDURE — 250N000013 HC RX MED GY IP 250 OP 250 PS 637

## 2023-12-09 PROCEDURE — 85025 COMPLETE CBC W/AUTO DIFF WBC: CPT

## 2023-12-09 PROCEDURE — 250N000011 HC RX IP 250 OP 636: Mod: JZ | Performed by: PHYSICIAN ASSISTANT

## 2023-12-09 PROCEDURE — 84132 ASSAY OF SERUM POTASSIUM: CPT

## 2023-12-09 PROCEDURE — 250N000012 HC RX MED GY IP 250 OP 636 PS 637

## 2023-12-09 PROCEDURE — 250N000013 HC RX MED GY IP 250 OP 250 PS 637: Performed by: PHYSICIAN ASSISTANT

## 2023-12-09 PROCEDURE — 120N000011 HC R&B TRANSPLANT UMMC

## 2023-12-09 PROCEDURE — 84100 ASSAY OF PHOSPHORUS: CPT

## 2023-12-09 PROCEDURE — 250N000012 HC RX MED GY IP 250 OP 636 PS 637: Performed by: SURGERY

## 2023-12-09 PROCEDURE — 80048 BASIC METABOLIC PNL TOTAL CA: CPT

## 2023-12-09 PROCEDURE — 76776 US EXAM K TRANSPL W/DOPPLER: CPT | Mod: 26 | Performed by: RADIOLOGY

## 2023-12-09 PROCEDURE — 250N000011 HC RX IP 250 OP 636: Mod: JZ

## 2023-12-09 PROCEDURE — 36415 COLL VENOUS BLD VENIPUNCTURE: CPT

## 2023-12-09 PROCEDURE — 99223 1ST HOSP IP/OBS HIGH 75: CPT | Mod: 24

## 2023-12-09 PROCEDURE — 258N000003 HC RX IP 258 OP 636

## 2023-12-09 RX ORDER — PREDNISONE 20 MG/1
40 TABLET ORAL DAILY
Qty: 4 TABLET | Refills: 0 | Status: DISCONTINUED | OUTPATIENT
Start: 2023-12-13 | End: 2023-12-11 | Stop reason: HOSPADM

## 2023-12-09 RX ORDER — HYDROMORPHONE HCL IN WATER/PF 6 MG/30 ML
0.2 PATIENT CONTROLLED ANALGESIA SYRINGE INTRAVENOUS
Status: DISCONTINUED | OUTPATIENT
Start: 2023-12-09 | End: 2023-12-09

## 2023-12-09 RX ORDER — NICOTINE POLACRILEX 4 MG
15-30 LOZENGE BUCCAL
Status: DISCONTINUED | OUTPATIENT
Start: 2023-12-09 | End: 2023-12-11

## 2023-12-09 RX ORDER — ONDANSETRON 2 MG/ML
4 INJECTION INTRAMUSCULAR; INTRAVENOUS EVERY 6 HOURS PRN
Status: DISCONTINUED | OUTPATIENT
Start: 2023-12-09 | End: 2023-12-11 | Stop reason: HOSPADM

## 2023-12-09 RX ORDER — CALCITRIOL 0.25 UG/1
0.25 CAPSULE, LIQUID FILLED ORAL
Status: ON HOLD | COMMUNITY
End: 2023-12-11

## 2023-12-09 RX ORDER — DEXTROSE MONOHYDRATE 25 G/50ML
25-50 INJECTION, SOLUTION INTRAVENOUS
Status: DISCONTINUED | OUTPATIENT
Start: 2023-12-09 | End: 2023-12-11

## 2023-12-09 RX ORDER — SULFAMETHOXAZOLE AND TRIMETHOPRIM 400; 80 MG/1; MG/1
1 TABLET ORAL DAILY
Status: DISCONTINUED | OUTPATIENT
Start: 2023-12-09 | End: 2023-12-11 | Stop reason: HOSPADM

## 2023-12-09 RX ORDER — OXYCODONE HYDROCHLORIDE 5 MG/1
5 TABLET ORAL EVERY 4 HOURS PRN
Status: DISCONTINUED | OUTPATIENT
Start: 2023-12-09 | End: 2023-12-11 | Stop reason: HOSPADM

## 2023-12-09 RX ORDER — NALOXONE HYDROCHLORIDE 0.4 MG/ML
0.4 INJECTION, SOLUTION INTRAMUSCULAR; INTRAVENOUS; SUBCUTANEOUS
Status: DISCONTINUED | OUTPATIENT
Start: 2023-12-09 | End: 2023-12-11 | Stop reason: HOSPADM

## 2023-12-09 RX ORDER — PREDNISONE 20 MG/1
20 TABLET ORAL DAILY
Qty: 7 TABLET | Refills: 0 | Status: DISCONTINUED | OUTPATIENT
Start: 2023-12-15 | End: 2023-12-11 | Stop reason: HOSPADM

## 2023-12-09 RX ORDER — ONDANSETRON 4 MG/1
4 TABLET, ORALLY DISINTEGRATING ORAL EVERY 6 HOURS PRN
Status: DISCONTINUED | OUTPATIENT
Start: 2023-12-09 | End: 2023-12-11 | Stop reason: HOSPADM

## 2023-12-09 RX ORDER — PREDNISONE 5 MG/1
5 TABLET ORAL DAILY
Qty: 7 TABLET | Refills: 0 | Status: DISCONTINUED | OUTPATIENT
Start: 2024-01-05 | End: 2023-12-11 | Stop reason: HOSPADM

## 2023-12-09 RX ORDER — AMOXICILLIN 250 MG
1 CAPSULE ORAL 2 TIMES DAILY
Status: DISCONTINUED | OUTPATIENT
Start: 2023-12-09 | End: 2023-12-11 | Stop reason: HOSPADM

## 2023-12-09 RX ORDER — ATORVASTATIN CALCIUM 10 MG/1
10 TABLET, FILM COATED ORAL DAILY
Status: DISCONTINUED | OUTPATIENT
Start: 2023-12-10 | End: 2023-12-11 | Stop reason: HOSPADM

## 2023-12-09 RX ORDER — METHYLPREDNISOLONE SODIUM SUCCINATE 125 MG/2ML
100 INJECTION, POWDER, LYOPHILIZED, FOR SOLUTION INTRAMUSCULAR; INTRAVENOUS ONCE
Status: COMPLETED | OUTPATIENT
Start: 2023-12-10 | End: 2023-12-10

## 2023-12-09 RX ORDER — NALOXONE HYDROCHLORIDE 0.4 MG/ML
0.2 INJECTION, SOLUTION INTRAMUSCULAR; INTRAVENOUS; SUBCUTANEOUS
Status: DISCONTINUED | OUTPATIENT
Start: 2023-12-09 | End: 2023-12-11 | Stop reason: HOSPADM

## 2023-12-09 RX ORDER — ACETAMINOPHEN 325 MG/1
650 TABLET ORAL EVERY 4 HOURS PRN
Status: DISCONTINUED | OUTPATIENT
Start: 2023-12-12 | End: 2023-12-11 | Stop reason: HOSPADM

## 2023-12-09 RX ORDER — PREDNISONE 20 MG/1
60 TABLET ORAL DAILY
Qty: 6 TABLET | Refills: 0 | Status: DISCONTINUED | OUTPATIENT
Start: 2023-12-11 | End: 2023-12-11 | Stop reason: HOSPADM

## 2023-12-09 RX ORDER — VALGANCICLOVIR 450 MG/1
450 TABLET, FILM COATED ORAL
Status: DISCONTINUED | OUTPATIENT
Start: 2023-12-11 | End: 2023-12-10

## 2023-12-09 RX ORDER — MAGNESIUM OXIDE 400 MG/1
400 TABLET ORAL
Status: DISCONTINUED | OUTPATIENT
Start: 2023-12-11 | End: 2023-12-11 | Stop reason: HOSPADM

## 2023-12-09 RX ORDER — MYCOPHENOLATE MOFETIL 250 MG/1
750 CAPSULE ORAL
Status: DISCONTINUED | OUTPATIENT
Start: 2023-12-09 | End: 2023-12-11 | Stop reason: HOSPADM

## 2023-12-09 RX ORDER — ENTECAVIR 0.5 MG/1
0.5 TABLET, FILM COATED ORAL
Status: DISCONTINUED | OUTPATIENT
Start: 2023-12-09 | End: 2023-12-10

## 2023-12-09 RX ORDER — PREDNISONE 10 MG/1
10 TABLET ORAL DAILY
Qty: 7 TABLET | Refills: 0 | Status: DISCONTINUED | OUTPATIENT
Start: 2023-12-29 | End: 2023-12-11 | Stop reason: HOSPADM

## 2023-12-09 RX ORDER — SEVELAMER HYDROCHLORIDE 800 MG/1
1600 TABLET, FILM COATED ORAL 2 TIMES DAILY WITH MEALS
Status: ON HOLD | COMMUNITY
End: 2023-12-11

## 2023-12-09 RX ORDER — OXYCODONE HYDROCHLORIDE 10 MG/1
10 TABLET ORAL EVERY 4 HOURS PRN
Status: DISCONTINUED | OUTPATIENT
Start: 2023-12-09 | End: 2023-12-11

## 2023-12-09 RX ORDER — ACETAMINOPHEN 325 MG/1
975 TABLET ORAL EVERY 8 HOURS
Status: DISCONTINUED | OUTPATIENT
Start: 2023-12-09 | End: 2023-12-11 | Stop reason: HOSPADM

## 2023-12-09 RX ORDER — BISACODYL 10 MG
10 SUPPOSITORY, RECTAL RECTAL DAILY PRN
Status: DISCONTINUED | OUTPATIENT
Start: 2023-12-09 | End: 2023-12-11 | Stop reason: HOSPADM

## 2023-12-09 RX ORDER — PROCHLORPERAZINE MALEATE 5 MG
10 TABLET ORAL EVERY 6 HOURS PRN
Status: DISCONTINUED | OUTPATIENT
Start: 2023-12-09 | End: 2023-12-11 | Stop reason: HOSPADM

## 2023-12-09 RX ORDER — LANOLIN ALCOHOL/MO/W.PET/CERES
3 CREAM (GRAM) TOPICAL
Status: DISCONTINUED | OUTPATIENT
Start: 2023-12-09 | End: 2023-12-11 | Stop reason: HOSPADM

## 2023-12-09 RX ORDER — POLYETHYLENE GLYCOL 3350 17 G/17G
17 POWDER, FOR SOLUTION ORAL DAILY
Status: DISCONTINUED | OUTPATIENT
Start: 2023-12-10 | End: 2023-12-11 | Stop reason: HOSPADM

## 2023-12-09 RX ORDER — SODIUM CHLORIDE, SODIUM LACTATE, POTASSIUM CHLORIDE, CALCIUM CHLORIDE 600; 310; 30; 20 MG/100ML; MG/100ML; MG/100ML; MG/100ML
INJECTION, SOLUTION INTRAVENOUS CONTINUOUS
Status: DISCONTINUED | OUTPATIENT
Start: 2023-12-09 | End: 2023-12-10

## 2023-12-09 RX ORDER — HYDROMORPHONE HCL IN WATER/PF 6 MG/30 ML
0.4 PATIENT CONTROLLED ANALGESIA SYRINGE INTRAVENOUS
Status: DISCONTINUED | OUTPATIENT
Start: 2023-12-09 | End: 2023-12-09

## 2023-12-09 RX ADMIN — OXYCODONE HYDROCHLORIDE 5 MG: 5 TABLET ORAL at 17:40

## 2023-12-09 RX ADMIN — SODIUM CHLORIDE 1000 ML: 9 INJECTION, SOLUTION INTRAVENOUS at 11:58

## 2023-12-09 RX ADMIN — SODIUM CHLORIDE 1000 ML: 9 INJECTION, SOLUTION INTRAVENOUS at 04:42

## 2023-12-09 RX ADMIN — SODIUM CHLORIDE 1000 ML: 4.5 INJECTION, SOLUTION INTRAVENOUS at 00:00

## 2023-12-09 RX ADMIN — ACETAMINOPHEN 975 MG: 325 TABLET, FILM COATED ORAL at 01:57

## 2023-12-09 RX ADMIN — SENNOSIDES AND DOCUSATE SODIUM 1 TABLET: 8.6; 5 TABLET ORAL at 17:40

## 2023-12-09 RX ADMIN — SULFAMETHOXAZOLE AND TRIMETHOPRIM 1 TABLET: 400; 80 TABLET ORAL at 13:35

## 2023-12-09 RX ADMIN — SODIUM CHLORIDE, POTASSIUM CHLORIDE, SODIUM LACTATE AND CALCIUM CHLORIDE: 600; 310; 30; 20 INJECTION, SOLUTION INTRAVENOUS at 13:34

## 2023-12-09 RX ADMIN — ACETAMINOPHEN 975 MG: 325 TABLET, FILM COATED ORAL at 09:49

## 2023-12-09 RX ADMIN — MYCOPHENOLATE MOFETIL 750 MG: 250 CAPSULE ORAL at 09:49

## 2023-12-09 RX ADMIN — SODIUM CHLORIDE 1000 ML: 9 INJECTION, SOLUTION INTRAVENOUS at 09:41

## 2023-12-09 RX ADMIN — SODIUM CHLORIDE 1000 ML: 4.5 INJECTION, SOLUTION INTRAVENOUS at 02:14

## 2023-12-09 RX ADMIN — TACROLIMUS 2.5 MG: 1 CAPSULE ORAL at 17:39

## 2023-12-09 RX ADMIN — SODIUM CHLORIDE 1000 ML: 9 INJECTION, SOLUTION INTRAVENOUS at 00:00

## 2023-12-09 RX ADMIN — METHYLPREDNISOLONE SODIUM SUCCINATE 250 MG: 125 INJECTION, POWDER, LYOPHILIZED, FOR SOLUTION INTRAMUSCULAR; INTRAVENOUS at 15:52

## 2023-12-09 RX ADMIN — ONDANSETRON 4 MG: 2 INJECTION INTRAMUSCULAR; INTRAVENOUS at 14:10

## 2023-12-09 RX ADMIN — ENTECAVIR 0.5 MG: 0.5 TABLET ORAL at 15:51

## 2023-12-09 RX ADMIN — MYCOPHENOLATE MOFETIL 750 MG: 250 CAPSULE ORAL at 17:39

## 2023-12-09 RX ADMIN — OXYCODONE HYDROCHLORIDE 5 MG: 5 TABLET ORAL at 10:57

## 2023-12-09 RX ADMIN — OXYCODONE HYDROCHLORIDE 5 MG: 5 TABLET ORAL at 06:11

## 2023-12-09 RX ADMIN — SENNOSIDES AND DOCUSATE SODIUM 1 TABLET: 8.6; 5 TABLET ORAL at 07:57

## 2023-12-09 RX ADMIN — ACETAMINOPHEN 975 MG: 325 TABLET, FILM COATED ORAL at 17:39

## 2023-12-09 ASSESSMENT — ACTIVITIES OF DAILY LIVING (ADL)
ADLS_ACUITY_SCORE: 20
ADLS_ACUITY_SCORE: 27
ADLS_ACUITY_SCORE: 20
ADLS_ACUITY_SCORE: 27
ADLS_ACUITY_SCORE: 27
ADLS_ACUITY_SCORE: 20

## 2023-12-09 NOTE — ANESTHESIA PROCEDURE NOTES
Central Line/PA Catheter Placement    Pre-Procedure   Staff -        Anesthesiologist:  Michelle Lemons MD       Performed By: anesthesiologist       Location: OR       Pre-Anesthestic Checklist: patient identified, IV checked, site marked, risks and benefits discussed, informed consent, monitors and equipment checked, pre-op evaluation and at physician/surgeon's request  Timeout:       Correct Patient: Yes        Correct Procedure: Yes        Correct Site: Yes        Correct Position: Yes        Correct Laterality: Yes   Line Placement:   This line was placed Post Induction starting at 12/8/2023 8:45 PM and ending at 12/8/2023 8:45 PM    Procedure   Procedure: central line       Laterality: right       Insertion Site: internal jugular.       Patient Position: Trendelenburg  Sterile Prep        All elements of maximal sterile barrier technique followed       Patient Prep/Sterile Barriers: draped, hand hygiene, gloves , hat , mask , draped, gown, sterile gel and probe cover       Skin prep: Chloraprep  Insertion/Injection        Technique: ultrasound guided        1. Ultrasound was used to evaluate the access site.       2. Vein evaluated via ultrasound for patency/adequacy.       3. Using real-time ultrasound the needle/catheter was observed entering the artery/vein.       Introducer Type: 9 Fr, 2-lumen MAC        Type: Introducer       Number of Lumens: triple lumen  Narrative         Secured by: suture and anchor securement device       Tegaderm and Biopatch dressing used.       Complications: None apparent,        blood aspirated from all lumens,        All lumens flushed: Yes       Verification method: Ultrasound

## 2023-12-09 NOTE — PROGRESS NOTES
Admitted/transferred from:   Time of arrival on unit 0123  2 RN full  skin assessment completed by Jess Fofana & Holli Tatum  Skin assessment finding: skin intact, no problems   Interventions/actions: skin interventions Abdominal incision wound care,       Will continue to monitor.

## 2023-12-09 NOTE — PROGRESS NOTES
"CLINICAL NUTRITION SERVICES - ASSESSMENT NOTE     Nutrition Prescription    RECOMMENDATIONS FOR MDs/PROVIDERS TO ORDER:  Diet as tolerated     Malnutrition Status:    Patient does not meet two of the established criteria necessary for diagnosing malnutrition but is at risk for malnutrition    Recommendations already ordered by Registered Dietitian (RD):  None today.     Future/Additional Recommendations:  Minimize diet restrictions as able d/t high calorie/protein needs post-transplant.  Oral supplements as needed to help meet nutritional needs.     High protein food choices with meals to help meet high needs post-transplant over the next 6-8 weeks.     Heart-healthy diet (low saturated fat, low sodium, high fiber) and food safety precautions long term due to immunosuppression regimen post-transplant         REASON FOR ASSESSMENT  Nuzhat Lopez is a 27 year old female seen by the dietitian for MD Order- Assess & Educate post-op SOT    Chart reviewed:  ESRD on hemodialysis secondary to IgA nephropathy, HTN, and migraine. S/p living donor kidney transplant on 12/8/23.       NUTRITION HISTORY  Visited with patient today. She was falling in and out of asleep. Sister at bedside. Reports she would be preparing all meals for patient.   Per sister, patient with good po and appetite PTA. She was on HD.     CURRENT NUTRITION ORDERS  Diet: On regular diet today.  Intake/Tolerance: patient did not eat any food. Family reports feeling nauseous. Denies need for oral nutrition supplements at this time     LABS  K+:4.0, Phos: 3.7  BUN:27.4, Cr: 5.13, GFR:11  Glucose:157 (H)    MEDICATIONS  Mycophenolate  Tacrolimus  Miralax  Senokot  MagOx    ANTHROPOMETRICS  Height: 154.9 cm (5' 1\")  Most Recent Weight: 75.3 kg (166 lb 0.1 oz) standing wt on 12/8/23  IBW: 47.7 kg (158% IbW)  BMI: Obesity Grade I BMI 30-34.9  Weight History:   Wt Readings from Last 10 Encounters:   12/09/23 75.6 kg (166 lb 9.6 oz)   11/27/23 76.7 kg (169 lb) "   07/11/22 70.8 kg (156 lb)   02/28/22 73.5 kg (162 lb)   11/23/21 77.1 kg (170 lb)   10/09/21 80.6 kg (177 lb 11.2 oz)     Dosing Weight: 55 kg ( adjusted wt from admit wt of 75.3 kg and IBW of 47.7 kg )    ASSESSED NUTRITION NEEDS:  Estimated Energy Needs: 1650- 1925 kcals (30-35 Kcal/Kg)  Justification: increased needs post-op SOT  Estimated Protein Needs: 72- 110 grams protein (1.3-2 gm/Kg)  Justification: increased needs post op SOT  Estimated Fluid Needs: per MD pending fluid status and adequate UOP    PHYSICAL FINDINGS  See malnutrition section below.    MALNUTRITION  % Intake: Decreased intake does not meet criteria  % Weight Loss: Up to 1-2% in 1 week (non-severe)  Subcutaneous Fat Loss: None observed  Muscle Loss: None observed  Fluid Accumulation/Edema: None noted  Malnutrition Diagnosis: Patient does not meet two of the established criteria necessary for diagnosing malnutrition but is at risk for malnutrition    NUTRITION DIAGNOSIS:  Food and nutrition-related knowledge deficit r/t length of time since previous post-transplant education AEB patient verbal report, review of chart record, and MD consult for nutrition education.     INTERVENTIONS  Implementation  Nutrition Education:  1. Provided instruction on post-transplant diet with discussion regarding protein sources and high protein needs in acute post-tx phase.  Reviewed recommendations to follow low fat/low sodium diet long term and discussed heart healthy diet tips.  Discussed monitoring of K+/Phos lab values with possible need for adjustment of these in the diet as necessary. Reviewed need for food safety precautions to prevent food borne illness.    2. Provided & reviewed handout: Post-transplant diet guidelines. Patient receptive to information provided. Expected diet compliance is good.     Medical food supplement therapy: patient denied needs. Need to re-assess need    Goals  1. Patient will verbalize understanding of 3 important aspects of  post-transplant diet guidelines.   2. PO intake >50% meals TID.    Monitoring/Evaluation  Progress toward goals will be monitored and evaluated per protocol.      Fantasma Méndez RD/MONICA  7A/7B (beds 219-229) RD pager: [phone]  Weekend/Holiday RD pager: [phone]

## 2023-12-09 NOTE — PROVIDER NOTIFICATION
pt in room 7215, Jessica Simmons, last 2 CVPs were 2mmHg, last /75 mmHg  Jess Fofana RN,  [phone]

## 2023-12-09 NOTE — PHARMACY-TRANSPLANT NOTE
Adult Kidney Transplant Post Operative Note    27 year old female s/p living donor kidney transplant on 12/8/2023 for IgA nephropathy.      Planned immunosuppression regimen per kidney transplant protocol:  INDUCTION:  Low intensity, PRA 0 (11/27/2023)  12/8/2023 (POD 0): basiliximab 20 mg IV x1, methylprednisolone 500 mg x1  12/9/2023 (POD 1): methylprednisolone 250 mg x1  12/10/2023 (POD 2): methylprednisolone 100 mg x1  12/11/2023 (POD 3): steroid taper  12/12/2023 (POD 4): basiliximab 20 mg IV x1     MAINTENANCE:   - Mycophenolate 750 mg BID  - Tacrolimus with goal trough levels of 8-10 mcg/L for first 6 months post-transplant     Opportunistic pathogen prophylaxis includes:  - PJP: trimethoprim/sulfamethoxazole  - CMV D+/R+: Valganciclovir for 3 months duration tentatively    Patient is not enrolled in medication study.    Pharmacy will monitor for medication interactions and immunosuppression levels in conjunction with the team. Medication therapy needs for discharge planning will continue to be addressed throughout the current admission via multidisciplinary rounds and order review.  Pharmacy will make recommendations as appropriate.  Delmis Mayo PharmAbbiD., BCPS

## 2023-12-09 NOTE — BRIEF OP NOTE
[facility]    Brief Operative Note    Pre-operative diagnosis: Encounter for pregnancy test [Z32.00]  Awaiting organ transplant [Z76.82]  Pre-diabetes [R73.03]  ESRD (end stage renal disease) (H) [N18.6]  Post-operative diagnosis Same as pre-operative diagnosis    Procedure: Kidney Transplant Non-Directed Recipient, N/A - Abdomen    Surgeon: Surgeon(s) and Role:     * Osbaldo Hurtado MD - Primary     * Krishna Beal MD - Fellow - Assisting     * Blu Valladares MD - Fellow - Assisting  Anesthesia: General   Estimated Blood Loss: 50 mL    Drains: None  Specimens: * No specimens in log *  Findings:   None.  Complications: None.  Implants: * No implants in log *    Plan:  - PACU labs  - q4h K/Hb for first 24h  - STAT Renal U/S  - Transfer to     Blu Valladares MD  PGY-1 Surgery

## 2023-12-09 NOTE — PLAN OF CARE
"/85 (BP Location: Left arm)   Pulse 91   Temp 98.2  F (36.8  C) (Tympanic)   Resp 16   Ht 1.549 m (5' 1\")   Wt 75.6 kg (166 lb 9.6 oz)   SpO2 98%   BMI 31.48 kg/m    Shift: 6453-0457  Isolation Status: standard  VS: stable on room air, afebrile  Neuro: Aox4  Behaviors: calm, able to make needs known   BG: ACHS (136)  Labs: creat 5.13  Respiratory: WDL  Cardiac: WDL, CVP ranging 2-7 team aware  Pain/Nausea: abd pain managed with scheduled tylenol, PRN oxy x1. Intermittent nausea when eating, zofran x1  PRN: oxy 5 mg x1, zofran x1  Diet: regular diet, poor appetite  IV Access: triple lumen internal jugular, L PIV SL  Infusion(s): LR @75 ml/hr, 1000 ml NS bolus x2   Lines/Drains: martinez in place  GI/: no BM since transplant, not passing gas. 1540 ml UOP on shift  Skin: abd incision covered with primapore, drainaged marked, bruising on L foot (injury prior to hospitalization)  Mobility: SBA  Events/Education: low UOP/low CVPs, NS bolus given x2, renal ultrasound completed  Plan: continue POC and notify team of changes      "

## 2023-12-09 NOTE — OR NURSING
Transplant resident and Dr. Goldberg are okay with patient going to floor with LA of  4.0, lab done in OR. Lab to be redrawn in 2 hours. Pt stable from their stand point. Will cont to monitor.

## 2023-12-09 NOTE — PROGRESS NOTES
"/73 (BP Location: Left arm)   Pulse 87   Temp 98  F (36.7  C) (Oral)   Resp 16   Ht 1.549 m (5' 1\")   Wt 75.3 kg (166 lb 0.1 oz)   SpO2 100%   BMI 31.37 kg/m      SHIFT: 2197-9866  ISOLATION: NA  VITALS: VSS on 2L NC, Capnography  BG:   Recent Labs   Lab 12/09/23  0140 12/08/23  2341 12/08/23  2338 12/08/23  2251 12/08/23  2221 12/08/23  2150   * 114* 119* 150* 218* 256*   NEURO: Aox4, Calm, Cooperative, Pleasant  Diet: Advanced Regular Diet   PAIN: 7/10 pain 1x scheduled tylenol, 1x oxycodone   RESPIRATORY: Clear Upper & Lower Lobes Bilaterally  CARDIAC: S1 & S2 audible  : Lockhart Catheter 150-1,200 urine output/hr  GI: No BM within shift, Pt stated able to pass gas  TUBES: NA  MIVF/GTT/ABX: Normal Saline @ 75 mL/hr, D5LR @ 75mL/hr  ASSIST: Not OOB  SAFETY: WDL, pt sister is currently bedside   SKIN: abdominal incision covered with premi pore, marked moderate drainage,   LABS: Lact Acid 1.7, , Creatinine decreased from 7.41 to 5.13, Hbg 11.4, WBC 12.3  Recent Labs   Lab Test 12/08/23  2338 12/08/23  2251 12/08/23  2221 10/08/21  1258 10/07/21  0439 10/06/21  0311   POTASSIUM 4.3 4.6 4.8   < > 4.5 4.9   PHOS 3.6  --   --   --  5.6* 5.3*   MAG 2.2  --   --   --   --   --    HGB 11.8 11.1* 11.6*   < >  --   --     < > = values in this interval not displayed.   PLAN: Continue to monitor from proper kidney function    "

## 2023-12-09 NOTE — PROGRESS NOTES
Patient removed from OS waitlist after living donor kidney transplant. OS ID [id number].    Donor Has Risk Criteria for Transmission of HIV/HCV/HBV: Yes  Recipient Notified of Risk Criteria: Yes

## 2023-12-09 NOTE — CONSULTS
[facility] [place]  Transplant Nephrology Consult  Date of Admission:  12/8/2023  Today's Date: 12/09/2023  Requesting physician: Osbaldo Hurtado MD    Recommendations:  - Give 1 L fluid bolus.  - Agree with low intensity induction.  - No indication for dialysis.      Assessment & Plan   # LDKT: Trend down in serum creatinine   - Baseline Creatinine: ~ TBD   - Proteinuria: Not checked post transplant   - Date DSA Last Checked: Nov/2023      Latest DSA: No DSA at time of transplant   - BK Viremia: Not checked post transplant   - Kidney Tx Biopsy: No   - Transplant Ureteral Stent: No    # Immunosuppression: Tacrolimus immediate release (goal 8-10), Mycophenolate mofetil (dose 750 mg every 12 hours), and Methylprednisolone (dose taper)   - Patient is in an immunosuppressed state and will continue to monitor for efficacy and toxicity of immunosuppression medications.   - Induction with Recent Transplant:   Low Intensity Protocol    - Changes: Not at this time    # Infection Prophylaxis:   - PJP: Sulfa/TMP (Bactrim)  - CMV: Valganciclovir (Valcyte),CMV IgG Ab positive  - Hep B: Entecavir (Baraclude), Donor HBcAb positive    # Hep B core Ab+ donor:   -Discussed prior to transplant with transplant hepatology.    -Plan to continue entecavir x6 months post txp then at 6m post txp should follow up with hepatology.    -Monthly LFTs   -q3 months HBsAg, HBsAb, HBV DNA for 1y post txp    # Hypertension: Controlled;  Goal BP: < 150/90   - Volume status: Hypovolemic  EDW ~ 75.5 kg   - Changes: Yes - Give 1 L fluid bolus.    Prior to admission antihypertensives: carvedilol  12.5 mg PO BID.    # Anemia in Chronic Renal Disease: Hgb: Stable      MARY: No   - Iron studies: Replete    # Mineral Bone Disorder:    - Secondary renal hyperparathyroidism; PTH level: Mildly elevated (151-300 pg/ml)   (prior to transplant)     On treatment: None  - Vitamin D; level:  19 (11/27/2023)         On supplement:  No  - Calcium; level: Normal        On supplement: No  - Phosphorus; level: Normal        On supplement: No    # Electrolytes:   - Potassium; level: Normal        On supplement: No  - Magnesium; level: Normal        On supplement: No, magnesium oxide 400 mg PO daily starts 12/11   - Bicarbonate; level: Normal        On supplement: No  - Sodium; level: Low      # Transplant History:  Etiology of Kidney Failure: IgA nephropathy  Tx: LDKT  Transplant: 12/8/2023 (Kidney)  Crossmatch at time of Tx: negative  DSA at time of Tx: No  Significant changes in immunosuppression: None  Significant transplant-related complications: None    Recommendations were communicated to the primary team verbally.    Seen and discussed with Dr. Whitfield.    Gwyn Ruiz, APRN CNP  Pager: [phone]    Physician Attestation     I saw and evaluated Nuzhat Lopez as part of a shared APRN/PA visit.     I personally reviewed the vital signs, medications, labs and imaging.    I personally performed the substantive portion of the medical decision making for this visit - please see the MARLENE's documentation for full details.    Key management decisions made by me and carried out under my direction: Continue simulect induction. Give IVF 1L bolus due to low CVP and decreasing UOP. Continue entecavir. Plan to continue entecavir x6 months post txp then at 6m post txp should follow up with hepatology. Monthly LFTs, q3 months HBsAg, HBsAb, HBV DNA for 1y post txp. 3d martinez. No stent placed      I personally performed the substantive portion of the history for this visit - please see the MARLENE's documentation for full details.  Key additional history findings made by me: Pt feels well. No flatus, nauseated this morning. Hasn't walked yet      Leno Whitfield MD  Date of Service (when I saw the patient): 12/09/23    REASON FOR CONSULT   Kidney Transplant    History of Present Illness   Nuzhat Lopez is a 27 year old female with history of ESRD secondary to biopsy  proven IgA nephropathy who had been on dialysis since 6/8/2022 and is now s/p LDKT without stent yesterday 12/8/23. Final crossmatch was negative and PRA 0%.  Induction was with basiliximab. Both donor and recipient are CMV IgG Ab positive.  The donor is HBcAb positive and Entecavir was started started 12/4/23.   Her serum creatinine today of 5.13 is down from 7.41 yesterday and urine output has been ~5.7 L over the last three shifts.     SBP 120s - 130s overnight.  CVP 4.  Afebrile. No chest pain or shortness of breath on 2L O2 via NC. No leg swelling. Reports some nausea, but denies vomiting.  No BM yet since surgery.      Review of Systems    The 10 point Review of Systems is negative other than noted in the HPI or here.     Past Medical History    I have reviewed this patient's medical history and updated it with pertinent information if needed.   Past Medical History:   Diagnosis Date     Anemia in chronic kidney disease      CKD (chronic kidney disease) stage 5, GFR less than 15 ml/min (H)      HTN (hypertension)      IgA nephropathy      Metabolic acidosis        Past Surgical History   I have reviewed this patient's surgical history and updated it with pertinent information if needed.  Past Surgical History:   Procedure Laterality Date     BIOPSY  2021    [facility]     NO PAST SURGERIES         Family History   I have reviewed this patient's family history and updated it with pertinent information if needed.   Family History   Problem Relation Age of Onset     Impaired Fasting Glucose Mother      Kidney Disease No family hx of      Hypertension No family hx of      Cancer No family hx of        Social History   I have reviewed this patient's social history and updated it with pertinent information if needed. Nuzhat Troy Lopez  reports that she has never smoked. She has never used smokeless tobacco. She reports that she does not drink alcohol and does not use drugs.    Allergies   Allergies   Allergen  "Reactions     Cephalexin Rash     Heparin Flush Rash     Prior to Admission Medications     acetaminophen  975 mg Oral Q8H     [START ON 12/10/2023] atorvastatin  10 mg Oral Daily     [START ON 2023] magnesium oxide  400 mg Oral Daily with lunch     pharmacy alert - intermittent dosing  1 each Other See Admin Instructions     [START ON 12/10/2023] polyethylene glycol  17 g Oral Daily     senna-docusate  1 tablet Oral BID     sodium chloride (PF)  10 mL Intracatheter Q8H     tacrolimus  2.5 mg Oral BID IS     [START ON 2023] valGANciclovir  450 mg Oral Once per day on        dextrose 5% in lactated ringers 75 mL/hr at 23 0700     sodium chloride Stopped (23 0500)     sodium chloride 75 mL/hr at 23 0700       Physical Exam   Temp  Av.4  F (36.9  C)  Min: 98  F (36.7  C)  Max: 99.1  F (37.3  C)      Pulse  Av.2  Min: 81  Max: 112 Resp  Av.2  Min: 14  Max: 18  SpO2  Av.1 %  Min: 92 %  Max: 100 %    CVP (mmHg): 4 mmHgBP 115/76   Pulse 83   Temp 98.1  F (36.7  C) (Oral)   Resp 16   Ht 1.549 m (5' 1\")   Wt 75.3 kg (166 lb 0.1 oz)   SpO2 100%   BMI 31.37 kg/m     Date 23 07 - 12/10/23 0659   Shift 5459-2274 1529-8598 3757-6005 24 Hour Total   INTAKE   Shift Total(mL/kg)       OUTPUT   Urine 150   150   Shift Total(mL/kg) 150(1.99)   150(1.99)   Weight (kg) 75.3 75.3 75.3 75.3      Admit Weight: 75.3 kg (166 lb 0.1 oz)     GENERAL APPEARANCE: alert and no distress  HENT: mouth without ulcers or lesions  RESP: lungs clear to auscultation - no rales, rhonchi or wheezes  CV: regular rhythm, normal rate, no rub, no murmur  EDEMA: no LE edema bilaterally  ABDOMEN: soft, nondistended, appropriately tender  MS: extremities normal - no gross deformities noted, no evidence of inflammation in joints, no muscle tenderness  SKIN: no rash  PSYCH: mentation appears normal and affect normal/bright  DIALYSIS ACCESS:  RUE AV fistula +thrill      Data "   CMP  Recent Labs   Lab 12/09/23  0542 12/09/23  0140 12/08/23 2341 12/08/23 2338 12/08/23 2251 12/08/23 2221   *  --   --  134* 134* 132*   POTASSIUM 4.0  4.0  --   --  4.3 4.6 4.8   CHLORIDE 96*  --   --  96*  --   --    CO2 23  --   --  23  --   --    ANIONGAP 14  --   --  15  --   --    * 169* 114* 119* 150* 218*   BUN 27.4*  --   --  32.4*  --   --    CR 5.13*  --   --  7.41*  --   --    GFRESTIMATED 11*  --   --  7*  --   --    DEEPTI 9.4  --   --  10.6*  --   --    MAG 1.9  --   --  2.2  --   --    PHOS 3.7  --   --  3.6  --   --      CBC  Recent Labs   Lab 12/09/23 0542 12/08/23 2338 12/08/23 2251 12/08/23 2221   HGB 11.4* 11.8 11.1* 11.6*   WBC 12.3* 10.8  --   --    RBC 3.69* 3.78*  --   --    HCT 35.6 35.3  --   --    MCV 97 93  --   --    MCH 30.9 31.2  --   --    MCHC 32.0 33.4  --   --    RDW 12.6 12.8  --   --     202  --   --      INRNo lab results found in last 7 days.  ABG  Recent Labs   Lab 12/08/23 2251 12/08/23 2221 12/08/23 2150 12/08/23 2126   O2PER 60.0 50.0 60.0 60.0      Urine Studies  Recent Labs   Lab Test 11/27/23  1410 02/28/22  1314 10/07/21  0436 10/05/21  1522   COLOR Straw Yellow Colorless Light Yellow   APPEARANCE Clear Slightly Cloudy* Clear Turbid*   URINEGLC 150* Negative Negative Negative   URINEBILI Negative Negative Negative Negative   URINEKETONE Negative Negative Negative Negative   SG 1.008 1.015 1.013 1.013   UBLD Small* Moderate* 0.2 mg/dL* 0.2 mg/dL*   URINEPH 8.0* 7.0 7.0 7.0   PROTEIN 70* 300* 300* 300*   NITRITE Negative Negative Negative Negative   LEUKEST Negative Trace* Negative Negative   RBCU 9* 7* 7* 27*   WBCU 3 6* 2 4     No lab results found.  PTH  Recent Labs   Lab Test 11/27/23  1406   PTHI 214*     Iron Studies  Recent Labs   Lab Test 11/27/23  1406   IRON 73   *   IRONSAT 42   LUCÍA 1,549*       IMAGING:  All imaging studies reviewed by me.

## 2023-12-09 NOTE — PHARMACY-ADMISSION MEDICATION HISTORY
Pharmacist Admission Medication History    Admission medication history is complete. The information provided in this note is only as accurate as the sources available at the time of the update.    Information Source(s): Patient and CareEverywhere/SureScripts via in-person    Pertinent Information:   - Entecavir: patient reports starting on Wednesday (12/6/2023)    Changes made to PTA medication list:  Added:   Calcitriol  Sevelamer  Deleted: None  Changed: None    Medication Affordability: did not assess     Allergies reviewed with patient and updates made in EHR: yes    Medication History Completed By:   Delmis Mayo, JoseD, BCPS 12/9/2023 12:53 PM    PTA Med List   Medication Sig Last Dose    acetaminophen (TYLENOL) 325 MG tablet Take 325-650 mg by mouth every 6 hours as needed for mild pain  12/7/2023    calcitRIOL (ROCALTROL) 0.25 MCG capsule Take 0.25 mcg by mouth three times a week     carvedilol (COREG) 12.5 MG tablet Take 1 tablet (12.5 mg) by mouth 2 times daily (with meals) 12/8/2023    entecavir (BARACLUDE) 0.5 MG tablet Take 1 tablet (0.5 mg) by mouth every 7 days Past Week    sevelamer HCl (RENAGEL) 800 MG tablet Take 1,600 mg by mouth 2 times daily (with meals) And twice daily with snacks

## 2023-12-09 NOTE — PROGRESS NOTES
Paged about low CVP 1-3    From chart review- SBP >120 and MAP >90   UOP roughly 5 ml/kg/min over last shift   Patient answering questions appropriately on exam      Plan  - assess CVP access for possibly causing low reads  - bolus 500 if still low after assessing   - cont MIVF     Ani Shay, DO  General Surgery, PGY-1

## 2023-12-09 NOTE — PROGRESS NOTES
"  Transplant Surgery Progress Note  Surgery Cross-Cover  Post Op Check    12/09/2023    Nuzhat Lopez is a 27 year old female POD#0 s/p Procedure(s):  Kidney Transplant Non-Directed Recipient for Pre-Op Diagnosis Codes:     * Encounter for pregnancy test [Z32.00]     * Awaiting organ transplant [Z76.82]     * Pre-diabetes [R73.03]     * ESRD (end stage renal disease) (H) [N18.6]    Patient reports that pain is controlled. No fevers/chills, chest pain, SOB, nausea, vomiting. Just feeling sleepy     /83 (BP Location: Left arm)   Pulse 87   Temp 98.4  F (36.9  C) (Oral)   Resp 16   Ht 1.549 m (5' 1\")   Wt 75.3 kg (166 lb 0.1 oz)   SpO2 100%   BMI 31.37 kg/m      A&O, NAD  NLB on RA  RRR   Abdomen soft, nondistended, non tender, RLQ with island dressing and s/s saturation     K 4.3  Hgb 11.8   Lactate 1.7 (from 4.0, 3.9, 2.9)     CXR  RESIDENT PRELIMINARY INTERPRETATION  Impression:   1. New right IJ central venous catheter with tip at the  brachiocephalic confluence.  2. New mild bibasilar atelectasis.   This result has not been signed. Information might be incomplete.     RESIDENT PRELIMINARY INTERPRETATION  IMPRESSION:   1. Normal appearance of the right lower quadrant transplant with  patent vasculature.  2. Elevated renal artery velocities at the anastomosis may be  secondary to anastomotic edema. Attention on follow-up.    A/P: No acute post-op issues. Continue plan of care per primary team. Please call with any questions.    Ani Shay MD    "

## 2023-12-09 NOTE — ANESTHESIA CARE TRANSFER NOTE
Patient: Nuzhat Lopez    Procedure: Procedure(s):  Kidney Transplant Non-Directed Recipient       Diagnosis: Encounter for pregnancy test [Z32.00]  Awaiting organ transplant [Z76.82]  Pre-diabetes [R73.03]  ESRD (end stage renal disease) (H) [N18.6]  Diagnosis Additional Information: No value filed.    Anesthesia Type:   No value filed.     Note:    Oropharynx: oropharynx clear of all foreign objects and spontaneously breathing  Level of Consciousness: drowsy  Oxygen Supplementation: nasal cannula  Level of Supplemental Oxygen (L/min / FiO2): 3  Independent Airway: airway patency satisfactory and stable    Vital Signs Stable: post-procedure vital signs reviewed and stable  Report to RN Given: handoff report given  Patient transferred to: PACU    Handoff Report: Identifed the Patient, Identified the Reponsible Provider, Reviewed the pertinent medical history, Discussed the surgical course, Reviewed Intra-OP anesthesia mangement and issues during anesthesia, Set expectations for post-procedure period and Allowed opportunity for questions and acknowledgement of understanding      Vitals:  Vitals Value Taken Time   BP     Temp     Pulse     Resp     SpO2         Electronically Signed By: ARDEN Dockery CRNA  December 8, 2023  11:09 PM

## 2023-12-09 NOTE — ANESTHESIA POSTPROCEDURE EVALUATION
Patient: Nuzhat Lopez    Procedure: Procedure(s):  Kidney Transplant Non-Directed Recipient       Anesthesia Type:  General    Note:  Disposition: Inpatient   Postop Pain Control: Uneventful            Sign Out: Well controlled pain   PONV: No   Neuro/Psych: Uneventful            Sign Out: Acceptable/Baseline neuro status   Airway/Respiratory: Uneventful            Sign Out: Acceptable/Baseline resp. status   CV/Hemodynamics: Uneventful            Sign Out: Acceptable CV status; No obvious hypovolemia; No obvious fluid overload   Other NRE:    DID A NON-ROUTINE EVENT OCCUR?            Last vitals:  Vitals Value Taken Time   BP     Temp     Pulse 102 12/08/23 2356   Resp     SpO2 98 % 12/08/23 2356   Vitals shown include unfiled device data.    Electronically Signed By: Michelle Lemons MD  December 8, 2023  11:57 PM

## 2023-12-09 NOTE — ANESTHESIA PROCEDURE NOTES
Airway       Patient location during procedure: OR       Procedure Start/Stop Times: 12/8/2023 6:02 PM  Staff -        CRNA: Mikayla Duran APRN CRNA       Performed By: CRNA  Consent for Airway        Urgency: elective  Indications and Patient Condition       Indications for airway management: cory-procedural       Induction type:intravenous       Mask difficulty assessment: 1 - vent by mask    Final Airway Details       Final airway type: endotracheal airway       Successful airway: ETT - single  Endotracheal Airway Details        ETT size (mm): 7.0       Cuffed: yes       Successful intubation technique: direct laryngoscopy       DL Blade Type: Ulrich 2       Grade View of Cords: 1       Adjucts: stylet       Position: Right       Measured from: gums/teeth       Secured at (cm): 21       Bite block used: None    Post intubation assessment        Placement verified by: capnometry, equal breath sounds and chest rise        Number of attempts at approach: 1       Secured with: silk tape       Ease of procedure: easy       Dentition: Intact and Unchanged    Medication(s) Administered   Medication Administration Time: 12/8/2023 6:02 PM

## 2023-12-10 LAB
ANION GAP SERPL CALCULATED.3IONS-SCNC: 8 MMOL/L (ref 7–15)
BASOPHILS # BLD AUTO: 0 10E3/UL (ref 0–0.2)
BASOPHILS NFR BLD AUTO: 0 %
BUN SERPL-MCNC: 11 MG/DL (ref 6–20)
CALCIUM SERPL-MCNC: 9.2 MG/DL (ref 8.6–10)
CHLORIDE SERPL-SCNC: 109 MMOL/L (ref 98–107)
CREAT SERPL-MCNC: 1.27 MG/DL (ref 0.51–0.95)
DEPRECATED HCO3 PLAS-SCNC: 20 MMOL/L (ref 22–29)
EGFRCR SERPLBLD CKD-EPI 2021: 59 ML/MIN/1.73M2
EOSINOPHIL # BLD AUTO: 0 10E3/UL (ref 0–0.7)
EOSINOPHIL NFR BLD AUTO: 0 %
ERYTHROCYTE [DISTWIDTH] IN BLOOD BY AUTOMATED COUNT: 13 % (ref 10–15)
GLUCOSE BLDC GLUCOMTR-MCNC: 103 MG/DL (ref 70–99)
GLUCOSE BLDC GLUCOMTR-MCNC: 119 MG/DL (ref 70–99)
GLUCOSE BLDC GLUCOMTR-MCNC: 120 MG/DL (ref 70–99)
GLUCOSE BLDC GLUCOMTR-MCNC: 142 MG/DL (ref 70–99)
GLUCOSE BLDC GLUCOMTR-MCNC: 151 MG/DL (ref 70–99)
GLUCOSE SERPL-MCNC: 107 MG/DL (ref 70–99)
HCT VFR BLD AUTO: 32.4 % (ref 35–47)
HGB BLD-MCNC: 10.2 G/DL (ref 11.7–15.7)
HGB BLD-MCNC: 10.5 G/DL (ref 11.7–15.7)
IMM GRANULOCYTES # BLD: 0.1 10E3/UL
IMM GRANULOCYTES NFR BLD: 1 %
LYMPHOCYTES # BLD AUTO: 1.2 10E3/UL (ref 0.8–5.3)
LYMPHOCYTES NFR BLD AUTO: 8 %
MAGNESIUM SERPL-MCNC: 1.9 MG/DL (ref 1.7–2.3)
MCH RBC QN AUTO: 30.7 PG (ref 26.5–33)
MCHC RBC AUTO-ENTMCNC: 31.5 G/DL (ref 31.5–36.5)
MCV RBC AUTO: 98 FL (ref 78–100)
MONOCYTES # BLD AUTO: 0.9 10E3/UL (ref 0–1.3)
MONOCYTES NFR BLD AUTO: 6 %
NEUTROPHILS # BLD AUTO: 12.3 10E3/UL (ref 1.6–8.3)
NEUTROPHILS NFR BLD AUTO: 85 %
NRBC # BLD AUTO: 0 10E3/UL
NRBC BLD AUTO-RTO: 0 /100
PHOSPHATE SERPL-MCNC: 2.9 MG/DL (ref 2.5–4.5)
PLATELET # BLD AUTO: 173 10E3/UL (ref 150–450)
POTASSIUM SERPL-SCNC: 4.1 MMOL/L (ref 3.4–5.3)
POTASSIUM SERPL-SCNC: 4.3 MMOL/L (ref 3.4–5.3)
RBC # BLD AUTO: 3.32 10E6/UL (ref 3.8–5.2)
SODIUM SERPL-SCNC: 137 MMOL/L (ref 135–145)
WBC # BLD AUTO: 14.5 10E3/UL (ref 4–11)

## 2023-12-10 PROCEDURE — 84100 ASSAY OF PHOSPHORUS: CPT

## 2023-12-10 PROCEDURE — 85018 HEMOGLOBIN: CPT

## 2023-12-10 PROCEDURE — 85025 COMPLETE CBC W/AUTO DIFF WBC: CPT

## 2023-12-10 PROCEDURE — 250N000013 HC RX MED GY IP 250 OP 250 PS 637: Performed by: PHYSICIAN ASSISTANT

## 2023-12-10 PROCEDURE — 36415 COLL VENOUS BLD VENIPUNCTURE: CPT

## 2023-12-10 PROCEDURE — 84132 ASSAY OF SERUM POTASSIUM: CPT

## 2023-12-10 PROCEDURE — 250N000013 HC RX MED GY IP 250 OP 250 PS 637: Performed by: SURGERY

## 2023-12-10 PROCEDURE — 250N000011 HC RX IP 250 OP 636: Mod: JZ | Performed by: PHYSICIAN ASSISTANT

## 2023-12-10 PROCEDURE — 258N000003 HC RX IP 258 OP 636: Performed by: PHYSICIAN ASSISTANT

## 2023-12-10 PROCEDURE — 36592 COLLECT BLOOD FROM PICC: CPT

## 2023-12-10 PROCEDURE — 120N000011 HC R&B TRANSPLANT UMMC

## 2023-12-10 PROCEDURE — 99233 SBSQ HOSP IP/OBS HIGH 50: CPT

## 2023-12-10 PROCEDURE — 83735 ASSAY OF MAGNESIUM: CPT

## 2023-12-10 PROCEDURE — 250N000012 HC RX MED GY IP 250 OP 636 PS 637

## 2023-12-10 PROCEDURE — 250N000012 HC RX MED GY IP 250 OP 636 PS 637: Performed by: PHYSICIAN ASSISTANT

## 2023-12-10 PROCEDURE — 80048 BASIC METABOLIC PNL TOTAL CA: CPT

## 2023-12-10 PROCEDURE — 250N000012 HC RX MED GY IP 250 OP 636 PS 637: Performed by: SURGERY

## 2023-12-10 PROCEDURE — 250N000013 HC RX MED GY IP 250 OP 250 PS 637

## 2023-12-10 RX ORDER — VALGANCICLOVIR 450 MG/1
900 TABLET, FILM COATED ORAL DAILY
Status: DISCONTINUED | OUTPATIENT
Start: 2023-12-10 | End: 2023-12-11 | Stop reason: HOSPADM

## 2023-12-10 RX ORDER — ENTECAVIR 0.5 MG/1
0.5 TABLET, FILM COATED ORAL
Status: DISCONTINUED | OUTPATIENT
Start: 2023-12-10 | End: 2023-12-11 | Stop reason: HOSPADM

## 2023-12-10 RX ADMIN — ACETAMINOPHEN 975 MG: 325 TABLET, FILM COATED ORAL at 09:35

## 2023-12-10 RX ADMIN — ACETAMINOPHEN 975 MG: 325 TABLET, FILM COATED ORAL at 01:32

## 2023-12-10 RX ADMIN — SENNOSIDES AND DOCUSATE SODIUM 1 TABLET: 8.6; 5 TABLET ORAL at 20:31

## 2023-12-10 RX ADMIN — MYCOPHENOLATE MOFETIL 750 MG: 250 CAPSULE ORAL at 18:13

## 2023-12-10 RX ADMIN — ACETAMINOPHEN 975 MG: 325 TABLET, FILM COATED ORAL at 18:14

## 2023-12-10 RX ADMIN — POLYETHYLENE GLYCOL 3350 17 G: 17 POWDER, FOR SOLUTION ORAL at 08:24

## 2023-12-10 RX ADMIN — SENNOSIDES AND DOCUSATE SODIUM 1 TABLET: 8.6; 5 TABLET ORAL at 08:24

## 2023-12-10 RX ADMIN — TACROLIMUS 2.5 MG: 1 CAPSULE ORAL at 08:25

## 2023-12-10 RX ADMIN — ENTECAVIR 0.5 MG: 0.5 TABLET ORAL at 20:31

## 2023-12-10 RX ADMIN — ATORVASTATIN CALCIUM 10 MG: 10 TABLET, FILM COATED ORAL at 08:25

## 2023-12-10 RX ADMIN — METHYLPREDNISOLONE SODIUM SUCCINATE 100 MG: 125 INJECTION, POWDER, FOR SOLUTION INTRAMUSCULAR; INTRAVENOUS at 08:21

## 2023-12-10 RX ADMIN — VALGANCICLOVIR 900 MG: 450 TABLET, FILM COATED ORAL at 09:35

## 2023-12-10 RX ADMIN — INSULIN ASPART 1 UNITS: 100 INJECTION, SOLUTION INTRAVENOUS; SUBCUTANEOUS at 17:44

## 2023-12-10 RX ADMIN — SODIUM CHLORIDE, POTASSIUM CHLORIDE, SODIUM LACTATE AND CALCIUM CHLORIDE: 600; 310; 30; 20 INJECTION, SOLUTION INTRAVENOUS at 01:32

## 2023-12-10 RX ADMIN — TACROLIMUS 2.5 MG: 1 CAPSULE ORAL at 18:14

## 2023-12-10 RX ADMIN — MYCOPHENOLATE MOFETIL 750 MG: 250 CAPSULE ORAL at 08:25

## 2023-12-10 RX ADMIN — SULFAMETHOXAZOLE AND TRIMETHOPRIM 1 TABLET: 400; 80 TABLET ORAL at 08:25

## 2023-12-10 ASSESSMENT — ACTIVITIES OF DAILY LIVING (ADL)
ADLS_ACUITY_SCORE: 25
ADLS_ACUITY_SCORE: 27
ADLS_ACUITY_SCORE: 25
ADLS_ACUITY_SCORE: 25
ADLS_ACUITY_SCORE: 27
ADLS_ACUITY_SCORE: 27
ADLS_ACUITY_SCORE: 25
ADLS_ACUITY_SCORE: 25
ADLS_ACUITY_SCORE: 27
ADLS_ACUITY_SCORE: 27
ADLS_ACUITY_SCORE: 25
ADLS_ACUITY_SCORE: 25

## 2023-12-10 NOTE — PLAN OF CARE
Goal Outcome Evaluation:      Shift: 4800-5439  VS: VSS on RA  Pain: abdominal incisional pain 5/10, managed  with PRN oxycodone 5 mg and schedule  tylenol 1x. Effective  Neuro: Alert and oriented x4   Cardiac: WNL  Respiratory: denies SOB.    Diet/Appetite:  tolerating regular diet.Denies nausea.  /GI: martinez cath in place with pink output. No passing gas/ NO BM   LDA's: internal jugular tripple lumen. White lumen infusing LR at 75 ml/hr   Skin: abdominal incision staple rigoberto SALINAS on. L foot injury, 5 toe injured prior to hospitalization   Activity: Ambulated one time outside room 1x and sat down on chair one time     Pertinent Labs/: Hemo 10.7 Potassium.4.2 ,170     No futher concerns at this time        Plan of Care Reviewed With: patient, family    Overall Patient Progress: improvingOverall Patient Progress: improving

## 2023-12-10 NOTE — PLAN OF CARE
"BP (!) 142/93   Pulse 103   Temp 97.9  F (36.6  C) (Oral)   Resp 18   Ht 1.549 m (5' 1\")   Wt 76.6 kg (168 lb 14.4 oz)   SpO2 97%   BMI 31.91 kg/m        Shift: 6564-1982  Isolation Status: standard  VS: stable on room air, afebrile  Neuro: Aox4  Behaviors: calm, able to make needs known  BG: ACHS (103,119)  Labs: Creat 1.27  Respiratory: WDL  Cardiac: WDL  Pain/Nausea: abd pain with movement, managed with scheduled tylenol denies nausea  PRN: none given  Diet: regular diet, good appetite  IV Access: triple lumen internal jugular, L PIV  Infusion(s): LR dc'ed   Lines/Drains: martinez in place  GI/: adequate UOP with martinez (1225 UOP), no BM but passing gas  Skin: abd incision stapled BRAD, scant drainage  Mobility: SBA/ assist x1  Events/Education: walked in the halls today, med card and lab book at bedside  Plan: continue POC and notify team of changes        "

## 2023-12-10 NOTE — PROGRESS NOTES
"BP (!) 129/91 (BP Location: Left arm)   Pulse 103   Temp 98  F (36.7  C) (Oral)   Resp 16   Ht 1.549 m (5' 1\")   Wt 75.6 kg (166 lb 9.6 oz)   SpO2 96%   BMI 31.48 kg/m      SHIFT: 4025-5687  ISOLATION: NA  VITALS: VSS on RA, afebrile  BG: AC/  Recent Labs   Lab 12/10/23  0138 12/09/23  2153 12/09/23  1659 12/09/23  1345 12/09/23  0542 12/09/23  0140   * 170* 127* 136* 157* 169*   NEURO: Aox4  Diet: Regular Diet  PAIN: Denies pain  Nausea: Denies Nausea  RESPIRATORY: WDL (upper & Lower Lobes Clear Bilaterally)  CARDIAC: S1 & S2 audible  : Lockhart Catheter (1,250 mL output) Pink in color  GI: No BM within shift, pt stated able to pass gas  TUBES: NA  MIVF/GTT/ABX: LR @ 75 mL/hr  ASSIST: 1x assist  SAFETY: WDL  SKIN: Abdominal incision scant drainage , stapled, BRAD w/ abdominal binder on  LABS:  K 4.3, Hgb 10.5  Recent Labs   Lab Test 12/10/23  0108 12/09/23  2204 12/09/23  1732 12/09/23  0928 12/09/23  0542 12/08/23  2338 10/08/21  1258 10/07/21  0439   POTASSIUM 4.3 4.2 3.9   < > 4.0  4.0 4.3   < > 4.5   PHOS  --   --   --   --  3.7 3.6  --  5.6*   MAG  --   --   --   --  1.9 2.2  --   --    HGB 10.5* 10.7* 10.7*   < > 11.4* 11.8   < >  --     < > = values in this interval not displayed.   PLAN: Continue w/POC & Notify MD of any changes    "

## 2023-12-10 NOTE — PROGRESS NOTES
"Transplant Surgery  Inpatient Daily Progress Note  12/10/2023    Assessment & Plan: 26yo with history of ESRD on hemodialysis secondary to IgA nephropathy, HTN, and migraine. S/p living donor kidney transplant on 12/8/23. Donor is hepatitis B core antibody positive.    Graft function: POD #2  Kidney: Cr decreased to 1.3. Excellent UO.  Renal US stat: patent doppler.     Immunosuppressed status secondary to medications:   -cPRA 0  Basiliximab: POD 0 and 4  Steroids: Solumedrol 500 mg, 250 mg, and 100 mg, followed by prednisone taper  MMF: 750mg BID  Tacro: Goal level 8-10    Hematology:   Anemia of chronic disease: Hgb stable    Cardiorespiratory:   HTN: PTA carvedilol. Hold restart of medication.    GI/Nutrition:   Diet: Regular  Bowel regimen: Senna, PEG    Endocrine:   Steroid hyperglycemia: sliding scale insulin ACHS    Fluid/Electrolytes: Stop MIV  -Continue Mg Oxide    : Lockhart to remain due to new surgical anastomosis x 3 days.    Infectious disease:  Donor HBcAb positive: Plan discussed with Hepatology prior to admission. Entecavir started 12/4/23.  - Continue antiviral for at least 6 months, then at 6 months send to Hepatology for further plan. Entecavir ordered  - Post-transplant: Liver enzymes at least monthly and HBsAg, HBsAb and HBV DNA every 3 months for the first year.    Prophylaxis: DVT, fall, GI, viral (Valcyte), pneumocystis (Bactrim)    Disposition: 7A    MARLENE/Fellow/Resident Provider: Araceli De La Torre PA-C    Faculty: Osbaldo Hurtado MD   _________________________________________________________________    Interval History: History from patient and/or EMR  Overnight events: Pain controlled. Adequate intake. No flatus/BM. Family at bedside.    ROS:   A 10-point review of systems was negative except as noted above.    Physical Exam:   Current vitals:   BP (!) 129/91 (BP Location: Left arm)   Pulse 103   Temp 98  F (36.7  C) (Oral)   Resp 16   Ht 1.549 m (5' 1\")   Wt 75.6 kg (166 lb 9.6 oz)   SpO2 " 96%   BMI 31.48 kg/m      Vital sign ranges:    Temp:  [97.7  F (36.5  C)-98.4  F (36.9  C)] 98  F (36.7  C)  Pulse:  [] 103  Resp:  [14-18] 16  BP: (110-135)/(65-91) 129/91  SpO2:  [94 %-99 %] 96 %    General Appearance: in no apparent distress.   Skin: Warm, perfused  Heart: well perfused  Lungs: NLB on RA  Abdomen: The abdomen is soft, non distended, appropriately ttp around surgical incision. Incision dry, intact  : martinez is present.  Urine is pink.  Extremities: edema: trace  Neurologic: awake, alert, and oriented. Tremor absent.     Data:   CMP  Recent Labs   Lab 12/10/23  0834 12/10/23  0811 12/10/23  0138 12/10/23  0108 12/09/23  0928 12/09/23  0542 12/08/23  2338 12/08/23  2251 12/08/23  2221   NA  --  137  --   --   --  133*   < > 134* 132*   POTASSIUM  --  4.1  --  4.3   < > 4.0  4.0   < > 4.6 4.8   CHLORIDE  --  109*  --   --   --  96*   < >  --   --    CO2  --  20*  --   --   --  23   < >  --   --    * 107*   < >  --    < > 157*   < > 150* 218*   BUN  --  11.0  --   --   --  27.4*   < >  --   --    CR  --  1.27*  --   --   --  5.13*   < >  --   --    GFRESTIMATED  --  59*  --   --   --  11*   < >  --   --    DEEPTI  --  9.2  --   --   --  9.4   < >  --   --    ICAW  --   --   --   --   --   --   --  6.2* 4.8   MAG  --  1.9  --   --   --  1.9   < >  --   --    PHOS  --  2.9  --   --   --  3.7   < >  --   --     < > = values in this interval not displayed.     CBC  Recent Labs   Lab 12/10/23  0811 12/10/23  0108 12/09/23  0928 12/09/23  0542   HGB 10.2* 10.5*   < > 11.4*   WBC 14.5*  --   --  12.3*     --   --  174    < > = values in this interval not displayed.

## 2023-12-10 NOTE — PROGRESS NOTES
[facility]   Transplant Nephrology Progress Note  Date of Admission:  12/8/2023  Today's Date: 12/10/2023    Recommendations:  - No indication for dialysis.  - No new recommendations at this time.      Assessment & Plan   # LDKT: Trend down in serum creatinine   - Baseline Creatinine: ~ TBD   - Proteinuria: Not checked post transplant   - Date DSA Last Checked: Nov/2023      Latest DSA: No DSA at time of transplant   - BK Viremia: Not checked post transplant   - Kidney Tx Biopsy: No   - Transplant Ureteral Stent: No    # Immunosuppression: Tacrolimus immediate release (goal 8-10), Mycophenolate mofetil (dose 750 mg every 12 hours), and Methylprednisolone (dose taper)   - Patient is in an immunosuppressed state and will continue to monitor for efficacy and toxicity of immunosuppression medications.   - Induction with Recent Transplant:   Low Intensity Protocol    - Changes: Not at this time    # Infection Prophylaxis:   - PJP: Sulfa/TMP (Bactrim)  - CMV: Valganciclovir (Valcyte),CMV IgG Ab positive  - Hep B: Entecavir ([place]), Donor HBcAb positive    # Hep B core Ab+ donor:   -Discussed prior to transplant with transplant hepatology.    -Plan to continue entecavir x6 months post txp then at 6m post txp should follow up with hepatology.    -Monthly LFTs   -q3 months HBsAg, HBsAb, HBV DNA for 1y post txp    # Hypertension: Controlled;  Goal BP: < 150/90   - Volume status: Euvolemic  EDW ~ 75.5 kg   - Changes: No    Prior to admission antihypertensives: carvedilol  12.5 mg PO BID.    # Anemia in Chronic Renal Disease: Hgb: Stable      MARY: No   - Iron studies: Replete    # Mineral Bone Disorder:    - Secondary renal hyperparathyroidism; PTH level: Mildly elevated (151-300 pg/ml)   (prior to transplant)     On treatment: None  - Vitamin D; level:  19 (11/27/2023)         On supplement: No  - Calcium; level: Normal        On supplement: No  - Phosphorus; level: Normal         On supplement: No    # Electrolytes:   - Potassium; level: Normal        On supplement: No  - Magnesium; level: Normal        On supplement: No, magnesium oxide 400 mg PO daily starts tomorrow  - Bicarbonate; level: Low (trend for now)        On supplement: No  - Sodium; level: Normal      # Transplant History:  Etiology of Kidney Failure: IgA nephropathy  Tx: LDKT  Transplant: 12/8/2023 (Kidney)  Crossmatch at time of Tx: negative  DSA at time of Tx: No  Significant changes in immunosuppression: None  Significant transplant-related complications: None      Recommendations were communicated to the primary team via this note.    Discussed with Dr. Whitfield.    ARDEN Valentine CNP   Pager: [phone]    Interval History   Ms. Lopez's creatinine is 1.27 (12/10 0811); Trend down from 5.13 yesterday.   I/O last 3 completed shifts:  In: 2727.5 [P.O.:610; I.V.:2117.5]  Out: 3890 [Urine:3890]   Other significant labs/tests/vitals: SBP 110s - 120s overnight. Afebrile   - 12/9/23 Transplant Renal Ultrasound: Normal    No acute events overnight.  No chest pain or shortness of breath.  No leg swelling.  No nausea or vomiting.  No BM yet since surgery.  Pt reports abdominal pain has improved.  No fever, sweats or chills.      Review of Systems   4 point ROS was obtained and negative except as noted in the Interval History.    MEDICATIONS:   acetaminophen  975 mg Oral Q8H    atorvastatin  10 mg Oral Daily    [START ON 12/12/2023] basiliximab (SIMULECT) 20 mg in sodium chloride 0.9 % 50 mL infusion  20 mg Intravenous Once    entecavir  0.5 mg Oral Q72H    insulin aspart  1-7 Units Subcutaneous TID AC    insulin aspart  1-5 Units Subcutaneous At Bedtime    [START ON 12/11/2023] magnesium oxide  400 mg Oral Daily with lunch    methylPREDNISolone  100 mg Intravenous Once    mycophenolate  750 mg Oral BID IS    polyethylene glycol  17 g Oral Daily    [START ON 12/11/2023] predniSONE  60 mg Oral Daily    Followed by    [START ON  "2023] predniSONE  40 mg Oral Daily    Followed by    [START ON 12/15/2023] predniSONE  20 mg Oral Daily    Followed by    [START ON 2023] predniSONE  15 mg Oral Daily    Followed by    [START ON 2023] predniSONE  10 mg Oral Daily    Followed by    [START ON 2024] predniSONE  5 mg Oral Daily    senna-docusate  1 tablet Oral BID    sodium chloride (PF)  10 mL Intracatheter Q8H    sulfamethoxazole-trimethoprim  1 tablet Oral Daily    tacrolimus  2.5 mg Oral BID IS    [START ON 2023] valGANciclovir  450 mg Oral Once per day on       lactated ringers 75 mL/hr at 12/10/23 0132       Physical Exam   Temp  Av.2  F (36.8  C)  Min: 97.7  F (36.5  C)  Max: 99.1  F (37.3  C)      Pulse  Av.5  Min: 81  Max: 112 Resp  Av.1  Min: 12  Max: 21  SpO2  Av.7 %  Min: 92 %  Max: 100 %    CVP (mmHg): 17 mmHgBP (!) 129/91 (BP Location: Left arm)   Pulse 103   Temp 98  F (36.7  C) (Oral)   Resp 16   Ht 1.549 m (5' 1\")   Wt 75.6 kg (166 lb 9.6 oz)   SpO2 96%   BMI 31.48 kg/m     Date 12/10/23 0700 - 23 0659   Shift 9499-8747 4852-9091 7597-6471 24 Hour Total   INTAKE   I.V. 1200   1200   Shift Total(mL/kg) 1200(15.88)   1200(15.88)   OUTPUT   Shift Total(mL/kg)       Weight (kg) 75.57 75.57 75.57 75.57      Admit Weight: 75.3 kg (166 lb 0.1 oz)     GENERAL APPEARANCE: alert and no distress  RESP: lungs clear to auscultation - no rales, rhonchi or wheezes  CV: regular rhythm, normal rate, no rub, no murmur  EDEMA: no LE edema bilaterally  ABDOMEN: soft, nondistended, appropriately tender, bowel sounds normal  MS: extremities normal - no gross deformities noted, no evidence of inflammation in joints, no muscle tenderness  SKIN: no rash  PSYCH: mentation appears normal and affect normal/bright  DIALYSIS ACCESS:  RUE AV fistula +thrill      Data   All labs reviewed by me.  CMP  Recent Labs   Lab 12/10/23  0138 12/10/23  0108 23  2204 23  2153 23  1732 " 12/09/23  1659 12/09/23  1345 12/09/23  1320 12/09/23  0928 12/09/23  0542 12/08/23  2341 12/08/23  2338 12/08/23  2251 12/08/23 2221   0000   NA  --   --   --   --   --   --   --   --   --  133*  --  134* 134* 132*  --    POTASSIUM  --  4.3 4.2  --  3.9  --   --  4.2   < > 4.0  4.0  --  4.3 4.6 4.8   < >   CHLORIDE  --   --   --   --   --   --   --   --   --  96*  --  96*  --   --   --    CO2  --   --   --   --   --   --   --   --   --  23  --  23  --   --   --    ANIONGAP  --   --   --   --   --   --   --   --   --  14  --  15  --   --   --    *  --   --  170*  --  127* 136*  --   --  157*   < > 119* 150* 218*   < >   BUN  --   --   --   --   --   --   --   --   --  27.4*  --  32.4*  --   --   --    CR  --   --   --   --   --   --   --   --   --  5.13*  --  7.41*  --   --   --    GFRESTIMATED  --   --   --   --   --   --   --   --   --  11*  --  7*  --   --   --    DEEPTI  --   --   --   --   --   --   --   --   --  9.4  --  10.6*  --   --   --    MAG  --   --   --   --   --   --   --   --   --  1.9  --  2.2  --   --   --    PHOS  --   --   --   --   --   --   --   --   --  3.7  --  3.6  --   --   --     < > = values in this interval not displayed.     CBC  Recent Labs   Lab 12/10/23  0108 12/09/23  2204 12/09/23  1732 12/09/23  1320 12/09/23  0928 12/09/23  0542 12/08/23  2338   HGB 10.5* 10.7* 10.7* 10.1*   < > 11.4* 11.8   WBC  --   --   --   --   --  12.3* 10.8   RBC  --   --   --   --   --  3.69* 3.78*   HCT  --   --   --   --   --  35.6 35.3   MCV  --   --   --   --   --  97 93   MCH  --   --   --   --   --  30.9 31.2   MCHC  --   --   --   --   --  32.0 33.4   RDW  --   --   --   --   --  12.6 12.8   PLT  --   --   --   --   --  174 202    < > = values in this interval not displayed.     INRNo lab results found in last 7 days.  ABG  Recent Labs   Lab 12/08/23  2251 12/08/23  2221 12/08/23  2150 12/08/23  2126   O2PER 60.0 50.0 60.0 60.0      Urine Studies  Recent Labs   Lab Test 11/27/23  1410  02/28/22  1314 10/07/21  0436 10/05/21  1522   COLOR Straw Yellow Colorless Light Yellow   APPEARANCE Clear Slightly Cloudy* Clear Turbid*   URINEGLC 150* Negative Negative Negative   URINEBILI Negative Negative Negative Negative   URINEKETONE Negative Negative Negative Negative   SG 1.008 1.015 1.013 1.013   UBLD Small* Moderate* 0.2 mg/dL* 0.2 mg/dL*   URINEPH 8.0* 7.0 7.0 7.0   PROTEIN 70* 300* 300* 300*   NITRITE Negative Negative Negative Negative   LEUKEST Negative Trace* Negative Negative   RBCU 9* 7* 7* 27*   WBCU 3 6* 2 4     No lab results found.  PTH  Recent Labs   Lab Test 11/27/23  1406   PTHI 214*     Iron Studies  Recent Labs   Lab Test 11/27/23  1406   IRON 73   *   IRONSAT 42   LUCÍA 1,549*       IMAGING:  All imaging studies reviewed by me.

## 2023-12-11 ENCOUNTER — TELEPHONE (OUTPATIENT)
Dept: PHARMACY | Facility: CLINIC | Age: 28
End: 2023-12-11
Payer: MEDICARE

## 2023-12-11 VITALS
DIASTOLIC BLOOD PRESSURE: 98 MMHG | TEMPERATURE: 99.1 F | SYSTOLIC BLOOD PRESSURE: 145 MMHG | WEIGHT: 168.9 LBS | HEIGHT: 61 IN | BODY MASS INDEX: 31.89 KG/M2 | HEART RATE: 117 BPM | OXYGEN SATURATION: 98 % | RESPIRATION RATE: 16 BRPM

## 2023-12-11 PROBLEM — Z91.89 AT RISK FOR INFECTION TRANSMITTED FROM DONOR: Status: ACTIVE | Noted: 2023-12-11

## 2023-12-11 PROBLEM — Z94.0 KIDNEY TRANSPLANT RECIPIENT: Chronic | Status: ACTIVE | Noted: 2023-12-08

## 2023-12-11 PROBLEM — E83.39 HYPOPHOSPHATEMIA: Status: ACTIVE | Noted: 2023-12-11

## 2023-12-11 PROBLEM — D84.9 IMMUNOSUPPRESSED STATUS (H): Chronic | Status: ACTIVE | Noted: 2023-12-11

## 2023-12-11 PROBLEM — D84.9 IMMUNOSUPPRESSED STATUS (H): Status: ACTIVE | Noted: 2023-12-11

## 2023-12-11 PROBLEM — E87.8 LOW BICARBONATE LEVEL: Status: ACTIVE | Noted: 2023-12-11

## 2023-12-11 LAB
ANION GAP SERPL CALCULATED.3IONS-SCNC: 8 MMOL/L (ref 7–15)
BASOPHILS # BLD AUTO: 0 10E3/UL (ref 0–0.2)
BASOPHILS NFR BLD AUTO: 0 %
BUN SERPL-MCNC: 14.9 MG/DL (ref 6–20)
CALCIUM SERPL-MCNC: 9.2 MG/DL (ref 8.6–10)
CHLORIDE SERPL-SCNC: 110 MMOL/L (ref 98–107)
CREAT SERPL-MCNC: 1.14 MG/DL (ref 0.51–0.95)
DEPRECATED HCO3 PLAS-SCNC: 19 MMOL/L (ref 22–29)
DONOR IDENTIFICATION: NORMAL
DSA COMMENTS: NORMAL
DSA PRESENT: NO
DSA TEST METHOD: NORMAL
EGFRCR SERPLBLD CKD-EPI 2021: 67 ML/MIN/1.73M2
EOSINOPHIL # BLD AUTO: 0.1 10E3/UL (ref 0–0.7)
EOSINOPHIL NFR BLD AUTO: 0 %
ERYTHROCYTE [DISTWIDTH] IN BLOOD BY AUTOMATED COUNT: 13.2 % (ref 10–15)
GLUCOSE SERPL-MCNC: 82 MG/DL (ref 70–99)
HBV DNA SERPL QL NAA+PROBE: NORMAL
HCT VFR BLD AUTO: 32.1 % (ref 35–47)
HCV RNA SERPL QL NAA+PROBE: NORMAL
HGB BLD-MCNC: 10.2 G/DL (ref 11.7–15.7)
HIV1+2 RNA SERPL QL NAA+PROBE: NORMAL
IMM GRANULOCYTES # BLD: 0.1 10E3/UL
IMM GRANULOCYTES NFR BLD: 1 %
LYMPHOCYTES # BLD AUTO: 2.5 10E3/UL (ref 0.8–5.3)
LYMPHOCYTES NFR BLD AUTO: 21 %
MAGNESIUM SERPL-MCNC: 1.9 MG/DL (ref 1.7–2.3)
MCH RBC QN AUTO: 31.1 PG (ref 26.5–33)
MCHC RBC AUTO-ENTMCNC: 31.8 G/DL (ref 31.5–36.5)
MCV RBC AUTO: 98 FL (ref 78–100)
MONOCYTES # BLD AUTO: 0.8 10E3/UL (ref 0–1.3)
MONOCYTES NFR BLD AUTO: 7 %
NEUTROPHILS # BLD AUTO: 8.4 10E3/UL (ref 1.6–8.3)
NEUTROPHILS NFR BLD AUTO: 71 %
NRBC # BLD AUTO: 0 10E3/UL
NRBC BLD AUTO-RTO: 0 /100
ORGAN: NORMAL
PHOSPHATE SERPL-MCNC: 1.6 MG/DL (ref 2.5–4.5)
PLATELET # BLD AUTO: 191 10E3/UL (ref 150–450)
POTASSIUM SERPL-SCNC: 4 MMOL/L (ref 3.4–5.3)
RBC # BLD AUTO: 3.28 10E6/UL (ref 3.8–5.2)
SODIUM SERPL-SCNC: 137 MMOL/L (ref 135–145)
WBC # BLD AUTO: 11.8 10E3/UL (ref 4–11)

## 2023-12-11 PROCEDURE — 250N000013 HC RX MED GY IP 250 OP 250 PS 637: Performed by: NURSE PRACTITIONER

## 2023-12-11 PROCEDURE — 84100 ASSAY OF PHOSPHORUS: CPT

## 2023-12-11 PROCEDURE — 80048 BASIC METABOLIC PNL TOTAL CA: CPT

## 2023-12-11 PROCEDURE — 85025 COMPLETE CBC W/AUTO DIFF WBC: CPT

## 2023-12-11 PROCEDURE — 250N000013 HC RX MED GY IP 250 OP 250 PS 637: Performed by: PHYSICIAN ASSISTANT

## 2023-12-11 PROCEDURE — 250N000012 HC RX MED GY IP 250 OP 636 PS 637: Performed by: PHYSICIAN ASSISTANT

## 2023-12-11 PROCEDURE — 99238 HOSP IP/OBS DSCHRG MGMT 30/<: CPT | Mod: 24 | Performed by: SURGERY

## 2023-12-11 PROCEDURE — 99233 SBSQ HOSP IP/OBS HIGH 50: CPT

## 2023-12-11 PROCEDURE — 36592 COLLECT BLOOD FROM PICC: CPT

## 2023-12-11 PROCEDURE — 250N000013 HC RX MED GY IP 250 OP 250 PS 637

## 2023-12-11 PROCEDURE — 83735 ASSAY OF MAGNESIUM: CPT

## 2023-12-11 PROCEDURE — 250N000013 HC RX MED GY IP 250 OP 250 PS 637: Performed by: SURGERY

## 2023-12-11 PROCEDURE — 250N000012 HC RX MED GY IP 250 OP 636 PS 637

## 2023-12-11 PROCEDURE — 250N000012 HC RX MED GY IP 250 OP 636 PS 637: Performed by: SURGERY

## 2023-12-11 RX ORDER — SULFAMETHOXAZOLE AND TRIMETHOPRIM 400; 80 MG/1; MG/1
1 TABLET ORAL DAILY
Qty: 30 TABLET | Refills: 11 | Status: SHIPPED | OUTPATIENT
Start: 2023-12-12 | End: 2023-12-12

## 2023-12-11 RX ORDER — ALBUTEROL SULFATE 90 UG/1
1-2 AEROSOL, METERED RESPIRATORY (INHALATION)
Start: 2023-12-12

## 2023-12-11 RX ORDER — ALBUTEROL SULFATE 0.83 MG/ML
2.5 SOLUTION RESPIRATORY (INHALATION)
OUTPATIENT
Start: 2023-12-12

## 2023-12-11 RX ORDER — ATORVASTATIN CALCIUM 10 MG/1
10 TABLET, FILM COATED ORAL DAILY
Qty: 30 TABLET | Refills: 11 | Status: SHIPPED | OUTPATIENT
Start: 2023-12-12 | End: 2023-12-12

## 2023-12-11 RX ORDER — TACROLIMUS 1 MG/1
2 CAPSULE ORAL 2 TIMES DAILY
Qty: 120 CAPSULE | Refills: 11 | Status: SHIPPED | OUTPATIENT
Start: 2023-12-11 | End: 2023-12-12

## 2023-12-11 RX ORDER — SODIUM BICARBONATE 650 MG/1
650 TABLET ORAL 2 TIMES DAILY
Qty: 60 TABLET | Refills: 11 | Status: SHIPPED | OUTPATIENT
Start: 2023-12-11 | End: 2023-12-12

## 2023-12-11 RX ORDER — ENTECAVIR 0.5 MG/1
0.5 TABLET, FILM COATED ORAL DAILY
Qty: 30 TABLET | Refills: 5 | Status: SHIPPED | OUTPATIENT
Start: 2023-12-11 | End: 2024-06-05

## 2023-12-11 RX ORDER — VALGANCICLOVIR 450 MG/1
900 TABLET, FILM COATED ORAL DAILY
Qty: 60 TABLET | Refills: 2 | Status: SHIPPED | OUTPATIENT
Start: 2023-12-12 | End: 2023-12-12

## 2023-12-11 RX ORDER — OXYCODONE HYDROCHLORIDE 5 MG/1
2.5-5 TABLET ORAL EVERY 4 HOURS PRN
Qty: 5 TABLET | Refills: 0 | Status: SHIPPED | OUTPATIENT
Start: 2023-12-11 | End: 2024-01-10

## 2023-12-11 RX ORDER — SODIUM BICARBONATE 650 MG/1
650 TABLET ORAL 2 TIMES DAILY
Status: DISCONTINUED | OUTPATIENT
Start: 2023-12-11 | End: 2023-12-11 | Stop reason: HOSPADM

## 2023-12-11 RX ORDER — TACROLIMUS 0.5 MG/1
0.5 CAPSULE ORAL 2 TIMES DAILY
Qty: 60 CAPSULE | Refills: 11 | Status: SHIPPED | OUTPATIENT
Start: 2023-12-11 | End: 2023-12-12

## 2023-12-11 RX ORDER — POLYETHYLENE GLYCOL 3350 17 G/17G
17 POWDER, FOR SOLUTION ORAL DAILY PRN
Qty: 510 G | Refills: 0 | Status: SHIPPED | OUTPATIENT
Start: 2023-12-11 | End: 2024-01-10

## 2023-12-11 RX ORDER — PREDNISONE 10 MG/1
TABLET ORAL
Qty: 49 TABLET | Refills: 0 | Status: SHIPPED | OUTPATIENT
Start: 2023-12-12 | End: 2024-01-10

## 2023-12-11 RX ORDER — MYCOPHENOLATE MOFETIL 250 MG/1
750 CAPSULE ORAL 2 TIMES DAILY
Qty: 180 CAPSULE | Refills: 11 | Status: SHIPPED | OUTPATIENT
Start: 2023-12-11 | End: 2023-12-12

## 2023-12-11 RX ORDER — DIPHENHYDRAMINE HYDROCHLORIDE 50 MG/ML
50 INJECTION INTRAMUSCULAR; INTRAVENOUS
Start: 2023-12-12

## 2023-12-11 RX ORDER — EPINEPHRINE 1 MG/ML
0.3 INJECTION, SOLUTION, CONCENTRATE INTRAVENOUS EVERY 5 MIN PRN
OUTPATIENT
Start: 2023-12-12

## 2023-12-11 RX ORDER — ACETAMINOPHEN 325 MG/1
975 TABLET ORAL EVERY 8 HOURS PRN
Qty: 100 TABLET | Refills: 0 | Status: SHIPPED | OUTPATIENT
Start: 2023-12-11

## 2023-12-11 RX ORDER — METHYLPREDNISOLONE SODIUM SUCCINATE 125 MG/2ML
125 INJECTION, POWDER, LYOPHILIZED, FOR SOLUTION INTRAMUSCULAR; INTRAVENOUS
Start: 2023-12-12

## 2023-12-11 RX ORDER — AMOXICILLIN 250 MG
1-2 CAPSULE ORAL 2 TIMES DAILY
Qty: 60 TABLET | Refills: 0 | Status: SHIPPED | OUTPATIENT
Start: 2023-12-11 | End: 2023-12-20

## 2023-12-11 RX ORDER — MAGNESIUM OXIDE 400 MG/1
400 TABLET ORAL
Qty: 30 TABLET | Refills: 11 | Status: SHIPPED | OUTPATIENT
Start: 2023-12-11 | End: 2023-12-12

## 2023-12-11 RX ADMIN — ATORVASTATIN CALCIUM 10 MG: 10 TABLET, FILM COATED ORAL at 08:22

## 2023-12-11 RX ADMIN — PREDNISONE 60 MG: 20 TABLET ORAL at 08:23

## 2023-12-11 RX ADMIN — ACETAMINOPHEN 975 MG: 325 TABLET, FILM COATED ORAL at 02:21

## 2023-12-11 RX ADMIN — TACROLIMUS 2.5 MG: 1 CAPSULE ORAL at 08:22

## 2023-12-11 RX ADMIN — SULFAMETHOXAZOLE AND TRIMETHOPRIM 1 TABLET: 400; 80 TABLET ORAL at 08:23

## 2023-12-11 RX ADMIN — ACETAMINOPHEN 975 MG: 325 TABLET, FILM COATED ORAL at 10:57

## 2023-12-11 RX ADMIN — VALGANCICLOVIR 900 MG: 450 TABLET, FILM COATED ORAL at 08:22

## 2023-12-11 RX ADMIN — SENNOSIDES AND DOCUSATE SODIUM 1 TABLET: 8.6; 5 TABLET ORAL at 08:22

## 2023-12-11 RX ADMIN — SODIUM BICARBONATE 650 MG: 650 TABLET ORAL at 08:22

## 2023-12-11 RX ADMIN — MYCOPHENOLATE MOFETIL 750 MG: 250 CAPSULE ORAL at 08:22

## 2023-12-11 RX ADMIN — MAGNESIUM OXIDE TAB 400 MG (241.3 MG ELEMENTAL MG) 400 MG: 400 (241.3 MG) TAB at 11:01

## 2023-12-11 RX ADMIN — POLYETHYLENE GLYCOL 3350 17 G: 17 POWDER, FOR SOLUTION ORAL at 08:23

## 2023-12-11 RX ADMIN — SODIUM PHOSPHATE, DIBASIC, ANHYDROUS, POTASSIUM PHOSPHATE, MONOBASIC, AND SODIUM PHOSPHATE, MONOBASIC, MONOHYDRATE 500 MG: 852; 155; 130 TABLET, COATED ORAL at 08:22

## 2023-12-11 ASSESSMENT — ACTIVITIES OF DAILY LIVING (ADL)
ADLS_ACUITY_SCORE: 21
ADLS_ACUITY_SCORE: 21
ADLS_ACUITY_SCORE: 25
ADLS_ACUITY_SCORE: 25
ADLS_ACUITY_SCORE: 21
ADLS_ACUITY_SCORE: 25
ADLS_ACUITY_SCORE: 25
ADLS_ACUITY_SCORE: 21
ADLS_ACUITY_SCORE: 25

## 2023-12-11 NOTE — CARE PLAN
"/89 (BP Location: Left arm, Patient Position: Semi-Yoon's, Cuff Size: Adult Regular)   Pulse 95   Temp 97.8  F (36.6  C) (Oral)   Resp 14   Ht 1.549 m (5' 1\")   Wt 76.6 kg (168 lb 14.4 oz)   SpO2 98%   BMI 31.91 kg/m        Shift: 3861-0668  VS: VSS on RA, afebrile  Neuro: Aox4  Behaviors: calm and cooperative  BG: ACHS - 151,120  Respiratory: diminished lung sounds at bases  Cardiac: WDL  Pain/Nausea: denies nausea, reports minimal incisional pain  Diet: regular  IV Access: RIJ, LPIV  Lines/Drains: martinez catheter  GI/: voiding adequately via martinez, 1 BM this shift  Skin: abd incision stapled BRAD  Mobility: SBA/ Assist x1  Plan: continue plan of care     "

## 2023-12-11 NOTE — DISCHARGE SUMMARY
[facility]    Discharge Summary  Transplant Surgery    Date of Admission:  12/8/2023  Date of Discharge:  12/11/2023  Discharging Provider: ARDEN Alonso CNP / Giovanny Chan MD     Attestation: I saw and examined this patient with Anahi Pedro, NP, and the transplant team. I independently reviewed all pertinent laboratory and imaging information and made independent management decisions including immunosuppression adjustment. I agree with the findings and plan as documented in this note.  Giovanny Chan MD  __________________________    Discharge Diagnoses   Principal Problem:    Kidney transplant recipient  Active Problems:    Anemia in chronic kidney disease    Primary hypertension    Immunosuppressed status (H24)    Hypophosphatemia    Low bicarbonate level     History of Present Illness   Nuzhat Lopez is an 27 year old female with history of ESRD on hemodialysis secondary to IgA nephropathy, HTN, and migraine. S/p living donor kidney transplant without ureteral stent on 12/8/23.     Hospital Course   Kidney transplant: Creatinine down to 1.1 by day of discharge.    Immunosuppressed due to medications:  -cPRA 0  -Induction with basiliximab 12/8 & 12/12/23 and steroid taper.  -Tacrolimus, goal level 8-10 (12 hour trough).  -Mycophenolate 750mg BID.  -Infectious prophylaxis with Bactrim indefinitely and valganciclovir x12 weeks.    Transplant coordinator Dina Siegel  [phone]  Donor type:  Live  DSA at time of transplant:  Pending  Ureteral stent: NO  CMV:  Donor + / Recipient +  EBV:  Donor + / Recipient +    Hematology:   Anemia of chronic disease: Hgb 10.2, stable.     Cardiorespiratory:   HTN: BP controlled. Hold carvedilol.     Endocrine:   Steroid hyperglycemia: Resolved prior to discharge.     Fluid/Electrolytes:   Hypomagnesemia prophylaxis: Continue MagOx.  Hypophosphatemia: Discharge on Phospha.  Low bicarb: Discharge on sodium bicarb  supplement.     Infectious disease:  Donor HBcAb positive: Plan discussed with Hepatology prior to admission. Entecavir started 12/4/23.  - Continue antiviral for at least 6 months, then at 6 months send to Hepatology for further plan. Entecavir ordered  - Post-transplant: Liver enzymes at least monthly and HBsAg, HBsAb and HBV DNA every 3 months for the first year.      Significant Results and Procedures   12/8/23  Procedure:      Kidney Transplant Non-Directed Recipient, N/A - Abdomen  Surgeon:         Surgeon(s) and Role:     * Osbaldo Hurtado MD - Primary     * Krishna Beal MD - Fellow - Assisting     * Blu Valladares MD - Fellow - Assisting  Anesthesia:     General             Estimated Blood Loss: 50 mL     Drains: None  Specimens:     * No specimens in log *  Findings:                     None.  Complications:            None.  Implants:         * No implants in log *    Pending Results   Unresulted Labs Ordered in the Past 30 Days of this Admission       Date and Time Order Name Status Description    12/8/2023  3:46 PM BK Virus IgG Antibody In process     12/8/2023  3:46 PM HBV HCV HIV by ANTHONY In process             Code Status   Full    Primary Care Physician   LAURA HERNANDES    Physical Exam   Temp: 97.7  F (36.5  C) Temp src: Oral BP: 129/89 Pulse: 98   Resp: 16 SpO2: 98 % O2 Device: None (Room air)    Vitals:    12/09/23 1100 12/10/23 0935 12/11/23 0611   Weight: 75.6 kg (166 lb 9.6 oz) 76.6 kg (168 lb 14.4 oz) 76.6 kg (168 lb 14.4 oz)     Vital Signs with Ranges  Temp:  [97.5  F (36.4  C)-98  F (36.7  C)] 97.7  F (36.5  C)  Pulse:  [] 98  Resp:  [14-18] 16  BP: (129-142)/() 129/89  SpO2:  [97 %-100 %] 98 %  I/O last 3 completed shifts:  In: 1780 [P.O.:570; I.V.:1210]  Out: 2200 [Urine:2200]    General Appearance: in no apparent distress.   Skin: Warm  Heart: well perfused  Lungs: Unlabored  Abdomen: The abdomen is soft, non distended, appropriately ttp around surgical incision. Incision  dry, intact  : martinez removed prior to discharge  Extremities: edema: trace  Neurologic: awake, alert, and oriented x4. Tremor absent.     Time Spent on this Encounter   I, ARDEN Alonso CNP, personally saw the patient today and spent greater than 30 minutes discharging this patient.    Discharge Disposition   Discharged to home  Condition at discharge: Stable    Consultations This Hospital Stay   SOT MEDICATION HISTORY IP PHARMACY CONSULT  SOCIAL WORK IP CONSULT  PHARMACY IP CONSULT  NUTRITION SERVICES ADULT IP CONSULT  NEPHROLOGY KIDNEY/PANCREAS TRANSPLANT ADULT IP CONSULT    Discharge Orders       Reason for your hospital stay    Kidney transplanted     Activity    Your activity upon discharge: Walk at least four times a day, lift no greater than 10 pounds for 6-8 weeks from the time of surgery.  No driving while taking narcotics or 3 weeks after surgery.     [facility] Follow-up and recommended labs and tests    [facility] FOLLOW UP:     1. [facility]: Over the next 2 days you will be seen in the [facility] (ph. [phone], option 3). Basiliximab infusion on 12/12/23. Your labs will be drawn at the beginning of your appointment. DO NOT take your medications prior to having labs drawn. Please bring all your medications with you from home to take after labs are drawn.     2. Follow up with Dr. Hurtado in Transplant Clinic in 1-2 weeks.     3. Follow up with Transplant Nephrologist as scheduled.     4. Follow up with your primary care provider in ~8 weeks. Patient to schedule.     5. Follow up in Hepatology Clinic for follow up management of entecavir in 6 months.    Remember to always bring an updated medication list to all appointments.        Call your Transplant Coordinator ([phone]) with questions about Transplant Center appointment scheduling.     LABS:     CBC, BMP, magnesium, phosphorus, tacrolimus level to be drawn daily while in ATC,  then every Monday and Thursday by home health care nurse if arranged, or at an outpatient lab.    Hepatic panel monthly x1 year.    HBsAg, HBsAb, HBV DNA every 3 months x1 year.     Monitor and record    weight every day     When to contact your care team    WHEN TO CONTACT YOUR  COORDINATOR:     Transplant Coordinator [phone]     Notify your coordinator if you have pain over your kidney, increased redness or drainage from your incision, fever greater than 100F, decreased urine output or new or increased amount of blood in urine.     Notify your coordinator immediately if you are ever unable to take your immunosuppressive medications for any reason.     If you have URGENT concerns after office hours, please call the hospital switchboard at [phone] and ask to have the organ transplant nurse on-call paged. If you have a life-threatening emergency, go to the nearest emergency room.     Wound care and dressings    Instructions to care for your wound at home: If you have staples in place, they will be removed in 3 weeks after operation. Wash incision daily with soap and water. Do not soak or scrub.     Diet    Follow this diet upon discharge: Diet recommendations post-transplant: Heart healthy dietary habits long term (low saturated/trans fat, low sodium). High protein diet x 8 weeks. Practice food safety precautions.     Discharge Medications    Current Discharge Medication List        START taking these medications    Details   atorvastatin (LIPITOR) 10 MG tablet Take 1 tablet (10 mg) by mouth daily  Qty: 30 tablet, Refills: 11    Associated Diagnoses: Kidney transplant recipient      magnesium oxide (MAG-OX) 400 MG tablet Take 1 tablet (400 mg) by mouth daily (with lunch)  Qty: 30 tablet, Refills: 11    Associated Diagnoses: Kidney transplant recipient      mycophenolate (GENERIC EQUIVALENT) 250 MG capsule Take 3 capsules (750 mg) by mouth 2 times daily  Qty: 180 capsule, Refills: 11    Associated Diagnoses:  Kidney transplant recipient      oxyCODONE (ROXICODONE) 5 MG tablet Take 0.5-1 tablets (2.5-5 mg) by mouth every 4 hours as needed for moderate pain  Qty: 5 tablet, Refills: 0    Associated Diagnoses: Kidney transplant recipient      phosphorus tablet 250 mg (PHOSPHA 250 NEUTRAL) 250 MG per tablet Take 2 tablets (500 mg) by mouth 2 times daily  Qty: 60 tablet, Refills: 0    Associated Diagnoses: Hypophosphatemia      polyethylene glycol (MIRALAX) 17 GM/Dose powder Take 17 g by mouth daily as needed for constipation  Qty: 510 g, Refills: 0    Associated Diagnoses: Kidney transplant recipient      predniSONE (DELTASONE) 10 MG tablet Take 6 tablets (60 mg) by mouth daily for 1 day, THEN 4 tablets (40 mg) daily for 2 days, THEN 2 tablets (20 mg) daily for 7 days, THEN 1.5 tablets (15 mg) daily for 7 days, THEN 1 tablet (10 mg) daily for 7 days, THEN 0.5 tablets (5 mg) daily for 7 days.  Qty: 49 tablet, Refills: 0    Associated Diagnoses: Kidney transplant recipient      senna-docusate (SENOKOT-S/PERICOLACE) 8.6-50 MG tablet Take 1-2 tablets by mouth 2 times daily  Qty: 60 tablet, Refills: 0    Associated Diagnoses: Kidney transplant recipient      sodium bicarbonate 650 MG tablet Take 1 tablet (650 mg) by mouth 2 times daily  Qty: 60 tablet, Refills: 11    Associated Diagnoses: Kidney transplant recipient      sulfamethoxazole-trimethoprim (BACTRIM) 400-80 MG tablet Take 1 tablet by mouth daily  Qty: 30 tablet, Refills: 11    Associated Diagnoses: Kidney transplant recipient      !! tacrolimus (GENERIC) 0.5 MG capsule Take 1 capsule (0.5 mg) by mouth 2 times daily Total dose 2.5mg twice daily  Qty: 60 capsule, Refills: 11    Associated Diagnoses: Kidney transplant recipient      !! tacrolimus (GENERIC) 1 MG capsule Take 2 capsules (2 mg) by mouth 2 times daily Total dose 2.5mg twice daily  Qty: 120 capsule, Refills: 11    Associated Diagnoses: Kidney transplant recipient      valGANciclovir (VALCYTE) 450 MG tablet Take  2 tablets (900 mg) by mouth daily  Qty: 60 tablet, Refills: 2    Associated Diagnoses: Kidney transplant recipient       !! - Potential duplicate medications found. Please discuss with provider.        CONTINUE these medications which have CHANGED    Details   acetaminophen (TYLENOL) 325 MG tablet Take 3 tablets (975 mg) by mouth every 8 hours as needed for pain  Qty: 100 tablet, Refills: 0    Associated Diagnoses: Kidney transplant recipient      entecavir (BARACLUDE) 0.5 MG tablet Take 1 tablet (0.5 mg) by mouth daily  Qty: 30 tablet, Refills: 5    Associated Diagnoses: Hepatitis B infection without delta agent without hepatic coma, unspecified chronicity           STOP taking these medications       calcitRIOL (ROCALTROL) 0.25 MCG capsule Comments:   Reason for Stopping:         carvedilol (COREG) 12.5 MG tablet Comments:   Reason for Stopping:         sevelamer HCl (RENAGEL) 800 MG tablet Comments:   Reason for Stopping:             Allergies   Allergies   Allergen Reactions    Cephalexin Rash    Heparin Flush Rash     Data   Most Recent 3 CBC's:  Recent Labs   Lab Test 12/11/23  0649 12/10/23  0811 12/10/23  0108 12/09/23  0928 12/09/23  0542   WBC 11.8* 14.5*  --   --  12.3*   HGB 10.2* 10.2* 10.5*   < > 11.4*   MCV 98 98  --   --  97    173  --   --  174    < > = values in this interval not displayed.     Most Recent 3 BMP's:  Recent Labs   Lab Test 12/11/23  0649 12/10/23  2130 12/10/23  1742 12/10/23  0834 12/10/23  0811 12/10/23  0138 12/10/23  0108 12/09/23  0928 12/09/23  0542     --   --   --  137  --   --   --  133*   POTASSIUM 4.0  --   --   --  4.1  --  4.3   < > 4.0  4.0   CHLORIDE 110*  --   --   --  109*  --   --   --  96*   CO2 19*  --   --   --  20*  --   --   --  23   BUN 14.9  --   --   --  11.0  --   --   --  27.4*   CR 1.14*  --   --   --  1.27*  --   --   --  5.13*   ANIONGAP 8  --   --   --  8  --   --   --  14   DEEPTI 9.2  --   --   --  9.2  --   --   --  9.4   GLC 82 120*  151*   < > 107*   < >  --    < > 157*    < > = values in this interval not displayed.

## 2023-12-11 NOTE — PROGRESS NOTES
CLINICAL NUTRITION SERVICES - BRIEF/DISCHARGE NOTE     Nutrition Prescription    RECOMMENDATIONS FOR MDs/PROVIDERS TO ORDER:  None at this time    Recommendations already ordered by Registered Dietitian (RD):  None at this time    Future/Additional Recommendations:  Minimize diet restrictions as able d/t high calorie/protein needs post-transplant.  Oral supplements as needed to help meet nutritional needs.     High protein food choices with meals to help meet high needs post-transplant over the next 6-8 weeks.     Heart-healthy diet (low saturated fat, low sodium, high fiber) and food safety precautions long term due to immunosuppression regimen post-transplant         EVALUATION OF THE PROGRESS TOWARD GOALS   Diet: Regular    Intake: Met with patient in room. Pt reports her appetite is good and improving since it was right after transplant, and she is eating the majority of her 3 meals a day. She reports having protein with meals, sharing examples like chicken or eggs and sausage. Flowsheet documentation shows % intake of meals. Per healthtouch, pt has been ordering 2-3 meals per day, providing an average of 2220 kcals and 69 g protein, meeting 100% of calorie and 96% of protein needs.     NEW FINDINGS   Spoke with patient about diet recommendations for discharge, including adequate calories and protein. Emphasized including protein with each meal, and discussed having back up meal options like protein bars or shakes in case appetite declines. RD reiterated food safety concerns post transplant, including avoiding raw/undercooked fish and meat, raw milk/cheese, using a food thermometer to check the internal temperature of meats, and storing food at the proper temperatures. RD encouraged pt to review USDA food safety guide and other handouts, and answered pt's questions. Encouraged pt to read over discharge instructions which emphasize nutrition recommendations to follow at home.     Patient s discharge needs  assessed and discharge planning has been conducted with the multidisciplinary transplant care team including physicians, pharmacy, social work and transplant coordinator.     Monitoring/Evaluation  Progress toward goals will be monitored and evaluated per protocol.    Once discharged, place outpatient nutrition consult via the transplant team if nutrition concerns arise.     Ngozi Rodrigues, MPH, RDN, LD   7A/7B (beds 219-229) RD pager: [phone]  Weekend/Holiday RD pager: [phone]

## 2023-12-11 NOTE — PROGRESS NOTES
Patient's Name: Nuzhat Lopez    Procedure Date: 12/11/23  Procedure Time 1515    Procedure Performed: Central line removal     The Central Venous Catheter (CVC) was removed per provider order.     The central venous catheter was located: Right Internal Jugular vein, with a total of 3 lumens.     The patient was placed in Trendelenburg position with Insertion site lower than heart.     Valsalva response achieved by: Patient instructed to take a deep breath and bear down while the CVC was removed with a constant steady motion.     Firm pressure was applied at the access site for 5 minutes until bleeding stopped.     Post removal site assessment; Clean and dry without bleeding. Occlusive dressing applied: Yes    CVC tip was inspected and intact: Yes    Tip was sent to lab for culture per provider order: No    Patient tolerated the procedure: well    2nd RN present during removal (if applicable) Nori Sanabria RN   12/11/2023   3:35 PM

## 2023-12-11 NOTE — PLAN OF CARE
Goal Outcome Evaluation:    Plan of Care Reviewed With: patient, family    Overall Patient Progress: improving Overall Patient Progress: improving    Outcome Evaluation: PO intake good, see RD note 12/11

## 2023-12-11 NOTE — PROGRESS NOTES
[facility]   Transplant Nephrology Progress Note  Date of Admission:  12/8/2023  Today's Date: 12/11/2023    Recommendations:  - No indication for dialysis.  - Give sodium bicarbonate 650 mg PO BID.  - Agree with phos tabs 500 mg PO BID.  - Monthly LFTs  - q3 months HBsAg, HBsAb, HBV DNA for 1y post txp      Assessment & Plan   # LDKT: Trend down in serum creatinine   - Baseline Creatinine: ~ TBD   - Proteinuria: Not checked post transplant   - Date DSA Last Checked: Nov/2023      Latest DSA: No DSA at time of transplant   - BK Viremia: Not checked post transplant   - Kidney Tx Biopsy: No   - Transplant Ureteral Stent: No    # Immunosuppression: Tacrolimus immediate release (goal 8-10), Mycophenolate mofetil (dose 750 mg every 12 hours), and Prednisone (dose taper)   - Patient is in an immunosuppressed state and will continue to monitor for efficacy and toxicity of immunosuppression medications.   - Induction with Recent Transplant:   Low Intensity Protocol    - Changes: Not at this time    # Infection Prophylaxis:   - PJP: Sulfa/TMP (Bactrim)  - CMV: Valganciclovir (Valcyte),CMV IgG Ab positive  - Hep B: Entecavir (Baraclude), Donor HBcAb positive    # Hep B core Ab+ donor:   -Discussed prior to transplant with transplant hepatology.    -Plan to continue entecavir x6 months post txp then at 6m post txp should follow up with hepatology.    -Monthly LFTs   -q3 months HBsAg, HBsAb, HBV DNA for 1y post txp    # Hypertension: Controlled;  Goal BP: < 150/90   - Volume status: Euvolemic  EDW ~ 75.5 kg   - Changes: No    Prior to admission antihypertensives: carvedilol  12.5 mg PO BID.    # Anemia in Chronic Renal Disease: Hgb: Stable      MARY: No   - Iron studies: Replete    # Mineral Bone Disorder:    - Secondary renal hyperparathyroidism; PTH level: Mildly elevated (151-300 pg/ml)   (prior to transplant)     On treatment: None  - Vitamin D; level:  19 (11/27/2023)         On  supplement: No  - Calcium; level: Normal        On supplement: No  - Phosphorus; level: Low        On supplement: No. Agree with phos tabs 500 mg PO BID.    # Electrolytes:   - Potassium; level: Normal        On supplement: No  - Magnesium; level: Normal        On supplement: No, magnesium oxide 400 mg PO daily starts tomorrow  - Bicarbonate; level: Low          On supplement: No. Give sodium bicarbonate 650 mg PO BID.  - Sodium; level: Normal      # Transplant History:  Etiology of Kidney Failure: IgA nephropathy  Tx: LDKT  Transplant: 12/8/2023 (Kidney)  Crossmatch at time of Tx: negative  DSA at time of Tx: No  Significant changes in immunosuppression: None  Significant transplant-related complications: None      Recommendations were communicated to the primary team via this note.    Discussed with Dr. Noguera.    ARDEN Valentine CNP   Pager: [phone]    Interval History   Lockhart removal today.  Ms. Lopez's creatinine is 1.14 (12/11 0649); Trend down from 1.27 yesterday.   I/O last 3 completed shifts:  In: 1780 [P.O.:570; I.V.:1210]  Out: 2200 [Urine:2200]   Other significant labs/tests/vitals: SBP 130s overnight. Afebrile  No acute events overnight.  No chest pain or shortness of breath.  trace leg swelling.  No nausea or vomiting.  No diarrhea.  No fever, sweats or chills.        Review of Systems   4 point ROS was obtained and negative except as noted in the Interval History.    MEDICATIONS:   acetaminophen  975 mg Oral Q8H    atorvastatin  10 mg Oral Daily    [START ON 12/12/2023] basiliximab (SIMULECT) 20 mg in sodium chloride 0.9 % 50 mL infusion  20 mg Intravenous Once    entecavir  0.5 mg Oral Daily at 8 pm    magnesium oxide  400 mg Oral Daily with lunch    mycophenolate  750 mg Oral BID IS    polyethylene glycol  17 g Oral Daily    predniSONE  60 mg Oral Daily    Followed by    [START ON 12/13/2023] predniSONE  40 mg Oral Daily    Followed by    [START ON 12/15/2023] predniSONE  20 mg Oral Daily     "Followed by    [START ON 2023] predniSONE  15 mg Oral Daily    Followed by    [START ON 2023] predniSONE  10 mg Oral Daily    Followed by    [START ON 2024] predniSONE  5 mg Oral Daily    senna-docusate  1 tablet Oral BID    sodium chloride (PF)  10 mL Intracatheter Q8H    sulfamethoxazole-trimethoprim  1 tablet Oral Daily    tacrolimus  2.5 mg Oral BID IS    valGANciclovir  900 mg Oral Daily           Physical Exam   Temp  Av.2  F (36.8  C)  Min: 97.7  F (36.5  C)  Max: 99.1  F (37.3  C)      Pulse  Av.5  Min: 81  Max: 112 Resp  Av.1  Min: 12  Max: 21  SpO2  Av.7 %  Min: 92 %  Max: 100 %    CVP (mmHg): 17 mmHgBP 133/89 (BP Location: Left arm)   Pulse 101   Temp 97.5  F (36.4  C) (Oral)   Resp 16   Ht 1.549 m (5' 1\")   Wt 76.6 kg (168 lb 14.4 oz)   SpO2 98%   BMI 31.91 kg/m     Date 12/10/23 0700 - 23 0659   Shift 4592-4509 8291-3346 7640-6509 24 Hour Total   INTAKE   I.V. 1200   1200   Shift Total(mL/kg) 1200(15.88)   1200(15.88)   OUTPUT   Shift Total(mL/kg)       Weight (kg) 75.57 75.57 75.57 75.57      Admit Weight: 75.3 kg (166 lb 0.1 oz)     GENERAL APPEARANCE: alert and no distress  RESP: lungs clear to auscultation - no rales, rhonchi or wheezes  CV: regular rhythm, normal rate, no rub, no murmur  EDEMA: trace LE edema bilaterally  ABDOMEN: soft, nondistended, appropriately tender, bowel sounds normal  MS: extremities normal - no gross deformities noted, no evidence of inflammation in joints, no muscle tenderness  SKIN: no rash  PSYCH: mentation appears normal and affect normal/bright  DIALYSIS ACCESS:  RUE AV fistula +thrill      Data   All labs reviewed by me.  CMP  Recent Labs   Lab 12/10/23  2130 12/10/23  1742 12/10/23  1242 12/10/23  0834 12/10/23  0811 12/10/23  0138 12/10/23  0108 23  2204 23  2153 23  1732 23  0928 23  0542 23  2341 23  2338 23  2251   0000   NA  --   --   --   --  137  --   --   --   -- "   --   --  133*  --  134* 134*  --    POTASSIUM  --   --   --   --  4.1  --  4.3 4.2  --  3.9   < > 4.0  4.0  --  4.3 4.6   < >   CHLORIDE  --   --   --   --  109*  --   --   --   --   --   --  96*  --  96*  --   --    CO2  --   --   --   --  20*  --   --   --   --   --   --  23 -- 23  --   --    ANIONGAP  --   --   --   --  8  --   --   --   --   --   --  14  --  15  --   --    * 151* 119* 103* 107*   < >  --   --    < >  --    < > 157*   < > 119* 150*   < >   BUN  --   --   --   --  11.0  --   --   --   --   --   --  27.4*  --  32.4*  --   --    CR  --   --   --   --  1.27*  --   --   --   --   --   --  5.13*  --  7.41*  --   --    GFRESTIMATED  --   --   --   --  59*  --   --   --   --   --   --  11*  --  7*  --   --    DEEPTI  --   --   --   --  9.2  --   --   --   --   --   --  9.4  --  10.6*  --   --    MAG  --   --   --   --  1.9  --   --   --   --   --   --  1.9  --  2.2  --   --    PHOS  --   --   --   --  2.9  --   --   --   --   --   --  3.7  --  3.6  --   --     < > = values in this interval not displayed.     CBC  Recent Labs   Lab 12/11/23  0649 12/10/23  0811 12/10/23  0108 12/09/23 2204 12/09/23 0928 12/09/23  0542 12/08/23  2338   HGB 10.2* 10.2* 10.5* 10.7*   < > 11.4* 11.8   WBC 11.8* 14.5*  --   --   --  12.3* 10.8   RBC 3.28* 3.32*  --   --   --  3.69* 3.78*   HCT 32.1* 32.4*  --   --   --  35.6 35.3   MCV 98 98  --   --   --  97 93   MCH 31.1 30.7  --   --   --  30.9 31.2   MCHC 31.8 31.5  --   --   --  32.0 33.4   RDW 13.2 13.0  --   --   --  12.6 12.8    173  --   --   --  174 202    < > = values in this interval not displayed.     INRNo lab results found in last 7 days.  ABG  Recent Labs   Lab 12/08/23  2251 12/08/23  2221 12/08/23  2150 12/08/23  2126   O2PER 60.0 50.0 60.0 60.0      Urine Studies  Recent Labs   Lab Test 11/27/23  1410 02/28/22  1314 10/07/21  0436 10/05/21  1522   COLOR Straw Yellow Colorless Light Yellow   APPEARANCE Clear Slightly Cloudy* Clear Turbid*    URINEGLC 150* Negative Negative Negative   URINEBILI Negative Negative Negative Negative   URINEKETONE Negative Negative Negative Negative   SG 1.008 1.015 1.013 1.013   UBLD Small* Moderate* 0.2 mg/dL* 0.2 mg/dL*   URINEPH 8.0* 7.0 7.0 7.0   PROTEIN 70* 300* 300* 300*   NITRITE Negative Negative Negative Negative   LEUKEST Negative Trace* Negative Negative   RBCU 9* 7* 7* 27*   WBCU 3 6* 2 4     No lab results found.  PTH  Recent Labs   Lab Test 11/27/23  1406   PTHI 214*     Iron Studies  Recent Labs   Lab Test 11/27/23  1406   IRON 73   *   IRONSAT 42   LUCÍA 1,549*       IMAGING:  All imaging studies reviewed by me.

## 2023-12-11 NOTE — DISCHARGE INSTRUCTIONS
Diet recommendations post-transplant: High calorie (1650 calories daily) and high protein (72 grams daily) diet x 8 weeks.  Heart healthy dietary habits long term (low saturated/trans fat, low sodium). Practice food safety precautions. See nutrition handout and food safety booklet for more information.

## 2023-12-11 NOTE — PLAN OF CARE
"Goal Outcome Evaluation:      Plan of Care Reviewed With: patient    Overall Patient Progress: improving  BP (!) 130/95 (BP Location: Left arm)   Pulse 102   Temp 98  F (36.7  C) (Oral)   Resp 16   Ht 1.549 m (5' 1\")   Wt 76.6 kg (168 lb 14.4 oz)   SpO2 97%   BMI 31.91 kg/m      Shift: 0313-3677  Isolation Status: standard isolation  VS: hypertensive 130/95. Stable on RA, afebrile  Neuro: Aox4  Behaviors: calm and cooperative  BG: ACHS  Labs/Imaging: Pending AM lab draw  Respiratory: diminished breath sounds in bases  Cardiac: WDL  Pain/Nausea: Pain 3/10  PRN: none given  Diet: regular  IV Access: RIJ, L PIV saline locked  Infusion(s): None  Lines/Drains: none  GI/: martinez urine output: 525  Skin: abdominal incision stapled  Mobility: SBA, assist of 1  Plan: continue with plan of care, will notify MD with any changes            "

## 2023-12-11 NOTE — PHARMACY-TRANSPLANT NOTE
Solid Organ Transplant Recipient Prior to Discharge Note    27 year old female s/p living donor kidney transplant on 12/8/2023.    Immunosuppression regimen per kidney transplant protocol:  INDUCTION:  Low intensity, PRA 0 (11/27/2023)  12/8/2023 (POD 0): Basiliximab 20 mg IV x1, methylprednisolone 500 mg x1  12/9/2023 (POD 1): methylprednisolone 250 mg x1  12/10/2023 (POD 2): methylprednisolone 100 mg x1  12/11/2023 (POD 3): Steroid taper  12/12/2023 (POD 4): Basiliximab 20 mg IV x1     MAINTENANCE:   - Mycophenolate 750 mg BID   - REMS education provided 12/10/2023, patient verbalized understanding   - Tacrolimus with goal trough levels of 8-10 mcg/L for first 6 months post-transplant     Opportunistic pathogen prophylaxis includes:  - PJP: trimethoprim/sulfamethoxazole  - CMV D+/R+: Valganciclovir for 3 months duration tentatively  - Donor HBcAb positive: Entecavir for at least 6 months post transplant with plan to reassess at that time     Pharmacy has monitored for medication interactions and immunosuppression levels in conjunction with the multidisciplinary team. In anticipation for discharge, medication therapy needs have been addressed daily throughout the current admission via multidisciplinary rounds and/or discussions, order verification, daily clinical pharmacy review, and communication with prescribers.  Marce NelsonD., BCPS

## 2023-12-11 NOTE — PLAN OF CARE
Goal Outcome Evaluation:      Plan of Care Reviewed With: patient, sibling    Overall Patient Progress: improving    Outcome Evaluation: Patient medically stable for discharge.    DISCHARGE:  Patient with orders to discharge to home.     Education Provided:   Med Card - Up to date and given to patient  Lab Book - Up to date and given to patient  Handouts - AVS discussed with patient. Copy of AVS given to patient. Organ Transplant Handbook. My Transplant Place QR code.  Specialty Pharmacy - Rx filled and picked up by patient    Discharge instructions, medications & follow ups reviewed with patient. Copy of discharge summary given to patient. PIV and CVC removed. [facility] notified, report given.    Patient in stable condition. AVSS. Patient had no further questions regarding discharge instructions and medications. Patient transferred out by wheelchair & left with family.    Plan for follow up care as directed in AVS handout.

## 2023-12-11 NOTE — PROGRESS NOTES
Care Management Discharge Note    Discharge Date: 12/11/2023       Discharge Disposition: Home    Discharge Services: None    Discharge DME: None    Discharge Transportation: family or friend will provide    Private pay costs discussed: Not applicable    Does the patient's insurance plan have a 3 day qualifying hospital stay waiver?  No    PAS Confirmation Code:    Patient/family educated on Medicare website which has current facility and service quality ratings: no    Education Provided on the Discharge Plan: Yes  Persons Notified of Discharge Plans: patient and brother  Patient/Family in Agreement with the Plan: yes    Handoff Referral Completed: Yes    Additional Information:  Pt s/p kidney transplant.  Team anticipates discharge home today.  Met with pt and her brother.   Introduced RNCC role.  Reviewed anticipated plan for discharge, transplant labs M/TH, ATC appointments and home care RN services.  Declines need for home care.  Discharge IMM reviewed/signed.    Santa Sewell, RN BSN, PHN, ACM-RN  7A RN Care Coordinator  Phone: [phone]  Pager [phone]    To contact the weekend RNCC  [place] (0800 - 1630) Saturday and Sunday    Units: 5A,5B,5C [phone]    Units: 6A, [phone]    Units 6B, 6C, 6D [phone]    Units: 7A, 7B, 7C, 7D, [phone]    [facility] ([phone]) Saturday and Sunday  Units: 5 Ortho, 5MS & WB ED Pager: [phone]  Units: 6MS, 8A & 10 ICU  Pager [phone]    12/11/2023 9:02 AM

## 2023-12-11 NOTE — CONSULTS
Transplant Admission Psychosocial Assessment    Patient Name: Nuzhat Lopez  : 1995  Age: 27 year old  MRN: [id number]  Date of Initial Social Work Evaluation: 2022    Patient underwent Kidney transplant.  Met with patient to update psychosocial assessment and provide brief education about SW role while inpatient, as well as expectations/requirements and follow up needs post-transplant. SW also provided education about need for compliance with transplant medications, and explained ESRD Medicare benefits and medication coverage under Medicare part B.  Medicare 2728 forms completed and signed by patient.      Presenting Information   Living Situation:   Resides with her parents and siblings    If not local, plans for short term stay:  NA     Previous Functional Status: Independent ADL's     Cultural/Language/Spiritual Considerations: NA       Support System  Primary Support Person Sister     Other support:  Parents, additional siblings     Plan for support in immediate post-transplant period: Nuzhat has a large, vast support network. Her parents and 10 siblings are all of support.     Health Care Directive  Decision Maker: Self     Alternate Decision Maker: NOK-parents     Health Care Directive: Provided education    Mental Health/Coping:   History of Mental Health: No current or previous mental health concerns     History of Chemical Health: No current or previous CD concerns     Current status: Nuzhat is doing well post transplant and feels grateful for this gift.     Coping: Appropriate     Services Needed/Recommended: NA     Financial   Income: Salary/Wages from Full Time Job as a      Impact of transplant on income: Time off of work for transplant recovery     Insurance and medication coverage:     HEALTH BENEFIT: MEDICARE    ID#[id number]  (PART A EFFECTIVE (2022) , PART B EFFECTIVE (2022) )    PROCESSING INFO: ID#[id number] GRP#OTHER BIN#[id number]    (SUPPLEMENT):  (AETNA)    ID#[id number] GRP# N/A (EFFECTIVE (11/28/2022 ) )    PROCESSING INFO: (Aetna Secondary) ID#[id number] BIN# [id number]    PT WILL PAY $0 AT TIME OF SERVICE     PHARMACY BENEFIT: (Silverscripts) PART D    PROCESSING INFO: ID#[id number] GRP#[id number] PCN#[id number] BIN#[id number] (EFFECTIVE (1/1/2023) )    DEDUCTIBLE (505)     COPAY STRUCTURE:    INITIAL PHASE:    $ (0) FOR TIER 1    $ (5) FOR TIER 2    % (17) FOR TIER 3    % (38) COINSURANCE FOR TIER 4    % (25) COINSURANCE FOR TIER 5 MEDICATIONS UNTIL TOTAL YEAR DRUG COSTS REACH $4660    COVERAGE GAP (DONUT HOLE):    %25 COINSURANCE FOR GENERIC    %25 COINSURANCE FOR BRAND UNTIL TOTAL YEARLY PATIENT OUT-OF-POCKET COSTS REACH $7400    CATASTROPHIC PHASE:    THE GREATER OF EITHER %5 COST OF MEDICATION OR    $4.15 FOR GENERIC    $10.35 FOR BRAND  TEST CLAIM SPECIALTY #28    MYCOPHENOLATE 250mg (#240/30DS) - $0 AT TIME OF SERVICE    PROGRAF 1mg (#120/30DS) - $0 AT TIME OF SERVICE    TACROLIMUS 1mg (#120/30DS) - $0 AT TIME OF SERVICE    CYCLOSPORINE 100mg (#60/30DS) - $0 AT TIME OF SERIVE    VALGANCICLOVIR 450mg (#60/30DS)- $0    TEST CLAIM SPECIALTY #27    MYCOPHENOLATE 250mg (#240/30DS) - $0 AT TIME OF SERVICE    PROGRAF 1mg (#120/30DS) - $0 AT TIME OF SERVICE    TACROLIMUS 1mg (#120/30DS) - $0 AT TIME OF SERVICE    CYCLOSPORINE 100mg (#60/30DS) - $0 AT TIME OF SERVICE    VALGANCICLOVIR 450mg (#60/30DS) - $0       Financial concerns: No concerns noted     Resources needed: NA     Education provided by ERIC: Social Work role inpatient setting, availability of support groups, parking information and psychosocial support.     Assessment and recommendations and plan:    Nuzhat Lopez is an 27 year old female with history of ESRD on hemodialysis secondary to IgA nephropathy, HTN, and migraine. S/p living donor kidney transplant without ureteral stent on 12/8/23.      ERIC met with Nuzhat and her sister at the bedside. She is doing well post transplant. 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 She has a large family of her parents and 10 siblings. She is not in a relationship, no children. Plan to discharge home with family support. SW will continue to follow for psychosocial support, resources and advocate on behalf of the patient.     SCOTT Burgos, [facility]  Outpatient Kidney/Pancreas/Auto Islet Transplant Program   [address] [place] [address] [place] [id number]  [email or web address]  Office: [phone] I Fax: [phone]

## 2023-12-11 NOTE — TELEPHONE ENCOUNTER
A pharmacist spent 30 minutes providing medication teaching with Nuzhat Lopez along with Nuzhat Gandara (mother) and Tammy (sibling) for discharge with a focus on new medications/dose changes.  The discharge medication list was reviewed with the patient/family and the following points were discussed, as applicable: Name, description, purpose, dose/strength, duration of medications, common side effects, food/medications to avoid, action to be taken if dose is missed, when to call MD, and how to obtain refills.  The patient will be responsible for managing medications. Additionally, the following transplant related education was covered: Purpose of medication card, Medication videos, Timing of medications and day of lab draw considerations , Prescription Insurance , and Discharge process for receiving meds   Patient will  transplant supplies including 7 day pill organizer, thermometer, and BP monitor at the discharge pharmacy along with medications.  Patient chooses to receive medications from [facility] specialty pharmacy.   Clinical Pharmacy Consult:                                                      Transplant Specific:   Date of Transplant: 12/8/23  Type of Transplant: kidney  First Transplant: yes  History of rejection: no    Immunosuppression Regimen   TAC 2.5mg qAM & 2.5mg qPM, Prednisone 60mg qAM then taper, and MMF 750mg qAM & 750mg qPM  Patient specific goal: 8-10  Most recent level: not drawn, date   Immunosuppressant Levels:    Pt adherent to lab draws: yes  Scr:   Lab Results   Component Value Date    CR 1.14 12/11/2023     Side effects: no side effects    Prophylactic Medications  PJP Prophylactic: Bactrim SS daily  Scheduled Discontinue Date: Lifelong Anticipated date     Antifungal: Not needed thus far  Scheduled Discontinue Date: N/A Anticipated date     CMV Prophylactic: CrCl >/=60 mL/minute: Valcyte 900mg daily   Scheduled Discontinue Date: 3 months Anticipated date     Acid Reducer: not needed thus  far  Scheduled Reviewed Date: N/A    Vascular Prophylactic:   Scheduled Discontinue Date:       Blood Pressure Management  Frequency of home Blood Pressure checks:  Most recent home BP:   Patient Blood pressure goal:     Patient blood pressure at goal:    Hospitalizations/ER visits since last assessment:     Med rec/DUR performed: yes  Med Rec Discrepancies: no    Reminders:    Bring to first clinic appt: med box, med card, bp monitor, all medications being taken, and lab book.  2.   MTM pharmacist visit on first clinic appt and if ok, again in 3 to 4 months during follow up appt.  3.   Avoid Grapefruit and Grapefruit juice.   4.   Avoid herbal supplements. If wish to take other medications or supplements, call your coordinator.   5.   Keep lab appts.   6.   Can use apps on phone like ClariFI to help manage medication lists and reminders.   7.   Make sure you are protecting your skin by wearing long sleeves and applying sunscreen to exposed skin, for any significant time in the sun.     Transplant Coordinator is Nely Schreiber [facility]

## 2023-12-12 ENCOUNTER — INFUSION THERAPY VISIT (OUTPATIENT)
Dept: INFUSION THERAPY | Facility: CLINIC | Age: 28
End: 2023-12-12
Attending: NURSE PRACTITIONER
Payer: MEDICARE

## 2023-12-12 ENCOUNTER — TELEPHONE (OUTPATIENT)
Dept: PHARMACY | Facility: CLINIC | Age: 28
End: 2023-12-12

## 2023-12-12 ENCOUNTER — LAB (OUTPATIENT)
Dept: LAB | Facility: CLINIC | Age: 28
End: 2023-12-12
Attending: NURSE PRACTITIONER
Payer: MEDICARE

## 2023-12-12 ENCOUNTER — TELEPHONE (OUTPATIENT)
Dept: TRANSPLANT | Facility: CLINIC | Age: 28
End: 2023-12-12

## 2023-12-12 VITALS
DIASTOLIC BLOOD PRESSURE: 97 MMHG | RESPIRATION RATE: 16 BRPM | HEART RATE: 101 BPM | TEMPERATURE: 97.7 F | BODY MASS INDEX: 32.03 KG/M2 | SYSTOLIC BLOOD PRESSURE: 144 MMHG | OXYGEN SATURATION: 99 % | WEIGHT: 169.5 LBS

## 2023-12-12 DIAGNOSIS — Z94.0 KIDNEY REPLACED BY TRANSPLANT: Primary | ICD-10-CM

## 2023-12-12 DIAGNOSIS — Z94.0 KIDNEY TRANSPLANT RECIPIENT: ICD-10-CM

## 2023-12-12 DIAGNOSIS — Z79.899 ENCOUNTER FOR LONG-TERM CURRENT USE OF MEDICATION: ICD-10-CM

## 2023-12-12 DIAGNOSIS — Z94.0 KIDNEY TRANSPLANT RECIPIENT: Primary | ICD-10-CM

## 2023-12-12 DIAGNOSIS — Z48.298 AFTERCARE FOLLOWING ORGAN TRANSPLANT: Primary | ICD-10-CM

## 2023-12-12 DIAGNOSIS — Z98.890 OTHER SPECIFIED POSTPROCEDURAL STATES: ICD-10-CM

## 2023-12-12 DIAGNOSIS — Z48.298 AFTERCARE FOLLOWING ORGAN TRANSPLANT: ICD-10-CM

## 2023-12-12 DIAGNOSIS — Z20.828 CONTACT WITH AND (SUSPECTED) EXPOSURE TO OTHER VIRAL COMMUNICABLE DISEASES: ICD-10-CM

## 2023-12-12 LAB
ANION GAP SERPL CALCULATED.3IONS-SCNC: 8 MMOL/L (ref 7–15)
BASOPHILS # BLD AUTO: 0 10E3/UL (ref 0–0.2)
BASOPHILS NFR BLD AUTO: 0 %
BUN SERPL-MCNC: 16.8 MG/DL (ref 6–20)
CALCIUM SERPL-MCNC: 9.4 MG/DL (ref 8.6–10)
CHLORIDE SERPL-SCNC: 108 MMOL/L (ref 98–107)
CREAT SERPL-MCNC: 1.17 MG/DL (ref 0.51–0.95)
DEPRECATED HCO3 PLAS-SCNC: 22 MMOL/L (ref 22–29)
EGFRCR SERPLBLD CKD-EPI 2021: 65 ML/MIN/1.73M2
EOSINOPHIL # BLD AUTO: 0.1 10E3/UL (ref 0–0.7)
EOSINOPHIL NFR BLD AUTO: 1 %
ERYTHROCYTE [DISTWIDTH] IN BLOOD BY AUTOMATED COUNT: 12.8 % (ref 10–15)
GLUCOSE SERPL-MCNC: 85 MG/DL (ref 70–99)
HCT VFR BLD AUTO: 34.6 % (ref 35–47)
HGB BLD-MCNC: 10.9 G/DL (ref 11.7–15.7)
HOLD SPECIMEN: NORMAL
IMM GRANULOCYTES # BLD: 0.1 10E3/UL
IMM GRANULOCYTES NFR BLD: 1 %
LYMPHOCYTES # BLD AUTO: 3.2 10E3/UL (ref 0.8–5.3)
LYMPHOCYTES NFR BLD AUTO: 31 %
MAGNESIUM SERPL-MCNC: 1.8 MG/DL (ref 1.7–2.3)
MCH RBC QN AUTO: 30.2 PG (ref 26.5–33)
MCHC RBC AUTO-ENTMCNC: 31.5 G/DL (ref 31.5–36.5)
MCV RBC AUTO: 96 FL (ref 78–100)
MONOCYTES # BLD AUTO: 0.6 10E3/UL (ref 0–1.3)
MONOCYTES NFR BLD AUTO: 6 %
NEUTROPHILS # BLD AUTO: 6.3 10E3/UL (ref 1.6–8.3)
NEUTROPHILS NFR BLD AUTO: 61 %
NRBC # BLD AUTO: 0 10E3/UL
NRBC BLD AUTO-RTO: 0 /100
PHOSPHATE SERPL-MCNC: 1.6 MG/DL (ref 2.5–4.5)
PLATELET # BLD AUTO: 218 10E3/UL (ref 150–450)
POTASSIUM SERPL-SCNC: 3.9 MMOL/L (ref 3.4–5.3)
RBC # BLD AUTO: 3.61 10E6/UL (ref 3.8–5.2)
SODIUM SERPL-SCNC: 138 MMOL/L (ref 135–145)
TACROLIMUS BLD-MCNC: 3.3 UG/L (ref 5–15)
TME LAST DOSE: ABNORMAL H
TME LAST DOSE: ABNORMAL H
WBC # BLD AUTO: 10.4 10E3/UL (ref 4–11)

## 2023-12-12 PROCEDURE — 99215 OFFICE O/P EST HI 40 MIN: CPT | Mod: 24 | Performed by: NURSE PRACTITIONER

## 2023-12-12 PROCEDURE — 258N000003 HC RX IP 258 OP 636: Performed by: NURSE PRACTITIONER

## 2023-12-12 PROCEDURE — 83735 ASSAY OF MAGNESIUM: CPT

## 2023-12-12 PROCEDURE — 80048 BASIC METABOLIC PNL TOTAL CA: CPT

## 2023-12-12 PROCEDURE — 80197 ASSAY OF TACROLIMUS: CPT

## 2023-12-12 PROCEDURE — 85025 COMPLETE CBC W/AUTO DIFF WBC: CPT

## 2023-12-12 PROCEDURE — 250N000011 HC RX IP 250 OP 636: Mod: JZ | Performed by: NURSE PRACTITIONER

## 2023-12-12 PROCEDURE — 84100 ASSAY OF PHOSPHORUS: CPT

## 2023-12-12 PROCEDURE — 36415 COLL VENOUS BLD VENIPUNCTURE: CPT | Performed by: PATHOLOGY

## 2023-12-12 PROCEDURE — 96365 THER/PROPH/DIAG IV INF INIT: CPT

## 2023-12-12 RX ORDER — SULFAMETHOXAZOLE AND TRIMETHOPRIM 400; 80 MG/1; MG/1
1 TABLET ORAL DAILY
Qty: 30 TABLET | Refills: 11 | Status: SHIPPED | OUTPATIENT
Start: 2023-12-12

## 2023-12-12 RX ORDER — ALBUTEROL SULFATE 90 UG/1
1-2 AEROSOL, METERED RESPIRATORY (INHALATION)
Start: 2023-12-12

## 2023-12-12 RX ORDER — MYCOPHENOLATE MOFETIL 250 MG/1
750 CAPSULE ORAL 2 TIMES DAILY
Qty: 180 CAPSULE | Refills: 11 | Status: SHIPPED | OUTPATIENT
Start: 2023-12-12 | End: 2023-12-20 | Stop reason: ALTCHOICE

## 2023-12-12 RX ORDER — ALBUTEROL SULFATE 0.83 MG/ML
2.5 SOLUTION RESPIRATORY (INHALATION)
OUTPATIENT
Start: 2023-12-12

## 2023-12-12 RX ORDER — TACROLIMUS 1 MG/1
4 CAPSULE ORAL 2 TIMES DAILY
Qty: 240 CAPSULE | Refills: 11 | Status: SHIPPED | OUTPATIENT
Start: 2023-12-12 | End: 2023-12-13

## 2023-12-12 RX ORDER — SODIUM BICARBONATE 650 MG/1
650 TABLET ORAL 2 TIMES DAILY
Qty: 60 TABLET | Refills: 11 | Status: SHIPPED | OUTPATIENT
Start: 2023-12-12 | End: 2023-12-13

## 2023-12-12 RX ORDER — TACROLIMUS 0.5 MG/1
0.5 CAPSULE ORAL 2 TIMES DAILY
Qty: 60 CAPSULE | Refills: 11 | Status: SHIPPED | OUTPATIENT
Start: 2023-12-12 | End: 2023-12-13

## 2023-12-12 RX ORDER — MAGNESIUM OXIDE 400 MG/1
400 TABLET ORAL
Qty: 30 TABLET | Refills: 11 | Status: SHIPPED | OUTPATIENT
Start: 2023-12-12 | End: 2023-12-20

## 2023-12-12 RX ORDER — DIPHENHYDRAMINE HYDROCHLORIDE 50 MG/ML
50 INJECTION INTRAMUSCULAR; INTRAVENOUS
Start: 2023-12-12

## 2023-12-12 RX ORDER — ATORVASTATIN CALCIUM 10 MG/1
10 TABLET, FILM COATED ORAL DAILY
Qty: 30 TABLET | Refills: 11 | Status: SHIPPED | OUTPATIENT
Start: 2023-12-12

## 2023-12-12 RX ORDER — EPINEPHRINE 1 MG/ML
0.3 INJECTION, SOLUTION INTRAMUSCULAR; SUBCUTANEOUS EVERY 5 MIN PRN
OUTPATIENT
Start: 2023-12-12

## 2023-12-12 RX ORDER — METHYLPREDNISOLONE SODIUM SUCCINATE 125 MG/2ML
125 INJECTION, POWDER, LYOPHILIZED, FOR SOLUTION INTRAMUSCULAR; INTRAVENOUS
Start: 2023-12-12

## 2023-12-12 RX ORDER — VALGANCICLOVIR 450 MG/1
900 TABLET, FILM COATED ORAL DAILY
Qty: 60 TABLET | Refills: 2 | Status: SHIPPED | OUTPATIENT
Start: 2023-12-12 | End: 2024-04-12

## 2023-12-12 RX ADMIN — SODIUM CHLORIDE 20 MG: 9 INJECTION, SOLUTION INTRAVENOUS at 09:23

## 2023-12-12 ASSESSMENT — PAIN SCALES - GENERAL: PAINLEVEL: MODERATE PAIN (5)

## 2023-12-12 NOTE — TELEPHONE ENCOUNTER
Nuzhat Lopez is a post-kidney transplant patient who discharged on 12/11/2023. Patient chooses to receive medications from [place] specialty pharmacy. The patient will be responsible for managing medications.    SOT*DELORIS MYERS) IS A (KIDNEY) TRANSPLANT PATIENT  (DATE OF TRANSPLANT: (12/8/2023 )      HEALTH BENEFIT: MEDICARE  ID#[id number]  (PART A EFFECTIVE (6/1/2022) , PART B EFFECTIVE (6/1/2022) )   PROCESSING INFO: ID#[id number] GRP#OTHER BIN#[id number]  (SUPPLEMENT): (AETNA)   ID#[id number] GRP# N/A (EFFECTIVE (11/28/2022 ) )  PROCESSING INFO: (AETNA SECONDARY) ID#[id number] BIN# [id number]  PT WILL PAY $0 AT TIME OF SERVICE       PHARMACY BENEFIT: (SILVERSCRIPTS) PART D  PROCESSING INFO: ID#[id number] GRP#[id number] PCN#[id number] BIN#[id number] (EFFECTIVE (1/1/2023) )   DEDUCTIBLE (505)    COPAY STRUCTURE:  INITIAL PHASE:  $ (0) FOR TIER 1  $ (5) FOR TIER 2  % (17) FOR TIER 3  % (38) COINSURANCE FOR TIER 4  % (25) COINSURANCE FOR TIER 5 MEDICATIONS UNTIL TOTAL YEAR DRUG COSTS REACH $4660  COVERAGE GAP (DONUT HOLE):   %25 COINSURANCE FOR GENERIC  %25 COINSURANCE FOR BRAND UNTIL TOTAL YEARLY PATIENT OUT-OF-POCKET COSTS REACH $7400  CATASTROPHIC PHASE:  THE GREATER OF EITHER %5 COST OF MEDICATION OR  $4.15 FOR GENERIC  $10.35 FOR BRAND          TEST CLAIM SPECIALTY #28  MYCOPHENOLATE 250MG (#240/30DS) - $0 AT TIME OF SERVICE  PROGRAF 1MG (#120/30DS) - $0 AT TIME OF SERVICE  TACROLIMUS 1MG (#120/30DS) - $0 AT TIME OF SERVICE  CYCLOSPORINE 100MG (#60/30DS) - $0 AT TIME OF SERIVE  VALGANCICLOVIR 450MG (#60/30DS)- $0    TEST CLAIM SPECIALTY #27  MYCOPHENOLATE 250MG (#240/30DS) - $0 AT TIME OF SERVICE  PROGRAF 1MG (#120/30DS) - $0 AT TIME OF SERVICE  TACROLIMUS 1MG (#120/30DS) - $0 AT TIME OF SERVICE  CYCLOSPORINE 100MG (#60/30DS) - $0 AT TIME OF SERVICE  VALGANCICLOVIR 450MG (#60/30DS) - $0    **CAN PATIENT FILL AT [facility] FOR MEDICATIONS LISTED? YES   **BILL 1ST FILL OF IMMUNOS TO [facility] AT DISCHARGE**    Charles Cuenca,  RPH

## 2023-12-12 NOTE — PATIENT INSTRUCTIONS
Dear Nuzhat Lopez    Thank you for choosing [facility] ([facility]) for your transplant cares.  The following information is a summary of our appointment as well as important reminders.      Please make sure your phone is available today because transplant coordinator will call to update you with your anti-rejection drug levels and possibly make changes to your anti-rejection dosages., Please refer to your hospital discharge instructions for details on home care services, future appointments, phone numbers, and diet/activity levels.     Transplant Coordinator: Dina  Transplant Office:  [phone]  [facility]:  [phone]  Ask for physician on call for kidney transplant.  Unit 7A (Transplant Unit):  [phone]  [facility]:  [phone]    Last dose of Tylenol was  975mg at 815am.    If you are a transplant patient and require transplant education, please click on this link: [email or web address].    We look forward in seeing you on your next appointment here at [facility] ([facility]).  Please don t hesitate to call us at [phone] to reschedule any of your appointments or to speak with one of the [facility] registered nurses.  It was a pleasure taking care of you today.    Sincerely,    [facility]  [address] [place] [address]  [id number]  Phone:  [phone]

## 2023-12-12 NOTE — TELEPHONE ENCOUNTER
Kidney and Pancreas Transplant Coordinator Transition to Outpatient Discharge Note    Pt Name: Nuzhat Lopez  : 1995    Transplant Date: 23  Hospital Discharge Date: 23    Surgeon (updated in Transplant Information tab): JYOTHI  Nephrologist (updated in Transplant Information tab): Poornima  Transplant Coordinator: Nely Alonso RN    Nuzhat Lopez LDRT d/t IgA Nephropathy, right side side   Stent was not placed.  Pancreas Drainage: na        Immunosuppression:   CNI: TAC  Antimetabolite: mmf  Prednisone taper: Yes.    Prophylaxis:   CMV: D+/R+ : Valcyte 3 months; renal dosing  EBV: D+/R+  Antibiotic: bactrim    High Risk DSA: No.    Lines / drains in place at time of discharge:  NA  If yes, review of parameters to track and when to contact RNCC:       [facility] after [facility]: [facility]  Lab after discharge: [facility]  Lab letter faxed to outside lab, if needed: No.      Post-op course / additional monitoring:  NA    Education:  Writer spoke with Nuzhat Lopez.   We discussed immunosuppression medications, indications, side effects, dose adjustments, and lab monitoring.   Lab draw schedule was provided via [employer] / mail to the patient and fully explained.    DSA  kits needed:  No.    If so, request submitted to centralized team for send-out.  Allosure  kits needed:  No.    If so, request submitted to LPN for kits to be sent to patient's home   address & request for Allosure acquisition form to be submitted.   Living Donor:  Yes.    If yes, discussed with patient data collection through [facility] for KFL in effort  to improve eplet matching. This will include additional, non-clinical lab   draw organized by an external organization, [facility].     MyTransplant Place: RNCC encouraged on-going review, emphasis on   post-transplant content.   MyChart: RNCC encouraged regular self-review of lab values via i[employer]O and  educated about importance of utilization for awareness of required follow   up and  communication with SOT team members.     Further education was provided on typical post transplant course, labs examined with thresholds, biopsy, infection prevention, office contact numbers, and when to call.       Discharge Transition Plan:   Pt will transition home, with assistance from family. Pt will not have home care.   Pt states having working BP monitor, scale, and thermometer at home.      Follow Up:  Orders for post-transplant follow-up appointments placed: Yes.  Patient Status Checklist Task updated: Yes.    Special/Additional needs: No.  Pt questions for follow up:         Nely Alonso RN  Post-Kidney Transplant Coordinator  (ph) [phone]

## 2023-12-12 NOTE — LETTER
12/12/2023         RE: Nuzhat Lopez  [address] [facility] [address]        Dear Colleague,    Thank you for referring your patient, Nuzhat Lopez, to the [facility]. Please see a copy of my visit note below.    TRANSPLANT EARLY POST TRANSPLANT VISIT    Assessment & Plan  # LDKT: Trend down in serum creatinine              - Baseline Creatinine: ~ TBD              - Proteinuria: Not checked post transplant              - Date DSA Last Checked: Nov/2023      Latest DSA: No DSA at time of transplant              - BK Viremia: Not checked post transplant              - Kidney Tx Biopsy: No              - Transplant Ureteral Stent: No     # Immunosuppression: Tacrolimus immediate release (goal 8-10), Mycophenolate mofetil (dose 750 mg every 12 hours), and Prednisone (dose taper)              - Patient is in an immunosuppressed state and will continue to monitor for efficacy and toxicity of immunosuppression medications.              - Induction with Recent Transplant:           Low Intensity Protocol               - Changes: Not at this time     # Infection Prophylaxis:   - PJP: Sulfa/TMP (Bactrim)  - CMV: Valganciclovir (Valcyte),CMV IgG Ab positive  - Hep B: Entecavir (Baraclude), Donor HBcAb positive    # Hep B core Ab+ donor:              -Discussed prior to transplant with transplant hepatology.               -Plan to continue entecavir x6 months post txp then at 6m post txp should follow up with hepatology.               -Monthly LFTs              -q3 months HBsAg, HBsAb, HBV DNA for 1y post txp    # Hypertension: Controlled;   Goal BP: < 150/90              - Volume status: Euvolemic                EDW ~ 75.5 kg              - Changes: No    # Anemia in Chronic Renal Disease: Hgb: Stable      MARY: No   - Iron studies: Replete    # Mineral Bone Disorder:    - Secondary renal hyperparathyroidism; PTH level: Not checked recently        On treatment: None  - Vitamin  D; level: Low        On supplement: No  - Calcium; level: Normal        On supplement: No  - Phosphorus; level: Low        On supplement: Yes    # Electrolytes:   - Potassium; level: Normal        On supplement: No  - Magnesium; level: Normal        On supplement: Yes  - Bicarbonate; level: Low normal        On supplement: Yes  - Sodium; level: Normal    #hematuria:  one episode of hematuria.  UA/UC ordered    # Medical Compliance: Yes    # Health Maintenance and Vaccination Review: Reviewed and up to date    # Transplant History:  Etiology of Kidney Failure: IgA nephropathy  Tx: LDKT  Transplant: 12/8/2023 (Kidney)  Donor Type: Living Donor Class:   Crossmatch at time of Tx: negative  DSA at time of Tx: No  Significant changes in immunosuppression: None  CMV IgG Ab High Risk Discordance (D+/R-): No  EBV IgG Ab High Risk Discordance (D+/R-): No  Significant transplant-related complications: None    Transplant Office Phone Number: [phone]    Assessment and plan was discussed with the patient and she voiced her understanding and agreement.    Return visit: No follow-ups on file.    Ania Virk NP    Chief Complaint  Ms. Lopez is a 27 year old here for kidney transplant.     History of Present Illness   Nuzhat Lopez is a 27 year old female with history of ESRD secondary to biopsy proven IgA nephropathy who had been on dialysis since 6/8/2022 and is now s/p LDKT without stent yesterday 12/8/23. Final crossmatch was negative and PRA 0%.  Induction was with basiliximab. Both donor and recipient are CMV IgG Ab positive.  The donor is HBcAb positive and Entecavir was started started 12/4/23.      No fevers or chills.  No nausea or vomiting    No urinary symptoms.    Passed a clot.      Home BP: Not checked    Problem List  Patient Active Problem List   Diagnosis    Acute kidney injury (H24)    Migraine headache    IgA nephropathy    Stage 5 chronic kidney disease (H)    HTN (hypertension)    Anemia in chronic  kidney disease    Influenza immunization not administered due to inavailability of vaccine    Disorder of phosphorus metabolism, unspecified    Eczema    Encounter for childhood immunizations appropriate for age    End stage renal disease (H)    Iron deficiency anemia, unspecified    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    Secondary hyperparathyroidism of renal origin (H24)    Shortness of breath    Skin eruption    Primary hypertension    Migraine    Kidney transplant recipient    Immunosuppressed status (H24)    Hypophosphatemia    Low bicarbonate level    At risk for infection transmitted from donor       Allergies  Allergies   Allergen Reactions    Cephalexin Rash    Heparin Flush Rash       Medications  Current Outpatient Medications   Medication Sig    acetaminophen (TYLENOL) 325 MG tablet Take 3 tablets (975 mg) by mouth every 8 hours as needed for pain    atorvastatin (LIPITOR) 10 MG tablet Take 1 tablet (10 mg) by mouth daily    entecavir (BARACLUDE) 0.5 MG tablet Take 1 tablet (0.5 mg) by mouth daily    magnesium oxide (MAG-OX) 400 MG tablet Take 1 tablet (400 mg) by mouth daily (with lunch)    mycophenolate (GENERIC EQUIVALENT) 250 MG capsule Take 3 capsules (750 mg) by mouth 2 times daily    oxyCODONE (ROXICODONE) 5 MG tablet Take 0.5-1 tablets (2.5-5 mg) by mouth every 4 hours as needed for moderate pain    phosphorus tablet 250 mg (PHOSPHA 250 NEUTRAL) 250 MG per tablet Take 2 tablets (500 mg) by mouth 2 times daily    polyethylene glycol (MIRALAX) 17 GM/Dose powder Take 17 g by mouth daily as needed for constipation    predniSONE (DELTASONE) 10 MG tablet Take 6 tablets (60 mg) by mouth daily for 1 day, THEN 4 tablets (40 mg) daily for 2 days, THEN 2 tablets (20 mg) daily for 7 days, THEN 1.5 tablets (15 mg) daily for 7 days, THEN 1 tablet (10 mg) daily for 7 days, THEN 0.5 tablets (5 mg) daily for 7 days.    senna-docusate (SENOKOT-S/PERICOLACE) 8.6-50 MG tablet Take 1-2  tablets by mouth 2 times daily    sodium bicarbonate 650 MG tablet Take 1 tablet (650 mg) by mouth 2 times daily    sulfamethoxazole-trimethoprim (BACTRIM) 400-80 MG tablet Take 1 tablet by mouth daily    tacrolimus (GENERIC) 0.5 MG capsule Take 1 capsule (0.5 mg) by mouth 2 times daily Total dose 2.5mg twice daily    tacrolimus (GENERIC) 1 MG capsule Take 2 capsules (2 mg) by mouth 2 times daily Total dose 2.5mg twice daily    valGANciclovir (VALCYTE) 450 MG tablet Take 2 tablets (900 mg) by mouth daily     No current facility-administered medications for this visit.     There are no discontinued medications.    Physical Exam  Vital Signs: There were no vitals taken for this visit.    GENERAL APPEARANCE: alert and no distress  HENT: mouth without ulcers or lesions  RESP: lungs clear to auscultation - no rales, rhonchi or wheezes  CV: regular rhythm, normal rate, no rub, no murmur  EDEMA: no LE edema bilaterally  ABDOMEN: soft, nondistended, nontender, bowel sounds normal  MS: extremities normal - no gross deformities noted, no evidence of inflammation in joints, no muscle tenderness  SKIN: no rash    Data        Latest Ref Rng & Units 12/12/2023     7:26 AM 12/11/2023     6:49 AM 12/10/2023     9:30 PM   Renal   Sodium 135 - 145 mmol/L 138  137     K 3.4 - 5.3 mmol/L 3.9  4.0     Cl 98 - 107 mmol/L 108  110     Cl (external) 98 - 107 mmol/L 108  110     CO2 22 - 29 mmol/L 22  19     Urea Nitrogen 6.0 - 20.0 mg/dL 16.8  14.9     Creatinine 0.51 - 0.95 mg/dL 1.17  1.14     Glucose 70 - 99 mg/dL 85  82  120    Calcium 8.6 - 10.0 mg/dL 9.4  9.2     Magnesium 1.7 - 2.3 mg/dL 1.8  1.9           Latest Ref Rng & Units 12/12/2023     7:26 AM 12/11/2023     6:49 AM 12/10/2023     8:11 AM   Bone Health   Phosphorus 2.5 - 4.5 mg/dL 1.6  1.6  2.9          Latest Ref Rng & Units 12/12/2023     7:26 AM 12/11/2023     6:49 AM 12/10/2023     8:11 AM   Heme   WBC 4.0 - 11.0 10e3/uL 10.4  11.8  14.5    Hgb 11.7 - 15.7 g/dL 10.9   10.2  10.2    Plt 150 - 450 10e3/uL 218  191  173          Latest Ref Rng & Units 11/27/2023     2:06 PM 2/28/2022     1:08 PM 10/7/2021     4:39 AM   Liver   AP 40 - 150 U/L 81  78     TBili <=1.2 mg/dL 0.5  0.2     ALT 0 - 50 U/L 6  11     AST 0 - 45 U/L 14  12     Tot Protein 6.4 - 8.3 g/dL 8.0  8.0     Albumin 3.4 - 5.0 g/dL  3.5  2.5    Albumin 3.5 - 5.2 g/dL 4.0            Latest Ref Rng & Units 11/27/2023     2:06 PM   Pancreas   A1C <5.7 % 4.6          Latest Ref Rng & Units 11/27/2023     2:06 PM   Iron studies   Iron 37 - 145 ug/dL 73    Iron Sat Index 15 - 46 % 42    Ferritin 6 - 175 ng/mL 1,549          Latest Ref Rng & Units 11/27/2023     2:06 PM 2/28/2022     1:08 PM   [facility] Virology   EBV CAPSID ANTIBODY IGG No detectable antibody. Positive  Positive                     Ania Virk, NP

## 2023-12-12 NOTE — PROGRESS NOTES
"Nuzhat Lopez came to [facility] today for a lab and assess following a kidney transplant on 12/8/23.      Discharge date: 12/11/2023  Transplant coordinator: Nely Alonso RN  Phone number patient can be reached at: [phone]      Physical Assessment:  See physical assessment located under \"Document Flowsheets\".  Incision site: clean, dry, and intact with staples, no redness noted  Lines: N/A  Lockhart: removed 12/11/2023  Urine clarity: yellowish-pink, small clots (catheter removed yesterday morning)  Hydration: 40oz  Nutrition: rice and pork with soup, denies nausea/vomiting, appetite ok but gets full quickly   Last BM: loose BM yesterday, had both Senna and Miralax in hospital, none taken since d/c  Pain: 4-5/10 at incision site, took Tylenol at 0810 today, hasn't used Oxycodone since 12/10/2023.   My transplant place videos watched: No    Labs drawn by [facility] staff: No    Plan of care for today: Medication review, labs reviewed, pill box set up, education checklist completed. Ania at bedside, no new orders at this time. Ania discussed high phosphorus food options with pt, encouraged intake.    Administrations This Visit       basiliximab (SIMULECT) 20 mg in sodium chloride 0.9 % 60 mL infusion       Admin Date  12/12/2023 Action  $New Bag Dose  20 mg Rate  120 mL/hr Route  Intravenous Documented By  Misty Love RN                      Medication changes: None at this time. Transplant coordinator will call if changes needed to Tacrolimus level.    Medications administered: Pt self-administered morning medications and took Tylenol for pain.      Patient education:    The following teaching topics were addressed: Importance of drinking 2L of non-caffeinated fluids daily, Incisional care, Signs/symptoms of infection, Good handwashing, Medications (purposes, doses and times of administration), Phone numbers to call with concenrs (Transplant coordinator, [address] and [facility]), and Plan of care   Patient and " sister  verbalized understanding and all questions answered.    Drug level:  Patient took 2.5mg of Tacrolimus last evening at 2000.  Care coordinator to follow up with the result.    Face to face time: 75  Discharge Plan    [facility]  Discharge instructions reviewed with patient: YES  Patient/Representative verbalized understanding, all questions answered: YES    Discharged from unit at 1000 with whom: sister to home.    Misty Love RN

## 2023-12-13 ENCOUNTER — INFUSION THERAPY VISIT (OUTPATIENT)
Dept: INFUSION THERAPY | Facility: CLINIC | Age: 28
End: 2023-12-13
Attending: NURSE PRACTITIONER
Payer: MEDICARE

## 2023-12-13 ENCOUNTER — TELEPHONE (OUTPATIENT)
Dept: TRANSPLANT | Facility: CLINIC | Age: 28
End: 2023-12-13

## 2023-12-13 ENCOUNTER — LAB (OUTPATIENT)
Dept: LAB | Facility: CLINIC | Age: 28
End: 2023-12-13
Attending: NURSE PRACTITIONER
Payer: MEDICARE

## 2023-12-13 VITALS
WEIGHT: 169.1 LBS | HEART RATE: 98 BPM | BODY MASS INDEX: 31.95 KG/M2 | DIASTOLIC BLOOD PRESSURE: 89 MMHG | SYSTOLIC BLOOD PRESSURE: 132 MMHG | OXYGEN SATURATION: 100 % | TEMPERATURE: 97.5 F | RESPIRATION RATE: 16 BRPM

## 2023-12-13 DIAGNOSIS — Z98.890 OTHER SPECIFIED POSTPROCEDURAL STATES: ICD-10-CM

## 2023-12-13 DIAGNOSIS — Z94.0 KIDNEY TRANSPLANT RECIPIENT: ICD-10-CM

## 2023-12-13 DIAGNOSIS — Z94.0 KIDNEY TRANSPLANT RECIPIENT: Primary | Chronic | ICD-10-CM

## 2023-12-13 DIAGNOSIS — E83.39 HYPOPHOSPHATEMIA: ICD-10-CM

## 2023-12-13 DIAGNOSIS — Z94.0 KIDNEY TRANSPLANT RECIPIENT: Primary | ICD-10-CM

## 2023-12-13 DIAGNOSIS — Z94.0 KIDNEY REPLACED BY TRANSPLANT: ICD-10-CM

## 2023-12-13 DIAGNOSIS — R19.7 DIARRHEA, UNSPECIFIED TYPE: Primary | ICD-10-CM

## 2023-12-13 DIAGNOSIS — Z48.298 AFTERCARE FOLLOWING ORGAN TRANSPLANT: ICD-10-CM

## 2023-12-13 DIAGNOSIS — Z20.828 CONTACT WITH AND (SUSPECTED) EXPOSURE TO OTHER VIRAL COMMUNICABLE DISEASES: ICD-10-CM

## 2023-12-13 DIAGNOSIS — Z79.899 ENCOUNTER FOR LONG-TERM CURRENT USE OF MEDICATION: ICD-10-CM

## 2023-12-13 LAB
ALBUMIN UR-MCNC: 10 MG/DL
ANION GAP SERPL CALCULATED.3IONS-SCNC: 9 MMOL/L (ref 7–15)
APPEARANCE UR: ABNORMAL
BASOPHILS # BLD AUTO: 0.1 10E3/UL (ref 0–0.2)
BASOPHILS NFR BLD AUTO: 1 %
BILIRUB UR QL STRIP: NEGATIVE
BUN SERPL-MCNC: 14 MG/DL (ref 6–20)
CALCIUM SERPL-MCNC: 9 MG/DL (ref 8.6–10)
CHLORIDE SERPL-SCNC: 108 MMOL/L (ref 98–107)
COLOR UR AUTO: YELLOW
CREAT SERPL-MCNC: 1.06 MG/DL (ref 0.51–0.95)
DEPRECATED HCO3 PLAS-SCNC: 20 MMOL/L (ref 22–29)
EGFRCR SERPLBLD CKD-EPI 2021: 73 ML/MIN/1.73M2
EOSINOPHIL # BLD AUTO: 0.3 10E3/UL (ref 0–0.7)
EOSINOPHIL NFR BLD AUTO: 3 %
ERYTHROCYTE [DISTWIDTH] IN BLOOD BY AUTOMATED COUNT: 12.7 % (ref 10–15)
GLUCOSE SERPL-MCNC: 112 MG/DL (ref 70–99)
GLUCOSE UR STRIP-MCNC: NEGATIVE MG/DL
HCT VFR BLD AUTO: 33.6 % (ref 35–47)
HGB BLD-MCNC: 11.1 G/DL (ref 11.7–15.7)
HGB UR QL STRIP: ABNORMAL
IMM GRANULOCYTES # BLD: 0.3 10E3/UL
IMM GRANULOCYTES NFR BLD: 3 %
KETONES UR STRIP-MCNC: NEGATIVE MG/DL
LEUKOCYTE ESTERASE UR QL STRIP: NEGATIVE
LYMPHOCYTES # BLD AUTO: 3.4 10E3/UL (ref 0.8–5.3)
LYMPHOCYTES NFR BLD AUTO: 34 %
MAGNESIUM SERPL-MCNC: 1.8 MG/DL (ref 1.7–2.3)
MCH RBC QN AUTO: 30.7 PG (ref 26.5–33)
MCHC RBC AUTO-ENTMCNC: 33 G/DL (ref 31.5–36.5)
MCV RBC AUTO: 93 FL (ref 78–100)
MONOCYTES # BLD AUTO: 0.5 10E3/UL (ref 0–1.3)
MONOCYTES NFR BLD AUTO: 5 %
MUCOUS THREADS #/AREA URNS LPF: PRESENT /LPF
NEUTROPHILS # BLD AUTO: 5.5 10E3/UL (ref 1.6–8.3)
NEUTROPHILS NFR BLD AUTO: 54 %
NITRATE UR QL: NEGATIVE
NRBC # BLD AUTO: 0 10E3/UL
NRBC BLD AUTO-RTO: 0 /100
PH UR STRIP: 6 [PH] (ref 5–7)
PHOSPHATE SERPL-MCNC: 1.5 MG/DL (ref 2.5–4.5)
PLATELET # BLD AUTO: 229 10E3/UL (ref 150–450)
POTASSIUM SERPL-SCNC: 3.5 MMOL/L (ref 3.4–5.3)
RBC # BLD AUTO: 3.61 10E6/UL (ref 3.8–5.2)
RBC URINE: >182 /HPF
SODIUM SERPL-SCNC: 137 MMOL/L (ref 135–145)
SP GR UR STRIP: 1.02 (ref 1–1.03)
SQUAMOUS EPITHELIAL: 7 /HPF
TACROLIMUS BLD-MCNC: 4 UG/L (ref 5–15)
TME LAST DOSE: ABNORMAL H
TME LAST DOSE: ABNORMAL H
UROBILINOGEN UR STRIP-MCNC: NORMAL MG/DL
WBC # BLD AUTO: 10 10E3/UL (ref 4–11)
WBC URINE: 12 /HPF

## 2023-12-13 PROCEDURE — 80048 BASIC METABOLIC PNL TOTAL CA: CPT | Performed by: PATHOLOGY

## 2023-12-13 PROCEDURE — 80197 ASSAY OF TACROLIMUS: CPT | Performed by: INTERNAL MEDICINE

## 2023-12-13 PROCEDURE — 99000 SPECIMEN HANDLING OFFICE-LAB: CPT | Performed by: PATHOLOGY

## 2023-12-13 PROCEDURE — 81001 URINALYSIS AUTO W/SCOPE: CPT | Performed by: PATHOLOGY

## 2023-12-13 PROCEDURE — 36415 COLL VENOUS BLD VENIPUNCTURE: CPT | Performed by: PATHOLOGY

## 2023-12-13 PROCEDURE — 83735 ASSAY OF MAGNESIUM: CPT | Performed by: PATHOLOGY

## 2023-12-13 PROCEDURE — 87086 URINE CULTURE/COLONY COUNT: CPT | Performed by: NURSE PRACTITIONER

## 2023-12-13 PROCEDURE — 80180 DRUG SCRN QUAN MYCOPHENOLATE: CPT | Performed by: INTERNAL MEDICINE

## 2023-12-13 PROCEDURE — 85025 COMPLETE CBC W/AUTO DIFF WBC: CPT | Performed by: PATHOLOGY

## 2023-12-13 PROCEDURE — 99215 OFFICE O/P EST HI 40 MIN: CPT | Mod: 24 | Performed by: NURSE PRACTITIONER

## 2023-12-13 PROCEDURE — 84100 ASSAY OF PHOSPHORUS: CPT | Performed by: PATHOLOGY

## 2023-12-13 RX ORDER — TACROLIMUS 1 MG/1
6 CAPSULE ORAL 2 TIMES DAILY
Qty: 360 CAPSULE | Refills: 11 | Status: SHIPPED | OUTPATIENT
Start: 2023-12-13 | End: 2023-12-22

## 2023-12-13 RX ORDER — TACROLIMUS 0.5 MG/1
CAPSULE ORAL
Qty: 60 CAPSULE | Refills: 11 | Status: SHIPPED | OUTPATIENT
Start: 2023-12-13 | End: 2024-01-24

## 2023-12-13 RX ORDER — SODIUM BICARBONATE 650 MG/1
650 TABLET ORAL 3 TIMES DAILY
Qty: 60 TABLET | Refills: 11 | Status: SHIPPED | OUTPATIENT
Start: 2023-12-13 | End: 2024-06-24

## 2023-12-13 NOTE — PROGRESS NOTES
"Nuzhat Lopez came to [facility] today for a lab and assess following a KIDNEY transplant on 12/8/23.      Discharge date: 12/11/23  Transplant coordinator: Nely Alonso  Phone number patient can be reached at: [phone]    Physical Assessment:  See physical assessment located under \"Document Flowsheets\".  Incision site: staples intact, scant amount of drainage noted on binder. No signs of infection  Lines: N/A  Lockhart: N/A  Urine clarity: yellow in color, slight pink tinge last evening, this morning no blood or clots.  Hydration: 2.5 liters of fluids, encouraged to continue to keep drinking 2-3 liters per day  Nutrition: appetite ok, eating meals and snacks, No nausea or vomiting   Last BM: yesterday evening, loose-watery in consistency. Patient will collect stool samples for testing.  Pain: 2-3/10 incisional. Taking tylenol for pain   Labs drawn by [facility] staff No, 1st floor lab staff    Plan of care for today:  -Assessment completed  -Medications reviewed, along with medication card  -Ania Virk, transplant surgery NP in to assess patient and answer any questions  -Patient to collect stool specimens for both C. Diff and an Enteric Bacteria & Virus Panel by ANTHONY stool. Stool collection supplies sent home with patient. Patient returns to [facility] tomorrow for infusion appointment.     Medication changes:   -Increase Phosphorus frequency to three times daily  -Increase sodium bicarbonate frequency to three times daily    Medications administered:  none    Patient education:    The following teaching topics were addressed: Importance of drinking 2L of non-caffeinated fluids daily, Incisional care, Signs/symptoms of infection, Good handwashing, Medications (purposes, doses and times of administration), Phone numbers to call with concenrs (Transplant coordinator, [facility] and [facility]), 7A discharge check list, and Plan of care   Patient and sister  verbalized understanding and all questions answered.    Drug " level:  Patient took 4.5mg of Prograf last evening at 2045.  Care coordinator to follow up with the result.    Face to face time: 60 minutes  Discharge Plan    Pt will follow up with transplant team/coordinator.  Discharge instructions reviewed with patient: YES  Patient/Representative verbalized understanding, all questions answered: YES    Discharged from unit at 0945 with whom: sister to home.    Agnieszka Oleary, RN, RN

## 2023-12-13 NOTE — TELEPHONE ENCOUNTER
Post Kidney and Pancreas Transplant Team Conference  Date: 12/13/2023  Transplant Coordinator: Nely Alonso     Attendees:  [x]  Dr. Noguera [x] Dina Siegel, RN [x] Maritza Charlton LPN     [x]  Dr. Robbins [x] Nely Alonso, RN [] Alyx Guerra LPN    [x] Dr. Whitfield [x] Maria C Mast RN    [x] Dr. Bower [x] Loretta Malik, RN [] Darby Mcclure RN   [] Dr. Wise [] Karely Donohue, RN    [] Dr. Keita [] Dione Rosenbaum, DEVIN    [x]  Dr. Chan [] Anne Bang RN    [] Dr. Hurtado [] Jax Cadena RN    [x] Ania Virk, NP [] Mary Campuzano RN    [x] Fanny Townsend NP [] Elke Helton RN        Verbal Plan Read Back:   Increase intake of phosphorus foods      Routed to RN Coordinator   Maritza Charlton LPN

## 2023-12-13 NOTE — PATIENT INSTRUCTIONS
Dear Nuzhat Lopez    Thank you for choosing [facility] ([facility]) for your transplant cares.  The following information is a summary of our appointment as well as important reminders.      Please make sure your phone is available today because your transplant coordinator will call you to update you with your anti-rejection drug levels and possibly make changes to your anti-rejection dosages.    Updates on 12/13/23:  -Increase phosphorus rich food consumption  -Increase Bicarbonate & Phosphorus frequency to Three times per day (up from twice daily)  -Collect stool specimens and bring tomorrow    If you are a transplant patient and require transplant education, please click on this link: [email or web address].    We look forward in seeing you on your next appointment here at [facility]  Please don t hesitate to call us at [phone] to reschedule any of your appointments or to speak with one of the [facility] registered nurses.  It was a pleasure taking care of you today.    Sincerely,    [facility]  [address] [place] [address]  [address]  Phone:  [phone]

## 2023-12-13 NOTE — LETTER
12/13/2023         RE: Nuzhat Lopez  [address] [facility] [address]        Dear Colleague,    Thank you for referring your patient, uNzhat Lopez, to the [facility]. Please see a copy of my visit note below.    TRANSPLANT EARLY POST TRANSPLANT VISIT    Assessment & Plan  # LDKT: Trend down in serum creatinine              - Baseline Creatinine: ~ TBD              - Proteinuria: Not checked post transplant              - Date DSA Last Checked: Nov/2023      Latest DSA: No DSA at time of transplant              - BK Viremia: Not checked post transplant              - Kidney Tx Biopsy: No              - Transplant Ureteral Stent: No     # Immunosuppression: Tacrolimus immediate release (goal 8-10), Mycophenolate mofetil (dose 750 mg every 12 hours), and Prednisone (dose taper)              - Patient is in an immunosuppressed state and will continue to monitor for efficacy and toxicity of immunosuppression medications.              - Induction with Recent Transplant:           Low Intensity Protocol               - Changes: Not at this time     # Infection Prophylaxis:   - PJP: Sulfa/TMP (Bactrim)  - CMV: Valganciclovir (Valcyte),CMV IgG Ab positive  - Hep B: Entecavir (Baraclude), Donor HBcAb positive    # Hep B core Ab+ donor:              -Discussed prior to transplant with transplant hepatology.               -Plan to continue entecavir x6 months post txp then at 6m post txp should follow up with hepatology.               -Monthly LFTs              -q3 months HBsAg, HBsAb, HBV DNA for 1y post txp    # Hypertension: Controlled;   Goal BP: < 150/90              - Volume status: Euvolemic                EDW ~ 75.5 kg              - Changes: No    # Anemia in Chronic Renal Disease: Hgb: Stable      MARY: No   - Iron studies: Replete    # Mineral Bone Disorder:    - Secondary renal hyperparathyroidism; PTH level: Not checked recently        On treatment: None  - Vitamin  D; level: Low        On supplement: No  - Calcium; level: Normal        On supplement: No  - Phosphorus; level: Low        On supplement: Yes increased today    # Electrolytes:   - Potassium; level: Normal        On supplement: No  - Magnesium; level: Normal        On supplement: Yes  - Bicarbonate; level: Low        On supplement: Yes increased today  - Sodium; level: Normal    #hematuria:  one episode of hematuria.  UC pending    #Loose stools:  C diff and enteric panel ordered.    # Medical Compliance: Yes    # Health Maintenance and Vaccination Review: Reviewed and up to date    # Transplant History:  Etiology of Kidney Failure: IgA nephropathy  Tx: LDKT  Transplant: 12/8/2023 (Kidney)  Donor Type: Living Donor Class:   Crossmatch at time of Tx: negative  DSA at time of Tx: No  Significant changes in immunosuppression: None  CMV IgG Ab High Risk Discordance (D+/R-): No  EBV IgG Ab High Risk Discordance (D+/R-): No  Significant transplant-related complications: None    Transplant Office Phone Number: [phone]    Assessment and plan was discussed with the patient and she voiced her understanding and agreement.    Return visit: No follow-ups on file.    Ania Virk NP    Chief Complaint  Ms. Lopez is a 27 year old here for kidney transplant.     History of Present Illness   Nuzhat Lopez is a 27 year old female with history of ESRD secondary to biopsy proven IgA nephropathy who had been on dialysis since 6/8/2022 and is now s/p LDKT without stent yesterday 12/8/23. Final crossmatch was negative and PRA 0%.  Induction was with basiliximab. Both donor and recipient are CMV IgG Ab positive.  The donor is HBcAb positive and Entecavir was started started 12/4/23.      No fevers or chills.  No nausea or vomiting    No urinary symptoms.    Some loose stools.     Home BP: Not checked    Problem List  Patient Active Problem List   Diagnosis    Acute kidney injury (H24)    Migraine headache    IgA nephropathy    Stage  5 chronic kidney disease (H)    HTN (hypertension)    Anemia in chronic kidney disease    Influenza immunization not administered due to inavailability of vaccine    Disorder of phosphorus metabolism, unspecified    Eczema    Encounter for childhood immunizations appropriate for age    End stage renal disease (H)    Iron deficiency anemia, unspecified    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    Secondary hyperparathyroidism of renal origin (H24)    Shortness of breath    Skin eruption    Primary hypertension    Migraine    Kidney transplant recipient    Immunosuppressed status (H24)    Hypophosphatemia    Low bicarbonate level    At risk for infection transmitted from donor       Allergies  Allergies   Allergen Reactions    Cephalexin Rash    Heparin Flush Rash       Medications  Current Outpatient Medications   Medication Sig    phosphorus tablet 250 mg (PHOSPHA 250 NEUTRAL) 250 MG per tablet Take 2 tablets (500 mg) by mouth 3 times daily    sodium bicarbonate 650 MG tablet Take 1 tablet (650 mg) by mouth 3 times daily    acetaminophen (TYLENOL) 325 MG tablet Take 3 tablets (975 mg) by mouth every 8 hours as needed for pain    atorvastatin (LIPITOR) 10 MG tablet Take 1 tablet (10 mg) by mouth daily    entecavir (BARACLUDE) 0.5 MG tablet Take 1 tablet (0.5 mg) by mouth daily    magnesium oxide (MAG-OX) 400 MG tablet Take 1 tablet (400 mg) by mouth daily (with lunch)    mycophenolate (GENERIC EQUIVALENT) 250 MG capsule Take 3 capsules (750 mg) by mouth 2 times daily    oxyCODONE (ROXICODONE) 5 MG tablet Take 0.5-1 tablets (2.5-5 mg) by mouth every 4 hours as needed for moderate pain (Patient not taking: Reported on 12/13/2023)    polyethylene glycol (MIRALAX) 17 GM/Dose powder Take 17 g by mouth daily as needed for constipation (Patient not taking: Reported on 12/13/2023)    predniSONE (DELTASONE) 10 MG tablet Take 6 tablets (60 mg) by mouth daily for 1 day, THEN 4 tablets (40 mg) daily  for 2 days, THEN 2 tablets (20 mg) daily for 7 days, THEN 1.5 tablets (15 mg) daily for 7 days, THEN 1 tablet (10 mg) daily for 7 days, THEN 0.5 tablets (5 mg) daily for 7 days.    senna-docusate (SENOKOT-S/PERICOLACE) 8.6-50 MG tablet Take 1-2 tablets by mouth 2 times daily (Patient not taking: Reported on 12/13/2023)    sulfamethoxazole-trimethoprim (BACTRIM) 400-80 MG tablet Take 1 tablet by mouth daily    tacrolimus (GENERIC) 0.5 MG capsule Take 1 capsule (0.5 mg) by mouth 2 times daily Total dose 4.5mg twice daily    tacrolimus (GENERIC) 1 MG capsule Take 4 capsules (4 mg) by mouth 2 times daily Total dose 4.5mg twice daily    valGANciclovir (VALCYTE) 450 MG tablet Take 2 tablets (900 mg) by mouth daily     No current facility-administered medications for this visit.     Medications Discontinued During This Encounter   Medication Reason    phosphorus tablet 250 mg (PHOSPHA 250 NEUTRAL) 250 MG per tablet Reorder (No AVS)    sodium bicarbonate 650 MG tablet Reorder (No AVS)       Physical Exam  Vital Signs: There were no vitals taken for this visit.    GENERAL APPEARANCE: alert and no distress  HENT: mouth without ulcers or lesions  RESP: lungs clear to auscultation - no rales, rhonchi or wheezes  CV: regular rhythm, normal rate, no rub, no murmur  EDEMA: no LE edema bilaterally  ABDOMEN: soft, nondistended, nontender, bowel sounds normal  MS: extremities normal - no gross deformities noted, no evidence of inflammation in joints, no muscle tenderness  SKIN: no rash    Data        Latest Ref Rng & Units 12/13/2023     7:23 AM 12/12/2023     7:26 AM 12/11/2023     6:49 AM   Renal   Sodium 135 - 145 mmol/L 137  138  137    K 3.4 - 5.3 mmol/L 3.5  3.9  4.0    Cl 98 - 107 mmol/L 108  108  110    Cl (external) 98 - 107 mmol/L 108  108  110    CO2 22 - 29 mmol/L 20  22  19    Urea Nitrogen 6.0 - 20.0 mg/dL 14.0  16.8  14.9    Creatinine 0.51 - 0.95 mg/dL 1.06  1.17  1.14    Glucose 70 - 99 mg/dL 112  85  82    Calcium  8.6 - 10.0 mg/dL 9.0  9.4  9.2    Magnesium 1.7 - 2.3 mg/dL 1.8  1.8  1.9          Latest Ref Rng & Units 12/13/2023     7:23 AM 12/12/2023     7:26 AM 12/11/2023     6:49 AM   Bone Health   Phosphorus 2.5 - 4.5 mg/dL 1.5  1.6  1.6          Latest Ref Rng & Units 12/13/2023     7:23 AM 12/12/2023     7:26 AM 12/11/2023     6:49 AM   Heme   WBC 4.0 - 11.0 10e3/uL 10.0  10.4  11.8    Hgb 11.7 - 15.7 g/dL 11.1  10.9  10.2    Plt 150 - 450 10e3/uL 229  218  191          Latest Ref Rng & Units 11/27/2023     2:06 PM 2/28/2022     1:08 PM 10/7/2021     4:39 AM   Liver   AP 40 - 150 U/L 81  78     TBili <=1.2 mg/dL 0.5  0.2     ALT 0 - 50 U/L 6  11     AST 0 - 45 U/L 14  12     Tot Protein 6.4 - 8.3 g/dL 8.0  8.0     Albumin 3.4 - 5.0 g/dL  3.5  2.5    Albumin 3.5 - 5.2 g/dL 4.0            Latest Ref Rng & Units 11/27/2023     2:06 PM   Pancreas   A1C <5.7 % 4.6          Latest Ref Rng & Units 11/27/2023     2:06 PM   Iron studies   Iron 37 - 145 ug/dL 73    Iron Sat Index 15 - 46 % 42    Ferritin 6 - 175 ng/mL 1,549          Latest Ref Rng & Units 11/27/2023     2:06 PM 2/28/2022     1:08 PM   [facility] T[facility] Virology   EBV CAPSID ANTIBODY IGG No detectable antibody. Positive  Positive        Recent Labs   Lab Test 12/12/23  0726   TACROL 3.3*              Ania Virk, NP

## 2023-12-13 NOTE — TELEPHONE ENCOUNTER
ISSUE:   Tacrolimus IR level 4 on 12/13, goal 8-10, dose 4.5 mg BID.    PLAN:   Call Patientand confirm this was an accurate 12-hour trough.   Verify Tacrolimus IR dose 4.5 mg BID.   Confirm no new medications or illness.   Confirm no missed doses.   If accurate trough and accurate dose, increase Tacrolimus IR dose to 6 mg BID     Is this more than a 50% increase or decrease in current IS dose: No  If YES, justification: new tx    Repeat labs in 3 days.  *If > 50% change in immunosuppression dose, repeat labs in 1 week.     OUTCOME:  Patient confirmed this was an accurate 12-hour trough.   Verified Tacrolimus IR dose 4.5 mg BID.   Confirmed no new medications or illness.   Confirmed no missed doses.   Patient confirms increase Tacrolimus IR dose to 6 mg BID

## 2023-12-13 NOTE — PROGRESS NOTES
TRANSPLANT EARLY POST TRANSPLANT VISIT    Assessment & Plan   # LDKT: Trend down in serum creatinine              - Baseline Creatinine: ~ TBD              - Proteinuria: Not checked post transplant              - Date DSA Last Checked: Nov/2023      Latest DSA: No DSA at time of transplant              - BK Viremia: Not checked post transplant              - Kidney Tx Biopsy: No              - Transplant Ureteral Stent: No     # Immunosuppression: Tacrolimus immediate release (goal 8-10), Mycophenolate mofetil (dose 750 mg every 12 hours), and Prednisone (dose taper)              - Patient is in an immunosuppressed state and will continue to monitor for efficacy and toxicity of immunosuppression medications.              - Induction with Recent Transplant:           Low Intensity Protocol               - Changes: Not at this time     # Infection Prophylaxis:   - PJP: Sulfa/TMP (Bactrim)  - CMV: Valganciclovir (Valcyte),CMV IgG Ab positive  - Hep B: Entecavir (Baraclude), Donor HBcAb positive    # Hep B core Ab+ donor:              -Discussed prior to transplant with transplant hepatology.               -Plan to continue entecavir x6 months post txp then at 6m post txp should follow up with hepatology.               -Monthly LFTs              -q3 months HBsAg, HBsAb, HBV DNA for 1y post txp    # Hypertension: Controlled;   Goal BP: < 150/90              - Volume status: Euvolemic                EDW ~ 75.5 kg              - Changes: No    # Anemia in Chronic Renal Disease: Hgb: Stable      MARY: No   - Iron studies: Replete    # Mineral Bone Disorder:    - Secondary renal hyperparathyroidism; PTH level: Not checked recently        On treatment: None  - Vitamin D; level: Low        On supplement: No  - Calcium; level: Normal        On supplement: No  - Phosphorus; level: Low        On supplement: Yes increased today    # Electrolytes:   - Potassium; level: Normal        On supplement: No  - Magnesium; level: Normal         On supplement: Yes  - Bicarbonate; level: Low        On supplement: Yes increased today  - Sodium; level: Normal    #hematuria:  one episode of hematuria.  UC pending    #Loose stools:  C diff and enteric panel ordered.    # Medical Compliance: Yes    # Health Maintenance and Vaccination Review: Reviewed and up to date    # Transplant History:  Etiology of Kidney Failure: IgA nephropathy  Tx: LDKT  Transplant: 12/8/2023 (Kidney)  Donor Type: Living Donor Class:   Crossmatch at time of Tx: negative  DSA at time of Tx: No  Significant changes in immunosuppression: None  CMV IgG Ab High Risk Discordance (D+/R-): No  EBV IgG Ab High Risk Discordance (D+/R-): No  Significant transplant-related complications: None    Transplant Office Phone Number: [phone]    Assessment and plan was discussed with the patient and she voiced her understanding and agreement.    Return visit: No follow-ups on file.    Ania Virk NP    Chief Complaint   Ms. Lopez is a 27 year old here for kidney transplant.     History of Present Illness    Nuzhat Lopez is a 27 year old female with history of ESRD secondary to biopsy proven IgA nephropathy who had been on dialysis since 6/8/2022 and is now s/p LDKT without stent yesterday 12/8/23. Final crossmatch was negative and PRA 0%.  Induction was with basiliximab. Both donor and recipient are CMV IgG Ab positive.  The donor is HBcAb positive and Entecavir was started started 12/4/23.      No fevers or chills.  No nausea or vomiting    No urinary symptoms.    Some loose stools.     Home BP: Not checked    Problem List   Patient Active Problem List   Diagnosis    Acute kidney injury (H24)    Migraine headache    IgA nephropathy    Stage 5 chronic kidney disease (H)    HTN (hypertension)    Anemia in chronic kidney disease    Influenza immunization not administered due to inavailability of vaccine    Disorder of phosphorus metabolism, unspecified    Eczema    Encounter for childhood  immunizations appropriate for age    End stage renal disease (H)    Iron deficiency anemia, unspecified    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    Secondary hyperparathyroidism of renal origin (H24)    Shortness of breath    Skin eruption    Primary hypertension    Migraine    Kidney transplant recipient    Immunosuppressed status (H24)    Hypophosphatemia    Low bicarbonate level    At risk for infection transmitted from donor       Allergies   Allergies   Allergen Reactions    Cephalexin Rash    Heparin Flush Rash       Medications   Current Outpatient Medications   Medication Sig    phosphorus tablet 250 mg (PHOSPHA 250 NEUTRAL) 250 MG per tablet Take 2 tablets (500 mg) by mouth 3 times daily    sodium bicarbonate 650 MG tablet Take 1 tablet (650 mg) by mouth 3 times daily    acetaminophen (TYLENOL) 325 MG tablet Take 3 tablets (975 mg) by mouth every 8 hours as needed for pain    atorvastatin (LIPITOR) 10 MG tablet Take 1 tablet (10 mg) by mouth daily    entecavir (BARACLUDE) 0.5 MG tablet Take 1 tablet (0.5 mg) by mouth daily    magnesium oxide (MAG-OX) 400 MG tablet Take 1 tablet (400 mg) by mouth daily (with lunch)    mycophenolate (GENERIC EQUIVALENT) 250 MG capsule Take 3 capsules (750 mg) by mouth 2 times daily    oxyCODONE (ROXICODONE) 5 MG tablet Take 0.5-1 tablets (2.5-5 mg) by mouth every 4 hours as needed for moderate pain (Patient not taking: Reported on 12/13/2023)    polyethylene glycol (MIRALAX) 17 GM/Dose powder Take 17 g by mouth daily as needed for constipation (Patient not taking: Reported on 12/13/2023)    predniSONE (DELTASONE) 10 MG tablet Take 6 tablets (60 mg) by mouth daily for 1 day, THEN 4 tablets (40 mg) daily for 2 days, THEN 2 tablets (20 mg) daily for 7 days, THEN 1.5 tablets (15 mg) daily for 7 days, THEN 1 tablet (10 mg) daily for 7 days, THEN 0.5 tablets (5 mg) daily for 7 days.    senna-docusate (SENOKOT-S/PERICOLACE) 8.6-50 MG tablet Take 1-2  tablets by mouth 2 times daily (Patient not taking: Reported on 12/13/2023)    sulfamethoxazole-trimethoprim (BACTRIM) 400-80 MG tablet Take 1 tablet by mouth daily    tacrolimus (GENERIC) 0.5 MG capsule Take 1 capsule (0.5 mg) by mouth 2 times daily Total dose 4.5mg twice daily    tacrolimus (GENERIC) 1 MG capsule Take 4 capsules (4 mg) by mouth 2 times daily Total dose 4.5mg twice daily    valGANciclovir (VALCYTE) 450 MG tablet Take 2 tablets (900 mg) by mouth daily     No current facility-administered medications for this visit.     Medications Discontinued During This Encounter   Medication Reason    phosphorus tablet 250 mg (PHOSPHA 250 NEUTRAL) 250 MG per tablet Reorder (No AVS)    sodium bicarbonate 650 MG tablet Reorder (No AVS)       Physical Exam   Vital Signs: There were no vitals taken for this visit.    GENERAL APPEARANCE: alert and no distress  HENT: mouth without ulcers or lesions  RESP: lungs clear to auscultation - no rales, rhonchi or wheezes  CV: regular rhythm, normal rate, no rub, no murmur  EDEMA: no LE edema bilaterally  ABDOMEN: soft, nondistended, nontender, bowel sounds normal  MS: extremities normal - no gross deformities noted, no evidence of inflammation in joints, no muscle tenderness  SKIN: no rash    Data         Latest Ref Rng & Units 12/13/2023     7:23 AM 12/12/2023     7:26 AM 12/11/2023     6:49 AM   Renal   Sodium 135 - 145 mmol/L 137  138  137    K 3.4 - 5.3 mmol/L 3.5  3.9  4.0    Cl 98 - 107 mmol/L 108  108  110    Cl (external) 98 - 107 mmol/L 108  108  110    CO2 22 - 29 mmol/L 20  22  19    Urea Nitrogen 6.0 - 20.0 mg/dL 14.0  16.8  14.9    Creatinine 0.51 - 0.95 mg/dL 1.06  1.17  1.14    Glucose 70 - 99 mg/dL 112  85  82    Calcium 8.6 - 10.0 mg/dL 9.0  9.4  9.2    Magnesium 1.7 - 2.3 mg/dL 1.8  1.8  1.9          Latest Ref Rng & Units 12/13/2023     7:23 AM 12/12/2023     7:26 AM 12/11/2023     6:49 AM   Bone Health   Phosphorus 2.5 - 4.5 mg/dL 1.5  1.6  1.6           Latest Ref Rng & Units 12/13/2023     7:23 AM 12/12/2023     7:26 AM 12/11/2023     6:49 AM   Heme   WBC 4.0 - 11.0 10e3/uL 10.0  10.4  11.8    Hgb 11.7 - 15.7 g/dL 11.1  10.9  10.2    Plt 150 - 450 10e3/uL 229  218  191          Latest Ref Rng & Units 11/27/2023     2:06 PM 2/28/2022     1:08 PM 10/7/2021     4:39 AM   Liver   AP 40 - 150 U/L 81  78     TBili <=1.2 mg/dL 0.5  0.2     ALT 0 - 50 U/L 6  11     AST 0 - 45 U/L 14  12     Tot Protein 6.4 - 8.3 g/dL 8.0  8.0     Albumin 3.4 - 5.0 g/dL  3.5  2.5    Albumin 3.5 - 5.2 g/dL 4.0            Latest Ref Rng & Units 11/27/2023     2:06 PM   Pancreas   A1C <5.7 % 4.6          Latest Ref Rng & Units 11/27/2023     2:06 PM   Iron studies   Iron 37 - 145 ug/dL 73    Iron Sat Index 15 - 46 % 42    Ferritin 6 - 175 ng/mL 1,549          Latest Ref Rng & Units 11/27/2023     2:06 PM 2/28/2022     1:08 PM   [facility] T[facility] Virology   EBV CAPSID ANTIBODY IGG No detectable antibody. Positive  Positive        Recent Labs   Lab Test 12/12/23  0726   TACROL 3.3*

## 2023-12-14 ENCOUNTER — LAB (OUTPATIENT)
Dept: LAB | Facility: CLINIC | Age: 28
End: 2023-12-14
Payer: MEDICARE

## 2023-12-14 DIAGNOSIS — R19.7 DIARRHEA, UNSPECIFIED TYPE: ICD-10-CM

## 2023-12-14 LAB
ADV 40+41 DNA STL QL NAA+NON-PROBE: NEGATIVE
ASTRO TYP 1-8 RNA STL QL NAA+NON-PROBE: NEGATIVE
BACTERIA UR CULT: NORMAL
C CAYETANENSIS DNA STL QL NAA+NON-PROBE: NEGATIVE
C DIFF TOX B STL QL: NEGATIVE
CAMPYLOBACTER DNA SPEC NAA+PROBE: NEGATIVE
CRYPTOSP DNA STL QL NAA+NON-PROBE: NEGATIVE
E COLI O157 DNA STL QL NAA+NON-PROBE: NORMAL
E HISTOLYT DNA STL QL NAA+NON-PROBE: NEGATIVE
EAEC ASTA GENE ISLT QL NAA+PROBE: NEGATIVE
EC STX1+STX2 GENES STL QL NAA+NON-PROBE: NEGATIVE
EPEC EAE GENE STL QL NAA+NON-PROBE: NEGATIVE
ETEC LTA+ST1A+ST1B TOX ST NAA+NON-PROBE: NEGATIVE
G LAMBLIA DNA STL QL NAA+NON-PROBE: NEGATIVE
MYCOPHENOLATE SERPL LC/MS/MS-MCNC: 3.31 MG/L (ref 1–3.5)
MYCOPHENOLATE-G SERPL LC/MS/MS-MCNC: 45.4 MG/L (ref 30–95)
NOROVIRUS GI+II RNA STL QL NAA+NON-PROBE: NEGATIVE
P SHIGELLOIDES DNA STL QL NAA+NON-PROBE: NEGATIVE
RVA RNA STL QL NAA+NON-PROBE: NEGATIVE
SALMONELLA SP RPOD STL QL NAA+PROBE: NEGATIVE
SAPO I+II+IV+V RNA STL QL NAA+NON-PROBE: NEGATIVE
SHIGELLA SP+EIEC IPAH ST NAA+NON-PROBE: NEGATIVE
TME LAST DOSE: NORMAL H
TME LAST DOSE: NORMAL H
V CHOLERAE DNA SPEC QL NAA+PROBE: NEGATIVE
VIBRIO DNA SPEC NAA+PROBE: NEGATIVE
Y ENTEROCOL DNA STL QL NAA+PROBE: NEGATIVE

## 2023-12-14 PROCEDURE — 87507 IADNA-DNA/RNA PROBE TQ 12-25: CPT

## 2023-12-14 PROCEDURE — 87493 C DIFF AMPLIFIED PROBE: CPT | Mod: 59

## 2023-12-15 LAB — BK VIRUS IGG ANTIBODY: ABNORMAL TITER

## 2023-12-18 NOTE — OP NOTE
Transplant Surgery  Operative Note     Procedure date:  12/8/23    Preoperative diagnosis:  End Stage renal failure due to IgA nephropathy    Postoperative diagnosis:  Same,     Procedure:  1. Left kidney  transplant,  Living, Right  iliac fossa, without vascular reconstruction. A J-J ureteral stent was not placed.  2. Kidney allograft preparation on Back Table      Surgeon:  KRZYSZTOF HERNANDEZ    Fellow/Assistant:  Rohan,     Anesthesia:  General    Specimen:  None    Drains:  none    Urine output:  yes    Estimated blood loss:  50ml     Indication: The patient has End Stage renal failure due to IgA nephropathy and received an organ offer for a Living kidney allograft. After discussing the risks and benefits of proceeding, the patient agreed to proceed with surgery and provided informed consent.  Findings: Integrity of recipient artery: good  Intraoperative Events: none,     Final ABO/Crossmatch verification: After the donor organ arrived to the operating room and prior to anastomosis, I participated in the transplant pre-verification upon organ receipt timeout by visually verifying the donor ID, organ and laterality, donor blood type, recipient unique identifier, recipient blood type, and that the donor and recipient are blood type compatible. The crossmatch was done prospectively; the T cell flow crossmatch result was negative and B cell flow crossmatch result was negative prior to anastomosis.  The patient received Basiliximab, Cellcept, and Solumedrol on induction.    Donor Organ Information:   Donor UNOS ID:  [id number]    Donor arterial clamp on:  12/8/2023  8:56 AM    Total ischemic time:  728 min    Cold ischemic time:  693 min    Warm ischemic time:  35 min     Back Table Details:   Procedure:  Bench preparation of the kidney allograft for transplantation without vascular reconstruction    Surgeon:  ANNALISE DOE    Faculty Co-Surgeon:  KRZYSZTOF HERNANDEZ    Fellow/Assistant:  Rohan    Donor arrival to  recipient room:  12/8/2023  6:40 PM    Graft injury:  no    Graft biopsy:  no    Organ received on:  Ice    Pump resistance:      Pump flow:      Arterial anatomy:  single    Donor arterial quality:  good    Venous anatomy:  single    Ureteral anatomy:  single    Any reconstruction:  no    Artery:  no    Vein:  no     Complications: None.    Back Table Preparation:  The donor kidney was received and inspected. It had been flushed with UW. The graft was prepared on the back table by removing perinephric fat and ligating venous tributaries and lymphatics. The ureter was also cleaned of excess tissue. If required, reconstruction was performed as detailed above. The kidney was stored in iced cold preservation solution until ready for transplantation. Faculty was present for the critical portions of the procedure.    Operative Procedure:   Arterial anastomosis start:  12/8/2023  8:29 PM    Arterial unclamp:  12/8/2023  9:04 PM    Extra vessels used:   no      The patient was brought to the operating room, placed in a supine position, and a time out was performed. Sequential compression devices were placed on both lower extremities and general endotracheal anesthesia was induced.  The patient was given IV antibiotics and a Lockhart catheter. A central line was placed by Anesthesia service. The abdomen was then shaved, prepped, and draped in the usual sterile fashion.  An incision was made in the left lower quadrant and carried down through the subcutaneous tissue and the abdominal wall fascia. If encountered, the epigastric vessels were ligated in continuity, divided and secured with surgical clips. The right iliac artery and vein were exposed. The retractor system was placed and the lymphatics overlying the vessels were serially ligated and divided.     The patient was heparinized. We applied atraumatic vascular clamps and the donor kidney was brought to the operative field. We made a venotomy and the renal vein was  anastomosed to the recipient right iliac vein in an end-to-side fashion. An arteriotomy was made and the donor renal artery was anastomosed to the recipient right external iliac artery  in an end to side fashion. The patient was simultaneously loaded with IV mannitol, Lasix and volume. The renal artery was protected and the clamps were removed. After several cardiac cycles, we opened the renal artery and the kidney was reperfused and was firm and pink .    The transplant ureter was managed by creating a liche anastomosis with absorbable suture. No The kidney made urine prior to implantation.    Hemostasis was obtained, the anastomoses inspected, and the kidney placed in the iliac fossa. After placement, the vessel lay was inspected and found to be acceptable. The kidney position was right RP. The field was irrigated with antibiotic solution. No drain was placed. The retractor was removed and the abdominal wall fascia reapproximated. Subcutaneous tissues were irrigated and hemostasis obtained.  The skin was reapproximated with staples and a dry dressing was applied.   All needle, sponge and instrument counts were correct x 2. The patient was awakened, extubated, and transferred to PACU for post-op monitoring. Faculty was present for key portions of the procedure.    I was present during the key portions of the procedure, and I was immediately available for the entire procedure. There was no qualified resident available to assist with the entire procedure. The fellow noted above Dr Beal participated as the first assistant in all parts of the dictated procedure and Dr Madrigal was the primary in the opening and closure with assistance from me as needed.

## 2023-12-19 ENCOUNTER — LAB (OUTPATIENT)
Dept: LAB | Facility: CLINIC | Age: 28
End: 2023-12-19
Payer: MEDICARE

## 2023-12-19 DIAGNOSIS — Z48.298 AFTERCARE FOLLOWING ORGAN TRANSPLANT: ICD-10-CM

## 2023-12-19 DIAGNOSIS — Z20.828 CONTACT WITH AND (SUSPECTED) EXPOSURE TO OTHER VIRAL COMMUNICABLE DISEASES: ICD-10-CM

## 2023-12-19 DIAGNOSIS — Z98.890 OTHER SPECIFIED POSTPROCEDURAL STATES: ICD-10-CM

## 2023-12-19 DIAGNOSIS — Z79.899 ENCOUNTER FOR LONG-TERM CURRENT USE OF MEDICATION: ICD-10-CM

## 2023-12-19 DIAGNOSIS — Z94.0 KIDNEY REPLACED BY TRANSPLANT: ICD-10-CM

## 2023-12-19 LAB
ANION GAP SERPL CALCULATED.3IONS-SCNC: 8 MMOL/L (ref 7–15)
BUN SERPL-MCNC: 14.5 MG/DL (ref 6–20)
CALCIUM SERPL-MCNC: 9.1 MG/DL (ref 8.6–10)
CHLORIDE SERPL-SCNC: 104 MMOL/L (ref 98–107)
CREAT SERPL-MCNC: 1.08 MG/DL (ref 0.51–0.95)
DEPRECATED HCO3 PLAS-SCNC: 23 MMOL/L (ref 22–29)
EGFRCR SERPLBLD CKD-EPI 2021: 71 ML/MIN/1.73M2
ERYTHROCYTE [DISTWIDTH] IN BLOOD BY AUTOMATED COUNT: 13.9 % (ref 10–15)
GLUCOSE SERPL-MCNC: 89 MG/DL (ref 70–99)
HCT VFR BLD AUTO: 35.1 % (ref 35–47)
HGB BLD-MCNC: 11.2 G/DL (ref 11.7–15.7)
MAGNESIUM SERPL-MCNC: 1.4 MG/DL (ref 1.7–2.3)
MCH RBC QN AUTO: 31.1 PG (ref 26.5–33)
MCHC RBC AUTO-ENTMCNC: 31.9 G/DL (ref 31.5–36.5)
MCV RBC AUTO: 98 FL (ref 78–100)
PHOSPHATE SERPL-MCNC: 2.5 MG/DL (ref 2.5–4.5)
PLATELET # BLD AUTO: 245 10E3/UL (ref 150–450)
POTASSIUM SERPL-SCNC: 4.5 MMOL/L (ref 3.4–5.3)
RBC # BLD AUTO: 3.6 10E6/UL (ref 3.8–5.2)
SODIUM SERPL-SCNC: 135 MMOL/L (ref 135–145)
TACROLIMUS BLD-MCNC: 10.8 UG/L (ref 5–15)
TME LAST DOSE: NORMAL H
TME LAST DOSE: NORMAL H
WBC # BLD AUTO: 11.7 10E3/UL (ref 4–11)

## 2023-12-19 PROCEDURE — 80048 BASIC METABOLIC PNL TOTAL CA: CPT

## 2023-12-19 PROCEDURE — 83735 ASSAY OF MAGNESIUM: CPT

## 2023-12-19 PROCEDURE — 84100 ASSAY OF PHOSPHORUS: CPT

## 2023-12-19 PROCEDURE — 36415 COLL VENOUS BLD VENIPUNCTURE: CPT

## 2023-12-19 PROCEDURE — 80197 ASSAY OF TACROLIMUS: CPT

## 2023-12-19 PROCEDURE — 85027 COMPLETE CBC AUTOMATED: CPT

## 2023-12-20 ENCOUNTER — TELEPHONE (OUTPATIENT)
Dept: TRANSPLANT | Facility: CLINIC | Age: 28
End: 2023-12-20
Payer: MEDICARE

## 2023-12-20 DIAGNOSIS — R19.7 DIARRHEA: Primary | ICD-10-CM

## 2023-12-20 DIAGNOSIS — Z94.0 KIDNEY TRANSPLANT RECIPIENT: ICD-10-CM

## 2023-12-20 RX ORDER — MYCOPHENOLIC ACID 180 MG/1
540 TABLET, DELAYED RELEASE ORAL 2 TIMES DAILY
Qty: 180 TABLET | Refills: 11 | Status: SHIPPED | OUTPATIENT
Start: 2023-12-20

## 2023-12-20 RX ORDER — MAGNESIUM OXIDE 400 MG/1
400 TABLET ORAL 2 TIMES DAILY
Qty: 60 TABLET | Refills: 1 | Status: SHIPPED | OUTPATIENT
Start: 2023-12-20 | End: 2024-02-14

## 2023-12-20 NOTE — TELEPHONE ENCOUNTER
C/o multiple watery stools per day. C diff and enteric panel negative. Confirmed she is not taking stool softeners. On  mg bid.     Encouraged fiber. Will check CMV.     Reviewed with austin Zhou to switch MMF to  mg bid. Increase mag-ox to 400 mg bid for low mag levels. Nuzhat verbalized understanding.

## 2023-12-21 ENCOUNTER — LAB (OUTPATIENT)
Dept: LAB | Facility: CLINIC | Age: 28
End: 2023-12-21
Payer: MEDICARE

## 2023-12-21 DIAGNOSIS — Z48.298 AFTERCARE FOLLOWING ORGAN TRANSPLANT: ICD-10-CM

## 2023-12-21 DIAGNOSIS — Z20.828 CONTACT WITH AND (SUSPECTED) EXPOSURE TO OTHER VIRAL COMMUNICABLE DISEASES: ICD-10-CM

## 2023-12-21 DIAGNOSIS — R19.7 DIARRHEA: ICD-10-CM

## 2023-12-21 DIAGNOSIS — Z94.0 KIDNEY REPLACED BY TRANSPLANT: ICD-10-CM

## 2023-12-21 DIAGNOSIS — Z79.899 ENCOUNTER FOR LONG-TERM CURRENT USE OF MEDICATION: ICD-10-CM

## 2023-12-21 DIAGNOSIS — Z98.890 OTHER SPECIFIED POSTPROCEDURAL STATES: ICD-10-CM

## 2023-12-21 LAB
ANION GAP SERPL CALCULATED.3IONS-SCNC: 10 MMOL/L (ref 7–15)
BUN SERPL-MCNC: 17.1 MG/DL (ref 6–20)
CALCIUM SERPL-MCNC: 9.3 MG/DL (ref 8.6–10)
CHLORIDE SERPL-SCNC: 105 MMOL/L (ref 98–107)
CMV DNA SPEC NAA+PROBE-ACNC: NOT DETECTED IU/ML
CREAT SERPL-MCNC: 1.19 MG/DL (ref 0.51–0.95)
DEPRECATED HCO3 PLAS-SCNC: 22 MMOL/L (ref 22–29)
EGFRCR SERPLBLD CKD-EPI 2021: 64 ML/MIN/1.73M2
ERYTHROCYTE [DISTWIDTH] IN BLOOD BY AUTOMATED COUNT: 14.1 % (ref 10–15)
FERRITIN SERPL-MCNC: 902 NG/ML (ref 6–175)
GLUCOSE SERPL-MCNC: 88 MG/DL (ref 70–99)
HCT VFR BLD AUTO: 38.2 % (ref 35–47)
HGB BLD-MCNC: 12.2 G/DL (ref 11.7–15.7)
IRON BINDING CAPACITY (ROCHE): 208 UG/DL (ref 240–430)
IRON SATN MFR SERPL: 34 % (ref 15–46)
IRON SERPL-MCNC: 70 UG/DL (ref 37–145)
MCH RBC QN AUTO: 31.5 PG (ref 26.5–33)
MCHC RBC AUTO-ENTMCNC: 31.9 G/DL (ref 31.5–36.5)
MCV RBC AUTO: 99 FL (ref 78–100)
PLATELET # BLD AUTO: 240 10E3/UL (ref 150–450)
POTASSIUM SERPL-SCNC: 4.3 MMOL/L (ref 3.4–5.3)
PTH-INTACT SERPL-MCNC: 84 PG/ML (ref 15–65)
RBC # BLD AUTO: 3.87 10E6/UL (ref 3.8–5.2)
SODIUM SERPL-SCNC: 137 MMOL/L (ref 135–145)
TACROLIMUS BLD-MCNC: 13.3 UG/L (ref 5–15)
TME LAST DOSE: NORMAL H
TME LAST DOSE: NORMAL H
VIT D+METAB SERPL-MCNC: 20 NG/ML (ref 20–50)
WBC # BLD AUTO: 12.7 10E3/UL (ref 4–11)

## 2023-12-21 PROCEDURE — 83970 ASSAY OF PARATHORMONE: CPT

## 2023-12-21 PROCEDURE — 36415 COLL VENOUS BLD VENIPUNCTURE: CPT

## 2023-12-21 PROCEDURE — 80197 ASSAY OF TACROLIMUS: CPT

## 2023-12-21 PROCEDURE — 83540 ASSAY OF IRON: CPT

## 2023-12-21 PROCEDURE — 80048 BASIC METABOLIC PNL TOTAL CA: CPT

## 2023-12-21 PROCEDURE — 85027 COMPLETE CBC AUTOMATED: CPT

## 2023-12-21 PROCEDURE — 82306 VITAMIN D 25 HYDROXY: CPT

## 2023-12-21 PROCEDURE — 83550 IRON BINDING TEST: CPT

## 2023-12-21 PROCEDURE — 82728 ASSAY OF FERRITIN: CPT

## 2023-12-22 ENCOUNTER — VIRTUAL VISIT (OUTPATIENT)
Dept: PHARMACY | Facility: CLINIC | Age: 28
End: 2023-12-22

## 2023-12-22 DIAGNOSIS — Z94.0 KIDNEY TRANSPLANT RECIPIENT: Primary | ICD-10-CM

## 2023-12-22 DIAGNOSIS — R52 PAIN: ICD-10-CM

## 2023-12-22 DIAGNOSIS — Z48.298 AFTERCARE FOLLOWING ORGAN TRANSPLANT: ICD-10-CM

## 2023-12-22 DIAGNOSIS — Z78.9 TAKES DIETARY SUPPLEMENTS: ICD-10-CM

## 2023-12-22 DIAGNOSIS — R19.5 LOOSE STOOLS: ICD-10-CM

## 2023-12-22 DIAGNOSIS — E78.5 DYSLIPIDEMIA: ICD-10-CM

## 2023-12-22 PROCEDURE — 99207 PR NO CHARGE LOS: CPT | Performed by: PHARMACIST

## 2023-12-22 RX ORDER — TACROLIMUS 1 MG/1
5 CAPSULE ORAL 2 TIMES DAILY
Qty: 300 CAPSULE | Refills: 11 | Status: SHIPPED | OUTPATIENT
Start: 2023-12-22 | End: 2023-12-29

## 2023-12-22 NOTE — TELEPHONE ENCOUNTER
Patient confirmed this was an accurate 12-hour trough.   Verified Tacrolimus IR dose 6 mg BID.   Confirmed no new medications or illness.   Confirmed no missed doses.   Patient confirms decrease Tacrolimus IR dose to 5 mg BID

## 2023-12-22 NOTE — TELEPHONE ENCOUNTER
[facility] message sent to patient regarding:  ISSUE:   Tacrolimus IR level 13.3 on 12.21.2023, goal 8-10, dose 6 mg BID.     PLAN/LPN TASK:   Call Patient and confirm this was an accurate 12-hour trough.   Verify Tacrolimus IR dose 6 mg BID.   Confirm no new medications or illness.   Confirm no missed doses.   If accurate trough and accurate dose, decrease Tacrolimus IR dose to 5 mg BID

## 2023-12-22 NOTE — PATIENT INSTRUCTIONS
"Recommendations from today's MTM visit:                                                      Reduce to Phosphorus 500mg twice daily. Recommend pushing high phosphorus foods like nuts, meats, dairy, eggs etc.   [facility] mail order will call you three weeks post discharge, but if your dose changes, call the number on the back of the pill box to talk to them earlier, [phone]. If your doctor sends over a new prescription to the mail order pharmacy you will have to call them to set up the order. They have an Dm called \"My [facility]\" as well.     Follow-up: 2 months post transplant     It was great speaking with you today.  I value your experience and would be very thankful for your time in providing feedback in our clinic survey. In the next few days, you may receive an email or text message from [employer] with a link to a survey related to your  clinical pharmacist.\"     To schedule another MTM appointment, please call the clinic directly or you may call the MTM scheduling line at [phone] or toll-free at [phone].     My Clinical Pharmacist's contact information:                                                      Please feel free to contact me with any questions or concerns you have.      Timur Carney, PharmD  [facility] Pharmacist    Phone: [phone]     "

## 2023-12-22 NOTE — PROGRESS NOTES
"Medication Therapy Management (MTM) Encounter    ASSESSMENT:                            Medication Adherence/Access: No issues identified    Kidney Transplant:    Stable.    Supplements:   Phosphorus levels normal, can reduce phosphorus supplement.     Hyperlipidemia   Stable.    Loose stools:   Patient is already making some changes, changing Mycophenolate Mofetil to Myfortic, and starting FIBER. No further changes needed until these fail or succeed.     Pain:   Stable.     PLAN:                            Reduce to Phosphorus 500mg twice daily. Recommend pushing high phosphorus foods like nuts, meats, dairy, eggs etc.   [facility] mail order will call you three weeks post discharge, but if your dose changes, call the number on the back of the pill box to talk to them earlier, [phone]. If your doctor sends over a new prescription to the mail order pharmacy you will have to call them to set up the order. They have an Dm called \"[facility]\" as well.     Follow-up: 2 months post transplant    SUBJECTIVE/OBJECTIVE:                          Nuzhat Lopez is a 27 year old female called for a transitions of care visit. She was discharged from [facility] on 12/11 for kidney txp.      Reason for visit: initial post transplant.    Allergies/ADRs: Reviewed in chart  Past Medical History: Reviewed in chart  Tobacco: She reports that she has never smoked. She has never used smokeless tobacco.  Alcohol: not currently using  THC/CBD: None    Medication Adherence/Access: Patient takes medications directly from bottles.  Patient takes medications 3 time(s) per day. 9am and 9pm, 12pm  Per patient, misses medication 0 times per week.   The patient fills medications at [facility]: YES.    Kidney Transplant:    Tacrolimus 5 mg twice daily  Mycophenolate Mofetil 750 mg twice daily, being replaced by Myfortic 540mg  twice daily due to diarrhea.   Prednisone 15 mg every morning.   Pt reports diarrhea  Transplant date: 12/8/23  CMV " prophylaxis: CrCl >/=60 mL/minute: Valcyte 900mg daily Treat 3 months post tx   PJP prophylaxis: Bactrim SS daily  Tx Coordinator: Nely Alonso Tx MD: Dr. 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    Supplements:   Mag Oxide 400mg twice daily ( 2 hours from MMF) .  Phosphorus 500mg 3 TIMES DAILY.   Sodium Bicarbonate 650mg 3 TIMES DAILY.   Lab Results   Component Value Date    MAG 1.4 (L) 12/19/2023    MAG 1.8 12/13/2023    PHOS 2.5 12/19/2023    PHOS 1.5 (L) 12/13/2023    CO2 22 12/21/2023    CO2 23 12/19/2023     Hyperlipidemia   Atorvastatin 10mg daily  Patient reports no significant myalgias or other side effects.     Loose stools:   3 BMs daily, watery per patient. Has not received mycophenolic acid or Fiber yet.     Pain:   Acetaminophen 325mg once daily. Prn.  Not needing oxycodone  2-3/10.    Today's Vitals: There were no vitals taken for this visit.  ----------------  Post Discharge Medication Reconciliation Status: discharge medications reconciled and changed, per note/orders.    I spent 15 minutes with this patient today. All changes were made via collaborative practice agreement with Dr. Noguera. A copy of the visit note was provided to the patient's provider(s).    A summary of these recommendations was sent via [employer].    Timur Carney, PharmD  [facility] Pharmacist    Phone: [phone]     Telemedicine Visit Details  Type of service:  Telephone visit  Start Time: 1:04 PM  End Time: 1:19 PM     Medication Therapy Recommendations  Takes dietary supplements    Current Medication: phosphorus tablet 250 mg (PHOSPHA 250 NEUTRAL) 250 MG per tablet   Rationale: Dose too high - Dosage too high - Safety    Recommendation: Decrease Frequency   Status: Accepted per CPA

## 2023-12-22 NOTE — TELEPHONE ENCOUNTER
ISSUE:   Tacrolimus IR level 13.3 on 12.21.2023, goal 8-10, dose 6 mg BID.    PLAN/LPN TASK:   Call Patient and confirm this was an accurate 12-hour trough.   Verify Tacrolimus IR dose 6 mg BID.   Confirm no new medications or illness.   Confirm no missed doses.   If accurate trough and accurate dose, decrease Tacrolimus IR dose to 5 mg BID     Is this more than a 50% increase or decrease in current IS dose: No  If YES, justification: n/a    Repeat labs in as scheduled 12.26.2023   *If > 50% change in immunosuppression dose, repeat labs in 1 week.

## 2023-12-26 ENCOUNTER — LAB (OUTPATIENT)
Dept: LAB | Facility: CLINIC | Age: 28
End: 2023-12-26
Payer: MEDICARE

## 2023-12-26 DIAGNOSIS — Z94.0 KIDNEY REPLACED BY TRANSPLANT: ICD-10-CM

## 2023-12-26 DIAGNOSIS — Z79.899 ENCOUNTER FOR LONG-TERM CURRENT USE OF MEDICATION: ICD-10-CM

## 2023-12-26 DIAGNOSIS — Z98.890 OTHER SPECIFIED POSTPROCEDURAL STATES: ICD-10-CM

## 2023-12-26 DIAGNOSIS — Z20.828 CONTACT WITH AND (SUSPECTED) EXPOSURE TO OTHER VIRAL COMMUNICABLE DISEASES: ICD-10-CM

## 2023-12-26 DIAGNOSIS — Z48.298 AFTERCARE FOLLOWING ORGAN TRANSPLANT: ICD-10-CM

## 2023-12-26 LAB
ANION GAP SERPL CALCULATED.3IONS-SCNC: 8 MMOL/L (ref 7–15)
BUN SERPL-MCNC: 13.3 MG/DL (ref 6–20)
CALCIUM SERPL-MCNC: 9.2 MG/DL (ref 8.6–10)
CHLORIDE SERPL-SCNC: 106 MMOL/L (ref 98–107)
CREAT SERPL-MCNC: 1.21 MG/DL (ref 0.51–0.95)
DEPRECATED HCO3 PLAS-SCNC: 24 MMOL/L (ref 22–29)
EGFRCR SERPLBLD CKD-EPI 2021: 62 ML/MIN/1.73M2
ERYTHROCYTE [DISTWIDTH] IN BLOOD BY AUTOMATED COUNT: 14.2 % (ref 10–15)
GLUCOSE SERPL-MCNC: 84 MG/DL (ref 70–99)
HCT VFR BLD AUTO: 38 % (ref 35–47)
HGB BLD-MCNC: 12.2 G/DL (ref 11.7–15.7)
MAGNESIUM SERPL-MCNC: 1.6 MG/DL (ref 1.7–2.3)
MCH RBC QN AUTO: 31.4 PG (ref 26.5–33)
MCHC RBC AUTO-ENTMCNC: 32.1 G/DL (ref 31.5–36.5)
MCV RBC AUTO: 98 FL (ref 78–100)
MYCOPHENOLATE SERPL LC/MS/MS-MCNC: 4.24 MG/L (ref 1–3.5)
MYCOPHENOLATE-G SERPL LC/MS/MS-MCNC: 45.9 MG/L (ref 30–95)
PHOSPHATE SERPL-MCNC: 2.9 MG/DL (ref 2.5–4.5)
PLATELET # BLD AUTO: 205 10E3/UL (ref 150–450)
POTASSIUM SERPL-SCNC: 4.2 MMOL/L (ref 3.4–5.3)
RBC # BLD AUTO: 3.88 10E6/UL (ref 3.8–5.2)
SODIUM SERPL-SCNC: 138 MMOL/L (ref 135–145)
TACROLIMUS BLD-MCNC: 10.6 UG/L (ref 5–15)
TME LAST DOSE: ABNORMAL H
TME LAST DOSE: ABNORMAL H
TME LAST DOSE: NORMAL H
TME LAST DOSE: NORMAL H
WBC # BLD AUTO: 9.2 10E3/UL (ref 4–11)

## 2023-12-26 PROCEDURE — 80048 BASIC METABOLIC PNL TOTAL CA: CPT

## 2023-12-26 PROCEDURE — 80180 DRUG SCRN QUAN MYCOPHENOLATE: CPT

## 2023-12-26 PROCEDURE — 83735 ASSAY OF MAGNESIUM: CPT

## 2023-12-26 PROCEDURE — 84100 ASSAY OF PHOSPHORUS: CPT

## 2023-12-26 PROCEDURE — 85027 COMPLETE CBC AUTOMATED: CPT

## 2023-12-26 PROCEDURE — 80197 ASSAY OF TACROLIMUS: CPT

## 2023-12-26 PROCEDURE — 36415 COLL VENOUS BLD VENIPUNCTURE: CPT

## 2023-12-27 ENCOUNTER — OFFICE VISIT (OUTPATIENT)
Dept: TRANSPLANT | Facility: CLINIC | Age: 28
End: 2023-12-27
Attending: INTERNAL MEDICINE
Payer: MEDICARE

## 2023-12-27 ENCOUNTER — TELEPHONE (OUTPATIENT)
Dept: TRANSPLANT | Facility: CLINIC | Age: 28
End: 2023-12-27

## 2023-12-27 VITALS
OXYGEN SATURATION: 98 % | SYSTOLIC BLOOD PRESSURE: 135 MMHG | WEIGHT: 166.63 LBS | TEMPERATURE: 98 F | HEART RATE: 100 BPM | BODY MASS INDEX: 31.48 KG/M2 | DIASTOLIC BLOOD PRESSURE: 90 MMHG

## 2023-12-27 DIAGNOSIS — Z94.0 KIDNEY REPLACED BY TRANSPLANT: Primary | ICD-10-CM

## 2023-12-27 DIAGNOSIS — Z48.298 AFTERCARE FOLLOWING ORGAN TRANSPLANT: ICD-10-CM

## 2023-12-27 PROCEDURE — G0463 HOSPITAL OUTPT CLINIC VISIT: HCPCS | Performed by: NURSE PRACTITIONER

## 2023-12-27 PROCEDURE — 99213 OFFICE O/P EST LOW 20 MIN: CPT | Mod: 24 | Performed by: NURSE PRACTITIONER

## 2023-12-27 NOTE — PROGRESS NOTES
Transplant Surgery Progress Note    Transplants:  12/8/2023 (Kidney); Postoperative day:  19  S: Doing well. No concerns. No issues with urinating, LBM yesterday. Did have diarrhea 2 days ago but now resolved. No F/C.   Taking Tylenol PRN for pain which helps.   Eating and drinking well, eats 3 meals and snacks. Gets in 2-3 liters per day.      BP at home 130s systolic and 80-90 diastolic     Staple removal next week   Transplant History:    Transplant Type:  LDKT  Donor Type: Living   Transplant Date:  12/8/2023 (Kidney)   Ureteral Stent:  No   Crossmatch:  negative   DSA at Tx:  No  Baseline Cr:    De[place]ovo DSA: No    Acute Rejection Hx:  No    Present Maintenance Immunosuppression:  Tacrolimus, Mycophenolate mofetil, and Prednisone    CMV IgG Ab Discordance:  No  EBV IgG Ab Discordance:  No    BK Viremia:  No  EBV Viremia:  No    Transplant Coordinator: Nely Alonso     Transplant Office Phone Number: [phone]     Immunosuppressant Medications       Immunosuppressive Agents Disp Start End     mycophenolic acid (GENERIC EQUIVALENT) 180 MG EC tablet 180 tablet 12/20/2023 --    Sig - Route: Take 3 tablets (540 mg) by mouth 2 times daily Take in place of mycophenolate - Oral    Class: E-Prescribe    Notes to Pharmacy: TXP DT 12/8/2023 (Kidney) TXP Dischg DT 12/11/2023 DX Kidney replaced by transplant Z94.0 TX [facility] ([place], [place])     tacrolimus (GENERIC) 0.5 MG capsule 60 capsule 12/13/2023 --    Sig: HOLD FOR FUTURE DOSE CHANGES.  Profile Rx: patient will contact pharmacy when needed    Patient not taking: Reported on 12/22/2023    Class: E-Prescribe     tacrolimus (GENERIC) 1 MG capsule 300 capsule 12/22/2023 --    Sig - Route: Take 5 capsules (5 mg) by mouth 2 times daily - Oral    Class: E-Prescribe            Possible Immunosuppression-related side effects:   []             headache  []             vivid dreams  []             irritability  []              cognitive difficuties  []             fine tremor  []             nausea  []             diarrhea  []             neuropathy      []             edema  []             renal calcineurin toxicity  []             hyperkalemia  []             post-transplant diabetes  []             decreased appetite  []             increased appetite  []             other:  []             none    Prescription Medications as of 12/27/2023         Rx Number Disp Refills Start End Last Dispensed Date Next Fill Date Owning Pharmacy    acetaminophen (TYLENOL) 325 MG tablet  100 tablet 0 12/11/2023 --   [facility] [address]    Sig: Take 3 tablets (975 mg) by mouth every 8 hours as needed for pain    Class: E-Prescribe    Route: Oral    atorvastatin (LIPITOR) 10 MG tablet  30 tablet 11 12/12/2023 --   [facility] [address] [address]     Sig: Take 1 tablet (10 mg) by mouth daily    Class: E-Prescribe    Route: Oral    entecavir (BARACLUDE) 0.5 MG tablet  30 tablet 5 12/11/2023 --   [facility] - [place], [place] - [address] [place] [address]    Sig: Take 1 tablet (0.5 mg) by mouth daily    Class: E-Prescribe    Route: Oral    Renewals       Renewal requests to authorizing provider (Anahi Pedro, APRN CNP) <b>prohibited</b>            magnesium oxide (MAG-OX) 400 MG tablet  60 tablet 1 12/20/2023 --   [facility] [address] [address]    Sig: Take 1 tablet (400 mg) by mouth 2 times daily    Class: E-Prescribe    Route: Oral    mycophenolic acid (GENERIC EQUIVALENT) 180 MG EC tablet  180 tablet 11 12/20/2023 --   [facility] [address] [address]    Sig: Take 3 tablets (540 mg) by mouth 2 times daily Take in place of mycophenolate    Class: E-Prescribe    Notes to Pharmacy: TXP DT 12/8/2023 (Kidney) TXP Dischg DT 12/11/2023 DX Kidney replaced by transplant Z94.0 TX  [facility] [place] ([place], [place])    Route: Oral    oxyCODONE (ROXICODONE) 5 MG tablet  5 tablet 0 12/11/2023 --   [place], [place] - [address] [place] [address] [address]    Sig: Take 0.5-1 tablets (2.5-5 mg) by mouth every 4 hours as needed for moderate pain    Class: E-Prescribe    Earliest Fill Date: 12/11/2023    Route: Oral    phosphorus tablet 250 mg (PHOSPHA 250 NEUTRAL) 250 MG per tablet  60 tablet 0 12/13/2023 --   [facility]/[place], [place] - [facility] [address]    Sig: Take 2 tablets (500 mg) by mouth 3 times daily    Class: E-Prescribe    Route: Oral    polyethylene glycol (MIRALAX) 17 GM/Dose powder  510 g 0 12/11/2023 --   [place], [place] - [address] [place] [address] [address]    Sig: Take 17 g by mouth daily as needed for constipation    Class: E-Prescribe    Route: Oral    predniSONE (DELTASONE) 10 MG tablet  49 tablet 0 12/12/2023 1/12/2024   [place], [place] - [address]    Sig: Take 6 tablets (60 mg) by mouth daily for 1 day, THEN 4 tablets (40 mg) daily for 2 days, THEN 2 tablets (20 mg) daily for 7 days, THEN 1.5 tablets (15 mg) daily for 7 days, THEN 1 tablet (10 mg) daily for 7 days, THEN 0.5 tablets (5 mg) daily for 7 days.    Class: E-Prescribe    Route: Oral    psyllium (METAMUCIL/KONSYL) 58.6 % powder  -- --  --       Sig: Take 1 teaspoonful by mouth daily    Class: Historical    Route: Oral    sodium bicarbonate 650 MG tablet  60 tablet 11 12/13/2023 --   [facility]/[place], [place] - [address]    Sig: Take 1 tablet (650 mg) by mouth 3 times daily    Class: E-Prescribe    Route: Oral    sulfamethoxazole-trimethoprim (BACTRIM) 400-80 MG tablet  30 tablet 11 12/12/2023 --   [facility], [place] - [address]    Sig: Take 1 tablet by mouth daily    Class: E-Prescribe    Route: Oral     tacrolimus (GENERIC) 0.5 MG capsule  60 capsule 11 12/13/2023 --   [facility] [place] [address] [address]    Sig: HOLD FOR FUTURE DOSE CHANGES.  Profile Rx: patient will contact pharmacy when needed    Class: E-Prescribe    tacrolimus (GENERIC) 1 MG capsule  300 capsule 11 12/22/2023 --   [facility] - [place] [address] [address]    Sig: Take 5 capsules (5 mg) by mouth 2 times daily    Class: E-Prescribe    Route: Oral    valGANciclovir (VALCYTE) 450 MG tablet  60 tablet 2 12/12/2023 --   [facility] [place] [address] [address]    Sig: Take 2 tablets (900 mg) by mouth daily    Class: E-Prescribe    Route: Oral            O:   Temp:  [98  F (36.7  C)] 98  F (36.7  C)  Pulse:  [100] 100  BP: (135)/(90) 135/90  SpO2:  [98 %] 98 %  General Appearance: in no apparent distress.   Skin: Normal, no rashes or jaundice  Heart: regular rate and rhythm  Lungs: easy respirations, no audible wheezing.  Abdomen: flat, The wound is dry and intact, without hernia. The abdomen is non-tender. The kidney graft is not tender.  There is no ascites.  Extremities: Tremor absent.   Edema: absent.   Incision C/D/I with staples. No drainage.         Latest Ref Rng & Units 12/26/2023     7:57 AM 12/21/2023     7:55 AM 12/19/2023     8:33 AM 12/13/2023     7:23 AM 12/12/2023     7:26 AM   Transplant Immunosuppression Labs   Creat 0.51 - 0.95 mg/dL 1.21  1.19  1.08  1.06  1.17    Urea Nitrogen 6.0 - 20.0 mg/dL 13.3  17.1  14.5  14.0  16.8    WBC 4.0 - 11.0 10e3/uL 9.2  12.7  11.7  10.0  10.4    Neutrophil %    54  61        Chemistries:   Recent Labs   Lab Test 12/26/23  0757   BUN 13.3   CR 1.21*   GFRESTIMATED 62   GLC 84     Lab Results   Component Value Date    A1C 4.6 11/27/2023     Recent Labs   Lab Test 11/27/23  1406   ALBUMIN 4.0   BILITOTAL 0.5   ALKPHOS 81   AST 14   ALT 6     Urine Studies:  Recent Labs   Lab Test 12/13/23  0728   COLOR Yellow    APPEARANCE Slightly Cloudy*   URINEGLC Negative   URINEBILI Negative   URINEKETONE Negative   SG 1.025   UBLD Large*   URINEPH 6.0   PROTEIN 10*   NITRITE Negative   LEUKEST Negative   RBCU >182*   WBCU 12*     No lab results found.  Hematology:   Recent Labs   Lab Test 12/26/23  0757 12/21/23  0755 12/19/23  0833   HGB 12.2 12.2 11.2*    240 245   WBC 9.2 12.7* 11.7*     Coags:   Recent Labs   Lab Test 11/27/23  1406 02/28/22  1308   INR 1.09 0.93     HLA antibodies:   SA1 HI RISK SAURABH   Date Value Ref Range Status   11/27/2023 None  Final     SA1 MOD RISK SAURABH   Date Value Ref Range Status   11/27/2023 None  Final     SA2 HI RISK SAURABH   Date Value Ref Range Status   11/27/2023 None  Final     SA2 MOD RISK SAURABH   Date Value Ref Range Status   11/27/2023 None  Final       Assessment: Nuzhat Lopez is doing well s/p LDKT:  Issues we addressed during her visit include:    Plan:    1. Graft function: Cr stable   2. Immunosuppression Management: No change    .  Complexity of management:Medium.  Contributing factors:  kidney transplant  3. High magnesium foods encouraged.   Followup: clinic appt next week for staple removal     Total Time: 20 min,   Counselling Time: 10 min.    ARDEN Christianson

## 2023-12-27 NOTE — NURSING NOTE
"Chief Complaint   Patient presents with    Follow Up     Kidney transplant follow-up     Vital signs:  Temp: 98  F (36.7  C) Temp src: Oral BP: (!) 135/90 Pulse: 100     SpO2: 98 %       Weight: 75.6 kg (166 lb 10.1 oz)  Estimated body mass index is 31.48 kg/m  as calculated from the following:    Height as of 12/8/23: 1.549 m (5' 1\").    Weight as of this encounter: 75.6 kg (166 lb 10.1 oz).      Amrit Ambriz RN on 12/27/2023 at 9:28 AM    "

## 2023-12-27 NOTE — LETTER
12/27/2023         RE: Nuzhat Lopze  [address] [facility] [address]        Dear Colleague,    Thank you for referring your patient, Nuzhat Lopez, to the [facility] TRANSPLANT [facility]INIC. Please see a copy of my visit note below.    Transplant Surgery Progress Note    Transplants:  12/8/2023 (Kidney); Postoperative day:  19  S: Doing well. No concerns. No issues with urinating, LBM yesterday. Did have diarrhea 2 days ago but now resolved. No F/C.   Taking Tylenol PRN for pain which helps.   Eating and drinking well, eats 3 meals and snacks. Gets in 2-3 liters per day.      BP at home 130s systolic and 80-90 diastolic     Staple removal next week   Transplant History:    Transplant Type:  LDKT  Donor Type: Living   Transplant Date:  12/8/2023 (Kidney)   Ureteral Stent:  No   Crossmatch:  negative   DSA at Tx:  No  Baseline Cr:    DeNovo DSA: No    Acute Rejection Hx:  No    Present Maintenance Immunosuppression:  Tacrolimus, Mycophenolate mofetil, and Prednisone    CMV IgG Ab Discordance:  No  EBV IgG Ab Discordance:  No    BK Viremia:  No  EBV Viremia:  No    Transplant Coordinator: Nely Alonso     Transplant Office Phone Number: [phone]     Immunosuppressant Medications       Immunosuppressive Agents Disp Start End     mycophenolic acid (GENERIC EQUIVALENT) 180 MG EC tablet 180 tablet 12/20/2023 --    Sig - Route: Take 3 tablets (540 mg) by mouth 2 times daily Take in place of mycophenolate - Oral    Class: E-Prescribe    Notes to Pharmacy: TXP DT 12/8/2023 (Kidney) TXP Dischg DT 12/11/2023 DX Kidney replaced by transplant Z94.0 [facility] ([place], [place])     tacrolimus (GENERIC) 0.5 MG capsule 60 capsule 12/13/2023 --    Sig: HOLD FOR FUTURE DOSE CHANGES.  Profile Rx: patient will contact pharmacy when needed    Patient not taking: Reported on 12/22/2023    Class: E-Prescribe     tacrolimus (GENERIC) 1 MG capsule 300 capsule 12/22/2023 --    Sig -  Route: Take 5 capsules (5 mg) by mouth 2 times daily - Oral    Class: E-Prescribe            Possible Immunosuppression-related side effects:   []             headache  []             vivid dreams  []             irritability  []             cognitive difficuties  []             fine tremor  []             nausea  []             diarrhea  []             neuropathy      []             edema  []             renal calcineurin toxicity  []             hyperkalemia  []             post-transplant diabetes  []             decreased appetite  []             increased appetite  []             other:  []             none    Prescription Medications as of 12/27/2023         Rx Number Disp Refills Start End Last Dispensed Date Next Fill Date Owning Pharmacy    acetaminophen (TYLENOL) 325 MG tablet  100 tablet 0 12/11/2023 --   [place], [place] - [address] [place] [address]    Sig: Take 3 tablets (975 mg) by mouth every 8 hours as needed for pain    Class: E-Prescribe    Route: Oral    atorvastatin (LIPITOR) 10 MG tablet  30 tablet 11 12/12/2023 --   [facility] [address]    Sig: Take 1 tablet (10 mg) by mouth daily    Class: E-Prescribe    Route: Oral    entecavir (BARACLUDE) 0.5 MG tablet  30 tablet 5 12/11/2023 --   [facility] [place], [place] - [address] [place] [address] [address]    Sig: Take 1 tablet (0.5 mg) by mouth daily    Class: E-Prescribe    Route: Oral    Renewals       Renewal requests to authorizing provider (Anahi Pedro, APRN CNP) <b>prohibited</b>            magnesium oxide (MAG-OX) 400 MG tablet  60 tablet 1 12/20/2023 --   [facility] [address]    Sig: Take 1 tablet (400 mg) by mouth 2 times daily    Class: E-Prescribe    Route: Oral    mycophenolic acid (GENERIC EQUIVALENT) 180 MG EC tablet  180 tablet 11 12/20/2023 --   [facility], [place] -  [address] [place] [address]    Sig: Take 3 tablets (540 mg) by mouth 2 times daily Take in place of mycophenolate    Class: E-Prescribe    Notes to Pharmacy: TXP DT 12/8/2023 (Kidney) TXP Dischg DT 12/11/2023 DX Kidney replaced by transplant Z94.0 [facility] ([place], [place])    Route: Oral    oxyCODONE (ROXICODONE) 5 MG tablet  5 tablet 0 12/11/2023 --   [facility] [place], [place] - [address] [place] [address]    Sig: Take 0.5-1 tablets (2.5-5 mg) by mouth every 4 hours as needed for moderate pain    Class: E-Prescribe    Earliest Fill Date: 12/11/2023    Route: Oral    phosphorus tablet 250 mg (PHOSPHA 250 NEUTRAL) 250 MG per tablet  60 tablet 0 12/13/2023 --   [facility] [place], [place] - [address] [place] [address]    Sig: Take 2 tablets (500 mg) by mouth 3 times daily    Class: E-Prescribe    Route: Oral    polyethylene glycol (MIRALAX) 17 GM/Dose powder  510 g 0 12/11/2023 --   [place], [place] - [address] [place] [address]    Sig: Take 17 g by mouth daily as needed for constipation    Class: E-Prescribe    Route: Oral    predniSONE (DELTASONE) 10 MG tablet  49 tablet 0 12/12/2023 1/12/2024   [place], [place] - [address] [place] [address]    Sig: Take 6 tablets (60 mg) by mouth daily for 1 day, THEN 4 tablets (40 mg) daily for 2 days, THEN 2 tablets (20 mg) daily for 7 days, THEN 1.5 tablets (15 mg) daily for 7 days, THEN 1 tablet (10 mg) daily for 7 days, THEN 0.5 tablets (5 mg) daily for 7 days.    Class: E-Prescribe    Route: Oral    psyllium (METAMUCIL/KONSYL) 58.6 % powder  -- --  --       Sig: Take 1 teaspoonful by mouth daily    Class: Historical    Route: Oral    sodium bicarbonate 650 MG tablet  60 tablet 11 12/13/2023 --   [facility] [place], [place] - [facility] [address]    Sig: Take 1 tablet (650 mg) by mouth 3 times daily    Class: E-Prescribe    Route: Oral     sulfamethoxazole-trimethoprim (BACTRIM) 400-80 MG tablet  30 tablet 11 12/12/2023 --   [facility] [place] [address] [address]    Sig: Take 1 tablet by mouth daily    Class: E-Prescribe    Route: Oral    tacrolimus (GENERIC) 0.5 MG capsule  60 capsule 11 12/13/2023 --   [facility] [place] [address] [address]    Sig: HOLD FOR FUTURE DOSE CHANGES.  Profile Rx: patient will contact pharmacy when needed    Class: E-Prescribe    tacrolimus (GENERIC) 1 MG capsule  300 capsule 11 12/22/2023 --   [facility] [place] [address]    Sig: Take 5 capsules (5 mg) by mouth 2 times daily    Class: E-Prescribe    Route: Oral    valGANciclovir (VALCYTE) 450 MG tablet  60 tablet 2 12/12/2023 --   [facility] [address] [address]    Sig: Take 2 tablets (900 mg) by mouth daily    Class: E-Prescribe    Route: Oral            O:   Temp:  [98  F (36.7  C)] 98  F (36.7  C)  Pulse:  [100] 100  BP: (135)/(90) 135/90  SpO2:  [98 %] 98 %  General Appearance: in no apparent distress.   Skin: Normal, no rashes or jaundice  Heart: regular rate and rhythm  Lungs: easy respirations, no audible wheezing.  Abdomen: flat, The wound is dry and intact, without hernia. The abdomen is non-tender. The kidney graft is not tender.  There is no ascites.  Extremities: Tremor absent.   Edema: absent.   Incision C/D/I with staples. No drainage.         Latest Ref Rng & Units 12/26/2023     7:57 AM 12/21/2023     7:55 AM 12/19/2023     8:33 AM 12/13/2023     7:23 AM 12/12/2023     7:26 AM   Transplant Immunosuppression Labs   Creat 0.51 - 0.95 mg/dL 1.21  1.19  1.08  1.06  1.17    Urea Nitrogen 6.0 - 20.0 mg/dL 13.3  17.1  14.5  14.0  16.8    WBC 4.0 - 11.0 10e3/uL 9.2  12.7  11.7  10.0  10.4    Neutrophil %    54  61        Chemistries:   Recent Labs   Lab Test 12/26/23  0757   BUN 13.3   CR 1.21*   GFRESTIMATED 62   GLC 84      Lab Results   Component Value Date    A1C 4.6 11/27/2023     Recent Labs   Lab Test 11/27/23  1406   ALBUMIN 4.0   BILITOTAL 0.5   ALKPHOS 81   AST 14   ALT 6     Urine Studies:  Recent Labs   Lab Test 12/13/23  0728   COLOR Yellow   APPEARANCE Slightly Cloudy*   URINEGLC Negative   URINEBILI Negative   URINEKETONE Negative   SG 1.025   UBLD Large*   URINEPH 6.0   PROTEIN 10*   NITRITE Negative   LEUKEST Negative   RBCU >182*   WBCU 12*     No lab results found.  Hematology:   Recent Labs   Lab Test 12/26/23  0757 12/21/23  0755 12/19/23  0833   HGB 12.2 12.2 11.2*    240 245   WBC 9.2 12.7* 11.7*     Coags:   Recent Labs   Lab Test 11/27/23  1406 02/28/22  1308   INR 1.09 0.93     HLA antibodies:   SA1 HI RISK SAURABH   Date Value Ref Range Status   11/27/2023 None  Final     SA1 MOD RISK SAURABH   Date Value Ref Range Status   11/27/2023 None  Final     SA2 HI RISK SAURABH   Date Value Ref Range Status   11/27/2023 None  Final     SA2 MOD RISK SAURABH   Date Value Ref Range Status   11/27/2023 None  Final       Assessment: Nuzhat Lopez is doing well s/p LDKT:  Issues we addressed during her visit include:    Plan:    1. Graft function: Cr stable   2. Immunosuppression Management: No change    .  Complexity of management:Medium.  Contributing factors:  kidney transplant  3. High magnesium foods encouraged.   Followup: clinic appt next week for staple removal     Total Time: 20 min,   Counselling Time: 10 min.    ARDEN Christianson       Again, thank you for allowing me to participate in the care of your patient.        Sincerely,        ARDEN Christianson CNP

## 2023-12-27 NOTE — TELEPHONE ENCOUNTER
Post Kidney and Pancreas Transplant Team Conference  Date: 12/27/2023  Transplant Coordinator:  Nely Alonso     Attendees:  [x]  Dr. Noguera [x] Dina Siegel, RN [x] Maritza Charlton LPN     [x]  Dr. Robbins [x] Nely Alonso, RN [] Alyx Guerra LPN    [x] Dr. Whitfield [] Maria C Mast RN    [x] Dr. Bower [x] Loretta Malik RN [x] Darby Mcclure RN   [] Dr. Wise [] Karely Donohue, RN    [] Dr. Keita [] Dione Rosenbaum RN    []  Dr. Chan [] Anne Bang RN    [] Dr. Hurtado [] Jax Cadena RN    [] Ania Virk NP [] Mary Campuzano RN    [x] Fanny Townsend NP [] Elke Helton RN        Verbal Plan Read Back:   Continue current treatment plan    Routed to RN Coordinator   Maritza Charlton LPN

## 2023-12-28 ENCOUNTER — LAB (OUTPATIENT)
Dept: LAB | Facility: CLINIC | Age: 28
End: 2023-12-28
Payer: MEDICARE

## 2023-12-28 DIAGNOSIS — Z20.828 CONTACT WITH AND (SUSPECTED) EXPOSURE TO OTHER VIRAL COMMUNICABLE DISEASES: ICD-10-CM

## 2023-12-28 DIAGNOSIS — Z94.0 KIDNEY REPLACED BY TRANSPLANT: ICD-10-CM

## 2023-12-28 DIAGNOSIS — Z98.890 OTHER SPECIFIED POSTPROCEDURAL STATES: ICD-10-CM

## 2023-12-28 DIAGNOSIS — Z48.298 AFTERCARE FOLLOWING ORGAN TRANSPLANT: ICD-10-CM

## 2023-12-28 DIAGNOSIS — Z79.899 ENCOUNTER FOR LONG-TERM CURRENT USE OF MEDICATION: ICD-10-CM

## 2023-12-28 LAB
ANION GAP SERPL CALCULATED.3IONS-SCNC: 9 MMOL/L (ref 7–15)
BUN SERPL-MCNC: 18.8 MG/DL (ref 6–20)
CALCIUM SERPL-MCNC: 9.6 MG/DL (ref 8.6–10)
CHLORIDE SERPL-SCNC: 102 MMOL/L (ref 98–107)
CREAT SERPL-MCNC: 1.22 MG/DL (ref 0.51–0.95)
DEPRECATED HCO3 PLAS-SCNC: 24 MMOL/L (ref 22–29)
EGFRCR SERPLBLD CKD-EPI 2021: 61 ML/MIN/1.73M2
ERYTHROCYTE [DISTWIDTH] IN BLOOD BY AUTOMATED COUNT: 14.2 % (ref 10–15)
GLUCOSE SERPL-MCNC: 82 MG/DL (ref 70–99)
HCT VFR BLD AUTO: 40.9 % (ref 35–47)
HGB BLD-MCNC: 13.2 G/DL (ref 11.7–15.7)
MAGNESIUM SERPL-MCNC: 1.7 MG/DL (ref 1.7–2.3)
MCH RBC QN AUTO: 31.4 PG (ref 26.5–33)
MCHC RBC AUTO-ENTMCNC: 32.3 G/DL (ref 31.5–36.5)
MCV RBC AUTO: 97 FL (ref 78–100)
PLATELET # BLD AUTO: 231 10E3/UL (ref 150–450)
POTASSIUM SERPL-SCNC: 4.1 MMOL/L (ref 3.4–5.3)
RBC # BLD AUTO: 4.2 10E6/UL (ref 3.8–5.2)
SODIUM SERPL-SCNC: 135 MMOL/L (ref 135–145)
TACROLIMUS BLD-MCNC: 12.1 UG/L (ref 5–15)
TME LAST DOSE: NORMAL H
TME LAST DOSE: NORMAL H
WBC # BLD AUTO: 9.1 10E3/UL (ref 4–11)

## 2023-12-28 PROCEDURE — 80197 ASSAY OF TACROLIMUS: CPT

## 2023-12-28 PROCEDURE — 80048 BASIC METABOLIC PNL TOTAL CA: CPT

## 2023-12-28 PROCEDURE — 36415 COLL VENOUS BLD VENIPUNCTURE: CPT

## 2023-12-28 PROCEDURE — 85027 COMPLETE CBC AUTOMATED: CPT

## 2023-12-28 PROCEDURE — 83735 ASSAY OF MAGNESIUM: CPT

## 2023-12-28 PROCEDURE — 87799 DETECT AGENT NOS DNA QUANT: CPT

## 2023-12-29 DIAGNOSIS — Z94.0 KIDNEY TRANSPLANT RECIPIENT: Primary | ICD-10-CM

## 2023-12-29 LAB — BKV DNA # SPEC NAA+PROBE: NOT DETECTED COPIES/ML

## 2023-12-29 RX ORDER — TACROLIMUS 1 MG/1
4 CAPSULE ORAL 2 TIMES DAILY
Qty: 240 CAPSULE | Refills: 11 | Status: SHIPPED | OUTPATIENT
Start: 2023-12-29 | End: 2024-01-17

## 2023-12-29 NOTE — TELEPHONE ENCOUNTER
Left message and sent [employer] message to patient regarding:  Tacrolimus IR level 12.1 on 12/29, goal 8-10, dose 5 mg BID.     PLAN:   Call Patientand confirm this was an accurate 12-hour trough.   Verify Tacrolimus IR dose 5 mg BID.   Confirm no new medications or illness.   Confirm no missed doses.   If accurate trough and accurate dose, decrease Tacrolimus IR dose to 4 mg BID

## 2023-12-29 NOTE — TELEPHONE ENCOUNTER
ISSUE:   Tacrolimus IR level 12.1 on 12/29, goal 8-10, dose 5 mg BID.    PLAN:   Call Patientand confirm this was an accurate 12-hour trough.   Verify Tacrolimus IR dose 5 mg BID.   Confirm no new medications or illness.   Confirm no missed doses.   If accurate trough and accurate dose, decrease Tacrolimus IR dose to 4 mg BID     Is this more than a 50% increase or decrease in current IS dose: No  If YES, justification: NA    Repeat labs in 3 days.  *If > 50% change in immunosuppression dose, repeat labs in 1 week.     OUTCOME: Patient confirmed this was an accurate 12-hour trough.   Verified Tacrolimus IR dose 5 mg BID.   Confirmed no new medications or illness.   Confirmed no missed doses.   Patient confirms decrease Tacrolimus IR dose to 4 mg BID

## 2024-01-02 ENCOUNTER — TELEPHONE (OUTPATIENT)
Dept: PHARMACY | Facility: CLINIC | Age: 29
End: 2024-01-02

## 2024-01-02 ENCOUNTER — LAB (OUTPATIENT)
Dept: LAB | Facility: CLINIC | Age: 29
End: 2024-01-02
Payer: MEDICARE

## 2024-01-02 DIAGNOSIS — Z79.899 ENCOUNTER FOR LONG-TERM CURRENT USE OF MEDICATION: ICD-10-CM

## 2024-01-02 DIAGNOSIS — Z94.0 KIDNEY TRANSPLANT RECIPIENT: ICD-10-CM

## 2024-01-02 DIAGNOSIS — Z94.0 KIDNEY REPLACED BY TRANSPLANT: ICD-10-CM

## 2024-01-02 DIAGNOSIS — Z98.890 OTHER SPECIFIED POSTPROCEDURAL STATES: ICD-10-CM

## 2024-01-02 DIAGNOSIS — Z20.828 CONTACT WITH AND (SUSPECTED) EXPOSURE TO OTHER VIRAL COMMUNICABLE DISEASES: ICD-10-CM

## 2024-01-02 DIAGNOSIS — Z48.298 AFTERCARE FOLLOWING ORGAN TRANSPLANT: ICD-10-CM

## 2024-01-02 LAB
ANION GAP SERPL CALCULATED.3IONS-SCNC: 9 MMOL/L (ref 7–15)
BUN SERPL-MCNC: 19.1 MG/DL (ref 6–20)
CALCIUM SERPL-MCNC: 9.1 MG/DL (ref 8.6–10)
CHLORIDE SERPL-SCNC: 108 MMOL/L (ref 98–107)
CREAT SERPL-MCNC: 1.23 MG/DL (ref 0.51–0.95)
DEPRECATED HCO3 PLAS-SCNC: 21 MMOL/L (ref 22–29)
EGFRCR SERPLBLD CKD-EPI 2021: 61 ML/MIN/1.73M2
ERYTHROCYTE [DISTWIDTH] IN BLOOD BY AUTOMATED COUNT: 14.3 % (ref 10–15)
GLUCOSE SERPL-MCNC: 87 MG/DL (ref 70–99)
HCT VFR BLD AUTO: 39.3 % (ref 35–47)
HGB BLD-MCNC: 12.6 G/DL (ref 11.7–15.7)
MAGNESIUM SERPL-MCNC: 1.7 MG/DL (ref 1.7–2.3)
MCH RBC QN AUTO: 31.6 PG (ref 26.5–33)
MCHC RBC AUTO-ENTMCNC: 32.1 G/DL (ref 31.5–36.5)
MCV RBC AUTO: 99 FL (ref 78–100)
MYCOPHENOLATE SERPL LC/MS/MS-MCNC: 2.99 MG/L (ref 1–3.5)
MYCOPHENOLATE-G SERPL LC/MS/MS-MCNC: 43.7 MG/L (ref 30–95)
PHOSPHATE SERPL-MCNC: 3 MG/DL (ref 2.5–4.5)
PLATELET # BLD AUTO: 248 10E3/UL (ref 150–450)
POTASSIUM SERPL-SCNC: 4.5 MMOL/L (ref 3.4–5.3)
RBC # BLD AUTO: 3.99 10E6/UL (ref 3.8–5.2)
SODIUM SERPL-SCNC: 138 MMOL/L (ref 135–145)
TACROLIMUS BLD-MCNC: 8.6 UG/L (ref 5–15)
TME LAST DOSE: NORMAL H
WBC # BLD AUTO: 8.1 10E3/UL (ref 4–11)

## 2024-01-02 PROCEDURE — 84100 ASSAY OF PHOSPHORUS: CPT

## 2024-01-02 PROCEDURE — 80048 BASIC METABOLIC PNL TOTAL CA: CPT

## 2024-01-02 PROCEDURE — 36415 COLL VENOUS BLD VENIPUNCTURE: CPT

## 2024-01-02 PROCEDURE — 87799 DETECT AGENT NOS DNA QUANT: CPT

## 2024-01-02 PROCEDURE — 80197 ASSAY OF TACROLIMUS: CPT

## 2024-01-02 PROCEDURE — 80180 DRUG SCRN QUAN MYCOPHENOLATE: CPT

## 2024-01-02 PROCEDURE — 83735 ASSAY OF MAGNESIUM: CPT

## 2024-01-02 PROCEDURE — 85027 COMPLETE CBC AUTOMATED: CPT

## 2024-01-02 RX ORDER — PREDNISONE 10 MG/1
TABLET ORAL
Qty: 49 TABLET | Refills: 0 | OUTPATIENT
Start: 2024-01-02 | End: 2024-02-01

## 2024-01-02 NOTE — TELEPHONE ENCOUNTER
Clinical Pharmacy Consult:                                                      Transplant Specific: 1 month post transplant medication review  Date of Transplant: 12/08/2023  Type of Transplant: kidney  First Transplant: yes  History of rejection: no    Immunosuppression Regimen   TAC 4mg qAM & 4mg qPM, Prednisone TAPER 10mg qAM & 0mg qPM, and Myforitic 540mg qAM & 540mg qPM  Patient specific goal: 8-10  Most recent level: 12.1, date 12/29/23  Immunosuppressant Levels:  Supratherapeutic, dose decreased to 4mg BID  Pt adherent to lab draws: yes  Scr:   Lab Results   Component Value Date    CR 1.22 12/28/2023     Side effects: no side effects    Prophylactic Medications  Antibacterial:  Bactrim SS daily  Scheduled Discontinue Date: 6 months    Antifungal: Not needed thus far  Scheduled Discontinue Date: N/A    Antiviral: CrCl >/=60 mL/minute: Valcyte 900mg daily   Scheduled Discontinue Date: 3 months    Acid Reducer: Not needed  Scheduled Reviewed Date: N/A    Thrombosis Prevention: Not needed  Scheduled Discontinue Date:  N/A    Blood Pressure Management  Frequency of home Blood Pressure checks: once daily  Most recent home BP: 132/86  Patient Blood pressure goal: <140/90  Patient blood pressure at goal:  yes  Hospitalizations/ER visits since last assessment: 0    Med rec/DUR performed: yes  Med Rec Discrepancies: no    Spoke with Nuzhat Troy via phone today. She is doing much better today. Denied any side effects from medications. Manages her own medications and have not missed any doses. Surgical site healing very well, no pain, fever, tenderness or discomfort. She has not needed any of her prn medications. Denied fever or any ER visits.    BP: checking everyday in the afternoon, readings at goal.    No refills needed today, no other questions or concerns. Follow up in 1 month.    Grady Fish, Pharm D  [facility]

## 2024-01-03 LAB — BKV DNA # SPEC NAA+PROBE: NOT DETECTED COPIES/ML

## 2024-01-04 ENCOUNTER — LAB (OUTPATIENT)
Dept: LAB | Facility: CLINIC | Age: 29
End: 2024-01-04
Payer: MEDICARE

## 2024-01-04 ENCOUNTER — ALLIED HEALTH/NURSE VISIT (OUTPATIENT)
Dept: TRANSPLANT | Facility: CLINIC | Age: 29
End: 2024-01-04
Attending: INTERNAL MEDICINE
Payer: MEDICARE

## 2024-01-04 DIAGNOSIS — Z79.899 ENCOUNTER FOR LONG-TERM CURRENT USE OF MEDICATION: ICD-10-CM

## 2024-01-04 DIAGNOSIS — Z94.0 KIDNEY REPLACED BY TRANSPLANT: ICD-10-CM

## 2024-01-04 DIAGNOSIS — Z98.890 OTHER SPECIFIED POSTPROCEDURAL STATES: ICD-10-CM

## 2024-01-04 DIAGNOSIS — E83.39 HYPOPHOSPHATEMIA: ICD-10-CM

## 2024-01-04 DIAGNOSIS — Z48.298 AFTERCARE FOLLOWING ORGAN TRANSPLANT: ICD-10-CM

## 2024-01-04 DIAGNOSIS — Z20.828 CONTACT WITH AND (SUSPECTED) EXPOSURE TO OTHER VIRAL COMMUNICABLE DISEASES: ICD-10-CM

## 2024-01-04 LAB
ANION GAP SERPL CALCULATED.3IONS-SCNC: 10 MMOL/L (ref 7–15)
BUN SERPL-MCNC: 20.5 MG/DL (ref 6–20)
CALCIUM SERPL-MCNC: 9.3 MG/DL (ref 8.6–10)
CHLORIDE SERPL-SCNC: 107 MMOL/L (ref 98–107)
CREAT SERPL-MCNC: 1.25 MG/DL (ref 0.51–0.95)
DEPRECATED HCO3 PLAS-SCNC: 22 MMOL/L (ref 22–29)
EGFRCR SERPLBLD CKD-EPI 2021: 60 ML/MIN/1.73M2
ERYTHROCYTE [DISTWIDTH] IN BLOOD BY AUTOMATED COUNT: 14.2 % (ref 10–15)
GLUCOSE SERPL-MCNC: 89 MG/DL (ref 70–99)
HCT VFR BLD AUTO: 40.7 % (ref 35–47)
HGB BLD-MCNC: 13.1 G/DL (ref 11.7–15.7)
MAGNESIUM SERPL-MCNC: 1.7 MG/DL (ref 1.7–2.3)
MCH RBC QN AUTO: 31.6 PG (ref 26.5–33)
MCHC RBC AUTO-ENTMCNC: 32.2 G/DL (ref 31.5–36.5)
MCV RBC AUTO: 98 FL (ref 78–100)
MYCOPHENOLATE SERPL LC/MS/MS-MCNC: 5.13 MG/L (ref 1–3.5)
MYCOPHENOLATE-G SERPL LC/MS/MS-MCNC: 79.3 MG/L (ref 30–95)
PHOSPHATE SERPL-MCNC: 3 MG/DL (ref 2.5–4.5)
PLATELET # BLD AUTO: 290 10E3/UL (ref 150–450)
POTASSIUM SERPL-SCNC: 4.4 MMOL/L (ref 3.4–5.3)
RBC # BLD AUTO: 4.14 10E6/UL (ref 3.8–5.2)
SODIUM SERPL-SCNC: 139 MMOL/L (ref 135–145)
TACROLIMUS BLD-MCNC: 8.8 UG/L (ref 5–15)
TME LAST DOSE: ABNORMAL H
TME LAST DOSE: ABNORMAL H
TME LAST DOSE: NORMAL H
TME LAST DOSE: NORMAL H
WBC # BLD AUTO: 8.6 10E3/UL (ref 4–11)

## 2024-01-04 PROCEDURE — 80180 DRUG SCRN QUAN MYCOPHENOLATE: CPT

## 2024-01-04 PROCEDURE — 80048 BASIC METABOLIC PNL TOTAL CA: CPT

## 2024-01-04 PROCEDURE — 83735 ASSAY OF MAGNESIUM: CPT

## 2024-01-04 PROCEDURE — 36415 COLL VENOUS BLD VENIPUNCTURE: CPT

## 2024-01-04 PROCEDURE — 85027 COMPLETE CBC AUTOMATED: CPT

## 2024-01-04 PROCEDURE — 84100 ASSAY OF PHOSPHORUS: CPT

## 2024-01-04 PROCEDURE — 80197 ASSAY OF TACROLIMUS: CPT

## 2024-01-04 NOTE — NURSING NOTE
Staples Removal    There were no vitals taken for this visit.    Removed patients staples. Surgical incision site was Clean, dry, and intact.    Educated patient on continued care around incision site.     Amrit Ambriz RN on 1/4/2024 at 1:15 PM

## 2024-01-10 ENCOUNTER — LAB (OUTPATIENT)
Dept: LAB | Facility: CLINIC | Age: 29
End: 2024-01-10
Attending: INTERNAL MEDICINE
Payer: MEDICARE

## 2024-01-10 ENCOUNTER — ANCILLARY PROCEDURE (OUTPATIENT)
Dept: GENERAL RADIOLOGY | Facility: CLINIC | Age: 29
End: 2024-01-10
Attending: INTERNAL MEDICINE
Payer: MEDICARE

## 2024-01-10 ENCOUNTER — OFFICE VISIT (OUTPATIENT)
Dept: TRANSPLANT | Facility: CLINIC | Age: 29
End: 2024-01-10
Attending: INTERNAL MEDICINE
Payer: MEDICARE

## 2024-01-10 VITALS
SYSTOLIC BLOOD PRESSURE: 121 MMHG | DIASTOLIC BLOOD PRESSURE: 81 MMHG | HEART RATE: 102 BPM | BODY MASS INDEX: 31.54 KG/M2 | OXYGEN SATURATION: 99 % | WEIGHT: 166.9 LBS

## 2024-01-10 DIAGNOSIS — Z20.828 CONTACT WITH AND (SUSPECTED) EXPOSURE TO OTHER VIRAL COMMUNICABLE DISEASES: ICD-10-CM

## 2024-01-10 DIAGNOSIS — Z98.890 OTHER SPECIFIED POSTPROCEDURAL STATES: ICD-10-CM

## 2024-01-10 DIAGNOSIS — Z48.298 AFTERCARE FOLLOWING ORGAN TRANSPLANT: ICD-10-CM

## 2024-01-10 DIAGNOSIS — D84.9 IMMUNOSUPPRESSED STATUS (H): Chronic | ICD-10-CM

## 2024-01-10 DIAGNOSIS — Z94.0 KIDNEY REPLACED BY TRANSPLANT: Primary | ICD-10-CM

## 2024-01-10 DIAGNOSIS — E87.20 METABOLIC ACIDOSIS: ICD-10-CM

## 2024-01-10 DIAGNOSIS — Z94.0 KIDNEY REPLACED BY TRANSPLANT: ICD-10-CM

## 2024-01-10 DIAGNOSIS — Z79.899 ENCOUNTER FOR LONG-TERM CURRENT USE OF MEDICATION: ICD-10-CM

## 2024-01-10 PROBLEM — I10 PRIMARY HYPERTENSION: Status: RESOLVED | Noted: 2021-10-12 | Resolved: 2024-01-10

## 2024-01-10 PROBLEM — Z91.89 AT RISK FOR INFECTION TRANSMITTED FROM DONOR: Status: RESOLVED | Noted: 2023-12-11 | Resolved: 2024-01-10

## 2024-01-10 PROBLEM — E83.30 DISORDER OF PHOSPHORUS METABOLISM, UNSPECIFIED: Status: RESOLVED | Noted: 2022-06-03 | Resolved: 2024-01-10

## 2024-01-10 PROBLEM — R06.02 SHORTNESS OF BREATH: Status: RESOLVED | Noted: 2022-06-07 | Resolved: 2024-01-10

## 2024-01-10 PROBLEM — I10 HTN (HYPERTENSION): Status: RESOLVED | Noted: 2022-01-05 | Resolved: 2024-01-10

## 2024-01-10 PROBLEM — N18.2 ANEMIA IN STAGE 2 CHRONIC KIDNEY DISEASE: Status: ACTIVE | Noted: 2022-01-05

## 2024-01-10 PROBLEM — R21 SKIN ERUPTION: Status: RESOLVED | Noted: 2017-08-07 | Resolved: 2024-01-10

## 2024-01-10 PROBLEM — N18.2 CKD (CHRONIC KIDNEY DISEASE), STAGE II: Status: ACTIVE | Noted: 2021-11-21

## 2024-01-10 PROBLEM — N02.8 RECURRENT AND PERSISTENT HEMATURIA WITH OTHER MORPHOLOGIC CHANGES: Status: RESOLVED | Noted: 2021-10-09 | Resolved: 2024-01-10

## 2024-01-10 PROBLEM — N17.9 ACUTE KIDNEY INJURY (H): Status: RESOLVED | Noted: 2021-10-05 | Resolved: 2024-01-10

## 2024-01-10 PROBLEM — G43.909 MIGRAINE: Status: RESOLVED | Noted: 2021-10-12 | Resolved: 2024-01-10

## 2024-01-10 PROBLEM — N18.6 END STAGE RENAL DISEASE (H): Status: RESOLVED | Noted: 2022-06-03 | Resolved: 2024-01-10

## 2024-01-10 PROBLEM — E83.39 HYPOPHOSPHATEMIA: Status: RESOLVED | Noted: 2023-12-11 | Resolved: 2024-01-10

## 2024-01-10 LAB
ALBUMIN MFR UR ELPH: <6 MG/DL
BK VIRUS SPECIMEN TYPE: NORMAL
BKV DNA # SPEC NAA+PROBE: NOT DETECTED IU/ML
CREAT UR-MCNC: 73.8 MG/DL
MYCOPHENOLATE SERPL LC/MS/MS-MCNC: 2.61 MG/L (ref 1–3.5)
MYCOPHENOLATE-G SERPL LC/MS/MS-MCNC: 52.7 MG/L (ref 30–95)
PROT/CREAT 24H UR: NORMAL MG/G{CREAT}
TME LAST DOSE: NORMAL H
TME LAST DOSE: NORMAL H

## 2024-01-10 PROCEDURE — 99215 OFFICE O/P EST HI 40 MIN: CPT | Mod: 24 | Performed by: INTERNAL MEDICINE

## 2024-01-10 PROCEDURE — 86833 HLA CLASS II HIGH DEFIN QUAL: CPT | Performed by: INTERNAL MEDICINE

## 2024-01-10 PROCEDURE — G2211 COMPLEX E/M VISIT ADD ON: HCPCS | Performed by: INTERNAL MEDICINE

## 2024-01-10 PROCEDURE — 87799 DETECT AGENT NOS DNA QUANT: CPT | Performed by: INTERNAL MEDICINE

## 2024-01-10 PROCEDURE — 99000 SPECIMEN HANDLING OFFICE-LAB: CPT | Performed by: PATHOLOGY

## 2024-01-10 PROCEDURE — 84156 ASSAY OF PROTEIN URINE: CPT | Performed by: PATHOLOGY

## 2024-01-10 PROCEDURE — 36415 COLL VENOUS BLD VENIPUNCTURE: CPT | Performed by: PATHOLOGY

## 2024-01-10 PROCEDURE — 80180 DRUG SCRN QUAN MYCOPHENOLATE: CPT | Performed by: INTERNAL MEDICINE

## 2024-01-10 PROCEDURE — 74018 RADEX ABDOMEN 1 VIEW: CPT | Performed by: RADIOLOGY

## 2024-01-10 PROCEDURE — G0463 HOSPITAL OUTPT CLINIC VISIT: HCPCS | Performed by: INTERNAL MEDICINE

## 2024-01-10 PROCEDURE — 86832 HLA CLASS I HIGH DEFIN QUAL: CPT | Performed by: INTERNAL MEDICINE

## 2024-01-10 PROCEDURE — 87516 HEPATITIS B DNA AMP PROBE: CPT | Performed by: INTERNAL MEDICINE

## 2024-01-10 ASSESSMENT — PAIN SCALES - GENERAL: PAINLEVEL: NO PAIN (0)

## 2024-01-10 NOTE — PATIENT INSTRUCTIONS
Patient Recommendations:  - No new recommendations at this time.    Transplant Patient Information  Your Post Transplant Coordinator is: Nely Alonso  For non urgent items, we encourage you to contact your coordinator/care team online via [employer]  You and your care team can also contact your transplant coordinator Monday - Friday, 8am - 5pm at [phone] (Option 2 to reach the coordinator or Option 4 to schedule an appointment).  After hours for urgent matters, please call [facility] at [phone].

## 2024-01-10 NOTE — LETTER
1/10/2024         RE: Nuzhat Lopez  [address] [facility] [address]        Dear Colleague,    Thank you for referring your patient, Nuzhat Lopez, to the [facility] TRANSPLANT CLINIC. Please see a copy of my visit note below.    TRANSPLANT NEPHROLOGY EARLY POST TRANSPLANT VISIT    Assessment & Plan   # LDKT: Stable   - Baseline Creatinine: ~ 1.1-1.3   - Proteinuria: Normal (<0.2 grams)   - Date DSA Last Checked: Nov/2023      Latest DSA: No DSA at time of transplant   - BK Viremia: No   - Kidney Tx Biopsy: No   - Transplant Ureteral Stent: No    # Immunosuppression: Tacrolimus immediate release (goal 8-10), Mycophenolic acid (dose 540 mg every 12 hours) and Prednisone (dose taper)   - Induction with Recent Transplant:  Low Intensity Protocol   - Continue with intensive monitoring of immunosuppression for efficacy and toxicity.   - Changes: Not at this time    # Infection Prophylaxis:   - PJP: Sulfa/TMP (Bactrim)  - CMV: Valganciclovir (Valcyte) x3 months  - Hep B: Entecavir (Baraclude)     # Donor Hep B core Ab +:   -Continue entecavir for at least 6 months. At 6 months send to hepatology.    -LFTs monthly. HBsAg, HBsAb, HBV DNA q3 months for the first year    # Hypertension: Controlled;  Goal BP: < 140/90   - Volume status: Euvolemic     - Changes: Not at this time    # Anemia in Chronic Renal Disease: Hgb: Stable, near normal      MARY: No   - Iron studies: Replete    # Mineral Bone Disorder:    - Secondary renal hyperparathyroidism; PTH level: Minimally elevated ( pg/ml)        On treatment: None   - Vitamin D; level: Normal        On supplement: No   - Calcium; level: Normal        On supplement: No   - Phosphorus; level: Normal        On supplement: No    # Electrolytes:   - Potassium; level: Normal        On supplement: No  - Magnesium; level: Normal        On supplement: Yes, mag ox 400mg bid  - Bicarbonate; level: Normal        On supplement: Yes, bicarb 650mg tid    # Headaches:   -She does  develop headaches occasionally. Asked her to keep track of this and let us know if unbearable or frequency increases    # Medical Compliance: Yes    # Health Maintenance and Vaccination Review: Not Reviewed    # Transplant History:  Etiology of Kidney Failure: IgA nephropathy  Tx: LDKT  Transplant: 12/8/2023 (Kidney)  Donor Type: Living Donor Class:   Crossmatch at time of Tx: negative  DSA at time of Tx: No  Significant changes in immunosuppression: None  CMV IgG Ab High Risk Discordance (D+/R-): No  EBV IgG Ab High Risk Discordance (D+/R-): No  Significant transplant-related complications: None    Transplant Office Phone Number: [phone]    Assessment and plan was discussed with the patient and she voiced her understanding and agreement.    Return visit: Return in 5 weeks (on 2/14/2024) for previously scheduled visit, 2 month post transplant visit.    Leno Whitfield MD    The longitudinal plan of care for kidney transplant was addressed during this visit. Due to the added complexity in care, I will continue to support Nuzhat Lopez in the subsequent management of this condition(s) and with the ongoing continuity of care of this condition(s).       Chief Complaint   Ms. Lopez is a 28 year old here for kidney transplant, immunosuppression management and hospital follow up after kidney transplant.     History of Present Illness    The patient overall feels well. She denies any recent hospitalizations. She denies nausea, vomiting, diarrhea, fever, chills, shortness of breath, chest pain, LE edema, unintentional weight loss, nights sweats, dysuria, hematuria. She is drinking 2-2.5L of fluid daily.       Home BP:  130 systolic    Problem List   Patient Active Problem List   Diagnosis     Acute kidney injury (H24)     Migraine headache     IgA nephropathy     Stage 5 chronic kidney disease (H)     HTN (hypertension)     Anemia in chronic kidney disease     Influenza immunization not administered due to inavailability of  vaccine     Disorder of phosphorus metabolism, unspecified     Eczema     Encounter for childhood immunizations appropriate for age     End stage renal disease (H)     Iron deficiency anemia, unspecified     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     Secondary hyperparathyroidism of renal origin (H24)     Shortness of breath     Skin eruption     Primary hypertension     Migraine     Kidney transplant recipient     Immunosuppressed status (H24)     Hypophosphatemia     Low bicarbonate level     At risk for infection transmitted from donor       Allergies   Allergies   Allergen Reactions     Cephalexin Rash     Heparin Flush Rash       Medications   Current Outpatient Medications   Medication Sig     acetaminophen (TYLENOL) 325 MG tablet Take 3 tablets (975 mg) by mouth every 8 hours as needed for pain     atorvastatin (LIPITOR) 10 MG tablet Take 1 tablet (10 mg) by mouth daily     entecavir (BARACLUDE) 0.5 MG tablet Take 1 tablet (0.5 mg) by mouth daily     magnesium oxide (MAG-OX) 400 MG tablet Take 1 tablet (400 mg) by mouth 2 times daily     mycophenolic acid (GENERIC EQUIVALENT) 180 MG EC tablet Take 3 tablets (540 mg) by mouth 2 times daily Take in place of mycophenolate     psyllium (METAMUCIL/KONSYL) 58.6 % powder Take 1 teaspoonful by mouth daily     sodium bicarbonate 650 MG tablet Take 1 tablet (650 mg) by mouth 3 times daily     sulfamethoxazole-trimethoprim (BACTRIM) 400-80 MG tablet Take 1 tablet by mouth daily     tacrolimus (GENERIC) 0.5 MG capsule HOLD FOR FUTURE DOSE CHANGES.  Profile Rx: patient will contact pharmacy when needed (Patient not taking: Reported on 12/22/2023)     tacrolimus (GENERIC) 1 MG capsule Take 4 capsules (4 mg) by mouth 2 times daily     valGANciclovir (VALCYTE) 450 MG tablet Take 2 tablets (900 mg) by mouth daily     No current facility-administered medications for this visit.     Medications Discontinued During This Encounter   Medication Reason      oxyCODONE (ROXICODONE) 5 MG tablet      phosphorus tablet 250 mg (PHOSPHA 250 NEUTRAL) 250 MG per tablet      polyethylene glycol (MIRALAX) 17 GM/Dose powder      predniSONE (DELTASONE) 10 MG tablet        Physical Exam   Vital Signs: /81 (BP Location: Left arm, Patient Position: Dangled, Cuff Size: Adult Large)   Pulse 102   Wt 75.7 kg (166 lb 14.4 oz)   SpO2 99%   BMI 31.54 kg/m      GENERAL APPEARANCE: alert and no distress  HENT: mouth without ulcers or lesions  RESP: lungs clear to auscultation - no rales, rhonchi or wheezes  CV: regular rhythm, normal rate, no rub, no murmur  EDEMA: no LE edema bilaterally  ABDOMEN: soft, nondistended, nontender, bowel sounds normal  MS: extremities normal - no gross deformities noted, no evidence of inflammation in joints, no muscle tenderness  SKIN: no rash  NEURO: normal strength and tone, sensory exam grossly normal, mentation intact and speech normal  PSYCH: mentation appears normal and affect normal/bright  TX KIDNEY: normal    Data         Latest Ref Rng & Units 1/4/2024     8:03 AM 1/2/2024     8:18 AM 12/28/2023     7:49 AM   Renal   Sodium 135 - 145 mmol/L 139  138  135    K 3.4 - 5.3 mmol/L 4.4  4.5  4.1    Cl 98 - 107 mmol/L 107  108  102    Cl (external) 98 - 107 mmol/L 107  108  102    CO2 22 - 29 mmol/L 22  21  24    Urea Nitrogen 6.0 - 20.0 mg/dL 20.5  19.1  18.8    Creatinine 0.51 - 0.95 mg/dL 1.25  1.23  1.22    Glucose 70 - 99 mg/dL 89  87  82    Calcium 8.6 - 10.0 mg/dL 9.3  9.1  9.6    Magnesium 1.7 - 2.3 mg/dL 1.7  1.7  1.7          Latest Ref Rng & Units 1/4/2024     8:03 AM 1/2/2024     8:18 AM 12/26/2023     7:57 AM   Bone Health   Phosphorus 2.5 - 4.5 mg/dL 3.0  3.0  2.9          Latest Ref Rng & Units 1/4/2024     8:03 AM 1/2/2024     8:18 AM 12/28/2023     7:49 AM   Heme   WBC 4.0 - 11.0 10e3/uL 8.6  8.1  9.1    Hgb 11.7 - 15.7 g/dL 13.1  12.6  13.2    Plt 150 - 450 10e3/uL 290  248  231          Latest Ref Rng & Units 11/27/2023      2:06 PM 2/28/2022     1:08 PM 10/7/2021     4:39 AM   Liver   AP 40 - 150 U/L 81  78     TBili <=1.2 mg/dL 0.5  0.2     ALT 0 - 50 U/L 6  11     AST 0 - 45 U/L 14  12     Tot Protein 6.4 - 8.3 g/dL 8.0  8.0     Albumin 3.4 - 5.0 g/dL  3.5  2.5    Albumin 3.5 - 5.2 g/dL 4.0            Latest Ref Rng & Units 11/27/2023     2:06 PM   Pancreas   A1C <5.7 % 4.6          Latest Ref Rng & Units 12/21/2023     7:55 AM 11/27/2023     2:06 PM   Iron studies   Iron 37 - 145 ug/dL 70  73    Iron Sat Index 15 - 46 % 34  42    Ferritin 6 - 175 ng/mL 902  1,549          Latest Ref Rng & Units 11/27/2023     2:06 PM 2/28/2022     1:08 PM   [facility] T[facility] Virology   EBV CAPSID ANTIBODY IGG No detectable antibody. Positive  Positive         Recent Labs   Lab Test 12/26/23  0757 12/28/23  0749 01/02/24  0818 01/04/24  0803   DOSTAC 12/25/2023 12/27/2023  --  1/3/2024   TACROL 10.6 12.1 8.6 8.8     Recent Labs   Lab Test 12/13/23  0723 12/26/23  0757 01/02/24  0818 01/04/24  0803   DOSMPA  --   --   --  1/3/2024   9:00 PM   MPACID 3.31 4.24* 2.99 5.13*   MPAG 45.4 45.9 43.7 79.3       Again, thank you for allowing me to participate in the care of your patient.        Sincerely,        Leno Whitfield MD

## 2024-01-10 NOTE — NURSING NOTE
Chief Complaint   Patient presents with    RECHECK     Post transplant follow up.      /81 (BP Location: Left arm, Patient Position: Dangled, Cuff Size: Adult Large)   Pulse 102   Wt 75.7 kg (166 lb 14.4 oz)   SpO2 99%   BMI 31.54 kg/m    ,sign

## 2024-01-10 NOTE — PROGRESS NOTES
TRANSPLANT NEPHROLOGY EARLY POST TRANSPLANT VISIT    Assessment & Plan   # LDKT: Stable   - Baseline Creatinine: ~ 1.1-1.3   - Proteinuria: Normal (<0.2 grams)   - Date DSA Last Checked: Nov/2023      Latest DSA: No DSA at time of transplant   - BK Viremia: No   - Kidney Tx Biopsy: No   - Transplant Ureteral Stent: No    # Immunosuppression: Tacrolimus immediate release (goal 8-10), Mycophenolic acid (dose 540 mg every 12 hours) and Prednisone (dose taper)   - Induction with Recent Transplant:  Low Intensity Protocol   - Continue with intensive monitoring of immunosuppression for efficacy and toxicity.   - Changes: Not at this time    # Infection Prophylaxis:   - PJP: Sulfa/TMP (Bactrim)  - CMV: Valganciclovir (Valcyte) x3 months  - Hep B: Entecavir (Baraclude)     # Donor Hep B core Ab +:   -Continue entecavir for at least 6 months. At 6 months send to hepatology.    -LFTs monthly. HBsAg, HBsAb, HBV DNA q3 months for the first year    # Hypertension: Controlled;  Goal BP: < 140/90   - Volume status: Euvolemic     - Changes: Not at this time    # Anemia in Chronic Renal Disease: Hgb: Stable, near normal      MARY: No   - Iron studies: Replete    # Mineral Bone Disorder:    - Secondary renal hyperparathyroidism; PTH level: Minimally elevated ( pg/ml)        On treatment: None   - Vitamin D; level: Normal        On supplement: No   - Calcium; level: Normal        On supplement: No   - Phosphorus; level: Normal        On supplement: No    # Electrolytes:   - Potassium; level: Normal        On supplement: No  - Magnesium; level: Normal        On supplement: Yes, mag ox 400mg bid  - Bicarbonate; level: Normal        On supplement: Yes, bicarb 650mg tid    # Headaches:   -She does develop headaches occasionally. Asked her to keep track of this and let us know if unbearable or frequency increases    # Medical Compliance: Yes    # Health Maintenance and Vaccination Review: Not Reviewed    # Transplant  History:  Etiology of Kidney Failure: IgA nephropathy  Tx: LDKT  Transplant: 12/8/2023 (Kidney)  Donor Type: Living Donor Class:   Crossmatch at time of Tx: negative  DSA at time of Tx: No  Significant changes in immunosuppression: None  CMV IgG Ab High Risk Discordance (D+/R-): No  EBV IgG Ab High Risk Discordance (D+/R-): No  Significant transplant-related complications: None    Transplant Office Phone Number: [phone]    Assessment and plan was discussed with the patient and she voiced her understanding and agreement.    Return visit: Return in 5 weeks (on 2/14/2024) for previously scheduled visit, 2 month post transplant visit.    Leno Whitfield MD    The longitudinal plan of care for kidney transplant was addressed during this visit. Due to the added complexity in care, I will continue to support Nuzhat Lopez in the subsequent management of this condition(s) and with the ongoing continuity of care of this condition(s).       Chief Complaint   Ms. Lopez is a 28 year old here for kidney transplant, immunosuppression management and hospital follow up after kidney transplant.     History of Present Illness    The patient overall feels well. She denies any recent hospitalizations. She denies nausea, vomiting, diarrhea, fever, chills, shortness of breath, chest pain, LE edema, unintentional weight loss, nights sweats, dysuria, hematuria. She is drinking 2-2.5L of fluid daily.       Home BP:  130 systolic    Problem List   Patient Active Problem List   Diagnosis     Acute kidney injury (H24)     Migraine headache     IgA nephropathy     Stage 5 chronic kidney disease (H)     HTN (hypertension)     Anemia in chronic kidney disease     Influenza immunization not administered due to inavailability of vaccine     Disorder of phosphorus metabolism, unspecified     Eczema     Encounter for childhood immunizations appropriate for age     End stage renal disease (H)     Iron deficiency anemia, unspecified     Recurrent and  persistent hematuria with other morphologic changes     Secondary hyperparathyroidism of renal origin (H24)     Shortness of breath     Skin eruption     Primary hypertension     Migraine     Kidney transplant recipient     Immunosuppressed status (H24)     Hypophosphatemia     Low bicarbonate level     At risk for infection transmitted from donor       Allergies   Allergies   Allergen Reactions     Cephalexin Rash     Heparin Flush Rash       Medications   Current Outpatient Medications   Medication Sig     acetaminophen (TYLENOL) 325 MG tablet Take 3 tablets (975 mg) by mouth every 8 hours as needed for pain     atorvastatin (LIPITOR) 10 MG tablet Take 1 tablet (10 mg) by mouth daily     entecavir (BARACLUDE) 0.5 MG tablet Take 1 tablet (0.5 mg) by mouth daily     magnesium oxide (MAG-OX) 400 MG tablet Take 1 tablet (400 mg) by mouth 2 times daily     mycophenolic acid (GENERIC EQUIVALENT) 180 MG EC tablet Take 3 tablets (540 mg) by mouth 2 times daily Take in place of mycophenolate     psyllium (METAMUCIL/KONSYL) 58.6 % powder Take 1 teaspoonful by mouth daily     sodium bicarbonate 650 MG tablet Take 1 tablet (650 mg) by mouth 3 times daily     sulfamethoxazole-trimethoprim (BACTRIM) 400-80 MG tablet Take 1 tablet by mouth daily     tacrolimus (GENERIC) 0.5 MG capsule HOLD FOR FUTURE DOSE CHANGES.  Profile Rx: patient will contact pharmacy when needed (Patient not taking: Reported on 12/22/2023)     tacrolimus (GENERIC) 1 MG capsule Take 4 capsules (4 mg) by mouth 2 times daily     valGANciclovir (VALCYTE) 450 MG tablet Take 2 tablets (900 mg) by mouth daily     No current facility-administered medications for this visit.     Medications Discontinued During This Encounter   Medication Reason     oxyCODONE (ROXICODONE) 5 MG tablet      phosphorus tablet 250 mg (PHOSPHA 250 NEUTRAL) 250 MG per tablet      polyethylene glycol (MIRALAX) 17 GM/Dose powder      predniSONE (DELTASONE) 10 MG tablet        Physical Exam    Vital Signs: /81 (BP Location: Left arm, Patient Position: Dangled, Cuff Size: Adult Large)   Pulse 102   Wt 75.7 kg (166 lb 14.4 oz)   SpO2 99%   BMI 31.54 kg/m      GENERAL APPEARANCE: alert and no distress  HENT: mouth without ulcers or lesions  RESP: lungs clear to auscultation - no rales, rhonchi or wheezes  CV: regular rhythm, normal rate, no rub, no murmur  EDEMA: no LE edema bilaterally  ABDOMEN: soft, nondistended, nontender, bowel sounds normal  MS: extremities normal - no gross deformities noted, no evidence of inflammation in joints, no muscle tenderness  SKIN: no rash  NEURO: normal strength and tone, sensory exam grossly normal, mentation intact and speech normal  PSYCH: mentation appears normal and affect normal/bright  TX KIDNEY: normal    Data         Latest Ref Rng & Units 1/4/2024     8:03 AM 1/2/2024     8:18 AM 12/28/2023     7:49 AM   Renal   Sodium 135 - 145 mmol/L 139  138  135    K 3.4 - 5.3 mmol/L 4.4  4.5  4.1    Cl 98 - 107 mmol/L 107  108  102    Cl (external) 98 - 107 mmol/L 107  108  102    CO2 22 - 29 mmol/L 22  21  24    Urea Nitrogen 6.0 - 20.0 mg/dL 20.5  19.1  18.8    Creatinine 0.51 - 0.95 mg/dL 1.25  1.23  1.22    Glucose 70 - 99 mg/dL 89  87  82    Calcium 8.6 - 10.0 mg/dL 9.3  9.1  9.6    Magnesium 1.7 - 2.3 mg/dL 1.7  1.7  1.7          Latest Ref Rng & Units 1/4/2024     8:03 AM 1/2/2024     8:18 AM 12/26/2023     7:57 AM   Bone Health   Phosphorus 2.5 - 4.5 mg/dL 3.0  3.0  2.9          Latest Ref Rng & Units 1/4/2024     8:03 AM 1/2/2024     8:18 AM 12/28/2023     7:49 AM   Heme   WBC 4.0 - 11.0 10e3/uL 8.6  8.1  9.1    Hgb 11.7 - 15.7 g/dL 13.1  12.6  13.2    Plt 150 - 450 10e3/uL 290  248  231          Latest Ref Rng & Units 11/27/2023     2:06 PM 2/28/2022     1:08 PM 10/7/2021     4:39 AM   Liver   AP 40 - 150 U/L 81  78     TBili <=1.2 mg/dL 0.5  0.2     ALT 0 - 50 U/L 6  11     AST 0 - 45 U/L 14  12     Tot Protein 6.4 - 8.3 g/dL 8.0  8.0     Albumin 3.4 -  5.0 g/dL  3.5  2.5    Albumin 3.5 - 5.2 g/dL 4.0            Latest Ref Rng & Units 11/27/2023     2:06 PM   Pancreas   A1C <5.7 % 4.6          Latest Ref Rng & Units 12/21/2023     7:55 AM 11/27/2023     2:06 PM   Iron studies   Iron 37 - 145 ug/dL 70  73    Iron Sat Index 15 - 46 % 34  42    Ferritin 6 - 175 ng/mL 902  1,549          Latest Ref Rng & Units 11/27/2023     2:06 PM 2/28/2022     1:08 PM   [facility] Virology   EBV CAPSID ANTIBODY IGG No detectable antibody. Positive  Positive         Recent Labs   Lab Test 12/26/23  0757 12/28/23  0749 01/02/24  0818 01/04/24  0803   DOSTAC 12/25/2023 12/27/2023  --  1/3/2024   TACROL 10.6 12.1 8.6 8.8     Recent Labs   Lab Test 12/13/23  0723 12/26/23  0757 01/02/24  0818 01/04/24  0803   DOSMPA  --   --   --  1/3/2024   9:00 PM   MPACID 3.31 4.24* 2.99 5.13*   MPAG 45.4 45.9 43.7 79.3

## 2024-01-11 ENCOUNTER — LAB (OUTPATIENT)
Dept: LAB | Facility: CLINIC | Age: 29
End: 2024-01-11
Payer: MEDICARE

## 2024-01-11 DIAGNOSIS — Z94.0 KIDNEY REPLACED BY TRANSPLANT: ICD-10-CM

## 2024-01-11 DIAGNOSIS — Z98.890 OTHER SPECIFIED POSTPROCEDURAL STATES: ICD-10-CM

## 2024-01-11 DIAGNOSIS — Z79.899 ENCOUNTER FOR LONG-TERM CURRENT USE OF MEDICATION: ICD-10-CM

## 2024-01-11 DIAGNOSIS — Z20.828 CONTACT WITH AND (SUSPECTED) EXPOSURE TO OTHER VIRAL COMMUNICABLE DISEASES: ICD-10-CM

## 2024-01-11 DIAGNOSIS — Z48.298 AFTERCARE FOLLOWING ORGAN TRANSPLANT: ICD-10-CM

## 2024-01-11 LAB
ANION GAP SERPL CALCULATED.3IONS-SCNC: 10 MMOL/L (ref 7–15)
BUN SERPL-MCNC: 22.8 MG/DL (ref 6–20)
CALCIUM SERPL-MCNC: 9.7 MG/DL (ref 8.6–10)
CHLORIDE SERPL-SCNC: 105 MMOL/L (ref 98–107)
CREAT SERPL-MCNC: 1.43 MG/DL (ref 0.51–0.95)
DEPRECATED HCO3 PLAS-SCNC: 23 MMOL/L (ref 22–29)
EGFRCR SERPLBLD CKD-EPI 2021: 51 ML/MIN/1.73M2
ERYTHROCYTE [DISTWIDTH] IN BLOOD BY AUTOMATED COUNT: 13.7 % (ref 10–15)
GLUCOSE SERPL-MCNC: 91 MG/DL (ref 70–99)
HBV DNA SERPL QL NAA+PROBE: NORMAL
HCT VFR BLD AUTO: 40.4 % (ref 35–47)
HCV RNA SERPL QL NAA+PROBE: NORMAL
HGB BLD-MCNC: 13.3 G/DL (ref 11.7–15.7)
HIV1+2 RNA SERPL QL NAA+PROBE: NORMAL
MAGNESIUM SERPL-MCNC: 2.1 MG/DL (ref 1.7–2.3)
MCH RBC QN AUTO: 32 PG (ref 26.5–33)
MCHC RBC AUTO-ENTMCNC: 32.9 G/DL (ref 31.5–36.5)
MCV RBC AUTO: 97 FL (ref 78–100)
PHOSPHATE SERPL-MCNC: 3.3 MG/DL (ref 2.5–4.5)
PLATELET # BLD AUTO: 294 10E3/UL (ref 150–450)
POTASSIUM SERPL-SCNC: 4.4 MMOL/L (ref 3.4–5.3)
RBC # BLD AUTO: 4.16 10E6/UL (ref 3.8–5.2)
SODIUM SERPL-SCNC: 138 MMOL/L (ref 135–145)
TACROLIMUS BLD-MCNC: 13.4 UG/L (ref 5–15)
TME LAST DOSE: NORMAL H
TME LAST DOSE: NORMAL H
WBC # BLD AUTO: 8.7 10E3/UL (ref 4–11)

## 2024-01-11 PROCEDURE — 36415 COLL VENOUS BLD VENIPUNCTURE: CPT

## 2024-01-11 PROCEDURE — 83735 ASSAY OF MAGNESIUM: CPT

## 2024-01-11 PROCEDURE — 80048 BASIC METABOLIC PNL TOTAL CA: CPT

## 2024-01-11 PROCEDURE — 84100 ASSAY OF PHOSPHORUS: CPT

## 2024-01-11 PROCEDURE — 85027 COMPLETE CBC AUTOMATED: CPT

## 2024-01-11 PROCEDURE — 80197 ASSAY OF TACROLIMUS: CPT

## 2024-01-16 ENCOUNTER — LAB (OUTPATIENT)
Dept: LAB | Facility: CLINIC | Age: 29
End: 2024-01-16
Payer: MEDICARE

## 2024-01-16 DIAGNOSIS — Z79.899 ENCOUNTER FOR LONG-TERM CURRENT USE OF MEDICATION: ICD-10-CM

## 2024-01-16 DIAGNOSIS — Z98.890 OTHER SPECIFIED POSTPROCEDURAL STATES: ICD-10-CM

## 2024-01-16 DIAGNOSIS — Z48.298 AFTERCARE FOLLOWING ORGAN TRANSPLANT: ICD-10-CM

## 2024-01-16 DIAGNOSIS — Z94.0 KIDNEY REPLACED BY TRANSPLANT: ICD-10-CM

## 2024-01-16 DIAGNOSIS — Z20.828 CONTACT WITH AND (SUSPECTED) EXPOSURE TO OTHER VIRAL COMMUNICABLE DISEASES: ICD-10-CM

## 2024-01-16 LAB
ANION GAP SERPL CALCULATED.3IONS-SCNC: 10 MMOL/L (ref 7–15)
BUN SERPL-MCNC: 12.5 MG/DL (ref 6–20)
CALCIUM SERPL-MCNC: 9.6 MG/DL (ref 8.6–10)
CHLORIDE SERPL-SCNC: 102 MMOL/L (ref 98–107)
CREAT SERPL-MCNC: 1.45 MG/DL (ref 0.51–0.95)
DEPRECATED HCO3 PLAS-SCNC: 22 MMOL/L (ref 22–29)
EGFRCR SERPLBLD CKD-EPI 2021: 50 ML/MIN/1.73M2
ERYTHROCYTE [DISTWIDTH] IN BLOOD BY AUTOMATED COUNT: 13.2 % (ref 10–15)
GLUCOSE SERPL-MCNC: 99 MG/DL (ref 70–99)
HCT VFR BLD AUTO: 44 % (ref 35–47)
HGB BLD-MCNC: 14.3 G/DL (ref 11.7–15.7)
MAGNESIUM SERPL-MCNC: 1.9 MG/DL (ref 1.7–2.3)
MCH RBC QN AUTO: 31.6 PG (ref 26.5–33)
MCHC RBC AUTO-ENTMCNC: 32.5 G/DL (ref 31.5–36.5)
MCV RBC AUTO: 97 FL (ref 78–100)
PHOSPHATE SERPL-MCNC: 3 MG/DL (ref 2.5–4.5)
PLATELET # BLD AUTO: 308 10E3/UL (ref 150–450)
POTASSIUM SERPL-SCNC: 4.9 MMOL/L (ref 3.4–5.3)
RBC # BLD AUTO: 4.53 10E6/UL (ref 3.8–5.2)
SODIUM SERPL-SCNC: 134 MMOL/L (ref 135–145)
TACROLIMUS BLD-MCNC: 12.8 UG/L (ref 5–15)
TME LAST DOSE: NORMAL H
TME LAST DOSE: NORMAL H
WBC # BLD AUTO: 9.4 10E3/UL (ref 4–11)

## 2024-01-16 PROCEDURE — 36415 COLL VENOUS BLD VENIPUNCTURE: CPT

## 2024-01-16 PROCEDURE — 80048 BASIC METABOLIC PNL TOTAL CA: CPT

## 2024-01-16 PROCEDURE — 84100 ASSAY OF PHOSPHORUS: CPT

## 2024-01-16 PROCEDURE — 83735 ASSAY OF MAGNESIUM: CPT

## 2024-01-16 PROCEDURE — 85027 COMPLETE CBC AUTOMATED: CPT

## 2024-01-16 PROCEDURE — 80197 ASSAY OF TACROLIMUS: CPT

## 2024-01-17 DIAGNOSIS — Z94.0 KIDNEY TRANSPLANT RECIPIENT: Primary | ICD-10-CM

## 2024-01-17 RX ORDER — TACROLIMUS 1 MG/1
3 CAPSULE ORAL 2 TIMES DAILY
Qty: 180 CAPSULE | Refills: 11 | Status: SHIPPED | OUTPATIENT
Start: 2024-01-17 | End: 2024-01-24

## 2024-01-17 NOTE — TELEPHONE ENCOUNTER
[employer] message sent to patient regarding:  Tacrolimus IR level 12.4 on 1/16, goal 8-10, dose 4 mg BID.     PLAN:   Call Patientand confirm this was an accurate 12-hour trough.   Verify Tacrolimus IR dose 4 mg BID.   Confirm no new medications or illness.   Confirm no missed doses.   If accurate trough and accurate dose, decrease Tacrolimus IR dose to 3 mg BID      Is this more than a 50% increase or decrease in current IS dose: No  If YES, justification: na     Repeat labs in 3 days.  *If > 50% change in immunosuppression dose, repeat labs in 1 week.

## 2024-01-17 NOTE — TELEPHONE ENCOUNTER
ISSUE:   Tacrolimus IR level 12.4 on 1/16, goal 8-10, dose 4 mg BID.    PLAN:   Call Patientand confirm this was an accurate 12-hour trough.   Verify Tacrolimus IR dose 4 mg BID.   Confirm no new medications or illness.   Confirm no missed doses.   If accurate trough and accurate dose, decrease Tacrolimus IR dose to 3 mg BID     Is this more than a 50% increase or decrease in current IS dose: No  If YES, justification: na    Repeat labs in 3 days.  *If > 50% change in immunosuppression dose, repeat labs in 1 week.     OUTCOME:  Patient confirmed this was an accurate 12-hour trough.   Verified Tacrolimus IR dose 4 mg BID.   Confirmed no new medications or illness.   Confirmed no missed doses.   Patient confirms decrease Tacrolimus IR dose to 3 mg BID and repeat labs as scheduled.

## 2024-01-18 ENCOUNTER — LAB (OUTPATIENT)
Dept: LAB | Facility: CLINIC | Age: 29
End: 2024-01-18
Payer: MEDICARE

## 2024-01-18 DIAGNOSIS — Z48.298 AFTERCARE FOLLOWING ORGAN TRANSPLANT: ICD-10-CM

## 2024-01-18 DIAGNOSIS — Z94.0 KIDNEY REPLACED BY TRANSPLANT: ICD-10-CM

## 2024-01-18 DIAGNOSIS — Z79.899 ENCOUNTER FOR LONG-TERM CURRENT USE OF MEDICATION: ICD-10-CM

## 2024-01-18 DIAGNOSIS — Z20.828 CONTACT WITH AND (SUSPECTED) EXPOSURE TO OTHER VIRAL COMMUNICABLE DISEASES: ICD-10-CM

## 2024-01-18 DIAGNOSIS — Z98.890 OTHER SPECIFIED POSTPROCEDURAL STATES: ICD-10-CM

## 2024-01-18 LAB
ANION GAP SERPL CALCULATED.3IONS-SCNC: 9 MMOL/L (ref 7–15)
BUN SERPL-MCNC: 9.8 MG/DL (ref 6–20)
CALCIUM SERPL-MCNC: 9.4 MG/DL (ref 8.6–10)
CHLORIDE SERPL-SCNC: 104 MMOL/L (ref 98–107)
CREAT SERPL-MCNC: 1.43 MG/DL (ref 0.51–0.95)
DEPRECATED HCO3 PLAS-SCNC: 22 MMOL/L (ref 22–29)
EGFRCR SERPLBLD CKD-EPI 2021: 51 ML/MIN/1.73M2
ERYTHROCYTE [DISTWIDTH] IN BLOOD BY AUTOMATED COUNT: 13 % (ref 10–15)
GLUCOSE SERPL-MCNC: 88 MG/DL (ref 70–99)
HCT VFR BLD AUTO: 40.9 % (ref 35–47)
HGB BLD-MCNC: 13.3 G/DL (ref 11.7–15.7)
MCH RBC QN AUTO: 31.4 PG (ref 26.5–33)
MCHC RBC AUTO-ENTMCNC: 32.5 G/DL (ref 31.5–36.5)
MCV RBC AUTO: 97 FL (ref 78–100)
PLATELET # BLD AUTO: 293 10E3/UL (ref 150–450)
POTASSIUM SERPL-SCNC: 4.7 MMOL/L (ref 3.4–5.3)
RBC # BLD AUTO: 4.24 10E6/UL (ref 3.8–5.2)
SODIUM SERPL-SCNC: 135 MMOL/L (ref 135–145)
TACROLIMUS BLD-MCNC: 8.9 UG/L (ref 5–15)
TME LAST DOSE: NORMAL H
TME LAST DOSE: NORMAL H
WBC # BLD AUTO: 7.3 10E3/UL (ref 4–11)

## 2024-01-18 PROCEDURE — 80197 ASSAY OF TACROLIMUS: CPT

## 2024-01-18 PROCEDURE — 36415 COLL VENOUS BLD VENIPUNCTURE: CPT

## 2024-01-18 PROCEDURE — 80048 BASIC METABOLIC PNL TOTAL CA: CPT

## 2024-01-18 PROCEDURE — 85027 COMPLETE CBC AUTOMATED: CPT

## 2024-01-20 LAB
DONOR IDENTIFICATION: NORMAL
DSA COMMENTS: NORMAL
DSA PRESENT: NO
DSA TEST METHOD: NORMAL
ORGAN: NORMAL
SA 1 CELL: NORMAL
SA 1 TEST METHOD: NORMAL
SA 2 CELL: NORMAL
SA 2 TEST METHOD: NORMAL
SA1 HI RISK ABY: NORMAL
SA1 MOD RISK ABY: NORMAL
SA2 HI RISK ABY: NORMAL
SA2 MOD RISK ABY: NORMAL
UNACCEPTABLE ANTIGENS: NORMAL
UNOS CPRA: 0
ZZZSA 1  COMMENTS: NORMAL
ZZZSA 2 COMMENTS: NORMAL

## 2024-01-23 ENCOUNTER — LAB (OUTPATIENT)
Dept: LAB | Facility: CLINIC | Age: 29
End: 2024-01-23
Payer: MEDICARE

## 2024-01-23 DIAGNOSIS — Z48.298 AFTERCARE FOLLOWING ORGAN TRANSPLANT: ICD-10-CM

## 2024-01-23 DIAGNOSIS — Z79.899 ENCOUNTER FOR LONG-TERM CURRENT USE OF MEDICATION: ICD-10-CM

## 2024-01-23 DIAGNOSIS — Z94.0 KIDNEY REPLACED BY TRANSPLANT: ICD-10-CM

## 2024-01-23 DIAGNOSIS — Z20.828 CONTACT WITH AND (SUSPECTED) EXPOSURE TO OTHER VIRAL COMMUNICABLE DISEASES: ICD-10-CM

## 2024-01-23 DIAGNOSIS — Z98.890 OTHER SPECIFIED POSTPROCEDURAL STATES: ICD-10-CM

## 2024-01-23 LAB
ANION GAP SERPL CALCULATED.3IONS-SCNC: 7 MMOL/L (ref 7–15)
BUN SERPL-MCNC: 12.5 MG/DL (ref 6–20)
CALCIUM SERPL-MCNC: 9.3 MG/DL (ref 8.6–10)
CHLORIDE SERPL-SCNC: 105 MMOL/L (ref 98–107)
CREAT SERPL-MCNC: 1.3 MG/DL (ref 0.51–0.95)
DEPRECATED HCO3 PLAS-SCNC: 23 MMOL/L (ref 22–29)
EGFRCR SERPLBLD CKD-EPI 2021: 57 ML/MIN/1.73M2
ERYTHROCYTE [DISTWIDTH] IN BLOOD BY AUTOMATED COUNT: 12.9 % (ref 10–15)
GLUCOSE SERPL-MCNC: 100 MG/DL (ref 70–99)
HCT VFR BLD AUTO: 41.1 % (ref 35–47)
HGB BLD-MCNC: 13.7 G/DL (ref 11.7–15.7)
MAGNESIUM SERPL-MCNC: 1.8 MG/DL (ref 1.7–2.3)
MCH RBC QN AUTO: 32.1 PG (ref 26.5–33)
MCHC RBC AUTO-ENTMCNC: 33.3 G/DL (ref 31.5–36.5)
MCV RBC AUTO: 96 FL (ref 78–100)
PHOSPHATE SERPL-MCNC: 2.6 MG/DL (ref 2.5–4.5)
PLATELET # BLD AUTO: 311 10E3/UL (ref 150–450)
POTASSIUM SERPL-SCNC: 4.8 MMOL/L (ref 3.4–5.3)
RBC # BLD AUTO: 4.27 10E6/UL (ref 3.8–5.2)
SODIUM SERPL-SCNC: 135 MMOL/L (ref 135–145)
TACROLIMUS BLD-MCNC: 7.4 UG/L (ref 5–15)
TME LAST DOSE: NORMAL H
TME LAST DOSE: NORMAL H
WBC # BLD AUTO: 7.2 10E3/UL (ref 4–11)

## 2024-01-23 PROCEDURE — 80048 BASIC METABOLIC PNL TOTAL CA: CPT

## 2024-01-23 PROCEDURE — 84100 ASSAY OF PHOSPHORUS: CPT

## 2024-01-23 PROCEDURE — 36415 COLL VENOUS BLD VENIPUNCTURE: CPT

## 2024-01-23 PROCEDURE — 85027 COMPLETE CBC AUTOMATED: CPT

## 2024-01-23 PROCEDURE — 83735 ASSAY OF MAGNESIUM: CPT

## 2024-01-23 PROCEDURE — 80197 ASSAY OF TACROLIMUS: CPT

## 2024-01-24 DIAGNOSIS — Z94.0 KIDNEY TRANSPLANT RECIPIENT: Primary | ICD-10-CM

## 2024-01-24 RX ORDER — TACROLIMUS 1 MG/1
3 CAPSULE ORAL 2 TIMES DAILY
Qty: 180 CAPSULE | Refills: 11 | Status: SHIPPED | OUTPATIENT
Start: 2024-01-24 | End: 2024-02-02

## 2024-01-24 RX ORDER — TACROLIMUS 0.5 MG/1
0.5 CAPSULE ORAL 2 TIMES DAILY
Qty: 60 CAPSULE | Refills: 11 | Status: SHIPPED | OUTPATIENT
Start: 2024-01-24 | End: 2024-02-02

## 2024-01-24 NOTE — TELEPHONE ENCOUNTER
ISSUE:   Tacrolimus IR level 7.4 on 1/23, goal 8-10, dose 3 mg BID.    PLAN:   Call Patientand confirm this was an accurate 12-hour trough.   Verify Tacrolimus IR dose 3 mg BID.   Confirm no new medications or illness.   Confirm no missed doses.   If accurate trough and accurate dose, increase Tacrolimus IR dose to 3.5 mg BID     Is this more than a 50% increase or decrease in current IS dose: No  If YES, justification: na    Repeat labs in 3 days.  *If > 50% change in immunosuppression dose, repeat labs in 1 week.     OUTCOME: Patient confirms this was an accurate 12-hour trough.   Verified Tacrolimus IR dose 3 mg BID.   Confirmed no new medications or illness.   Confirmed no missed doses.   Patient confirms increase Tacrolimus IR dose to 3.5 mg BID and repeat labs as scheduled.

## 2024-01-24 NOTE — TELEPHONE ENCOUNTER
[employer] message sent to patient regarding:  Tacrolimus IR level 7.4 on 1/23, goal 8-10, dose 3 mg BID.     PLAN:   Call Patientand confirm this was an accurate 12-hour trough.   Verify Tacrolimus IR dose 3 mg BID.   Confirm no new medications or illness.   Confirm no missed doses.   If accurate trough and accurate dose, increase Tacrolimus IR dose to 3.5 mg BID      Is this more than a 50% increase or decrease in current IS dose: No  If YES, justification: na     Repeat labs in 3 days.  *If > 50% change in immunosuppression dose, repeat labs in 1 week

## 2024-01-25 ENCOUNTER — LAB (OUTPATIENT)
Dept: LAB | Facility: CLINIC | Age: 29
End: 2024-01-25
Payer: MEDICARE

## 2024-01-25 DIAGNOSIS — Z48.298 AFTERCARE FOLLOWING ORGAN TRANSPLANT: ICD-10-CM

## 2024-01-25 DIAGNOSIS — Z98.890 OTHER SPECIFIED POSTPROCEDURAL STATES: ICD-10-CM

## 2024-01-25 DIAGNOSIS — Z94.0 KIDNEY REPLACED BY TRANSPLANT: ICD-10-CM

## 2024-01-25 DIAGNOSIS — Z20.828 CONTACT WITH AND (SUSPECTED) EXPOSURE TO OTHER VIRAL COMMUNICABLE DISEASES: ICD-10-CM

## 2024-01-25 DIAGNOSIS — Z79.899 ENCOUNTER FOR LONG-TERM CURRENT USE OF MEDICATION: ICD-10-CM

## 2024-01-25 LAB
ANION GAP SERPL CALCULATED.3IONS-SCNC: 9 MMOL/L (ref 7–15)
BK VIRUS SPECIMEN TYPE: NORMAL
BKV DNA # SPEC NAA+PROBE: NOT DETECTED IU/ML
BUN SERPL-MCNC: 20 MG/DL (ref 6–20)
CALCIUM SERPL-MCNC: 9.4 MG/DL (ref 8.6–10)
CHLORIDE SERPL-SCNC: 108 MMOL/L (ref 98–107)
CREAT SERPL-MCNC: 1.39 MG/DL (ref 0.51–0.95)
DEPRECATED HCO3 PLAS-SCNC: 22 MMOL/L (ref 22–29)
EGFRCR SERPLBLD CKD-EPI 2021: 52 ML/MIN/1.73M2
ERYTHROCYTE [DISTWIDTH] IN BLOOD BY AUTOMATED COUNT: 12.7 % (ref 10–15)
GLUCOSE SERPL-MCNC: 86 MG/DL (ref 70–99)
HCT VFR BLD AUTO: 41.8 % (ref 35–47)
HGB BLD-MCNC: 13.6 G/DL (ref 11.7–15.7)
MAGNESIUM SERPL-MCNC: 1.7 MG/DL (ref 1.7–2.3)
MCH RBC QN AUTO: 31.3 PG (ref 26.5–33)
MCHC RBC AUTO-ENTMCNC: 32.5 G/DL (ref 31.5–36.5)
MCV RBC AUTO: 96 FL (ref 78–100)
PHOSPHATE SERPL-MCNC: 3.6 MG/DL (ref 2.5–4.5)
PLATELET # BLD AUTO: 285 10E3/UL (ref 150–450)
POTASSIUM SERPL-SCNC: 4.4 MMOL/L (ref 3.4–5.3)
RBC # BLD AUTO: 4.34 10E6/UL (ref 3.8–5.2)
SODIUM SERPL-SCNC: 139 MMOL/L (ref 135–145)
TACROLIMUS BLD-MCNC: 8.9 UG/L (ref 5–15)
TME LAST DOSE: NORMAL H
TME LAST DOSE: NORMAL H
WBC # BLD AUTO: 7.4 10E3/UL (ref 4–11)

## 2024-01-25 PROCEDURE — 87799 DETECT AGENT NOS DNA QUANT: CPT

## 2024-01-25 PROCEDURE — 83735 ASSAY OF MAGNESIUM: CPT

## 2024-01-25 PROCEDURE — 80048 BASIC METABOLIC PNL TOTAL CA: CPT

## 2024-01-25 PROCEDURE — 85027 COMPLETE CBC AUTOMATED: CPT

## 2024-01-25 PROCEDURE — 36415 COLL VENOUS BLD VENIPUNCTURE: CPT

## 2024-01-25 PROCEDURE — 84100 ASSAY OF PHOSPHORUS: CPT

## 2024-01-25 PROCEDURE — 80197 ASSAY OF TACROLIMUS: CPT

## 2024-01-30 ENCOUNTER — LAB (OUTPATIENT)
Dept: LAB | Facility: CLINIC | Age: 29
End: 2024-01-30
Payer: MEDICARE

## 2024-01-30 DIAGNOSIS — Z20.828 CONTACT WITH AND (SUSPECTED) EXPOSURE TO OTHER VIRAL COMMUNICABLE DISEASES: ICD-10-CM

## 2024-01-30 DIAGNOSIS — Z94.0 KIDNEY REPLACED BY TRANSPLANT: ICD-10-CM

## 2024-01-30 DIAGNOSIS — Z48.298 AFTERCARE FOLLOWING ORGAN TRANSPLANT: ICD-10-CM

## 2024-01-30 DIAGNOSIS — Z79.899 ENCOUNTER FOR LONG-TERM CURRENT USE OF MEDICATION: ICD-10-CM

## 2024-01-30 DIAGNOSIS — Z98.890 OTHER SPECIFIED POSTPROCEDURAL STATES: ICD-10-CM

## 2024-01-30 LAB
ANION GAP SERPL CALCULATED.3IONS-SCNC: 9 MMOL/L (ref 7–15)
BUN SERPL-MCNC: 17.4 MG/DL (ref 6–20)
CALCIUM SERPL-MCNC: 9.7 MG/DL (ref 8.6–10)
CHLORIDE SERPL-SCNC: 105 MMOL/L (ref 98–107)
CREAT SERPL-MCNC: 1.38 MG/DL (ref 0.51–0.95)
DEPRECATED HCO3 PLAS-SCNC: 22 MMOL/L (ref 22–29)
EGFRCR SERPLBLD CKD-EPI 2021: 53 ML/MIN/1.73M2
ERYTHROCYTE [DISTWIDTH] IN BLOOD BY AUTOMATED COUNT: 12.8 % (ref 10–15)
GLUCOSE SERPL-MCNC: 87 MG/DL (ref 70–99)
HCT VFR BLD AUTO: 40.4 % (ref 35–47)
HGB BLD-MCNC: 13.5 G/DL (ref 11.7–15.7)
MCH RBC QN AUTO: 31.5 PG (ref 26.5–33)
MCHC RBC AUTO-ENTMCNC: 33.4 G/DL (ref 31.5–36.5)
MCV RBC AUTO: 94 FL (ref 78–100)
PHOSPHATE SERPL-MCNC: 3.6 MG/DL (ref 2.5–4.5)
PLATELET # BLD AUTO: 262 10E3/UL (ref 150–450)
POTASSIUM SERPL-SCNC: 4.4 MMOL/L (ref 3.4–5.3)
RBC # BLD AUTO: 4.29 10E6/UL (ref 3.8–5.2)
SODIUM SERPL-SCNC: 136 MMOL/L (ref 135–145)
TACROLIMUS BLD-MCNC: 10.8 UG/L (ref 5–15)
TME LAST DOSE: NORMAL H
TME LAST DOSE: NORMAL H
WBC # BLD AUTO: 7 10E3/UL (ref 4–11)

## 2024-01-30 PROCEDURE — 80048 BASIC METABOLIC PNL TOTAL CA: CPT

## 2024-01-30 PROCEDURE — 84100 ASSAY OF PHOSPHORUS: CPT

## 2024-01-30 PROCEDURE — 80197 ASSAY OF TACROLIMUS: CPT

## 2024-01-30 PROCEDURE — 85027 COMPLETE CBC AUTOMATED: CPT

## 2024-01-30 PROCEDURE — 36415 COLL VENOUS BLD VENIPUNCTURE: CPT

## 2024-02-01 ENCOUNTER — LAB (OUTPATIENT)
Dept: LAB | Facility: CLINIC | Age: 29
End: 2024-02-01
Payer: MEDICARE

## 2024-02-01 DIAGNOSIS — Z94.0 KIDNEY REPLACED BY TRANSPLANT: ICD-10-CM

## 2024-02-01 DIAGNOSIS — Z98.890 OTHER SPECIFIED POSTPROCEDURAL STATES: ICD-10-CM

## 2024-02-01 DIAGNOSIS — Z79.899 ENCOUNTER FOR LONG-TERM CURRENT USE OF MEDICATION: ICD-10-CM

## 2024-02-01 DIAGNOSIS — Z48.298 AFTERCARE FOLLOWING ORGAN TRANSPLANT: ICD-10-CM

## 2024-02-01 DIAGNOSIS — Z20.828 CONTACT WITH AND (SUSPECTED) EXPOSURE TO OTHER VIRAL COMMUNICABLE DISEASES: ICD-10-CM

## 2024-02-01 LAB
ANION GAP SERPL CALCULATED.3IONS-SCNC: 9 MMOL/L (ref 7–15)
BUN SERPL-MCNC: 14.4 MG/DL (ref 6–20)
CALCIUM SERPL-MCNC: 9.1 MG/DL (ref 8.6–10)
CHLORIDE SERPL-SCNC: 108 MMOL/L (ref 98–107)
CREAT SERPL-MCNC: 1.4 MG/DL (ref 0.51–0.95)
DEPRECATED HCO3 PLAS-SCNC: 22 MMOL/L (ref 22–29)
EGFRCR SERPLBLD CKD-EPI 2021: 52 ML/MIN/1.73M2
ERYTHROCYTE [DISTWIDTH] IN BLOOD BY AUTOMATED COUNT: 12.8 % (ref 10–15)
GLUCOSE SERPL-MCNC: 96 MG/DL (ref 70–99)
HCT VFR BLD AUTO: 41.6 % (ref 35–47)
HGB BLD-MCNC: 13.9 G/DL (ref 11.7–15.7)
MAGNESIUM SERPL-MCNC: 2 MG/DL (ref 1.7–2.3)
MCH RBC QN AUTO: 31.9 PG (ref 26.5–33)
MCHC RBC AUTO-ENTMCNC: 33.4 G/DL (ref 31.5–36.5)
MCV RBC AUTO: 95 FL (ref 78–100)
PHOSPHATE SERPL-MCNC: 3 MG/DL (ref 2.5–4.5)
PLATELET # BLD AUTO: 269 10E3/UL (ref 150–450)
POTASSIUM SERPL-SCNC: 4.6 MMOL/L (ref 3.4–5.3)
RBC # BLD AUTO: 4.36 10E6/UL (ref 3.8–5.2)
SODIUM SERPL-SCNC: 139 MMOL/L (ref 135–145)
TACROLIMUS BLD-MCNC: 11 UG/L (ref 5–15)
TME LAST DOSE: NORMAL H
TME LAST DOSE: NORMAL H
WBC # BLD AUTO: 7.3 10E3/UL (ref 4–11)

## 2024-02-01 PROCEDURE — 36415 COLL VENOUS BLD VENIPUNCTURE: CPT

## 2024-02-01 PROCEDURE — 80048 BASIC METABOLIC PNL TOTAL CA: CPT

## 2024-02-01 PROCEDURE — 83735 ASSAY OF MAGNESIUM: CPT

## 2024-02-01 PROCEDURE — 80197 ASSAY OF TACROLIMUS: CPT

## 2024-02-01 PROCEDURE — 85027 COMPLETE CBC AUTOMATED: CPT

## 2024-02-01 PROCEDURE — 84100 ASSAY OF PHOSPHORUS: CPT

## 2024-02-02 DIAGNOSIS — Z94.0 KIDNEY TRANSPLANT RECIPIENT: Primary | ICD-10-CM

## 2024-02-02 RX ORDER — TACROLIMUS 0.5 MG/1
0.5 CAPSULE ORAL EVERY MORNING
Qty: 30 CAPSULE | Refills: 11 | Status: SHIPPED | OUTPATIENT
Start: 2024-02-02 | End: 2024-02-28

## 2024-02-02 RX ORDER — TACROLIMUS 1 MG/1
3 CAPSULE ORAL 2 TIMES DAILY
Qty: 180 CAPSULE | Refills: 11 | Status: SHIPPED | OUTPATIENT
Start: 2024-02-02 | End: 2024-02-28

## 2024-02-02 NOTE — TELEPHONE ENCOUNTER
ISSUE:   Tacrolimus IR level 11 on 2/2, goal 8-10, dose 3.5 mg BID.    PLAN:   Call Patientand confirm this was an accurate 12-hour trough.   Verify Tacrolimus IR dose 3.5 mg BID.   Confirm no new medications or illness.   Confirm no missed doses.   If accurate trough and accurate dose, decrease Tacrolimus IR dose to 3.5/3 mg am/pm     Is this more than a 50% increase or decrease in current IS dose: No  If YES, justification: na    Repeat labs in 3 days.  *If > 50% change in immunosuppression dose, repeat labs in 1 week.     OUTCOME:   Patient confirmed this was an accurate 12-hour trough.   Verified Tacrolimus IR dose 3.5 mg BID.   Confirmed no new medications or illness.   Confirmed no missed doses.   Patient confirms decrease Tacrolimus IR dose to 3.5/3 mg am/pm and repeat labs in 3 days.

## 2024-02-02 NOTE — TELEPHONE ENCOUNTER
Left message and sent [employer] message to patient regarding:  Tacrolimus IR level 11 on 2/2, goal 8-10, dose 3.5 mg BID.     PLAN:   Call Patientand confirm this was an accurate 12-hour trough.   Verify Tacrolimus IR dose 3.5 mg BID.   Confirm no new medications or illness.   Confirm no missed doses.   If accurate trough and accurate dose, decrease Tacrolimus IR dose to 3.5/3 mg am/pm      Is this more than a 50% increase or decrease in current IS dose: No  If YES, justification: na     Repeat labs in 3 days.

## 2024-02-06 ENCOUNTER — LAB (OUTPATIENT)
Dept: LAB | Facility: CLINIC | Age: 29
End: 2024-02-06
Payer: MEDICARE

## 2024-02-06 DIAGNOSIS — Z48.298 AFTERCARE FOLLOWING ORGAN TRANSPLANT: ICD-10-CM

## 2024-02-06 DIAGNOSIS — Z79.899 ENCOUNTER FOR LONG-TERM CURRENT USE OF MEDICATION: ICD-10-CM

## 2024-02-06 DIAGNOSIS — Z20.828 CONTACT WITH AND (SUSPECTED) EXPOSURE TO OTHER VIRAL COMMUNICABLE DISEASES: ICD-10-CM

## 2024-02-06 DIAGNOSIS — Z98.890 OTHER SPECIFIED POSTPROCEDURAL STATES: ICD-10-CM

## 2024-02-06 DIAGNOSIS — Z94.0 KIDNEY REPLACED BY TRANSPLANT: ICD-10-CM

## 2024-02-06 LAB
ALBUMIN MFR UR ELPH: <6 MG/DL
ANION GAP SERPL CALCULATED.3IONS-SCNC: 10 MMOL/L (ref 7–15)
BK VIRUS SPECIMEN TYPE: NORMAL
BKV DNA # SPEC NAA+PROBE: NOT DETECTED IU/ML
BUN SERPL-MCNC: 18.5 MG/DL (ref 6–20)
CALCIUM SERPL-MCNC: 9.7 MG/DL (ref 8.6–10)
CHLORIDE SERPL-SCNC: 103 MMOL/L (ref 98–107)
CREAT SERPL-MCNC: 1.43 MG/DL (ref 0.51–0.95)
CREAT UR-MCNC: 93.8 MG/DL
DEPRECATED HCO3 PLAS-SCNC: 23 MMOL/L (ref 22–29)
EGFRCR SERPLBLD CKD-EPI 2021: 51 ML/MIN/1.73M2
ERYTHROCYTE [DISTWIDTH] IN BLOOD BY AUTOMATED COUNT: 12.7 % (ref 10–15)
GLUCOSE SERPL-MCNC: 100 MG/DL (ref 70–99)
HCT VFR BLD AUTO: 41.5 % (ref 35–47)
HGB BLD-MCNC: 13.8 G/DL (ref 11.7–15.7)
MCH RBC QN AUTO: 31.7 PG (ref 26.5–33)
MCHC RBC AUTO-ENTMCNC: 33.3 G/DL (ref 31.5–36.5)
MCV RBC AUTO: 95 FL (ref 78–100)
MYCOPHENOLATE SERPL LC/MS/MS-MCNC: 2.34 MG/L (ref 1–3.5)
MYCOPHENOLATE-G SERPL LC/MS/MS-MCNC: 82.5 MG/L (ref 30–95)
PLATELET # BLD AUTO: 271 10E3/UL (ref 150–450)
POTASSIUM SERPL-SCNC: 4.7 MMOL/L (ref 3.4–5.3)
PROT/CREAT 24H UR: NORMAL MG/G{CREAT}
RBC # BLD AUTO: 4.36 10E6/UL (ref 3.8–5.2)
SODIUM SERPL-SCNC: 136 MMOL/L (ref 135–145)
TACROLIMUS BLD-MCNC: 8.9 UG/L (ref 5–15)
TME LAST DOSE: NORMAL H
WBC # BLD AUTO: 6.3 10E3/UL (ref 4–11)

## 2024-02-06 PROCEDURE — 36415 COLL VENOUS BLD VENIPUNCTURE: CPT

## 2024-02-06 PROCEDURE — 85027 COMPLETE CBC AUTOMATED: CPT

## 2024-02-06 PROCEDURE — 84156 ASSAY OF PROTEIN URINE: CPT

## 2024-02-06 PROCEDURE — 87799 DETECT AGENT NOS DNA QUANT: CPT

## 2024-02-06 PROCEDURE — 86833 HLA CLASS II HIGH DEFIN QUAL: CPT

## 2024-02-06 PROCEDURE — 86832 HLA CLASS I HIGH DEFIN QUAL: CPT

## 2024-02-06 PROCEDURE — 80197 ASSAY OF TACROLIMUS: CPT

## 2024-02-06 PROCEDURE — 80180 DRUG SCRN QUAN MYCOPHENOLATE: CPT

## 2024-02-06 PROCEDURE — 80048 BASIC METABOLIC PNL TOTAL CA: CPT

## 2024-02-08 ENCOUNTER — LAB (OUTPATIENT)
Dept: LAB | Facility: CLINIC | Age: 29
End: 2024-02-08
Payer: MEDICARE

## 2024-02-08 DIAGNOSIS — Z20.828 CONTACT WITH AND (SUSPECTED) EXPOSURE TO OTHER VIRAL COMMUNICABLE DISEASES: ICD-10-CM

## 2024-02-08 DIAGNOSIS — Z94.0 KIDNEY REPLACED BY TRANSPLANT: ICD-10-CM

## 2024-02-08 DIAGNOSIS — Z79.899 ENCOUNTER FOR LONG-TERM CURRENT USE OF MEDICATION: ICD-10-CM

## 2024-02-08 DIAGNOSIS — Z48.298 AFTERCARE FOLLOWING ORGAN TRANSPLANT: ICD-10-CM

## 2024-02-08 DIAGNOSIS — Z98.890 OTHER SPECIFIED POSTPROCEDURAL STATES: ICD-10-CM

## 2024-02-08 LAB
ANION GAP SERPL CALCULATED.3IONS-SCNC: 9 MMOL/L (ref 7–15)
BUN SERPL-MCNC: 21.1 MG/DL (ref 6–20)
CALCIUM SERPL-MCNC: 9.5 MG/DL (ref 8.6–10)
CHLORIDE SERPL-SCNC: 105 MMOL/L (ref 98–107)
CREAT SERPL-MCNC: 1.38 MG/DL (ref 0.51–0.95)
DEPRECATED HCO3 PLAS-SCNC: 22 MMOL/L (ref 22–29)
EGFRCR SERPLBLD CKD-EPI 2021: 53 ML/MIN/1.73M2
ERYTHROCYTE [DISTWIDTH] IN BLOOD BY AUTOMATED COUNT: 12.8 % (ref 10–15)
GLUCOSE SERPL-MCNC: 96 MG/DL (ref 70–99)
HCT VFR BLD AUTO: 43 % (ref 35–47)
HGB BLD-MCNC: 14.2 G/DL (ref 11.7–15.7)
MCH RBC QN AUTO: 31.6 PG (ref 26.5–33)
MCHC RBC AUTO-ENTMCNC: 33 G/DL (ref 31.5–36.5)
MCV RBC AUTO: 96 FL (ref 78–100)
PLATELET # BLD AUTO: 259 10E3/UL (ref 150–450)
POTASSIUM SERPL-SCNC: 4.9 MMOL/L (ref 3.4–5.3)
RBC # BLD AUTO: 4.5 10E6/UL (ref 3.8–5.2)
SODIUM SERPL-SCNC: 136 MMOL/L (ref 135–145)
TACROLIMUS BLD-MCNC: 11.9 UG/L (ref 5–15)
TME LAST DOSE: NORMAL H
TME LAST DOSE: NORMAL H
WBC # BLD AUTO: 6.9 10E3/UL (ref 4–11)

## 2024-02-08 PROCEDURE — 85027 COMPLETE CBC AUTOMATED: CPT

## 2024-02-08 PROCEDURE — 80197 ASSAY OF TACROLIMUS: CPT

## 2024-02-08 PROCEDURE — 80048 BASIC METABOLIC PNL TOTAL CA: CPT

## 2024-02-08 PROCEDURE — 36415 COLL VENOUS BLD VENIPUNCTURE: CPT

## 2024-02-14 ENCOUNTER — LAB (OUTPATIENT)
Dept: LAB | Facility: CLINIC | Age: 29
End: 2024-02-14
Attending: INTERNAL MEDICINE
Payer: MEDICARE

## 2024-02-14 ENCOUNTER — OFFICE VISIT (OUTPATIENT)
Dept: TRANSPLANT | Facility: CLINIC | Age: 29
End: 2024-02-14
Attending: INTERNAL MEDICINE
Payer: MEDICARE

## 2024-02-14 ENCOUNTER — VIRTUAL VISIT (OUTPATIENT)
Dept: PHARMACY | Facility: CLINIC | Age: 29
End: 2024-02-14
Payer: MEDICARE

## 2024-02-14 VITALS
OXYGEN SATURATION: 100 % | HEART RATE: 90 BPM | WEIGHT: 170.6 LBS | TEMPERATURE: 98.3 F | SYSTOLIC BLOOD PRESSURE: 117 MMHG | BODY MASS INDEX: 32.23 KG/M2 | DIASTOLIC BLOOD PRESSURE: 79 MMHG

## 2024-02-14 DIAGNOSIS — Z94.0 KIDNEY REPLACED BY TRANSPLANT: Primary | ICD-10-CM

## 2024-02-14 DIAGNOSIS — Z79.899 ENCOUNTER FOR LONG-TERM CURRENT USE OF MEDICATION: ICD-10-CM

## 2024-02-14 DIAGNOSIS — Z48.298 AFTERCARE FOLLOWING ORGAN TRANSPLANT: ICD-10-CM

## 2024-02-14 DIAGNOSIS — Z20.828 CONTACT WITH AND (SUSPECTED) EXPOSURE TO OTHER VIRAL COMMUNICABLE DISEASES: ICD-10-CM

## 2024-02-14 DIAGNOSIS — Z94.0 KIDNEY TRANSPLANT RECIPIENT: Primary | ICD-10-CM

## 2024-02-14 DIAGNOSIS — Z94.0 KIDNEY REPLACED BY TRANSPLANT: ICD-10-CM

## 2024-02-14 DIAGNOSIS — E83.42 HYPOMAGNESEMIA: ICD-10-CM

## 2024-02-14 DIAGNOSIS — Z78.9 TAKES DIETARY SUPPLEMENTS: ICD-10-CM

## 2024-02-14 DIAGNOSIS — Z48.298 AFTERCARE FOLLOWING ORGAN TRANSPLANT: Primary | ICD-10-CM

## 2024-02-14 DIAGNOSIS — Z98.890 OTHER SPECIFIED POSTPROCEDURAL STATES: ICD-10-CM

## 2024-02-14 DIAGNOSIS — D84.9 IMMUNOSUPPRESSED STATUS (H): ICD-10-CM

## 2024-02-14 PROBLEM — G43.909 MIGRAINE HEADACHE: Status: RESOLVED | Noted: 2021-10-10 | Resolved: 2024-02-14

## 2024-02-14 LAB
ALBUMIN SERPL BCG-MCNC: 4.1 G/DL (ref 3.5–5.2)
ALP SERPL-CCNC: 109 U/L (ref 40–150)
ALT SERPL W P-5'-P-CCNC: 59 U/L (ref 0–50)
ANION GAP SERPL CALCULATED.3IONS-SCNC: 8 MMOL/L (ref 7–15)
AST SERPL W P-5'-P-CCNC: 38 U/L (ref 0–45)
BILIRUB DIRECT SERPL-MCNC: <0.2 MG/DL (ref 0–0.3)
BILIRUB SERPL-MCNC: 0.4 MG/DL
BK VIRUS SPECIMEN TYPE: NORMAL
BKV DNA # SPEC NAA+PROBE: NOT DETECTED IU/ML
BUN SERPL-MCNC: 17.9 MG/DL (ref 6–20)
CALCIUM SERPL-MCNC: 9.1 MG/DL (ref 8.6–10)
CHLORIDE SERPL-SCNC: 106 MMOL/L (ref 98–107)
CREAT SERPL-MCNC: 1.31 MG/DL (ref 0.51–0.95)
DEPRECATED HCO3 PLAS-SCNC: 21 MMOL/L (ref 22–29)
DONOR IDENTIFICATION: NORMAL
DSA COMMENTS: NORMAL
DSA PRESENT: NO
DSA TEST METHOD: NORMAL
EGFRCR SERPLBLD CKD-EPI 2021: 56 ML/MIN/1.73M2
ERYTHROCYTE [DISTWIDTH] IN BLOOD BY AUTOMATED COUNT: 12.7 % (ref 10–15)
GLUCOSE SERPL-MCNC: 96 MG/DL (ref 70–99)
HCT VFR BLD AUTO: 42.7 % (ref 35–47)
HGB BLD-MCNC: 14.4 G/DL (ref 11.7–15.7)
MAGNESIUM SERPL-MCNC: 1.7 MG/DL (ref 1.7–2.3)
MCH RBC QN AUTO: 31.2 PG (ref 26.5–33)
MCHC RBC AUTO-ENTMCNC: 33.7 G/DL (ref 31.5–36.5)
MCV RBC AUTO: 92 FL (ref 78–100)
ORGAN: NORMAL
PHOSPHATE SERPL-MCNC: 3.2 MG/DL (ref 2.5–4.5)
PLATELET # BLD AUTO: 228 10E3/UL (ref 150–450)
POTASSIUM SERPL-SCNC: 4.8 MMOL/L (ref 3.4–5.3)
PROT SERPL-MCNC: 7.1 G/DL (ref 6.4–8.3)
RBC # BLD AUTO: 4.62 10E6/UL (ref 3.8–5.2)
SA 1 CELL: NORMAL
SA 1 TEST METHOD: NORMAL
SA 2 CELL: NORMAL
SA 2 TEST METHOD: NORMAL
SA1 HI RISK ABY: NORMAL
SA1 MOD RISK ABY: NORMAL
SA2 HI RISK ABY: NORMAL
SA2 MOD RISK ABY: NORMAL
SODIUM SERPL-SCNC: 135 MMOL/L (ref 135–145)
TACROLIMUS BLD-MCNC: 10.9 UG/L (ref 5–15)
TME LAST DOSE: NORMAL H
TME LAST DOSE: NORMAL H
UNACCEPTABLE ANTIGENS: NORMAL
UNOS CPRA: 0
WBC # BLD AUTO: 5.5 10E3/UL (ref 4–11)
ZZZSA 1  COMMENTS: NORMAL
ZZZSA 2 COMMENTS: NORMAL

## 2024-02-14 PROCEDURE — 99215 OFFICE O/P EST HI 40 MIN: CPT | Mod: 24 | Performed by: INTERNAL MEDICINE

## 2024-02-14 PROCEDURE — 87799 DETECT AGENT NOS DNA QUANT: CPT | Performed by: INTERNAL MEDICINE

## 2024-02-14 PROCEDURE — 80053 COMPREHEN METABOLIC PANEL: CPT | Performed by: PATHOLOGY

## 2024-02-14 PROCEDURE — 99000 SPECIMEN HANDLING OFFICE-LAB: CPT | Performed by: PATHOLOGY

## 2024-02-14 PROCEDURE — 85027 COMPLETE CBC AUTOMATED: CPT | Performed by: PATHOLOGY

## 2024-02-14 PROCEDURE — 36415 COLL VENOUS BLD VENIPUNCTURE: CPT | Performed by: PATHOLOGY

## 2024-02-14 PROCEDURE — 80197 ASSAY OF TACROLIMUS: CPT | Performed by: INTERNAL MEDICINE

## 2024-02-14 PROCEDURE — G0463 HOSPITAL OUTPT CLINIC VISIT: HCPCS | Performed by: INTERNAL MEDICINE

## 2024-02-14 PROCEDURE — 99207 PR NO CHARGE LOS: CPT | Mod: 93 | Performed by: PHARMACIST

## 2024-02-14 PROCEDURE — 84100 ASSAY OF PHOSPHORUS: CPT | Performed by: PATHOLOGY

## 2024-02-14 PROCEDURE — G2211 COMPLEX E/M VISIT ADD ON: HCPCS | Performed by: INTERNAL MEDICINE

## 2024-02-14 PROCEDURE — 83735 ASSAY OF MAGNESIUM: CPT | Performed by: PATHOLOGY

## 2024-02-14 PROCEDURE — 82248 BILIRUBIN DIRECT: CPT | Performed by: PATHOLOGY

## 2024-02-14 RX ORDER — BIOTIN 1 MG
1000 TABLET ORAL DAILY
COMMUNITY

## 2024-02-14 ASSESSMENT — PAIN SCALES - GENERAL: PAINLEVEL: NO PAIN (0)

## 2024-02-14 NOTE — PATIENT INSTRUCTIONS
"Recommendations from today's MTM visit:                                                      Increase your fiber supplement to the recommended dosage, usually 3-6 capsules.   Start Biotin 1mg daily for hair loss.     Follow-up: 2 months post transplant     It was great speaking with you today.  I value your experience and would be very thankful for your time in providing feedback in our clinic survey. In the next few days, you may receive an email or text message from [employer] with a link to a survey related to your  clinical pharmacist.\"     To schedule another MTM appointment, please call the clinic directly or you may call the MTM scheduling line at [phone] or toll-free at [phone].     My Clinical Pharmacist's contact information:                                                      Please feel free to contact me with any questions or concerns you have.      Timur Carney, PharmD  MTM Pharmacist    Phone: [phone]     "

## 2024-02-14 NOTE — PROGRESS NOTES
TRANSPLANT NEPHROLOGY EARLY POST TRANSPLANT VISIT    Assessment & Plan   # LDKT: Stable but stlight trend up from post txp when baseline was 1.1-1.2, possibly due to high tac levels   - Baseline Creatinine: ~ 1.2-1.4   - Proteinuria: Normal (<0.2 grams)   - Date DSA Last Checked: Jan/2024      Latest DSA: No   - BK Viremia: No   - Kidney Tx Biopsy: No   - Transplant Ureteral Stent: No    # Immunosuppression: Tacrolimus immediate release (goal 8-10) and Mycophenolic acid (dose 540 mg every 12 hours)   - Induction with Recent Transplant:  Low Intensity Protocol   - Continue with intensive monitoring of immunosuppression for efficacy and toxicity.   - Changes: Not at this time    # Infection Prophylaxis:   - PJP: Sulfa/TMP (Bactrim)  - CMV: Valganciclovir (Valcyte) x3 months. Stop after 3/8/24   - Hep B: Entecavir (Baraclude)     # Donor Hep B core Ab +:   -Continue entecavir for at least 6 months. At 6 months send to hepatology.    -LFTs monthly. HBsAg, HBsAb, HBV DNA q3 months for the first year     # Hypertension: Controlled;  Goal BP: < 130/80   - Volume status: Euvolemic     - Changes: Not at this time    # Anemia in Chronic Renal Disease: Hgb: Stable, near normal      MARY: No   - Iron studies: Replete    # Mineral Bone Disorder:    - Secondary renal hyperparathyroidism; PTH level: Minimally elevated ( pg/ml)        On treatment: None   - Vitamin D; level: Normal        On supplement: No   - Calcium; level: Normal        On supplement: No   - Phosphorus; level: Normal        On supplement: No    # Loose stools:   -Stop magnesium    # Electrolytes:   - Potassium; level: Normal        On supplement: No  - Magnesium; level: Normal        On supplement: Yes, mag ox 400mg bid, stop  - Bicarbonate; level: Normal        On supplement: Yes, bicarb 650mg tid    # Headaches:   -She does develop headaches occasionally but this has improved.     # Medical Compliance: Yes    # Health Maintenance and Vaccination Review:  Recommend:  After 3m post transplant recommend COVID and flu vaccines    # Transplant History:  Etiology of Kidney Failure: IgA nephropathy  Tx: LDKT  Transplant: 12/8/2023 (Kidney)  Donor Type: Living Donor Class:   Crossmatch at time of Tx: negative  DSA at time of Tx: No  Significant changes in immunosuppression: None  CMV IgG Ab High Risk Discordance (D+/R-): No  EBV IgG Ab High Risk Discordance (D+/R-): No  Significant transplant-related complications: None    Transplant Office Phone Number: [phone]    Assessment and plan was discussed with the patient and she voiced her understanding and agreement.    Return visit: Return in 8 weeks (on 4/12/2024) for previously scheduled visit, 4 month post transplant visit.    Leno Whitfield MD    The longitudinal plan of care for kidney transplant was addressed during this visit. Due to the added complexity in care, I will continue to support Nuzhat Lopez in the subsequent management of this condition(s) and with the ongoing continuity of care of this condition(s).       Chief Complaint   Ms. Lopez is a 28 year old here for kidney transplant, immunosuppression management and hospital follow up after kidney transplant.     History of Present Illness   The patient overall feels well. She denies any recent hospitalizations. She denies nausea, vomiting, fever, chills, shortness of breath, chest pain, LE edema, unintentional weight loss, nights sweats, dysuria, hematuria.     She does have loose stools about twice daily.     Home BP:  120 systolic    Problem List   Patient Active Problem List   Diagnosis    Migraine headache    IgA nephropathy    CKD (chronic kidney disease), stage II    Anemia in stage 2 chronic kidney disease    Iron deficiency anemia, unspecified    Secondary hyperparathyroidism of renal origin (H24)    Kidney replaced by transplant    Immunosuppressed status (H24)    Metabolic acidosis    Aftercare following organ transplant       Allergies   Allergies    Allergen Reactions    Cephalexin Rash    Heparin Flush Rash       Medications   Current Outpatient Medications   Medication Sig    acetaminophen (TYLENOL) 325 MG tablet Take 3 tablets (975 mg) by mouth every 8 hours as needed for pain    atorvastatin (LIPITOR) 10 MG tablet Take 1 tablet (10 mg) by mouth daily    entecavir (BARACLUDE) 0.5 MG tablet Take 1 tablet (0.5 mg) by mouth daily    mycophenolic acid (GENERIC EQUIVALENT) 180 MG EC tablet Take 3 tablets (540 mg) by mouth 2 times daily Take in place of mycophenolate    psyllium (METAMUCIL/KONSYL) 58.6 % powder Take 1 teaspoonful by mouth daily    sodium bicarbonate 650 MG tablet Take 1 tablet (650 mg) by mouth 3 times daily    sulfamethoxazole-trimethoprim (BACTRIM) 400-80 MG tablet Take 1 tablet by mouth daily    tacrolimus (GENERIC) 0.5 MG capsule Take 1 capsule (0.5 mg) by mouth every morning Total dose = 3.5 mg in the AM and 3 mg in the PM    tacrolimus (GENERIC) 1 MG capsule Take 3 capsules (3 mg) by mouth 2 times daily Total dose = 3.5 mg in the AM and 3 mg in the PM    valGANciclovir (VALCYTE) 450 MG tablet Take 2 tablets (900 mg) by mouth daily     No current facility-administered medications for this visit.     Medications Discontinued During This Encounter   Medication Reason    magnesium oxide (MAG-OX) 400 MG tablet        Physical Exam   Vital Signs: /79   Pulse 90   Temp 98.3  F (36.8  C) (Oral)   Wt 77.4 kg (170 lb 9.6 oz)   SpO2 100%   BMI 32.23 kg/m      GENERAL APPEARANCE: alert and no distress  HENT: mouth without ulcers or lesions  RESP: lungs clear to auscultation - no rales, rhonchi or wheezes  CV: regular rhythm, normal rate, no rub, no murmur  EDEMA: no LE edema bilaterally  ABDOMEN: soft, nondistended, nontender, bowel sounds normal  MS: extremities normal - no gross deformities noted, no evidence of inflammation in joints, no muscle tenderness  SKIN: no rash  NEURO: normal strength and tone, sensory exam grossly normal,  mentation intact and speech normal  PSYCH: mentation appears normal and affect normal/bright  TX KIDNEY: normal    Data         Latest Ref Rng & Units 2/8/2024     8:17 AM 2/6/2024     8:11 AM 2/1/2024     7:57 AM   Renal   Sodium 135 - 145 mmol/L 136  136  139    K 3.4 - 5.3 mmol/L 4.9  4.7  4.6    Cl 98 - 107 mmol/L 105  103  108    Cl (external) 98 - 107 mmol/L 105  103  108    CO2 22 - 29 mmol/L 22  23  22    Urea Nitrogen 6.0 - 20.0 mg/dL 21.1  18.5  14.4    Creatinine 0.51 - 0.95 mg/dL 1.38  1.43  1.40    Glucose 70 - 99 mg/dL 96  100  96    Calcium 8.6 - 10.0 mg/dL 9.5  9.7  9.1    Magnesium 1.7 - 2.3 mg/dL   2.0          Latest Ref Rng & Units 2/1/2024     7:57 AM 1/30/2024     7:59 AM 1/25/2024     7:56 AM   Bone Health   Phosphorus 2.5 - 4.5 mg/dL 3.0  3.6  3.6          Latest Ref Rng & Units 2/8/2024     8:17 AM 2/6/2024     8:11 AM 2/1/2024     7:57 AM   Heme   WBC 4.0 - 11.0 10e3/uL 6.9  6.3  7.3    Hgb 11.7 - 15.7 g/dL 14.2  13.8  13.9    Plt 150 - 450 10e3/uL 259  271  269          Latest Ref Rng & Units 11/27/2023     2:06 PM 2/28/2022     1:08 PM 10/7/2021     4:39 AM   Liver   AP 40 - 150 U/L 81  78     TBili <=1.2 mg/dL 0.5  0.2     ALT 0 - 50 U/L 6  11     AST 0 - 45 U/L 14  12     Tot Protein 6.4 - 8.3 g/dL 8.0  8.0     Albumin 3.4 - 5.0 g/dL  3.5  2.5    Albumin 3.5 - 5.2 g/dL 4.0            Latest Ref Rng & Units 11/27/2023     2:06 PM   Pancreas   A1C <5.7 % 4.6          Latest Ref Rng & Units 12/21/2023     7:55 AM 11/27/2023     2:06 PM   Iron studies   Iron 37 - 145 ug/dL 70  73    Iron Sat Index 15 - 46 % 34  42    Ferritin 6 - 175 ng/mL 902  1,549          Latest Ref Rng & Units 11/27/2023     2:06 PM 2/28/2022     1:08 PM   [facility] Txp Virology   EBV CAPSID ANTIBODY IGG No detectable antibody. Positive  Positive         Recent Labs   Lab Test 01/30/24  0759 02/01/24  0802 02/06/24  0811 02/08/24  0817   DOSTAC 1/29/2024 1/31/2024 2/5/2024  --    TACROL 10.8 11.0 8.9 11.9     Recent Labs    Lab Test 12/13/23  0723 12/26/23  0757 01/04/24  0803 01/10/24  1003 02/06/24  0811   DOSA  --   --  1/3/2024   9:00 PM  --   --    MPACID 3.31   < > 5.13* 2.61 2.34   MPAG 45.4   < > 79.3 52.7 82.5    < > = values in this interval not displayed.

## 2024-02-14 NOTE — NURSING NOTE
Chief Complaint   Patient presents with    RECHECK       /79   Pulse 90   Temp 98.3  F (36.8  C) (Oral)   Wt 77.4 kg (170 lb 9.6 oz)   SpO2 100%   BMI 32.23 kg/m      Juan Mas on 2/14/2024 at 9:53 AM

## 2024-02-14 NOTE — PROGRESS NOTES
Disease State Management Encounter:                          Nuzhat Lopez is a 28 year old female called for a follow-up visit from 12/22.      Reason for visit: 2 months post txp.    Allergies/ADRs: Reviewed in chart  Past Medical History: Reviewed in chart  Tobacco: She reports that she has never smoked. She has never used smokeless tobacco.  Alcohol: not currently using     Medication Adherence/Access: no issues reported    Kidney Transplant:    Tacrolimus 3.5 mg every morning and 3mg every evening.   Myfortic 540mg twice daily   Pt reports watery, loose stools twice daily. Rare headaches. Hair loss.   Taking 1 capsule of fiber once daily  Transplant date: 12/8/23  CMV prophylaxis: CrCl >/=60 mL/minute: Valcyte 900mg daily Treat 3 months post tx   PJP prophylaxis: Bactrim SS daily  Tx Coordinator: Nely Alonso Tx MD: Dr. 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     Supplements:   Sodium Bicarbonate 650mg 3 TIMES DAILY.   Lab Results   Component Value Date    CO2 21 (L) 02/14/2024     Today's Vitals: There were no vitals taken for this visit.    Assessment/Plan:    Increase your fiber supplement to the recommended dosage, usually 3-6 capsules.   Start Biotin 1mg daily for hair loss.     Follow-up: 2 months post transplant    I spent 10 minutes with this patient today. . A copy of the visit note was provided to the patient's provider(s).    A summary of these recommendations was sent via [employer].    Timur Carney, PharmD  [facility] Pharmacist    Phone: [phone]      Medication Therapy Recommendations  Aftercare following organ transplant    Current Medication: psyllium (METAMUCIL/KONSYL) 58.6 % powder   Rationale:  Dose too low - Dosage too low - Effectiveness   Recommendation: Increase Dose   Status: Accepted - no CPA Needed          Current Medication: tacrolimus (GENERIC) 1 MG capsule   Rationale: Undesirable effect - Adverse medication event - Safety   Recommendation: Start Medication - Biotin 1 MG Caps   Status: Accepted - no CPA Needed

## 2024-02-14 NOTE — PATIENT INSTRUCTIONS
Patient Recommendations:  - Stop magnesium oxide  -Weekly labs  -Stop valcyte after March 8th  -After March 8th I recommend COVID and flu vaccines    Transplant Patient Information  Your Post Transplant Coordinator is: Nely Alonso  For non urgent items, we encourage you to contact your coordinator/care team online via [employer]  You and your care team can also contact your transplant coordinator Monday - Friday, 8am - 5pm at [phone] (Option 2 to reach the coordinator or Option 4 to schedule an appointment).  After hours for urgent matters, please call [facility] at [phone].

## 2024-02-14 NOTE — LETTER
2/14/2024         RE: Nuzhat Lopez  [address] [facility] [address]        Dear Colleague,    Thank you for referring your patient, Nuzhat Lopez, to the [facility]. Please see a copy of my visit note below.    TRANSPLANT NEPHROLOGY EARLY POST TRANSPLANT VISIT    Assessment & Plan  # LDKT: Stable but stlight trend up from post txp when baseline was 1.1-1.2, possibly due to high tac levels   - Baseline Creatinine: ~ 1.2-1.4   - Proteinuria: Normal (<0.2 grams)   - Date DSA Last Checked: Jan/2024      Latest DSA: No   - BK Viremia: No   - Kidney Tx Biopsy: No   - Transplant Ureteral Stent: No    # Immunosuppression: Tacrolimus immediate release (goal 8-10) and Mycophenolic acid (dose 540 mg every 12 hours)   - Induction with Recent Transplant:  Low Intensity Protocol   - Continue with intensive monitoring of immunosuppression for efficacy and toxicity.   - Changes: Not at this time    # Infection Prophylaxis:   - PJP: Sulfa/TMP (Bactrim)  - CMV: Valganciclovir (Valcyte) x3 months. Stop after 3/8/24   - Hep B: Entecavir (Baraclude)     # Donor Hep B core Ab +:   -Continue entecavir for at least 6 months. At 6 months send to hepatology.    -LFTs monthly. HBsAg, HBsAb, HBV DNA q3 months for the first year     # Hypertension: Controlled;  Goal BP: < 130/80   - Volume status: Euvolemic     - Changes: Not at this time    # Anemia in Chronic Renal Disease: Hgb: Stable, near normal      MARY: No   - Iron studies: Replete    # Mineral Bone Disorder:    - Secondary renal hyperparathyroidism; PTH level: Minimally elevated ( pg/ml)        On treatment: None   - Vitamin D; level: Normal        On supplement: No   - Calcium; level: Normal        On supplement: No   - Phosphorus; level: Normal        On supplement: No    # Loose stools:   -Stop magnesium    # Electrolytes:   - Potassium; level: Normal        On supplement: No  - Magnesium; level: Normal        On supplement: Yes, mag ox 400mg bid,  stop  - Bicarbonate; level: Normal        On supplement: Yes, bicarb 650mg tid    # Headaches:   -She does develop headaches occasionally but this has improved.     # Medical Compliance: Yes    # Health Maintenance and Vaccination Review: Recommend:  After 3m post transplant recommend COVID and flu vaccines    # Transplant History:  Etiology of Kidney Failure: IgA nephropathy  Tx: LDKT  Transplant: 12/8/2023 (Kidney)  Donor Type: Living Donor Class:   Crossmatch at time of Tx: negative  DSA at time of Tx: No  Significant changes in immunosuppression: None  CMV IgG Ab High Risk Discordance (D+/R-): No  EBV IgG Ab High Risk Discordance (D+/R-): No  Significant transplant-related complications: None    Transplant Office Phone Number: [phone]    Assessment and plan was discussed with the patient and she voiced her understanding and agreement.    Return visit: Return in 8 weeks (on 4/12/2024) for previously scheduled visit, 4 month post transplant visit.    Leno Whitfield MD    The longitudinal plan of care for kidney transplant was addressed during this visit. Due to the added complexity in care, I will continue to support Nuzhat Lopez in the subsequent management of this condition(s) and with the ongoing continuity of care of this condition(s).       Chief Complaint  Ms. Lopez is a 28 year old here for kidney transplant, immunosuppression management and hospital follow up after kidney transplant.     History of Present Illness  The patient overall feels well. She denies any recent hospitalizations. She denies nausea, vomiting, fever, chills, shortness of breath, chest pain, LE edema, unintentional weight loss, nights sweats, dysuria, hematuria.     She does have loose stools about twice daily.     Home BP:  120 systolic    Problem List  Patient Active Problem List   Diagnosis     Migraine headache     IgA nephropathy     CKD (chronic kidney disease), stage II     Anemia in stage 2 chronic kidney disease     Iron  deficiency anemia, unspecified     Secondary hyperparathyroidism of renal origin (H24)     Kidney replaced by transplant     Immunosuppressed status (H24)     Metabolic acidosis     Aftercare following organ transplant       Allergies  Allergies   Allergen Reactions     Cephalexin Rash     Heparin Flush Rash       Medications  Current Outpatient Medications   Medication Sig     acetaminophen (TYLENOL) 325 MG tablet Take 3 tablets (975 mg) by mouth every 8 hours as needed for pain     atorvastatin (LIPITOR) 10 MG tablet Take 1 tablet (10 mg) by mouth daily     entecavir (BARACLUDE) 0.5 MG tablet Take 1 tablet (0.5 mg) by mouth daily     mycophenolic acid (GENERIC EQUIVALENT) 180 MG EC tablet Take 3 tablets (540 mg) by mouth 2 times daily Take in place of mycophenolate     psyllium (METAMUCIL/KONSYL) 58.6 % powder Take 1 teaspoonful by mouth daily     sodium bicarbonate 650 MG tablet Take 1 tablet (650 mg) by mouth 3 times daily     sulfamethoxazole-trimethoprim (BACTRIM) 400-80 MG tablet Take 1 tablet by mouth daily     tacrolimus (GENERIC) 0.5 MG capsule Take 1 capsule (0.5 mg) by mouth every morning Total dose = 3.5 mg in the AM and 3 mg in the PM     tacrolimus (GENERIC) 1 MG capsule Take 3 capsules (3 mg) by mouth 2 times daily Total dose = 3.5 mg in the AM and 3 mg in the PM     valGANciclovir (VALCYTE) 450 MG tablet Take 2 tablets (900 mg) by mouth daily     No current facility-administered medications for this visit.     Medications Discontinued During This Encounter   Medication Reason     magnesium oxide (MAG-OX) 400 MG tablet        Physical Exam  Vital Signs: /79   Pulse 90   Temp 98.3  F (36.8  C) (Oral)   Wt 77.4 kg (170 lb 9.6 oz)   SpO2 100%   BMI 32.23 kg/m      GENERAL APPEARANCE: alert and no distress  HENT: mouth without ulcers or lesions  RESP: lungs clear to auscultation - no rales, rhonchi or wheezes  CV: regular rhythm, normal rate, no rub, no murmur  EDEMA: no LE edema  bilaterally  ABDOMEN: soft, nondistended, nontender, bowel sounds normal  MS: extremities normal - no gross deformities noted, no evidence of inflammation in joints, no muscle tenderness  SKIN: no rash  NEURO: normal strength and tone, sensory exam grossly normal, mentation intact and speech normal  PSYCH: mentation appears normal and affect normal/bright  TX KIDNEY: normal    Data        Latest Ref Rng & Units 2/8/2024     8:17 AM 2/6/2024     8:11 AM 2/1/2024     7:57 AM   Renal   Sodium 135 - 145 mmol/L 136  136  139    K 3.4 - 5.3 mmol/L 4.9  4.7  4.6    Cl 98 - 107 mmol/L 105  103  108    Cl (external) 98 - 107 mmol/L 105  103  108    CO2 22 - 29 mmol/L 22  23  22    Urea Nitrogen 6.0 - 20.0 mg/dL 21.1  18.5  14.4    Creatinine 0.51 - 0.95 mg/dL 1.38  1.43  1.40    Glucose 70 - 99 mg/dL 96  100  96    Calcium 8.6 - 10.0 mg/dL 9.5  9.7  9.1    Magnesium 1.7 - 2.3 mg/dL   2.0          Latest Ref Rng & Units 2/1/2024     7:57 AM 1/30/2024     7:59 AM 1/25/2024     7:56 AM   Bone Health   Phosphorus 2.5 - 4.5 mg/dL 3.0  3.6  3.6          Latest Ref Rng & Units 2/8/2024     8:17 AM 2/6/2024     8:11 AM 2/1/2024     7:57 AM   Heme   WBC 4.0 - 11.0 10e3/uL 6.9  6.3  7.3    Hgb 11.7 - 15.7 g/dL 14.2  13.8  13.9    Plt 150 - 450 10e3/uL 259  271  269          Latest Ref Rng & Units 11/27/2023     2:06 PM 2/28/2022     1:08 PM 10/7/2021     4:39 AM   Liver   AP 40 - 150 U/L 81  78     TBili <=1.2 mg/dL 0.5  0.2     ALT 0 - 50 U/L 6  11     AST 0 - 45 U/L 14  12     Tot Protein 6.4 - 8.3 g/dL 8.0  8.0     Albumin 3.4 - 5.0 g/dL  3.5  2.5    Albumin 3.5 - 5.2 g/dL 4.0            Latest Ref Rng & Units 11/27/2023     2:06 PM   Pancreas   A1C <5.7 % 4.6          Latest Ref Rng & Units 12/21/2023     7:55 AM 11/27/2023     2:06 PM   Iron studies   Iron 37 - 145 ug/dL 70  73    Iron Sat Index 15 - 46 % 34  42    Ferritin 6 - 175 ng/mL 902  1,549          Latest Ref Rng & Units 11/27/2023     2:06 PM 2/28/2022     1:08 PM   [facility]  Txp Virology   EBV CAPSID ANTIBODY IGG No detectable antibody. Positive  Positive         Recent Labs   Lab Test 01/30/24  0759 02/01/24  0802 02/06/24  0811 02/08/24  0817   DOSTAC 1/29/2024 1/31/2024 2/5/2024  --    TACROL 10.8 11.0 8.9 11.9     Recent Labs   Lab Test 12/13/23  0723 12/26/23  0757 01/04/24  0803 01/10/24  1003 02/06/24  0811   DOSMPA  --   --  1/3/2024   9:00 PM  --   --    MPACID 3.31   < > 5.13* 2.61 2.34   MPAG 45.4   < > 79.3 52.7 82.5    < > = values in this interval not displayed.         Again, thank you for allowing me to participate in the care of your patient.        Sincerely,        Leno Whitfield MD

## 2024-02-20 ENCOUNTER — LAB (OUTPATIENT)
Dept: LAB | Facility: CLINIC | Age: 29
End: 2024-02-20
Payer: MEDICARE

## 2024-02-20 DIAGNOSIS — Z20.828 CONTACT WITH AND (SUSPECTED) EXPOSURE TO OTHER VIRAL COMMUNICABLE DISEASES: ICD-10-CM

## 2024-02-20 DIAGNOSIS — Z98.890 OTHER SPECIFIED POSTPROCEDURAL STATES: ICD-10-CM

## 2024-02-20 DIAGNOSIS — Z79.899 ENCOUNTER FOR LONG-TERM CURRENT USE OF MEDICATION: ICD-10-CM

## 2024-02-20 DIAGNOSIS — Z48.298 AFTERCARE FOLLOWING ORGAN TRANSPLANT: ICD-10-CM

## 2024-02-20 DIAGNOSIS — Z94.0 KIDNEY REPLACED BY TRANSPLANT: ICD-10-CM

## 2024-02-20 LAB
ALBUMIN SERPL BCG-MCNC: 4.1 G/DL (ref 3.5–5.2)
ALP SERPL-CCNC: 104 U/L (ref 40–150)
ALT SERPL W P-5'-P-CCNC: 50 U/L (ref 0–50)
ANION GAP SERPL CALCULATED.3IONS-SCNC: 9 MMOL/L (ref 7–15)
AST SERPL W P-5'-P-CCNC: 32 U/L (ref 0–45)
BILIRUB DIRECT SERPL-MCNC: <0.2 MG/DL (ref 0–0.3)
BILIRUB SERPL-MCNC: 0.5 MG/DL
BUN SERPL-MCNC: 13.4 MG/DL (ref 6–20)
CALCIUM SERPL-MCNC: 9.2 MG/DL (ref 8.6–10)
CHLORIDE SERPL-SCNC: 105 MMOL/L (ref 98–107)
CREAT SERPL-MCNC: 1.29 MG/DL (ref 0.51–0.95)
DEPRECATED HCO3 PLAS-SCNC: 21 MMOL/L (ref 22–29)
EGFRCR SERPLBLD CKD-EPI 2021: 57 ML/MIN/1.73M2
ERYTHROCYTE [DISTWIDTH] IN BLOOD BY AUTOMATED COUNT: 12.6 % (ref 10–15)
GLUCOSE SERPL-MCNC: 98 MG/DL (ref 70–99)
HCT VFR BLD AUTO: 42.3 % (ref 35–47)
HGB BLD-MCNC: 13.9 G/DL (ref 11.7–15.7)
MCH RBC QN AUTO: 31.2 PG (ref 26.5–33)
MCHC RBC AUTO-ENTMCNC: 32.9 G/DL (ref 31.5–36.5)
MCV RBC AUTO: 95 FL (ref 78–100)
PLATELET # BLD AUTO: 236 10E3/UL (ref 150–450)
POTASSIUM SERPL-SCNC: 4.6 MMOL/L (ref 3.4–5.3)
PROT SERPL-MCNC: 6.8 G/DL (ref 6.4–8.3)
RBC # BLD AUTO: 4.45 10E6/UL (ref 3.8–5.2)
SODIUM SERPL-SCNC: 135 MMOL/L (ref 135–145)
TACROLIMUS BLD-MCNC: 10.2 UG/L (ref 5–15)
TME LAST DOSE: NORMAL H
TME LAST DOSE: NORMAL H
WBC # BLD AUTO: 6.5 10E3/UL (ref 4–11)

## 2024-02-20 PROCEDURE — 85027 COMPLETE CBC AUTOMATED: CPT

## 2024-02-20 PROCEDURE — 36415 COLL VENOUS BLD VENIPUNCTURE: CPT

## 2024-02-20 PROCEDURE — 80197 ASSAY OF TACROLIMUS: CPT

## 2024-02-20 PROCEDURE — 87517 HEPATITIS B DNA QUANT: CPT

## 2024-02-20 PROCEDURE — 80053 COMPREHEN METABOLIC PANEL: CPT

## 2024-02-20 PROCEDURE — 82248 BILIRUBIN DIRECT: CPT

## 2024-02-22 LAB — HBV DNA SERPL NAA+PROBE-ACNC: NOT DETECTED IU/ML

## 2024-02-27 ENCOUNTER — LAB (OUTPATIENT)
Dept: LAB | Facility: CLINIC | Age: 29
End: 2024-02-27
Payer: MEDICARE

## 2024-02-27 DIAGNOSIS — Z20.828 CONTACT WITH AND (SUSPECTED) EXPOSURE TO OTHER VIRAL COMMUNICABLE DISEASES: ICD-10-CM

## 2024-02-27 DIAGNOSIS — Z94.0 KIDNEY REPLACED BY TRANSPLANT: ICD-10-CM

## 2024-02-27 DIAGNOSIS — Z98.890 OTHER SPECIFIED POSTPROCEDURAL STATES: ICD-10-CM

## 2024-02-27 DIAGNOSIS — Z79.899 ENCOUNTER FOR LONG-TERM CURRENT USE OF MEDICATION: ICD-10-CM

## 2024-02-27 DIAGNOSIS — Z48.298 AFTERCARE FOLLOWING ORGAN TRANSPLANT: ICD-10-CM

## 2024-02-27 LAB
ANION GAP SERPL CALCULATED.3IONS-SCNC: 7 MMOL/L (ref 7–15)
BUN SERPL-MCNC: 19.8 MG/DL (ref 6–20)
CALCIUM SERPL-MCNC: 9.3 MG/DL (ref 8.6–10)
CHLORIDE SERPL-SCNC: 108 MMOL/L (ref 98–107)
CREAT SERPL-MCNC: 1.31 MG/DL (ref 0.51–0.95)
DEPRECATED HCO3 PLAS-SCNC: 23 MMOL/L (ref 22–29)
EGFRCR SERPLBLD CKD-EPI 2021: 56 ML/MIN/1.73M2
ERYTHROCYTE [DISTWIDTH] IN BLOOD BY AUTOMATED COUNT: 12.4 % (ref 10–15)
GLUCOSE SERPL-MCNC: 96 MG/DL (ref 70–99)
HCT VFR BLD AUTO: 42.8 % (ref 35–47)
HGB BLD-MCNC: 14.1 G/DL (ref 11.7–15.7)
MCH RBC QN AUTO: 31.3 PG (ref 26.5–33)
MCHC RBC AUTO-ENTMCNC: 32.9 G/DL (ref 31.5–36.5)
MCV RBC AUTO: 95 FL (ref 78–100)
PLATELET # BLD AUTO: 216 10E3/UL (ref 150–450)
POTASSIUM SERPL-SCNC: 5.1 MMOL/L (ref 3.4–5.3)
RBC # BLD AUTO: 4.5 10E6/UL (ref 3.8–5.2)
SODIUM SERPL-SCNC: 138 MMOL/L (ref 135–145)
TACROLIMUS BLD-MCNC: 10.6 UG/L (ref 5–15)
TME LAST DOSE: NORMAL H
TME LAST DOSE: NORMAL H
WBC # BLD AUTO: 7.1 10E3/UL (ref 4–11)

## 2024-02-27 PROCEDURE — 36415 COLL VENOUS BLD VENIPUNCTURE: CPT

## 2024-02-27 PROCEDURE — 80048 BASIC METABOLIC PNL TOTAL CA: CPT

## 2024-02-27 PROCEDURE — 80197 ASSAY OF TACROLIMUS: CPT

## 2024-02-27 PROCEDURE — 85027 COMPLETE CBC AUTOMATED: CPT

## 2024-02-28 DIAGNOSIS — Z94.0 KIDNEY TRANSPLANT RECIPIENT: Primary | ICD-10-CM

## 2024-02-28 RX ORDER — TACROLIMUS 1 MG/1
3 CAPSULE ORAL 2 TIMES DAILY
Qty: 180 CAPSULE | Refills: 11 | Status: SHIPPED | OUTPATIENT
Start: 2024-02-28 | End: 2024-03-06

## 2024-02-28 RX ORDER — TACROLIMUS 0.5 MG/1
CAPSULE ORAL
Qty: 30 CAPSULE | Refills: 11 | Status: SHIPPED | OUTPATIENT
Start: 2024-02-28 | End: 2024-03-06

## 2024-02-28 NOTE — TELEPHONE ENCOUNTER
Left message and sent FRAMED message to patient regarding:  Tacrolimus IR level 10.6 on 2/28, goal 8-10, dose 3.5/3 mg BID.     PLAN:   Call Patientand confirm this was an accurate 12-hour trough.   Verify Tacrolimus IR dose 3.5/3 mg am/pm.   Confirm no new medications or illness.   Confirm no missed doses.   If accurate trough and accurate dose, decrease Tacrolimus IR dose to 3 mg BID      Is this more than a 50% increase or decrease in current IS dose: No  If YES, justification: na     Repeat labs in 1 weeks.

## 2024-02-28 NOTE — TELEPHONE ENCOUNTER
ISSUE:   Tacrolimus IR level 10.6 on 2/28, goal 8-10, dose 3.5/3 mg BID.    PLAN:   Call Patientand confirm this was an accurate 12-hour trough.   Verify Tacrolimus IR dose 3.5/3 mg am/pm.   Confirm no new medications or illness.   Confirm no missed doses.   If accurate trough and accurate dose, decrease Tacrolimus IR dose to 3 mg BID     Is this more than a 50% increase or decrease in current IS dose: No  If YES, justification: na    Repeat labs in 1 weeks.  *If > 50% change in immunosuppression dose, repeat labs in 1 week.     OUTCOME: Patient confirmed this was an accurate 12-hour trough.   Verified Tacrolimus IR dose 3.5/3 mg am/pm.   Confirmed no new medications or illness.   Confirmed no missed doses.   Patient confirms decrease Tacrolimus IR dose to 3 mg BID and repeat labs in 1 weeks.

## 2024-03-05 ENCOUNTER — LAB (OUTPATIENT)
Dept: LAB | Facility: CLINIC | Age: 29
End: 2024-03-05
Payer: MEDICARE

## 2024-03-05 DIAGNOSIS — Z94.0 KIDNEY REPLACED BY TRANSPLANT: ICD-10-CM

## 2024-03-05 DIAGNOSIS — Z79.899 ENCOUNTER FOR LONG-TERM CURRENT USE OF MEDICATION: ICD-10-CM

## 2024-03-05 DIAGNOSIS — Z98.890 OTHER SPECIFIED POSTPROCEDURAL STATES: ICD-10-CM

## 2024-03-05 DIAGNOSIS — Z20.828 CONTACT WITH AND (SUSPECTED) EXPOSURE TO OTHER VIRAL COMMUNICABLE DISEASES: ICD-10-CM

## 2024-03-05 DIAGNOSIS — Z48.298 AFTERCARE FOLLOWING ORGAN TRANSPLANT: ICD-10-CM

## 2024-03-05 LAB
ANION GAP SERPL CALCULATED.3IONS-SCNC: 8 MMOL/L (ref 7–15)
BUN SERPL-MCNC: 20.3 MG/DL (ref 6–20)
CALCIUM SERPL-MCNC: 9.4 MG/DL (ref 8.6–10)
CHLORIDE SERPL-SCNC: 104 MMOL/L (ref 98–107)
CREAT SERPL-MCNC: 1.48 MG/DL (ref 0.51–0.95)
DEPRECATED HCO3 PLAS-SCNC: 23 MMOL/L (ref 22–29)
EGFRCR SERPLBLD CKD-EPI 2021: 49 ML/MIN/1.73M2
ERYTHROCYTE [DISTWIDTH] IN BLOOD BY AUTOMATED COUNT: 12.2 % (ref 10–15)
GLUCOSE SERPL-MCNC: 98 MG/DL (ref 70–99)
HCT VFR BLD AUTO: 44.6 % (ref 35–47)
HGB BLD-MCNC: 14.7 G/DL (ref 11.7–15.7)
MCH RBC QN AUTO: 31 PG (ref 26.5–33)
MCHC RBC AUTO-ENTMCNC: 33 G/DL (ref 31.5–36.5)
MCV RBC AUTO: 94 FL (ref 78–100)
PLATELET # BLD AUTO: 243 10E3/UL (ref 150–450)
POTASSIUM SERPL-SCNC: 4.7 MMOL/L (ref 3.4–5.3)
RBC # BLD AUTO: 4.74 10E6/UL (ref 3.8–5.2)
SODIUM SERPL-SCNC: 135 MMOL/L (ref 135–145)
TACROLIMUS BLD-MCNC: 10.5 UG/L (ref 5–15)
TME LAST DOSE: NORMAL H
TME LAST DOSE: NORMAL H
WBC # BLD AUTO: 5.8 10E3/UL (ref 4–11)

## 2024-03-05 PROCEDURE — 85027 COMPLETE CBC AUTOMATED: CPT

## 2024-03-05 PROCEDURE — 80197 ASSAY OF TACROLIMUS: CPT

## 2024-03-05 PROCEDURE — 80048 BASIC METABOLIC PNL TOTAL CA: CPT

## 2024-03-05 PROCEDURE — 36415 COLL VENOUS BLD VENIPUNCTURE: CPT

## 2024-03-06 DIAGNOSIS — Z94.0 KIDNEY TRANSPLANT RECIPIENT: ICD-10-CM

## 2024-03-06 NOTE — TELEPHONE ENCOUNTER
ISSUE:   Tacrolimus IR level 10.5 on 3/6, goal 8-10, dose 3 mg BID.    PLAN:   Call Patientand confirm this was an accurate 12-hour trough.   Verify Tacrolimus IR dose 3 mg BID.   Confirm no new medications or illness.   Confirm no missed doses.   If accurate trough and accurate dose, decrease Tacrolimus IR dose to 2.5 mg BID     Is this more than a 50% increase or decrease in current IS dose: No  If YES, justification: na    Repeat labs in 1weeks.  *If > 50% change in immunosuppression dose, repeat labs in 1 week.     OUTCOME:

## 2024-03-06 NOTE — TELEPHONE ENCOUNTER
Call placed to patient. Patient confirms current dose and accurate trough level. Denies any recent illness, diarrhea or medication changes. Patient v\u to decrease tacrolimus dose to 2.5 mg bid and repeat level in one week. Rx sent

## 2024-03-07 RX ORDER — TACROLIMUS 0.5 MG/1
0.5 CAPSULE ORAL 2 TIMES DAILY
Qty: 60 CAPSULE | Refills: 11 | Status: SHIPPED | OUTPATIENT
Start: 2024-03-07 | End: 2024-03-20

## 2024-03-07 RX ORDER — TACROLIMUS 1 MG/1
2 CAPSULE ORAL 2 TIMES DAILY
Qty: 120 CAPSULE | Refills: 11 | Status: SHIPPED | OUTPATIENT
Start: 2024-03-07 | End: 2024-03-20

## 2024-03-11 ENCOUNTER — TELEPHONE (OUTPATIENT)
Dept: PHARMACY | Facility: CLINIC | Age: 29
End: 2024-03-11
Payer: MEDICARE

## 2024-03-11 NOTE — TELEPHONE ENCOUNTER
Clinical Pharmacy Consult:                                                      Transplant Specific: 3 month post transplant medication review  Date of Transplant: 12/08/2023  Type of Transplant: kidney  First Transplant: yes  History of rejection: no    Immunosuppression Regimen   TAC 2.5mg qAM & 2.5mg qPM and Myforitic 540mg qAM & 540mg qPM  Patient specific goal: 8-10  Most recent level: 10.5, date 3/5/24  Immunosuppressant Levels:  Supratherapeutic, dose decreased to 3.5mg BID  Pt adherent to lab draws: yes  Scr:   Lab Results   Component Value Date    CR 1.22 12/28/2023     Side effects: headache    Prophylactic Medications  Antibacterial:  Bactrim SS daily  Scheduled Discontinue Date: 6 months    Antifungal: Not needed thus far  Scheduled Discontinue Date: N/A    Antiviral: CrCl >/=60 mL/minute: Valcyte 900mg daily   Scheduled Discontinue Date: 3 months - completed    Acid Reducer: Not needed  Scheduled Reviewed Date: N/A    Thrombosis Prevention: Not needed  Scheduled Discontinue Date:  N/A    Blood Pressure Management  Frequency of home Blood Pressure checks: once daily  Most recent home BP: 120-130's/80's  Patient Blood pressure goal: <140/90  Patient blood pressure at goal:  yes  Hospitalizations/ER visits since last assessment: 0    Med rec/DUR performed: yes  Med Rec Discrepancies: no    Spoke with Nuzhattierney Simmons via phone today. She is doing good. Her diarrhea have resolved with use of fiber supplement. She has noticed less hair loss on brushing since starting Biotin. She uses acetaminophen to treat occasional headache. Manages her own medications, denied any missed dose. Denied any other side effects. Denied pain, fever, wt changes, or unrinary symptoms. Is aware of tac dose change and have been taking correct dose since she was notified.    BP: checking everyday in the afternoon, readings at goal.    No other questions or concerns today. Follow up in 3 months.    Grady Fish, Pharm D  [facility]

## 2024-03-12 ENCOUNTER — LAB (OUTPATIENT)
Dept: LAB | Facility: CLINIC | Age: 29
End: 2024-03-12
Payer: MEDICARE

## 2024-03-12 DIAGNOSIS — N18.6 ESRD (END STAGE RENAL DISEASE) (H): ICD-10-CM

## 2024-03-12 DIAGNOSIS — Z94.0 KIDNEY REPLACED BY TRANSPLANT: ICD-10-CM

## 2024-03-12 DIAGNOSIS — Z48.298 AFTERCARE FOLLOWING ORGAN TRANSPLANT: ICD-10-CM

## 2024-03-12 DIAGNOSIS — Z79.899 ENCOUNTER FOR LONG-TERM CURRENT USE OF MEDICATION: ICD-10-CM

## 2024-03-12 DIAGNOSIS — Z98.890 OTHER SPECIFIED POSTPROCEDURAL STATES: ICD-10-CM

## 2024-03-12 DIAGNOSIS — Z20.828 CONTACT WITH AND (SUSPECTED) EXPOSURE TO OTHER VIRAL COMMUNICABLE DISEASES: ICD-10-CM

## 2024-03-12 DIAGNOSIS — Z76.82 ORGAN TRANSPLANT CANDIDATE: ICD-10-CM

## 2024-03-12 LAB
ANION GAP SERPL CALCULATED.3IONS-SCNC: 9 MMOL/L (ref 7–15)
BK VIRUS SPECIMEN TYPE: NORMAL
BKV DNA # SPEC NAA+PROBE: NOT DETECTED IU/ML
BUN SERPL-MCNC: 20.6 MG/DL (ref 6–20)
CALCIUM SERPL-MCNC: 9.3 MG/DL (ref 8.6–10)
CHLORIDE SERPL-SCNC: 105 MMOL/L (ref 98–107)
CREAT SERPL-MCNC: 1.29 MG/DL (ref 0.51–0.95)
DEPRECATED HCO3 PLAS-SCNC: 23 MMOL/L (ref 22–29)
EGFRCR SERPLBLD CKD-EPI 2021: 57 ML/MIN/1.73M2
ERYTHROCYTE [DISTWIDTH] IN BLOOD BY AUTOMATED COUNT: 12.5 % (ref 10–15)
GLUCOSE SERPL-MCNC: 89 MG/DL (ref 70–99)
HCT VFR BLD AUTO: 44.2 % (ref 35–47)
HGB BLD-MCNC: 14.4 G/DL (ref 11.7–15.7)
MCH RBC QN AUTO: 31.1 PG (ref 26.5–33)
MCHC RBC AUTO-ENTMCNC: 32.6 G/DL (ref 31.5–36.5)
MCV RBC AUTO: 96 FL (ref 78–100)
PLATELET # BLD AUTO: 262 10E3/UL (ref 150–450)
POTASSIUM SERPL-SCNC: 4.6 MMOL/L (ref 3.4–5.3)
RBC # BLD AUTO: 4.63 10E6/UL (ref 3.8–5.2)
SODIUM SERPL-SCNC: 137 MMOL/L (ref 135–145)
TACROLIMUS BLD-MCNC: 8.9 UG/L (ref 5–15)
TME LAST DOSE: NORMAL H
TME LAST DOSE: NORMAL H
WBC # BLD AUTO: 5.8 10E3/UL (ref 4–11)

## 2024-03-12 PROCEDURE — 85027 COMPLETE CBC AUTOMATED: CPT

## 2024-03-12 PROCEDURE — 80197 ASSAY OF TACROLIMUS: CPT

## 2024-03-12 PROCEDURE — 80048 BASIC METABOLIC PNL TOTAL CA: CPT

## 2024-03-12 PROCEDURE — 36415 COLL VENOUS BLD VENIPUNCTURE: CPT

## 2024-03-12 PROCEDURE — 86833 HLA CLASS II HIGH DEFIN QUAL: CPT

## 2024-03-12 PROCEDURE — 87799 DETECT AGENT NOS DNA QUANT: CPT

## 2024-03-12 PROCEDURE — 86832 HLA CLASS I HIGH DEFIN QUAL: CPT

## 2024-03-19 ENCOUNTER — LAB (OUTPATIENT)
Dept: LAB | Facility: CLINIC | Age: 29
End: 2024-03-19
Payer: MEDICARE

## 2024-03-19 DIAGNOSIS — Z94.0 KIDNEY REPLACED BY TRANSPLANT: ICD-10-CM

## 2024-03-19 DIAGNOSIS — Z98.890 OTHER SPECIFIED POSTPROCEDURAL STATES: ICD-10-CM

## 2024-03-19 DIAGNOSIS — Z20.828 CONTACT WITH AND (SUSPECTED) EXPOSURE TO OTHER VIRAL COMMUNICABLE DISEASES: ICD-10-CM

## 2024-03-19 DIAGNOSIS — Z79.899 ENCOUNTER FOR LONG-TERM CURRENT USE OF MEDICATION: ICD-10-CM

## 2024-03-19 DIAGNOSIS — Z48.298 AFTERCARE FOLLOWING ORGAN TRANSPLANT: ICD-10-CM

## 2024-03-19 LAB
TACROLIMUS BLD-MCNC: 7.9 UG/L (ref 5–15)
TME LAST DOSE: NORMAL H
TME LAST DOSE: NORMAL H

## 2024-03-19 PROCEDURE — 36415 COLL VENOUS BLD VENIPUNCTURE: CPT

## 2024-03-19 PROCEDURE — 80197 ASSAY OF TACROLIMUS: CPT

## 2024-03-20 ENCOUNTER — TELEPHONE (OUTPATIENT)
Dept: TRANSPLANT | Facility: CLINIC | Age: 29
End: 2024-03-20
Payer: MEDICARE

## 2024-03-20 DIAGNOSIS — Z94.0 KIDNEY TRANSPLANT RECIPIENT: ICD-10-CM

## 2024-03-20 RX ORDER — TACROLIMUS 1 MG/1
3 CAPSULE ORAL 2 TIMES DAILY
Qty: 120 CAPSULE | Refills: 11 | Status: SHIPPED | OUTPATIENT
Start: 2024-03-20 | End: 2024-04-03

## 2024-03-20 RX ORDER — TACROLIMUS 0.5 MG/1
CAPSULE ORAL
Qty: 60 CAPSULE | Refills: 11 | Status: SHIPPED | OUTPATIENT
Start: 2024-03-20 | End: 2024-04-03

## 2024-03-20 NOTE — TELEPHONE ENCOUNTER
ISSUE:   Tacrolimus IR level 7.9 on 03/19/24, goal 10, dose 2.5 mg BID.    PLAN:   Call Patient and confirm this was an accurate 12-hour trough.   Verify Tacrolimus IR dose 2.5 mg BID.   Confirm no new medications or illness.   Confirm no missed doses.     If accurate trough and accurate dose, increase Tacrolimus IR dose to 3 mg BID  *Recommended dose rounded from calculated dose 3.16 mg  BID.      Repeat labs as scheduled.    For any dose change <50%, recheck labs per guideline or within 1 month.  For any dose change of more than 50%, recheck drug level based on timing to reach steady state:   Immediate release tacrolimus: 2-3 days  Extended-release tacrolimus: 7 days  Cyclosporine: 2 days  Sirolimus: 12 days  Everolimus: 8 days      OUTCOME:   Spoke with Patient, they confirm accurate trough level and current dose 2.5 mg BID.   Patientconfirmed dose change to 3 mg BID.  Patientagreed to repeat labs as scheduled.  Orders sent to preferred pharmacy for dose change.   Patientvoiced understanding of plan.

## 2024-03-26 ENCOUNTER — LAB (OUTPATIENT)
Dept: LAB | Facility: CLINIC | Age: 29
End: 2024-03-26
Payer: MEDICARE

## 2024-03-26 DIAGNOSIS — Z20.828 CONTACT WITH AND (SUSPECTED) EXPOSURE TO OTHER VIRAL COMMUNICABLE DISEASES: ICD-10-CM

## 2024-03-26 DIAGNOSIS — Z98.890 OTHER SPECIFIED POSTPROCEDURAL STATES: ICD-10-CM

## 2024-03-26 DIAGNOSIS — Z94.0 KIDNEY REPLACED BY TRANSPLANT: ICD-10-CM

## 2024-03-26 DIAGNOSIS — Z79.899 ENCOUNTER FOR LONG-TERM CURRENT USE OF MEDICATION: ICD-10-CM

## 2024-03-26 DIAGNOSIS — Z48.298 AFTERCARE FOLLOWING ORGAN TRANSPLANT: ICD-10-CM

## 2024-03-26 LAB
ANION GAP SERPL CALCULATED.3IONS-SCNC: 10 MMOL/L (ref 7–15)
BK VIRUS SPECIMEN TYPE: NORMAL
BKV DNA # SPEC NAA+PROBE: NOT DETECTED IU/ML
BUN SERPL-MCNC: 12.6 MG/DL (ref 6–20)
CALCIUM SERPL-MCNC: 9.3 MG/DL (ref 8.6–10)
CHLORIDE SERPL-SCNC: 104 MMOL/L (ref 98–107)
CREAT SERPL-MCNC: 1.22 MG/DL (ref 0.51–0.95)
DEPRECATED HCO3 PLAS-SCNC: 23 MMOL/L (ref 22–29)
EGFRCR SERPLBLD CKD-EPI 2021: 61 ML/MIN/1.73M2
ERYTHROCYTE [DISTWIDTH] IN BLOOD BY AUTOMATED COUNT: 11.7 % (ref 10–15)
GLUCOSE SERPL-MCNC: 96 MG/DL (ref 70–99)
HCT VFR BLD AUTO: 47.5 % (ref 35–47)
HGB BLD-MCNC: 15.5 G/DL (ref 11.7–15.7)
MAGNESIUM SERPL-MCNC: 1.8 MG/DL (ref 1.7–2.3)
MCH RBC QN AUTO: 31.1 PG (ref 26.5–33)
MCHC RBC AUTO-ENTMCNC: 32.6 G/DL (ref 31.5–36.5)
MCV RBC AUTO: 95 FL (ref 78–100)
PHOSPHATE SERPL-MCNC: 3.3 MG/DL (ref 2.5–4.5)
PLATELET # BLD AUTO: 238 10E3/UL (ref 150–450)
POTASSIUM SERPL-SCNC: 4.6 MMOL/L (ref 3.4–5.3)
RBC # BLD AUTO: 4.99 10E6/UL (ref 3.8–5.2)
SODIUM SERPL-SCNC: 137 MMOL/L (ref 135–145)
TACROLIMUS BLD-MCNC: 8.4 UG/L (ref 5–15)
TME LAST DOSE: NORMAL H
TME LAST DOSE: NORMAL H
WBC # BLD AUTO: 6.6 10E3/UL (ref 4–11)

## 2024-03-26 PROCEDURE — 87799 DETECT AGENT NOS DNA QUANT: CPT

## 2024-03-26 PROCEDURE — 83735 ASSAY OF MAGNESIUM: CPT

## 2024-03-26 PROCEDURE — 80197 ASSAY OF TACROLIMUS: CPT

## 2024-03-26 PROCEDURE — 85027 COMPLETE CBC AUTOMATED: CPT

## 2024-03-26 PROCEDURE — 80048 BASIC METABOLIC PNL TOTAL CA: CPT

## 2024-03-26 PROCEDURE — 36415 COLL VENOUS BLD VENIPUNCTURE: CPT

## 2024-03-26 PROCEDURE — 84100 ASSAY OF PHOSPHORUS: CPT

## 2024-04-02 ENCOUNTER — LAB (OUTPATIENT)
Dept: LAB | Facility: CLINIC | Age: 29
End: 2024-04-02
Payer: MEDICARE

## 2024-04-02 DIAGNOSIS — Z48.298 AFTERCARE FOLLOWING ORGAN TRANSPLANT: ICD-10-CM

## 2024-04-02 DIAGNOSIS — Z20.828 CONTACT WITH AND (SUSPECTED) EXPOSURE TO OTHER VIRAL COMMUNICABLE DISEASES: ICD-10-CM

## 2024-04-02 DIAGNOSIS — Z98.890 OTHER SPECIFIED POSTPROCEDURAL STATES: ICD-10-CM

## 2024-04-02 DIAGNOSIS — Z79.899 ENCOUNTER FOR LONG-TERM CURRENT USE OF MEDICATION: ICD-10-CM

## 2024-04-02 DIAGNOSIS — Z94.0 KIDNEY REPLACED BY TRANSPLANT: ICD-10-CM

## 2024-04-02 LAB
ALBUMIN SERPL BCG-MCNC: 4.1 G/DL (ref 3.5–5.2)
ALP SERPL-CCNC: 92 U/L (ref 40–150)
ALT SERPL W P-5'-P-CCNC: 33 U/L (ref 0–50)
ANION GAP SERPL CALCULATED.3IONS-SCNC: 8 MMOL/L (ref 7–15)
AST SERPL W P-5'-P-CCNC: 37 U/L (ref 0–45)
BILIRUB DIRECT SERPL-MCNC: <0.2 MG/DL (ref 0–0.3)
BILIRUB SERPL-MCNC: 0.4 MG/DL
BK VIRUS SPECIMEN TYPE: NORMAL
BKV DNA # SPEC NAA+PROBE: NOT DETECTED IU/ML
BUN SERPL-MCNC: 18.3 MG/DL (ref 6–20)
CALCIUM SERPL-MCNC: 9.3 MG/DL (ref 8.6–10)
CHLORIDE SERPL-SCNC: 107 MMOL/L (ref 98–107)
CREAT SERPL-MCNC: 1.24 MG/DL (ref 0.51–0.95)
DEPRECATED HCO3 PLAS-SCNC: 24 MMOL/L (ref 22–29)
EGFRCR SERPLBLD CKD-EPI 2021: 60 ML/MIN/1.73M2
ERYTHROCYTE [DISTWIDTH] IN BLOOD BY AUTOMATED COUNT: 11.4 % (ref 10–15)
GLUCOSE SERPL-MCNC: 99 MG/DL (ref 70–99)
HCT VFR BLD AUTO: 45 % (ref 35–47)
HGB BLD-MCNC: 14.7 G/DL (ref 11.7–15.7)
MAGNESIUM SERPL-MCNC: 1.8 MG/DL (ref 1.7–2.3)
MCH RBC QN AUTO: 30.8 PG (ref 26.5–33)
MCHC RBC AUTO-ENTMCNC: 32.7 G/DL (ref 31.5–36.5)
MCV RBC AUTO: 94 FL (ref 78–100)
PLATELET # BLD AUTO: 218 10E3/UL (ref 150–450)
POTASSIUM SERPL-SCNC: 4.5 MMOL/L (ref 3.4–5.3)
PROT SERPL-MCNC: 7.2 G/DL (ref 6.4–8.3)
RBC # BLD AUTO: 4.77 10E6/UL (ref 3.8–5.2)
SODIUM SERPL-SCNC: 139 MMOL/L (ref 135–145)
TACROLIMUS BLD-MCNC: 7.3 UG/L (ref 5–15)
TME LAST DOSE: NORMAL H
TME LAST DOSE: NORMAL H
WBC # BLD AUTO: 7.3 10E3/UL (ref 4–11)

## 2024-04-02 PROCEDURE — 80053 COMPREHEN METABOLIC PANEL: CPT

## 2024-04-02 PROCEDURE — 36415 COLL VENOUS BLD VENIPUNCTURE: CPT

## 2024-04-02 PROCEDURE — 83735 ASSAY OF MAGNESIUM: CPT

## 2024-04-02 PROCEDURE — 82248 BILIRUBIN DIRECT: CPT

## 2024-04-02 PROCEDURE — 80197 ASSAY OF TACROLIMUS: CPT

## 2024-04-02 PROCEDURE — 87799 DETECT AGENT NOS DNA QUANT: CPT

## 2024-04-02 PROCEDURE — 85027 COMPLETE CBC AUTOMATED: CPT

## 2024-04-03 DIAGNOSIS — Z94.0 KIDNEY TRANSPLANT RECIPIENT: Primary | ICD-10-CM

## 2024-04-03 RX ORDER — TACROLIMUS 0.5 MG/1
0.5 CAPSULE ORAL 2 TIMES DAILY
Qty: 60 CAPSULE | Refills: 11 | Status: SHIPPED | OUTPATIENT
Start: 2024-04-03 | End: 2024-05-02

## 2024-04-03 RX ORDER — TACROLIMUS 1 MG/1
3 CAPSULE ORAL 2 TIMES DAILY
Qty: 180 CAPSULE | Refills: 11 | Status: SHIPPED | OUTPATIENT
Start: 2024-04-03 | End: 2024-05-29

## 2024-04-03 NOTE — TELEPHONE ENCOUNTER
Left message and sent [employer] message to patient regarding:  Tacrolimus IR level 7.3 on 4/2, goal 8-10, dose 3 mg BID.     PLAN:   Call Patientand confirm this was an accurate 12-hour trough.   Verify Tacrolimus IR dose 3 mg BID.   Confirm no new medications or illness.   Confirm no missed doses.   If accurate trough and accurate dose, increase Tacrolimus IR dose to 3.5 mg BID      Is this more than a 50% increase or decrease in current IS dose: No  If YES, justification: na     Repeat labs in 1 weeks.

## 2024-04-03 NOTE — TELEPHONE ENCOUNTER
ISSUE:   Tacrolimus IR level 7.3 on 4/2, goal 8-10, dose 3 mg BID.    PLAN:   Call Patientand confirm this was an accurate 12-hour trough.   Verify Tacrolimus IR dose 3 mg BID.   Confirm no new medications or illness.   Confirm no missed doses.   If accurate trough and accurate dose, increase Tacrolimus IR dose to 3.5 mg BID     Is this more than a 50% increase or decrease in current IS dose: No  If YES, justification: na    Repeat labs in 1 weeks.  *If > 50% change in immunosuppression dose, repeat labs in 1 week.     OUTCOME: Patient confirmed this was an accurate 12-hour trough.   Verified Tacrolimus IR dose 3 mg BID.   Confirmed no new medications or illness.   Confirmed no missed doses.   Patient confirms increase Tacrolimus IR dose to 3.5 mg BID and repeat labs in 1 week.

## 2024-04-09 ENCOUNTER — LAB (OUTPATIENT)
Dept: LAB | Facility: CLINIC | Age: 29
End: 2024-04-09
Payer: MEDICARE

## 2024-04-09 DIAGNOSIS — Z79.899 ENCOUNTER FOR LONG-TERM CURRENT USE OF MEDICATION: ICD-10-CM

## 2024-04-09 DIAGNOSIS — Z48.298 AFTERCARE FOLLOWING ORGAN TRANSPLANT: ICD-10-CM

## 2024-04-09 DIAGNOSIS — Z94.0 KIDNEY REPLACED BY TRANSPLANT: ICD-10-CM

## 2024-04-09 DIAGNOSIS — Z20.828 CONTACT WITH AND (SUSPECTED) EXPOSURE TO OTHER VIRAL COMMUNICABLE DISEASES: ICD-10-CM

## 2024-04-09 DIAGNOSIS — Z98.890 OTHER SPECIFIED POSTPROCEDURAL STATES: ICD-10-CM

## 2024-04-09 LAB
TACROLIMUS BLD-MCNC: 11 UG/L (ref 5–15)
TME LAST DOSE: NORMAL H
TME LAST DOSE: NORMAL H

## 2024-04-09 PROCEDURE — 80197 ASSAY OF TACROLIMUS: CPT

## 2024-04-09 PROCEDURE — 36415 COLL VENOUS BLD VENIPUNCTURE: CPT

## 2024-04-12 ENCOUNTER — LAB (OUTPATIENT)
Dept: LAB | Facility: CLINIC | Age: 29
End: 2024-04-12
Attending: INTERNAL MEDICINE
Payer: MEDICARE

## 2024-04-12 ENCOUNTER — OFFICE VISIT (OUTPATIENT)
Dept: TRANSPLANT | Facility: CLINIC | Age: 29
End: 2024-04-12
Attending: INTERNAL MEDICINE
Payer: MEDICARE

## 2024-04-12 VITALS
HEART RATE: 111 BPM | BODY MASS INDEX: 33.07 KG/M2 | DIASTOLIC BLOOD PRESSURE: 85 MMHG | TEMPERATURE: 97.6 F | OXYGEN SATURATION: 97 % | SYSTOLIC BLOOD PRESSURE: 123 MMHG | WEIGHT: 175 LBS

## 2024-04-12 DIAGNOSIS — D84.9 IMMUNOSUPPRESSED STATUS (H): Chronic | ICD-10-CM

## 2024-04-12 DIAGNOSIS — Z48.298 AFTERCARE FOLLOWING ORGAN TRANSPLANT: ICD-10-CM

## 2024-04-12 DIAGNOSIS — R51.9 NONINTRACTABLE HEADACHE, UNSPECIFIED CHRONICITY PATTERN, UNSPECIFIED HEADACHE TYPE: ICD-10-CM

## 2024-04-12 DIAGNOSIS — Z20.828 CONTACT WITH AND (SUSPECTED) EXPOSURE TO OTHER VIRAL COMMUNICABLE DISEASES: ICD-10-CM

## 2024-04-12 DIAGNOSIS — N25.81 SECONDARY HYPERPARATHYROIDISM OF RENAL ORIGIN (H): Primary | ICD-10-CM

## 2024-04-12 DIAGNOSIS — Z94.0 KIDNEY REPLACED BY TRANSPLANT: ICD-10-CM

## 2024-04-12 DIAGNOSIS — E87.20 METABOLIC ACIDOSIS: ICD-10-CM

## 2024-04-12 DIAGNOSIS — Z79.899 ENCOUNTER FOR LONG-TERM CURRENT USE OF MEDICATION: ICD-10-CM

## 2024-04-12 DIAGNOSIS — Z98.890 OTHER SPECIFIED POSTPROCEDURAL STATES: ICD-10-CM

## 2024-04-12 DIAGNOSIS — N18.2 CKD (CHRONIC KIDNEY DISEASE), STAGE II: ICD-10-CM

## 2024-04-12 DIAGNOSIS — N02.B9 IGA NEPHROPATHY: ICD-10-CM

## 2024-04-12 DIAGNOSIS — D63.1 ANEMIA IN STAGE 2 CHRONIC KIDNEY DISEASE: ICD-10-CM

## 2024-04-12 DIAGNOSIS — N18.2 ANEMIA IN STAGE 2 CHRONIC KIDNEY DISEASE: ICD-10-CM

## 2024-04-12 LAB
ALBUMIN MFR UR ELPH: 8.7 MG/DL
ANION GAP SERPL CALCULATED.3IONS-SCNC: 12 MMOL/L (ref 7–15)
BUN SERPL-MCNC: 16.5 MG/DL (ref 6–20)
CALCIUM SERPL-MCNC: 9.6 MG/DL (ref 8.6–10)
CHLORIDE SERPL-SCNC: 103 MMOL/L (ref 98–107)
CREAT SERPL-MCNC: 1.18 MG/DL (ref 0.51–0.95)
CREAT UR-MCNC: 95.1 MG/DL
DEPRECATED HCO3 PLAS-SCNC: 21 MMOL/L (ref 22–29)
EGFRCR SERPLBLD CKD-EPI 2021: 64 ML/MIN/1.73M2
ERYTHROCYTE [DISTWIDTH] IN BLOOD BY AUTOMATED COUNT: 11.5 % (ref 10–15)
GLUCOSE SERPL-MCNC: 109 MG/DL (ref 70–99)
HCT VFR BLD AUTO: 47 % (ref 35–47)
HGB BLD-MCNC: 15.5 G/DL (ref 11.7–15.7)
MCH RBC QN AUTO: 30.2 PG (ref 26.5–33)
MCHC RBC AUTO-ENTMCNC: 33 G/DL (ref 31.5–36.5)
MCV RBC AUTO: 92 FL (ref 78–100)
PLATELET # BLD AUTO: 196 10E3/UL (ref 150–450)
POTASSIUM SERPL-SCNC: 4.6 MMOL/L (ref 3.4–5.3)
PROT/CREAT 24H UR: 0.09 MG/MG CR (ref 0–0.2)
PTH-INTACT SERPL-MCNC: 114 PG/ML (ref 15–65)
RBC # BLD AUTO: 5.13 10E6/UL (ref 3.8–5.2)
SODIUM SERPL-SCNC: 136 MMOL/L (ref 135–145)
TACROLIMUS BLD-MCNC: 10.5 UG/L (ref 5–15)
TME LAST DOSE: NORMAL H
TME LAST DOSE: NORMAL H
WBC # BLD AUTO: 8.2 10E3/UL (ref 4–11)

## 2024-04-12 PROCEDURE — 86833 HLA CLASS II HIGH DEFIN QUAL: CPT | Performed by: INTERNAL MEDICINE

## 2024-04-12 PROCEDURE — 84156 ASSAY OF PROTEIN URINE: CPT | Performed by: PATHOLOGY

## 2024-04-12 PROCEDURE — 80197 ASSAY OF TACROLIMUS: CPT | Performed by: INTERNAL MEDICINE

## 2024-04-12 PROCEDURE — 99215 OFFICE O/P EST HI 40 MIN: CPT | Performed by: INTERNAL MEDICINE

## 2024-04-12 PROCEDURE — G0463 HOSPITAL OUTPT CLINIC VISIT: HCPCS | Performed by: INTERNAL MEDICINE

## 2024-04-12 PROCEDURE — 99000 SPECIMEN HANDLING OFFICE-LAB: CPT | Performed by: PATHOLOGY

## 2024-04-12 PROCEDURE — G2211 COMPLEX E/M VISIT ADD ON: HCPCS | Performed by: INTERNAL MEDICINE

## 2024-04-12 PROCEDURE — 85027 COMPLETE CBC AUTOMATED: CPT | Performed by: PATHOLOGY

## 2024-04-12 PROCEDURE — 36415 COLL VENOUS BLD VENIPUNCTURE: CPT | Performed by: PATHOLOGY

## 2024-04-12 PROCEDURE — 80048 BASIC METABOLIC PNL TOTAL CA: CPT | Performed by: PATHOLOGY

## 2024-04-12 PROCEDURE — 83970 ASSAY OF PARATHORMONE: CPT | Performed by: PATHOLOGY

## 2024-04-12 PROCEDURE — 86832 HLA CLASS I HIGH DEFIN QUAL: CPT | Performed by: INTERNAL MEDICINE

## 2024-04-12 ASSESSMENT — PAIN SCALES - GENERAL: PAINLEVEL: NO PAIN (0)

## 2024-04-12 NOTE — LETTER
4/12/2024         RE: Nuzhat Lopez  [address] [facility] [address]        Dear Colleague,    Thank you for referring your patient, Nuzhat Lopez, to the [facility]. Please see a copy of my visit note below.    TRANSPLANT NEPHROLOGY EARLY POST TRANSPLANT VISIT    Assessment & Plan   # LDKT: Stable    - Baseline Creatinine: ~ 1.1-1.4   - Proteinuria: Normal (<0.2 grams)   - Date DSA Last Checked: Jan/2024      Latest DSA: No   - BK Viremia: No   - Kidney Tx Biopsy: No   - Transplant Ureteral Stent: No    # Immunosuppression: Tacrolimus immediate release (goal 8-10) and Mycophenolic acid (dose 540 mg every 12 hours)   - Induction with Recent Transplant:  Low Intensity Protocol   - Continue with intensive monitoring of immunosuppression for efficacy and toxicity.   - Changes: Not at this time currently on 3.5 twice a day    # Infection Prophylaxis:   - PJP: Sulfa/TMP (Bactrim)  - CMV: done on march 8th after 3 months  - Hep B: Entecavir (Baraclude)     # Donor Hep B core Ab +:   -Continue entecavir for at least 6 months. At 6 months send to hepatology.    -LFTs monthly. HBsAg, HBsAb, HBV DNA q3 months for the first year     # Hypertension: Controlled;  Goal BP: < 130/80   - Volume status: Euvolemic     - Changes: Not at this time    # Anemia in Chronic Renal Disease: Hgb: Stable, near normal      MARY: No   - Iron studies: Replete    # Mineral Bone Disorder:    - Secondary renal hyperparathyroidism; PTH level: Minimally elevated ( pg/ml)        On treatment: None   - Vitamin D; level: Normal        On supplement: No   - Calcium; level: Normal        On supplement: No   - Phosphorus; level: Normal        On supplement: No    # Loose stools, resolved     # Electrolytes:   - Potassium; level: Normal        On supplement: No  - Magnesium; level: Normal        On supplement: Yes, mag ox 400mg bid, stop  - Bicarbonate; level: Low normal        On supplement: Yes, bicarb 650mg tid    #  Headaches: they are happening 2 times per week which is mild increase from prior. They were more like tension type of headaches with band like around her head than migraines. Rarely last all day usually stays up to 1-2 hours after taking her meds. Improves with tylenol. No other association noted including poor sleep or eating habits on those days with headaches.  - asked to maintain dairy for her head aches, which she agrees to    # Medical Compliance: Yes    # Health Maintenance and Vaccination Review: Recommend:  After 3m post transplant recommend COVID and flu vaccines    # Transplant History:  Etiology of Kidney Failure: IgA nephropathy  Tx: LDKT  Transplant: 12/8/2023 (Kidney)  Donor Type: Living Donor Class:   Crossmatch at time of Tx: negative  DSA at time of Tx: No  Significant changes in immunosuppression: None  CMV IgG Ab High Risk Discordance (D+/R-): No  EBV IgG Ab High Risk Discordance (D+/R-): No  Significant transplant-related complications: None    Transplant Office Phone Number: [phone]    Assessment and plan was discussed with the patient and she voiced her understanding and agreement.    Return visit: Return in about 2 months (around 6/12/2024).    Xiomara Bower MD    The longitudinal plan of care for kidney transplant was addressed during this visit. Due to the added complexity in care, I will continue to support Nuzhat Lopez in the subsequent management of this condition(s) and with the ongoing continuity of care of this condition(s).       Chief Complaint   Ms. Lopez is a 28 year old here for kidney transplant, immunosuppression management and hospital follow up after kidney transplant.     History of Present Illness   The patient overall feels well. She denies any recent hospitalizations. She denies nausea, vomiting, fever, chills, shortness of breath, chest pain, LE edema, unintentional weight loss, nights sweats, dysuria, hematuria.     Headaches, as mentioned above, band like happen  2 times per week on average. No associated vision changes, not checked BP during those episodes.    Home BP: 120's systolic    Problem List   Patient Active Problem List   Diagnosis     IgA nephropathy     CKD (chronic kidney disease), stage II     Anemia in stage 2 chronic kidney disease     Iron deficiency anemia, unspecified     Secondary hyperparathyroidism of renal origin (H24)     Kidney replaced by transplant     Immunosuppressed status (H24)     Metabolic acidosis     Aftercare following organ transplant     Hypomagnesemia       Allergies   Allergies   Allergen Reactions     Cephalexin Rash     Heparin Flush Rash       Medications   Current Outpatient Medications   Medication Sig Dispense Refill     acetaminophen (TYLENOL) 325 MG tablet Take 3 tablets (975 mg) by mouth every 8 hours as needed for pain 100 tablet 0     atorvastatin (LIPITOR) 10 MG tablet Take 1 tablet (10 mg) by mouth daily 30 tablet 11     biotin 1000 MCG TABS tablet Take 1,000 mcg by mouth daily       entecavir (BARACLUDE) 0.5 MG tablet Take 1 tablet (0.5 mg) by mouth daily 30 tablet 5     mycophenolic acid (GENERIC EQUIVALENT) 180 MG EC tablet Take 3 tablets (540 mg) by mouth 2 times daily Take in place of mycophenolate 180 tablet 11     psyllium (METAMUCIL/KONSYL) 58.6 % powder Take 1 teaspoonful by mouth daily       sodium bicarbonate 650 MG tablet Take 1 tablet (650 mg) by mouth 3 times daily 60 tablet 11     sulfamethoxazole-trimethoprim (BACTRIM) 400-80 MG tablet Take 1 tablet by mouth daily 30 tablet 11     tacrolimus (GENERIC EQUIVALENT) 0.5 MG capsule Take 1 capsule (0.5 mg) by mouth 2 times daily Total dose = 3.5 mg twice per day 60 capsule 11     tacrolimus (GENERIC EQUIVALENT) 1 MG capsule Take 3 capsules (3 mg) by mouth 2 times daily Total dose = 3.5 mg every 12 hours 180 capsule 11     valGANciclovir (VALCYTE) 450 MG tablet Take 2 tablets (900 mg) by mouth daily 60 tablet 2     No current facility-administered medications for  this visit.     There are no discontinued medications.      Physical Exam   Vital Signs: /85 (BP Location: Left arm, Patient Position: Sitting, Cuff Size: Adult Regular)   Pulse 111   Temp 97.6  F (36.4  C) (Oral)   Wt 79.4 kg (175 lb)   SpO2 97%   BMI 33.07 kg/m      GENERAL APPEARANCE: alert and no distress  HENT: mouth without ulcers or lesions  RESP: lungs clear to auscultation - no rales, rhonchi or wheezes  CV: regular rhythm, normal rate, no rub, no murmur  EDEMA: no LE edema bilaterally  ABDOMEN: soft, nondistended, nontender, bowel sounds normal  MS: extremities normal - no gross deformities noted, no evidence of inflammation in joints, no muscle tenderness  SKIN: no rash  NEURO: normal strength and tone, sensory exam grossly normal, mentation intact and speech normal  PSYCH: mentation appears normal and affect normal/bright  TX KIDNEY: normal    Data         Latest Ref Rng & Units 4/2/2024     8:38 AM 3/26/2024     8:24 AM 3/12/2024     8:31 AM   Renal   Sodium 135 - 145 mmol/L 139  137  137    K 3.4 - 5.3 mmol/L 4.5  4.6  4.6    Cl 98 - 107 mmol/L 107  104  105    Cl (external) 98 - 107 mmol/L 107  104  105    CO2 22 - 29 mmol/L 24  23  23    Urea Nitrogen 6.0 - 20.0 mg/dL 18.3  12.6  20.6    Creatinine 0.51 - 0.95 mg/dL 1.24  1.22  1.29    Glucose 70 - 99 mg/dL 99  96  89    Calcium 8.6 - 10.0 mg/dL 9.3  9.3  9.3    Magnesium 1.7 - 2.3 mg/dL 1.8  1.8           Latest Ref Rng & Units 3/26/2024     8:24 AM 2/14/2024     9:38 AM 2/1/2024     7:57 AM   Bone Health   Phosphorus 2.5 - 4.5 mg/dL 3.3  3.2  3.0          Latest Ref Rng & Units 4/12/2024     9:04 AM 4/2/2024     8:38 AM 3/26/2024     8:23 AM   Heme   WBC 4.0 - 11.0 10e3/uL 8.2  7.3  6.6    Hgb 11.7 - 15.7 g/dL 15.5  14.7  15.5    Plt 150 - 450 10e3/uL 196  218  238          Latest Ref Rng & Units 4/2/2024     8:38 AM 2/20/2024     8:17 AM 2/14/2024     9:38 AM   Liver   AP 40 - 150 U/L 92  104  109    TBili <=1.2 mg/dL 0.4  0.5  0.4     Bilirubin Direct 0.00 - 0.30 mg/dL <0.20  <0.20  <0.20    ALT 0 - 50 U/L 33  50  59    AST 0 - 45 U/L 37  32  38    Tot Protein 6.4 - 8.3 g/dL 7.2  6.8  7.1    Albumin 3.5 - 5.2 g/dL 4.1  4.1  4.1          Latest Ref Rng & Units 11/27/2023     2:06 PM   Pancreas   A1C <5.7 % 4.6          Latest Ref Rng & Units 12/21/2023     7:55 AM 11/27/2023     2:06 PM   Iron studies   Iron 37 - 145 ug/dL 70  73    Iron Sat Index 15 - 46 % 34  42    Ferritin 6 - 175 ng/mL 902  1,549          Latest Ref Rng & Units 11/27/2023     2:06 PM 2/28/2022     1:08 PM   [facility] Txp Virology   EBV CAPSID ANTIBODY IGG No detectable antibody. Positive  Positive      Failed to redirect to the Timeline version of the REVFS SmartLink.  Recent Labs   Lab Test 03/19/24  0839 03/26/24  0823 04/02/24  0838 04/09/24  0826   DOSTAC 3/18/2024 3/25/2024 4/1/2024  --    TACROL 7.9 8.4 7.3 11.0     Recent Labs   Lab Test 12/13/23  0723 12/26/23  0757 01/04/24  0803 01/10/24  1003 02/06/24  0811   DOSMPA  --   --  1/3/2024   9:00 PM  --   --    MPACID 3.31   < > 5.13* 2.61 2.34   MPAG 45.4   < > 79.3 52.7 82.5    < > = values in this interval not displayed.       Total time spent on the day of clinic visit was 40 min including face to face time of 20 min with pt, labs and image review,medication review,  previous nephrology note review and documentation.      Again, thank you for allowing me to participate in the care of your patient.        Sincerely,        Xiomara Bower MD

## 2024-04-12 NOTE — PROGRESS NOTES
TRANSPLANT NEPHROLOGY EARLY POST TRANSPLANT VISIT    Assessment & Plan   # LDKT: Stable    - Baseline Creatinine: ~ 1.1-1.4   - Proteinuria: Normal (<0.2 grams)   - Date DSA Last Checked: Jan/2024      Latest DSA: No   - BK Viremia: No   - Kidney Tx Biopsy: No   - Transplant Ureteral Stent: No    # Immunosuppression: Tacrolimus immediate release (goal 8-10) and Mycophenolic acid (dose 540 mg every 12 hours)   - Induction with Recent Transplant:  Low Intensity Protocol   - Continue with intensive monitoring of immunosuppression for efficacy and toxicity.   - Changes: Not at this time currently on 3.5 twice a day    # Infection Prophylaxis:   - PJP: Sulfa/TMP (Bactrim)  - CMV: done on march 8th after 3 months  - Hep B: Entecavir (Baraclude)     # Donor Hep B core Ab +:   -Continue entecavir for at least 6 months. At 6 months send to hepatology.    -LFTs monthly. HBsAg, HBsAb, HBV DNA q3 months for the first year     # Hypertension: Controlled;  Goal BP: < 130/80   - Volume status: Euvolemic     - Changes: Not at this time    # Anemia in Chronic Renal Disease: Hgb: Stable, near normal      MARY: No   - Iron studies: Replete    # Mineral Bone Disorder:    - Secondary renal hyperparathyroidism; PTH level: Minimally elevated ( pg/ml)        On treatment: None   - Vitamin D; level: Normal        On supplement: No   - Calcium; level: Normal        On supplement: No   - Phosphorus; level: Normal        On supplement: No    # Loose stools, resolved     # Electrolytes:   - Potassium; level: Normal        On supplement: No  - Magnesium; level: Normal        On supplement: Yes, mag ox 400mg bid, stop  - Bicarbonate; level: Low normal        On supplement: Yes, bicarb 650mg tid    # Headaches: they are happening 2 times per week which is mild increase from prior. They were more like tension type of headaches with band like around her head than migraines. Rarely last all day usually stays up to 1-2 hours after taking her  meds. Improves with tylenol. No other association noted including poor sleep or eating habits on those days with headaches.  - asked to maintain dairy for her head aches, which she agrees to    # Medical Compliance: Yes    # Health Maintenance and Vaccination Review: Recommend:  After 3m post transplant recommend COVID and flu vaccines    # Transplant History:  Etiology of Kidney Failure: IgA nephropathy  Tx: LDKT  Transplant: 12/8/2023 (Kidney)  Donor Type: Living Donor Class:   Crossmatch at time of Tx: negative  DSA at time of Tx: No  Significant changes in immunosuppression: None  CMV IgG Ab High Risk Discordance (D+/R-): No  EBV IgG Ab High Risk Discordance (D+/R-): No  Significant transplant-related complications: None    Transplant Office Phone Number: [phone]    Assessment and plan was discussed with the patient and she voiced her understanding and agreement.    Return visit: Return in about 2 months (around 6/12/2024).    Xiomara Bower MD    The longitudinal plan of care for kidney transplant was addressed during this visit. Due to the added complexity in care, I will continue to support Nuzhat Lopez in the subsequent management of this condition(s) and with the ongoing continuity of care of this condition(s).       Chief Complaint   Ms. Lopez is a 28 year old here for kidney transplant, immunosuppression management and hospital follow up after kidney transplant.     History of Present Illness   The patient overall feels well. She denies any recent hospitalizations. She denies nausea, vomiting, fever, chills, shortness of breath, chest pain, LE edema, unintentional weight loss, nights sweats, dysuria, hematuria.     Headaches, as mentioned above, band like happen 2 times per week on average. No associated vision changes, not checked BP during those episodes.    Home BP: 120's systolic    Problem List   Patient Active Problem List   Diagnosis     IgA nephropathy     CKD (chronic kidney disease), stage  II     Anemia in stage 2 chronic kidney disease     Iron deficiency anemia, unspecified     Secondary hyperparathyroidism of renal origin (H24)     Kidney replaced by transplant     Immunosuppressed status (H24)     Metabolic acidosis     Aftercare following organ transplant     Hypomagnesemia       Allergies   Allergies   Allergen Reactions     Cephalexin Rash     Heparin Flush Rash       Medications   Current Outpatient Medications   Medication Sig Dispense Refill     acetaminophen (TYLENOL) 325 MG tablet Take 3 tablets (975 mg) by mouth every 8 hours as needed for pain 100 tablet 0     atorvastatin (LIPITOR) 10 MG tablet Take 1 tablet (10 mg) by mouth daily 30 tablet 11     biotin 1000 MCG TABS tablet Take 1,000 mcg by mouth daily       entecavir (BARACLUDE) 0.5 MG tablet Take 1 tablet (0.5 mg) by mouth daily 30 tablet 5     mycophenolic acid (GENERIC EQUIVALENT) 180 MG EC tablet Take 3 tablets (540 mg) by mouth 2 times daily Take in place of mycophenolate 180 tablet 11     psyllium (METAMUCIL/KONSYL) 58.6 % powder Take 1 teaspoonful by mouth daily       sodium bicarbonate 650 MG tablet Take 1 tablet (650 mg) by mouth 3 times daily 60 tablet 11     sulfamethoxazole-trimethoprim (BACTRIM) 400-80 MG tablet Take 1 tablet by mouth daily 30 tablet 11     tacrolimus (GENERIC EQUIVALENT) 0.5 MG capsule Take 1 capsule (0.5 mg) by mouth 2 times daily Total dose = 3.5 mg twice per day 60 capsule 11     tacrolimus (GENERIC EQUIVALENT) 1 MG capsule Take 3 capsules (3 mg) by mouth 2 times daily Total dose = 3.5 mg every 12 hours 180 capsule 11     valGANciclovir (VALCYTE) 450 MG tablet Take 2 tablets (900 mg) by mouth daily 60 tablet 2     No current facility-administered medications for this visit.     There are no discontinued medications.      Physical Exam   Vital Signs: /85 (BP Location: Left arm, Patient Position: Sitting, Cuff Size: Adult Regular)   Pulse 111   Temp 97.6  F (36.4  C) (Oral)   Wt 79.4 kg (175  lb)   SpO2 97%   BMI 33.07 kg/m      GENERAL APPEARANCE: alert and no distress  HENT: mouth without ulcers or lesions  RESP: lungs clear to auscultation - no rales, rhonchi or wheezes  CV: regular rhythm, normal rate, no rub, no murmur  EDEMA: no LE edema bilaterally  ABDOMEN: soft, nondistended, nontender, bowel sounds normal  MS: extremities normal - no gross deformities noted, no evidence of inflammation in joints, no muscle tenderness  SKIN: no rash  NEURO: normal strength and tone, sensory exam grossly normal, mentation intact and speech normal  PSYCH: mentation appears normal and affect normal/bright  TX KIDNEY: normal    Data         Latest Ref Rng & Units 4/2/2024     8:38 AM 3/26/2024     8:24 AM 3/12/2024     8:31 AM   Renal   Sodium 135 - 145 mmol/L 139  137  137    K 3.4 - 5.3 mmol/L 4.5  4.6  4.6    Cl 98 - 107 mmol/L 107  104  105    Cl (external) 98 - 107 mmol/L 107  104  105    CO2 22 - 29 mmol/L 24  23  23    Urea Nitrogen 6.0 - 20.0 mg/dL 18.3  12.6  20.6    Creatinine 0.51 - 0.95 mg/dL 1.24  1.22  1.29    Glucose 70 - 99 mg/dL 99  96  89    Calcium 8.6 - 10.0 mg/dL 9.3  9.3  9.3    Magnesium 1.7 - 2.3 mg/dL 1.8  1.8           Latest Ref Rng & Units 3/26/2024     8:24 AM 2/14/2024     9:38 AM 2/1/2024     7:57 AM   Bone Health   Phosphorus 2.5 - 4.5 mg/dL 3.3  3.2  3.0          Latest Ref Rng & Units 4/12/2024     9:04 AM 4/2/2024     8:38 AM 3/26/2024     8:23 AM   Heme   WBC 4.0 - 11.0 10e3/uL 8.2  7.3  6.6    Hgb 11.7 - 15.7 g/dL 15.5  14.7  15.5    Plt 150 - 450 10e3/uL 196  218  238          Latest Ref Rng & Units 4/2/2024     8:38 AM 2/20/2024     8:17 AM 2/14/2024     9:38 AM   Liver   AP 40 - 150 U/L 92  104  109    TBili <=1.2 mg/dL 0.4  0.5  0.4    Bilirubin Direct 0.00 - 0.30 mg/dL <0.20  <0.20  <0.20    ALT 0 - 50 U/L 33  50  59    AST 0 - 45 U/L 37  32  38    Tot Protein 6.4 - 8.3 g/dL 7.2  6.8  7.1    Albumin 3.5 - 5.2 g/dL 4.1  4.1  4.1          Latest Ref Rng & Units 11/27/2023      2:06 PM   Pancreas   A1C <5.7 % 4.6          Latest Ref Rng & Units 12/21/2023     7:55 AM 11/27/2023     2:06 PM   Iron studies   Iron 37 - 145 ug/dL 70  73    Iron Sat Index 15 - 46 % 34  42    Ferritin 6 - 175 ng/mL 902  1,549          Latest Ref Rng & Units 11/27/2023     2:06 PM 2/28/2022     1:08 PM   [facility] Txp Virology   EBV CAPSID ANTIBODY IGG No detectable antibody. Positive  Positive      Failed to redirect to the Timeline version of the REVFS SmartLink.  Recent Labs   Lab Test 03/19/24  0839 03/26/24  0823 04/02/24  0838 04/09/24  0826   DOSTAC 3/18/2024 3/25/2024 4/1/2024  --    TACROL 7.9 8.4 7.3 11.0     Recent Labs   Lab Test 12/13/23  0723 12/26/23  0757 01/04/24  0803 01/10/24  1003 02/06/24  0811   DOSMPA  --   --  1/3/2024   9:00 PM  --   --    MPACID 3.31   < > 5.13* 2.61 2.34   MPAG 45.4   < > 79.3 52.7 82.5    < > = values in this interval not displayed.       Total time spent on the day of clinic visit was 40 min including face to face time of 20 min with pt, labs and image review,medication review,  previous nephrology note review and documentation.

## 2024-04-12 NOTE — NURSING NOTE
Chief Complaint   Patient presents with    RECHECK     K/P POST ACUTE TXP NEPH - post kidney transplant, AKT, scheduled per request         Has had occasional headaches post transplant.     /85 (BP Location: Left arm, Patient Position: Sitting, Cuff Size: Adult Regular)   Pulse 111   Temp 97.6  F (36.4  C) (Oral)   Wt 79.4 kg (175 lb)   SpO2 97%   BMI 33.07 kg/m  \    Dalton Hinojosa CMA on 4/12/2024 at 9:26 AM

## 2024-04-16 ENCOUNTER — LAB (OUTPATIENT)
Dept: LAB | Facility: CLINIC | Age: 29
End: 2024-04-16
Payer: MEDICARE

## 2024-04-16 DIAGNOSIS — Z98.890 OTHER SPECIFIED POSTPROCEDURAL STATES: ICD-10-CM

## 2024-04-16 DIAGNOSIS — Z20.828 CONTACT WITH AND (SUSPECTED) EXPOSURE TO OTHER VIRAL COMMUNICABLE DISEASES: ICD-10-CM

## 2024-04-16 DIAGNOSIS — Z94.0 KIDNEY REPLACED BY TRANSPLANT: ICD-10-CM

## 2024-04-16 DIAGNOSIS — Z79.899 ENCOUNTER FOR LONG-TERM CURRENT USE OF MEDICATION: ICD-10-CM

## 2024-04-16 DIAGNOSIS — Z48.298 AFTERCARE FOLLOWING ORGAN TRANSPLANT: ICD-10-CM

## 2024-04-16 LAB
ERYTHROCYTE [DISTWIDTH] IN BLOOD BY AUTOMATED COUNT: 11.5 % (ref 10–15)
HCT VFR BLD AUTO: 43.9 % (ref 35–47)
HGB BLD-MCNC: 14.3 G/DL (ref 11.7–15.7)
MCH RBC QN AUTO: 30.2 PG (ref 26.5–33)
MCHC RBC AUTO-ENTMCNC: 32.6 G/DL (ref 31.5–36.5)
MCV RBC AUTO: 93 FL (ref 78–100)
PLATELET # BLD AUTO: 193 10E3/UL (ref 150–450)
RBC # BLD AUTO: 4.74 10E6/UL (ref 3.8–5.2)
TACROLIMUS BLD-MCNC: 10.4 UG/L (ref 5–15)
TME LAST DOSE: NORMAL H
TME LAST DOSE: NORMAL H
WBC # BLD AUTO: 8.2 10E3/UL (ref 4–11)

## 2024-04-16 PROCEDURE — 80048 BASIC METABOLIC PNL TOTAL CA: CPT

## 2024-04-16 PROCEDURE — 36415 COLL VENOUS BLD VENIPUNCTURE: CPT

## 2024-04-16 PROCEDURE — 85027 COMPLETE CBC AUTOMATED: CPT

## 2024-04-16 PROCEDURE — 80197 ASSAY OF TACROLIMUS: CPT

## 2024-04-17 LAB
ANION GAP SERPL CALCULATED.3IONS-SCNC: 10 MMOL/L (ref 7–15)
BUN SERPL-MCNC: 19.3 MG/DL (ref 6–20)
CALCIUM SERPL-MCNC: 9.2 MG/DL (ref 8.6–10)
CHLORIDE SERPL-SCNC: 103 MMOL/L (ref 98–107)
CREAT SERPL-MCNC: 1.21 MG/DL (ref 0.51–0.95)
DEPRECATED HCO3 PLAS-SCNC: 22 MMOL/L (ref 22–29)
EGFRCR SERPLBLD CKD-EPI 2021: 62 ML/MIN/1.73M2
GLUCOSE SERPL-MCNC: 98 MG/DL (ref 70–99)
POTASSIUM SERPL-SCNC: 4.7 MMOL/L (ref 3.4–5.3)
SODIUM SERPL-SCNC: 135 MMOL/L (ref 135–145)

## 2024-04-30 ENCOUNTER — LAB (OUTPATIENT)
Dept: LAB | Facility: CLINIC | Age: 29
End: 2024-04-30
Payer: MEDICARE

## 2024-04-30 DIAGNOSIS — Z98.890 OTHER SPECIFIED POSTPROCEDURAL STATES: ICD-10-CM

## 2024-04-30 DIAGNOSIS — Z20.828 CONTACT WITH AND (SUSPECTED) EXPOSURE TO OTHER VIRAL COMMUNICABLE DISEASES: ICD-10-CM

## 2024-04-30 DIAGNOSIS — Z48.298 AFTERCARE FOLLOWING ORGAN TRANSPLANT: ICD-10-CM

## 2024-04-30 DIAGNOSIS — Z79.899 ENCOUNTER FOR LONG-TERM CURRENT USE OF MEDICATION: ICD-10-CM

## 2024-04-30 DIAGNOSIS — Z94.0 KIDNEY REPLACED BY TRANSPLANT: ICD-10-CM

## 2024-04-30 LAB
ALBUMIN SERPL BCG-MCNC: 4.3 G/DL (ref 3.5–5.2)
ALP SERPL-CCNC: 90 U/L (ref 40–150)
ALT SERPL W P-5'-P-CCNC: 14 U/L (ref 0–50)
ANION GAP SERPL CALCULATED.3IONS-SCNC: 8 MMOL/L (ref 7–15)
AST SERPL W P-5'-P-CCNC: 23 U/L (ref 0–45)
BILIRUB DIRECT SERPL-MCNC: <0.2 MG/DL (ref 0–0.3)
BILIRUB SERPL-MCNC: 0.5 MG/DL
BK VIRUS SPECIMEN TYPE: NORMAL
BKV DNA # SPEC NAA+PROBE: NOT DETECTED IU/ML
BUN SERPL-MCNC: 14.4 MG/DL (ref 6–20)
CALCIUM SERPL-MCNC: 9.5 MG/DL (ref 8.6–10)
CHLORIDE SERPL-SCNC: 107 MMOL/L (ref 98–107)
CREAT SERPL-MCNC: 1.27 MG/DL (ref 0.51–0.95)
DEPRECATED HCO3 PLAS-SCNC: 23 MMOL/L (ref 22–29)
EGFRCR SERPLBLD CKD-EPI 2021: 58 ML/MIN/1.73M2
ERYTHROCYTE [DISTWIDTH] IN BLOOD BY AUTOMATED COUNT: 11.8 % (ref 10–15)
GLUCOSE SERPL-MCNC: 102 MG/DL (ref 70–99)
HCT VFR BLD AUTO: 43.9 % (ref 35–47)
HGB BLD-MCNC: 14.2 G/DL (ref 11.7–15.7)
MCH RBC QN AUTO: 30.1 PG (ref 26.5–33)
MCHC RBC AUTO-ENTMCNC: 32.3 G/DL (ref 31.5–36.5)
MCV RBC AUTO: 93 FL (ref 78–100)
PLATELET # BLD AUTO: 214 10E3/UL (ref 150–450)
POTASSIUM SERPL-SCNC: 4.4 MMOL/L (ref 3.4–5.3)
PROT SERPL-MCNC: 7.3 G/DL (ref 6.4–8.3)
RBC # BLD AUTO: 4.72 10E6/UL (ref 3.8–5.2)
SODIUM SERPL-SCNC: 138 MMOL/L (ref 135–145)
TACROLIMUS BLD-MCNC: 8.3 UG/L (ref 5–15)
TME LAST DOSE: NORMAL H
TME LAST DOSE: NORMAL H
WBC # BLD AUTO: 5.9 10E3/UL (ref 4–11)

## 2024-04-30 PROCEDURE — 80197 ASSAY OF TACROLIMUS: CPT

## 2024-04-30 PROCEDURE — 87799 DETECT AGENT NOS DNA QUANT: CPT

## 2024-04-30 PROCEDURE — 36415 COLL VENOUS BLD VENIPUNCTURE: CPT

## 2024-04-30 PROCEDURE — 85027 COMPLETE CBC AUTOMATED: CPT

## 2024-04-30 PROCEDURE — 82248 BILIRUBIN DIRECT: CPT

## 2024-04-30 PROCEDURE — 80053 COMPREHEN METABOLIC PANEL: CPT

## 2024-05-02 DIAGNOSIS — Z94.0 KIDNEY TRANSPLANT RECIPIENT: Primary | ICD-10-CM

## 2024-05-02 RX ORDER — TACROLIMUS 0.5 MG/1
0.5 CAPSULE ORAL 2 TIMES DAILY
Qty: 60 CAPSULE | Refills: 11 | Status: SHIPPED | OUTPATIENT
Start: 2024-05-02 | End: 2024-05-29

## 2024-05-14 ENCOUNTER — LAB (OUTPATIENT)
Dept: LAB | Facility: CLINIC | Age: 29
End: 2024-05-14
Payer: MEDICARE

## 2024-05-14 DIAGNOSIS — Z94.0 KIDNEY REPLACED BY TRANSPLANT: ICD-10-CM

## 2024-05-14 DIAGNOSIS — Z98.890 OTHER SPECIFIED POSTPROCEDURAL STATES: ICD-10-CM

## 2024-05-14 DIAGNOSIS — Z20.828 CONTACT WITH AND (SUSPECTED) EXPOSURE TO OTHER VIRAL COMMUNICABLE DISEASES: ICD-10-CM

## 2024-05-14 DIAGNOSIS — Z48.298 AFTERCARE FOLLOWING ORGAN TRANSPLANT: ICD-10-CM

## 2024-05-14 DIAGNOSIS — Z79.899 ENCOUNTER FOR LONG-TERM CURRENT USE OF MEDICATION: ICD-10-CM

## 2024-05-14 LAB
ALBUMIN SERPL BCG-MCNC: 4.1 G/DL (ref 3.5–5.2)
ALP SERPL-CCNC: 100 U/L (ref 40–150)
ALT SERPL W P-5'-P-CCNC: 26 U/L (ref 0–50)
ANION GAP SERPL CALCULATED.3IONS-SCNC: 11 MMOL/L (ref 7–15)
AST SERPL W P-5'-P-CCNC: 32 U/L (ref 0–45)
BILIRUB DIRECT SERPL-MCNC: <0.2 MG/DL (ref 0–0.3)
BILIRUB SERPL-MCNC: 0.3 MG/DL
BK VIRUS SPECIMEN TYPE: NORMAL
BKV DNA # SPEC NAA+PROBE: NOT DETECTED IU/ML
BUN SERPL-MCNC: 17.7 MG/DL (ref 6–20)
CALCIUM SERPL-MCNC: 9.5 MG/DL (ref 8.6–10)
CHLORIDE SERPL-SCNC: 106 MMOL/L (ref 98–107)
CREAT SERPL-MCNC: 1.27 MG/DL (ref 0.51–0.95)
DEPRECATED HCO3 PLAS-SCNC: 21 MMOL/L (ref 22–29)
EGFRCR SERPLBLD CKD-EPI 2021: 58 ML/MIN/1.73M2
ERYTHROCYTE [DISTWIDTH] IN BLOOD BY AUTOMATED COUNT: 11.7 % (ref 10–15)
GLUCOSE SERPL-MCNC: 96 MG/DL (ref 70–99)
HCT VFR BLD AUTO: 43.2 % (ref 35–47)
HGB BLD-MCNC: 13.9 G/DL (ref 11.7–15.7)
MCH RBC QN AUTO: 29.7 PG (ref 26.5–33)
MCHC RBC AUTO-ENTMCNC: 32.2 G/DL (ref 31.5–36.5)
MCV RBC AUTO: 92 FL (ref 78–100)
PLATELET # BLD AUTO: 184 10E3/UL (ref 150–450)
POTASSIUM SERPL-SCNC: 4.3 MMOL/L (ref 3.4–5.3)
PROT SERPL-MCNC: 7.2 G/DL (ref 6.4–8.3)
RBC # BLD AUTO: 4.68 10E6/UL (ref 3.8–5.2)
SODIUM SERPL-SCNC: 138 MMOL/L (ref 135–145)
TACROLIMUS BLD-MCNC: 10.4 UG/L (ref 5–15)
TME LAST DOSE: NORMAL H
TME LAST DOSE: NORMAL H
WBC # BLD AUTO: 7.3 10E3/UL (ref 4–11)

## 2024-05-14 PROCEDURE — 80197 ASSAY OF TACROLIMUS: CPT

## 2024-05-14 PROCEDURE — 85027 COMPLETE CBC AUTOMATED: CPT

## 2024-05-14 PROCEDURE — 87799 DETECT AGENT NOS DNA QUANT: CPT

## 2024-05-14 PROCEDURE — 80053 COMPREHEN METABOLIC PANEL: CPT

## 2024-05-14 PROCEDURE — 36415 COLL VENOUS BLD VENIPUNCTURE: CPT

## 2024-05-14 PROCEDURE — 87517 HEPATITIS B DNA QUANT: CPT

## 2024-05-14 PROCEDURE — 82248 BILIRUBIN DIRECT: CPT

## 2024-05-15 LAB — HBV DNA SERPL NAA+PROBE-ACNC: NOT DETECTED IU/ML

## 2024-05-24 ENCOUNTER — LAB (OUTPATIENT)
Dept: LAB | Facility: CLINIC | Age: 29
End: 2024-05-24
Payer: MEDICARE

## 2024-05-24 ENCOUNTER — TELEPHONE (OUTPATIENT)
Dept: TRANSPLANT | Facility: CLINIC | Age: 29
End: 2024-05-24

## 2024-05-24 DIAGNOSIS — Z48.298 AFTERCARE FOLLOWING ORGAN TRANSPLANT: ICD-10-CM

## 2024-05-24 DIAGNOSIS — R33.9 INCOMPLETE BLADDER EMPTYING: Primary | ICD-10-CM

## 2024-05-24 LAB
ALBUMIN UR-MCNC: NEGATIVE MG/DL
APPEARANCE UR: CLEAR
BACTERIA #/AREA URNS HPF: ABNORMAL /HPF
BILIRUB UR QL STRIP: NEGATIVE
COLOR UR AUTO: YELLOW
GLUCOSE UR STRIP-MCNC: NEGATIVE MG/DL
HGB UR QL STRIP: ABNORMAL
KETONES UR STRIP-MCNC: NEGATIVE MG/DL
LEUKOCYTE ESTERASE UR QL STRIP: NEGATIVE
NITRATE UR QL: NEGATIVE
PH UR STRIP: 5.5 [PH] (ref 5–8)
RBC #/AREA URNS AUTO: ABNORMAL /HPF
SP GR UR STRIP: <=1.005 (ref 1–1.03)
SQUAMOUS #/AREA URNS AUTO: ABNORMAL /LPF
UROBILINOGEN UR STRIP-ACNC: 0.2 E.U./DL
WBC #/AREA URNS AUTO: ABNORMAL /HPF

## 2024-05-24 PROCEDURE — 81001 URINALYSIS AUTO W/SCOPE: CPT

## 2024-05-24 NOTE — TELEPHONE ENCOUNTER
Leno Whitfield MD Colianni, Lauren, RN  Correct. Let's get a U/S w/ PVR please. No doppler    Orders placed and updated patient

## 2024-05-24 NOTE — TELEPHONE ENCOUNTER
Nuzhat Lopez Lauren, RN  Phone Number: [phone]     Hello,    For the past two days, I ve been having increased pain and difficulty urinating. I also have some mild-moderate lower abdomen pain and urinary urgency, but unable to urinate much throughout the day.    My urine also appears more concentrated and foul-smelling first thing in the morning the last two weeks. I didn t think too much about it because my urine improves throughout the day with good hydration usually.    Please advise on what I need to do next. Thank you for your time.    Nuzhat Simmons

## 2024-05-28 ENCOUNTER — LAB (OUTPATIENT)
Dept: LAB | Facility: CLINIC | Age: 29
End: 2024-05-28
Payer: MEDICARE

## 2024-05-28 DIAGNOSIS — Z20.828 CONTACT WITH AND (SUSPECTED) EXPOSURE TO OTHER VIRAL COMMUNICABLE DISEASES: ICD-10-CM

## 2024-05-28 DIAGNOSIS — Z94.0 KIDNEY REPLACED BY TRANSPLANT: ICD-10-CM

## 2024-05-28 DIAGNOSIS — Z98.890 OTHER SPECIFIED POSTPROCEDURAL STATES: ICD-10-CM

## 2024-05-28 DIAGNOSIS — Z48.298 AFTERCARE FOLLOWING ORGAN TRANSPLANT: ICD-10-CM

## 2024-05-28 DIAGNOSIS — Z79.899 ENCOUNTER FOR LONG-TERM CURRENT USE OF MEDICATION: ICD-10-CM

## 2024-05-28 LAB
ANION GAP SERPL CALCULATED.3IONS-SCNC: 10 MMOL/L (ref 7–15)
BUN SERPL-MCNC: 14.8 MG/DL (ref 6–20)
CALCIUM SERPL-MCNC: 9.6 MG/DL (ref 8.6–10)
CHLORIDE SERPL-SCNC: 103 MMOL/L (ref 98–107)
CREAT SERPL-MCNC: 1.47 MG/DL (ref 0.51–0.95)
DEPRECATED HCO3 PLAS-SCNC: 22 MMOL/L (ref 22–29)
EGFRCR SERPLBLD CKD-EPI 2021: 49 ML/MIN/1.73M2
ERYTHROCYTE [DISTWIDTH] IN BLOOD BY AUTOMATED COUNT: 11.6 % (ref 10–15)
GLUCOSE SERPL-MCNC: 100 MG/DL (ref 70–99)
HCT VFR BLD AUTO: 45 % (ref 35–47)
HGB BLD-MCNC: 14.5 G/DL (ref 11.7–15.7)
MCH RBC QN AUTO: 29.3 PG (ref 26.5–33)
MCHC RBC AUTO-ENTMCNC: 32.2 G/DL (ref 31.5–36.5)
MCV RBC AUTO: 91 FL (ref 78–100)
PLATELET # BLD AUTO: 256 10E3/UL (ref 150–450)
POTASSIUM SERPL-SCNC: 4.6 MMOL/L (ref 3.4–5.3)
RBC # BLD AUTO: 4.95 10E6/UL (ref 3.8–5.2)
SODIUM SERPL-SCNC: 135 MMOL/L (ref 135–145)
TACROLIMUS BLD-MCNC: 10.8 UG/L (ref 5–15)
TME LAST DOSE: NORMAL H
TME LAST DOSE: NORMAL H
WBC # BLD AUTO: 5.9 10E3/UL (ref 4–11)

## 2024-05-28 PROCEDURE — 80197 ASSAY OF TACROLIMUS: CPT

## 2024-05-28 PROCEDURE — 80048 BASIC METABOLIC PNL TOTAL CA: CPT

## 2024-05-28 PROCEDURE — 85027 COMPLETE CBC AUTOMATED: CPT

## 2024-05-28 PROCEDURE — 36415 COLL VENOUS BLD VENIPUNCTURE: CPT

## 2024-05-29 ENCOUNTER — ANCILLARY PROCEDURE (OUTPATIENT)
Dept: ULTRASOUND IMAGING | Facility: CLINIC | Age: 29
End: 2024-05-29
Attending: INTERNAL MEDICINE
Payer: MEDICARE

## 2024-05-29 ENCOUNTER — TELEPHONE (OUTPATIENT)
Dept: TRANSPLANT | Facility: CLINIC | Age: 29
End: 2024-05-29

## 2024-05-29 DIAGNOSIS — Z48.298 AFTERCARE FOLLOWING ORGAN TRANSPLANT: ICD-10-CM

## 2024-05-29 DIAGNOSIS — R33.9 INCOMPLETE BLADDER EMPTYING: ICD-10-CM

## 2024-05-29 DIAGNOSIS — Z94.0 KIDNEY TRANSPLANT RECIPIENT: Primary | ICD-10-CM

## 2024-05-29 PROCEDURE — 76775 US EXAM ABDO BACK WALL LIM: CPT | Mod: GC | Performed by: RADIOLOGY

## 2024-05-29 RX ORDER — TACROLIMUS 1 MG/1
3 CAPSULE ORAL 2 TIMES DAILY
Qty: 180 CAPSULE | Refills: 11 | Status: SHIPPED | OUTPATIENT
Start: 2024-05-29 | End: 2024-08-14

## 2024-05-29 RX ORDER — TACROLIMUS 0.5 MG/1
CAPSULE ORAL
Qty: 60 CAPSULE | Refills: 11 | Status: SHIPPED | OUTPATIENT
Start: 2024-05-29 | End: 2024-08-14

## 2024-05-29 NOTE — TELEPHONE ENCOUNTER
ISSUE:   Tacrolimus IR level 10.8 on 5/29, goal 8-10, dose 3.5 mg BID.    PLAN:   Call Patient and confirm this was an accurate 12-hour trough.   Verify Tacrolimus IR dose 3.5 mg BID.   Confirm no new medications or or missed doses.   Confirm no new illness / infection / diarrhea.   If accurate trough and accurate dose, decrease Tacrolimus IR dose to 3 mg BID     Is this more than a 50% increase or decrease in current IS dose: No  If YES, justification: na    Repeat labs in 1 weeks.  *If > 50% change in immunosuppression dose, repeat labs in 1 week.     OUTCOME: Patient confirmed this was an accurate 12-hour trough.   Verified Tacrolimus IR dose 3.5 mg BID.   Confirmed no new medications or or missed doses.   Confirmed no new illness / infection / diarrhea.   Patient confirms decrease Tacrolimus IR dose to 3 mg BID and repeat labs in 1 week.

## 2024-05-29 NOTE — TELEPHONE ENCOUNTER
Post Kidney and Pancreas Transplant Team Conference  Date: 5/29/2024  Transplant Coordinator: Nely Alonso     Attendees:  [x]  Dr. Noguera [x] Dina Siegel, RN [x] Maritza Charlton LPN     [x]  Dr. Robbins [x] Nely Alonso, RN [] Alyx Guerra LPN    [x] Dr. Whitfield [x] Maria C Mast RN    [x] Dr. Bower [x] Loretta Malik RN [] Darby Mcclure RN   [] Dr. Wise [] Karely Donohue, RN    [] Dr. Keita [] Dione Rosenbaum RN    []  Dr. Chan [] Anne Bang RN    [] Dr. Hurtado [] Jax Cadena RN    [x] Ania Virk NP [] Mary Campuzano RN    [x] Fanny Townsend NP [] Elke Helton RN        Verbal Plan Read Back:   Continue current treatment plan    Routed to RN Coordinator   Maritza Charlton LPN

## 2024-06-05 DIAGNOSIS — B19.10 HEPATITIS B INFECTION WITHOUT DELTA AGENT WITHOUT HEPATIC COMA, UNSPECIFIED CHRONICITY: Primary | ICD-10-CM

## 2024-06-05 DIAGNOSIS — B19.10 HEPATITIS B INFECTION WITHOUT DELTA AGENT WITHOUT HEPATIC COMA, UNSPECIFIED CHRONICITY: ICD-10-CM

## 2024-06-05 RX ORDER — ENTECAVIR 0.5 MG/1
0.5 TABLET, FILM COATED ORAL DAILY
Qty: 30 TABLET | Refills: 5 | Status: CANCELLED | OUTPATIENT
Start: 2024-06-05

## 2024-06-05 RX ORDER — ENTECAVIR 0.5 MG/1
0.5 TABLET, FILM COATED ORAL DAILY
Qty: 30 TABLET | Refills: 5 | Status: SHIPPED | OUTPATIENT
Start: 2024-06-05 | End: 2024-09-04

## 2024-06-11 ENCOUNTER — LAB (OUTPATIENT)
Dept: LAB | Facility: CLINIC | Age: 29
End: 2024-06-11
Payer: MEDICARE

## 2024-06-11 DIAGNOSIS — Z48.298 AFTERCARE FOLLOWING ORGAN TRANSPLANT: ICD-10-CM

## 2024-06-11 DIAGNOSIS — Z94.0 KIDNEY REPLACED BY TRANSPLANT: ICD-10-CM

## 2024-06-11 DIAGNOSIS — Z98.890 OTHER SPECIFIED POSTPROCEDURAL STATES: ICD-10-CM

## 2024-06-11 DIAGNOSIS — Z79.899 ENCOUNTER FOR LONG-TERM CURRENT USE OF MEDICATION: ICD-10-CM

## 2024-06-11 DIAGNOSIS — Z20.828 CONTACT WITH AND (SUSPECTED) EXPOSURE TO OTHER VIRAL COMMUNICABLE DISEASES: ICD-10-CM

## 2024-06-11 LAB
ALBUMIN MFR UR ELPH: 9.2 MG/DL
ALBUMIN SERPL BCG-MCNC: 4 G/DL (ref 3.5–5.2)
ALP SERPL-CCNC: 95 U/L (ref 40–150)
ALT SERPL W P-5'-P-CCNC: 15 U/L (ref 0–50)
AST SERPL W P-5'-P-CCNC: 23 U/L (ref 0–45)
BILIRUB DIRECT SERPL-MCNC: <0.2 MG/DL (ref 0–0.3)
BILIRUB SERPL-MCNC: 0.2 MG/DL
CHOLEST SERPL-MCNC: 96 MG/DL
CREAT UR-MCNC: 126 MG/DL
FASTING STATUS PATIENT QL REPORTED: YES
HBA1C MFR BLD: 5.4 % (ref 0–5.6)
HDLC SERPL-MCNC: 37 MG/DL
LDLC SERPL CALC-MCNC: 25 MG/DL
NONHDLC SERPL-MCNC: 59 MG/DL
PROT SERPL-MCNC: 6.9 G/DL (ref 6.4–8.3)
PROT/CREAT 24H UR: 0.07 MG/MG CR (ref 0–0.2)
TACROLIMUS BLD-MCNC: 7.1 UG/L (ref 5–15)
TME LAST DOSE: NORMAL H
TME LAST DOSE: NORMAL H
TRIGL SERPL-MCNC: 172 MG/DL
URATE SERPL-MCNC: 6.4 MG/DL (ref 2.4–5.7)

## 2024-06-11 PROCEDURE — 84550 ASSAY OF BLOOD/URIC ACID: CPT

## 2024-06-11 PROCEDURE — 80197 ASSAY OF TACROLIMUS: CPT

## 2024-06-11 PROCEDURE — 80061 LIPID PANEL: CPT

## 2024-06-11 PROCEDURE — 80076 HEPATIC FUNCTION PANEL: CPT

## 2024-06-11 PROCEDURE — 36415 COLL VENOUS BLD VENIPUNCTURE: CPT

## 2024-06-11 PROCEDURE — 83036 HEMOGLOBIN GLYCOSYLATED A1C: CPT | Mod: GZ

## 2024-06-11 PROCEDURE — 84156 ASSAY OF PROTEIN URINE: CPT

## 2024-06-13 ENCOUNTER — TELEPHONE (OUTPATIENT)
Dept: PHARMACY | Facility: CLINIC | Age: 29
End: 2024-06-13
Payer: MEDICARE

## 2024-06-13 NOTE — TELEPHONE ENCOUNTER
Clinical Pharmacy Consult:                                                      Transplant Specific: 6 month post transplant medication review  Date of Transplant: 12/08/2023  Type of Transplant: kidney  First Transplant: yes  History of rejection: no    Immunosuppression Regimen   TAC 3mg qAM & 3mg qPM and Myforitic 540mg qAM & 540mg qPM  Patient specific goal: 8-10  Most recent level: 7.1 on 06/11/2024, pending dose adjustment  Immunosuppressant Levels:  Subtherapeutic  Pt adherent to lab draws: yes  Scr:   Lab Results   Component Value Date    CR 1.22 12/28/2023     Side effects:     Prophylactic Medications  PJP:  Bactrim SS daily  Scheduled Discontinue Date: 6 months will check with Tx team if okay to stop    Antifungal: Not needed thus far  Scheduled Discontinue Date: N/A    CMV: CrCl >/=60 mL/minute: Valcyte 900mg daily   Scheduled Discontinue Date: 3 months - completed    Acid Reducer: Not needed  Scheduled Reviewed Date: N/A    Thrombosis Prevention: Not needed  Scheduled Discontinue Date:  N/A    Blood Pressure Management  Frequency of home Blood Pressure checks:   Most recent home BP:   Patient Blood pressure goal: <130/80  Patient blood pressure at goal:    Hospitalizations/ER visits since last assessment:     Med rec/DUR performed: no  Med Rec Discrepancies:     Unable to reach Nuzhat via phone, chart review performed. Last tacrolimus level low, pending dose adjustment. Subjectively refilling appropriately. Her transplant medication % days covered is 1.00 supporting good adherence. Lipid panel this week LDL at goal, A1c 5.4.    Next pharmacy follow up in 6 months.    Time Spent: 5 minutes    Ney Christian, PharmD, BCACP  [facility]  [address] [place] [id number]  Specialty - [phone]  Transplant - [phone]  Mail - [phone]

## 2024-06-24 ENCOUNTER — LAB (OUTPATIENT)
Dept: LAB | Facility: CLINIC | Age: 29
End: 2024-06-24
Attending: INTERNAL MEDICINE
Payer: MEDICARE

## 2024-06-24 ENCOUNTER — OFFICE VISIT (OUTPATIENT)
Dept: TRANSPLANT | Facility: CLINIC | Age: 29
End: 2024-06-24
Attending: INTERNAL MEDICINE
Payer: MEDICARE

## 2024-06-24 VITALS
BODY MASS INDEX: 33.12 KG/M2 | WEIGHT: 175.3 LBS | DIASTOLIC BLOOD PRESSURE: 83 MMHG | OXYGEN SATURATION: 98 % | SYSTOLIC BLOOD PRESSURE: 117 MMHG | HEART RATE: 103 BPM

## 2024-06-24 DIAGNOSIS — Z23 NEED FOR POST EXPOSURE PROPHYLAXIS FOR HEPATITIS B: ICD-10-CM

## 2024-06-24 DIAGNOSIS — Z94.0 KIDNEY REPLACED BY TRANSPLANT: ICD-10-CM

## 2024-06-24 DIAGNOSIS — Z48.298 AFTERCARE FOLLOWING ORGAN TRANSPLANT: ICD-10-CM

## 2024-06-24 DIAGNOSIS — Z20.828 CONTACT WITH AND (SUSPECTED) EXPOSURE TO OTHER VIRAL COMMUNICABLE DISEASES: ICD-10-CM

## 2024-06-24 DIAGNOSIS — Z29.89 NEED FOR PNEUMOCYSTIS PROPHYLAXIS: ICD-10-CM

## 2024-06-24 DIAGNOSIS — Z20.5 EXPOSURE TO HEPATITIS B: Primary | ICD-10-CM

## 2024-06-24 DIAGNOSIS — Z98.890 OTHER SPECIFIED POSTPROCEDURAL STATES: ICD-10-CM

## 2024-06-24 DIAGNOSIS — Z20.828 NEED FOR POST EXPOSURE PROPHYLAXIS FOR HEPATITIS B: ICD-10-CM

## 2024-06-24 DIAGNOSIS — D84.9 IMMUNOSUPPRESSED STATUS (H): Chronic | ICD-10-CM

## 2024-06-24 DIAGNOSIS — E87.20 METABOLIC ACIDOSIS: ICD-10-CM

## 2024-06-24 DIAGNOSIS — Z79.899 ENCOUNTER FOR LONG-TERM CURRENT USE OF MEDICATION: ICD-10-CM

## 2024-06-24 DIAGNOSIS — Z94.0 KIDNEY TRANSPLANT RECIPIENT: ICD-10-CM

## 2024-06-24 DIAGNOSIS — N18.31 STAGE 3A CHRONIC KIDNEY DISEASE (H): ICD-10-CM

## 2024-06-24 PROBLEM — D63.1 ANEMIA IN STAGE 2 CHRONIC KIDNEY DISEASE: Status: RESOLVED | Noted: 2022-01-05 | Resolved: 2024-06-24

## 2024-06-24 PROBLEM — N25.81 SECONDARY RENAL HYPERPARATHYROIDISM (H): Status: ACTIVE | Noted: 2022-06-03

## 2024-06-24 PROBLEM — E83.42 HYPOMAGNESEMIA: Status: RESOLVED | Noted: 2024-02-14 | Resolved: 2024-06-24

## 2024-06-24 PROBLEM — N18.2 ANEMIA IN STAGE 2 CHRONIC KIDNEY DISEASE: Status: RESOLVED | Noted: 2022-01-05 | Resolved: 2024-06-24

## 2024-06-24 LAB
ALBUMIN SERPL BCG-MCNC: 4.2 G/DL (ref 3.5–5.2)
ALP SERPL-CCNC: 97 U/L (ref 40–150)
ALT SERPL W P-5'-P-CCNC: 9 U/L (ref 0–50)
ANION GAP SERPL CALCULATED.3IONS-SCNC: 11 MMOL/L (ref 7–15)
AST SERPL W P-5'-P-CCNC: 17 U/L (ref 0–45)
BILIRUB DIRECT SERPL-MCNC: <0.2 MG/DL (ref 0–0.3)
BILIRUB SERPL-MCNC: 0.5 MG/DL
BUN SERPL-MCNC: 18.5 MG/DL (ref 6–20)
CALCIUM SERPL-MCNC: 9.4 MG/DL (ref 8.6–10)
CHLORIDE SERPL-SCNC: 103 MMOL/L (ref 98–107)
CREAT SERPL-MCNC: 1.26 MG/DL (ref 0.51–0.95)
DEPRECATED HCO3 PLAS-SCNC: 21 MMOL/L (ref 22–29)
EGFRCR SERPLBLD CKD-EPI 2021: 59 ML/MIN/1.73M2
ERYTHROCYTE [DISTWIDTH] IN BLOOD BY AUTOMATED COUNT: 12.2 % (ref 10–15)
GLUCOSE SERPL-MCNC: 98 MG/DL (ref 70–99)
HCT VFR BLD AUTO: 43.7 % (ref 35–47)
HGB BLD-MCNC: 14.1 G/DL (ref 11.7–15.7)
MCH RBC QN AUTO: 29.1 PG (ref 26.5–33)
MCHC RBC AUTO-ENTMCNC: 32.3 G/DL (ref 31.5–36.5)
MCV RBC AUTO: 90 FL (ref 78–100)
PLATELET # BLD AUTO: 255 10E3/UL (ref 150–450)
POTASSIUM SERPL-SCNC: 4.1 MMOL/L (ref 3.4–5.3)
PROT SERPL-MCNC: 7.5 G/DL (ref 6.4–8.3)
RBC # BLD AUTO: 4.85 10E6/UL (ref 3.8–5.2)
SODIUM SERPL-SCNC: 135 MMOL/L (ref 135–145)
TACROLIMUS BLD-MCNC: 8 UG/L (ref 5–15)
TME LAST DOSE: NORMAL H
TME LAST DOSE: NORMAL H
WBC # BLD AUTO: 8.1 10E3/UL (ref 4–11)

## 2024-06-24 PROCEDURE — 99214 OFFICE O/P EST MOD 30 MIN: CPT | Performed by: INTERNAL MEDICINE

## 2024-06-24 PROCEDURE — 80053 COMPREHEN METABOLIC PANEL: CPT | Performed by: PATHOLOGY

## 2024-06-24 PROCEDURE — G0463 HOSPITAL OUTPT CLINIC VISIT: HCPCS | Performed by: INTERNAL MEDICINE

## 2024-06-24 PROCEDURE — 99000 SPECIMEN HANDLING OFFICE-LAB: CPT | Performed by: PATHOLOGY

## 2024-06-24 PROCEDURE — 80197 ASSAY OF TACROLIMUS: CPT | Performed by: INTERNAL MEDICINE

## 2024-06-24 PROCEDURE — 87517 HEPATITIS B DNA QUANT: CPT | Performed by: INTERNAL MEDICINE

## 2024-06-24 PROCEDURE — 36415 COLL VENOUS BLD VENIPUNCTURE: CPT | Performed by: PATHOLOGY

## 2024-06-24 PROCEDURE — 85027 COMPLETE CBC AUTOMATED: CPT | Performed by: PATHOLOGY

## 2024-06-24 PROCEDURE — G2211 COMPLEX E/M VISIT ADD ON: HCPCS | Performed by: INTERNAL MEDICINE

## 2024-06-24 PROCEDURE — 82248 BILIRUBIN DIRECT: CPT | Performed by: PATHOLOGY

## 2024-06-24 RX ORDER — SODIUM BICARBONATE 650 MG/1
1300 TABLET ORAL 2 TIMES DAILY
Qty: 360 TABLET | Refills: 1 | Status: SHIPPED | OUTPATIENT
Start: 2024-06-24

## 2024-06-24 ASSESSMENT — PAIN SCALES - GENERAL: PAINLEVEL: NO PAIN (0)

## 2024-06-24 NOTE — LETTER
6/24/2024      RE: Nuzhat Lopez  [address] [facility] [address]       TRANSPLANT NEPHROLOGY CLINIC VISIT     Assessment & Plan  # LDKT: CKD Stage 3a - Stable   - Baseline Creatinine: ~ 1.2-1.5   - Proteinuria: Normal (<0.2 grams)   - DSA Hx: No DSA   - Last cPRA: 0%   - BK Viremia: No   - Kidney Tx Biopsy Hx: No biopsy history.    # IgA Nephropathy: No evidence of recurrence.    # Immunosuppression: Tacrolimus immediate release (goal 6-8) and Mycophenolic acid (dose 540 mg every 12 hours)   - Induction with Recent Transplant:  Low Intensity Protocol   - Continue with intensive monitoring of immunosuppression for efficacy and toxicity.   - Historical Changes in Immunosuppression: None   - Changes: Not at this time    # Infection Prevention:      - PJP: Sulfa/TMP (Bactrim)  - CMV: None, prophylaxis completed; CMV IgG Ab positive   - CMV IgG Ab High Risk Discordance (D+/R-): No  - EBV IgG Ab High Risk Discordance (D+/R-): No    # Blood Pressure: Controlled;  Goal BP: < 130/80   - Changes: No    # Mineral Bone Disorder:    - Secondary renal hyperparathyroidism; PTH level: Minimally elevated ( pg/ml)        On treatment: None  - Vitamin D; level: Low normal        On supplement: No  - Calcium; level: Normal        On supplement: No    # Electrolytes:   - Potassium; level: Normal        On supplement: No  - Magnesium; level: Normal        On supplement: No  - Bicarbonate; level: Stable low        On supplement: Yes; Will increase sodium bicarbonate to 1300 mg bid.    # Hepatitis B core Ab Positive in Kidney Donor: Patient with Hep B core Ab negative and Hep B surface Ag negative, and Hep B surface Ab positive.  Hep B DNA Quant negative with last check May/2024.  Started on Entecavir (Baraclude) for hepatitis B prophylaxis.   - Will refer to Hepatology to discuss risk/benefit of long-term prophylaxis.     # Other Significant PMH:   - Headaches: Somewhat improved symptoms, especially when sleeping well.   - Hair  Loss: Improved symptoms on biotin.      # Skin Cancer Risk:    - Discussed sun protection and recommend regular follow up with Dermatology.    # Transplant History:  Etiology of Kidney Failure: IgA nephropathy  Tx: LDKT  Transplant: 12/8/2023 (Kidney)  Significant transplant-related complications: None    Transplant Office Phone Number: [phone]    Assessment and plan was discussed with the patient and she voiced her understanding and agreement.    Return visit: Return for previously scheduled visit.    Mike Noguera MD    The longitudinal plan of care for the diagnosis(es)/condition(s) as documented were addressed during this visit. Due to the added complexity in care, I will continue to support Nuzhat Simmons in the subsequent management and with ongoing continuity of care.      Chief Complaint  Ms. Lopez is a 28 year old here for kidney transplant and immunosuppression management.     History of Present Illness   Ms. Lopez reports feeling good overall.  Since last clinic visit:   Hospitalizations: No   New Medical Issues: No  Chest pain or shortness of breath: No  Lower extremity swelling: No  Weight change: No  Nausea and vomiting: Yes; Rare nausea with headaches, but improved overall.  No vomiting.  Diarrhea: No  Heartburn symptoms: No  Fever, sweats or chills: No  Urinary complaints: No    Home BP:  120/80s    Problem List  Patient Active Problem List   Diagnosis     IgA nephropathy     Stage 3a chronic kidney disease (H)     Iron deficiency anemia, unspecified     Secondary renal hyperparathyroidism (H24)     Kidney replaced by transplant     Immunosuppressed status (H24)     Metabolic acidosis     Aftercare following organ transplant     Need for pneumocystis prophylaxis     Need for post exposure prophylaxis for hepatitis B       Allergies  Allergies   Allergen Reactions     Cephalexin Rash     Heparin Flush Rash       Medications  Current Outpatient Medications   Medication Sig Dispense Refill      acetaminophen (TYLENOL) 325 MG tablet Take 3 tablets (975 mg) by mouth every 8 hours as needed for pain 100 tablet 0     atorvastatin (LIPITOR) 10 MG tablet Take 1 tablet (10 mg) by mouth daily 30 tablet 11     biotin 1000 MCG TABS tablet Take 1,000 mcg by mouth daily       entecavir (BARACLUDE) 0.5 MG tablet Take 1 tablet (0.5 mg) by mouth daily 30 tablet 5     mycophenolic acid (GENERIC EQUIVALENT) 180 MG EC tablet Take 3 tablets (540 mg) by mouth 2 times daily Take in place of mycophenolate 180 tablet 11     sodium bicarbonate 650 MG tablet Take 2 tablets (1,300 mg) by mouth 2 times daily 360 tablet 1     sulfamethoxazole-trimethoprim (BACTRIM) 400-80 MG tablet Take 1 tablet by mouth daily 30 tablet 11     tacrolimus (GENERIC EQUIVALENT) 0.5 MG capsule HOLD FOR FUTURE DOSE CHANGES. 60 capsule 11     tacrolimus (GENERIC EQUIVALENT) 1 MG capsule Take 3 capsules (3 mg) by mouth 2 times daily 180 capsule 11     No current facility-administered medications for this visit.     Medications Discontinued During This Encounter   Medication Reason     psyllium (METAMUCIL/KONSYL) 58.6 % powder      sodium bicarbonate 650 MG tablet        Physical Exam  Vital Signs: /83   Pulse 103   Wt 79.5 kg (175 lb 4.8 oz)   SpO2 98%   BMI 33.12 kg/m      GENERAL APPEARANCE: alert and no distress  HENT: mouth without ulcers or lesions  RESP: lungs clear to auscultation - no rales, rhonchi or wheezes  CV: regular rhythm, normal rate, no rub, no murmur  EDEMA: no LE edema bilaterally  ABDOMEN: soft, nondistended, nontender, bowel sounds normal  MS: extremities normal - no gross deformities noted, no evidence of inflammation in joints, no muscle tenderness  SKIN: no rash  TX KIDNEY: normal  DIALYSIS ACCESS:  RUE AV fistula with good thrill    Data        Latest Ref Rng & Units 6/24/2024     8:48 AM 5/28/2024     8:16 AM 5/14/2024     8:18 AM   Renal   Sodium 135 - 145 mmol/L 135  135  138    K 3.4 - 5.3 mmol/L 4.1  4.6  4.3    Cl  98 - 107 mmol/L 103  103  106    Cl (external) 98 - 107 mmol/L 103  103  106    CO2 22 - 29 mmol/L 21  22  21    Urea Nitrogen 6.0 - 20.0 mg/dL 18.5  14.8  17.7    Creatinine 0.51 - 0.95 mg/dL 1.26  1.47  1.27    Glucose 70 - 99 mg/dL 98  100  96    Calcium 8.6 - 10.0 mg/dL 9.4  9.6  9.5          Latest Ref Rng & Units 4/12/2024     9:04 AM 3/26/2024     8:24 AM 2/14/2024     9:38 AM   Bone Health   Phosphorus 2.5 - 4.5 mg/dL  3.3  3.2    Parathyroid Hormone Intact 15 - 65 pg/mL 114            Latest Ref Rng & Units 6/24/2024     8:48 AM 5/28/2024     8:16 AM 5/14/2024     8:18 AM   Heme   WBC 4.0 - 11.0 10e3/uL 8.1  5.9  7.3    Hgb 11.7 - 15.7 g/dL 14.1  14.5  13.9    Plt 150 - 450 10e3/uL 255  256  184          Latest Ref Rng & Units 6/24/2024     8:48 AM 6/11/2024     8:12 AM 5/14/2024     8:18 AM   Liver   AP 40 - 150 U/L 97  95  100    TBili <=1.2 mg/dL 0.5  0.2  0.3    Bilirubin Direct 0.00 - 0.30 mg/dL <0.20  <0.20  <0.20    ALT 0 - 50 U/L 9  15  26    AST 0 - 45 U/L 17  23  32    Tot Protein 6.4 - 8.3 g/dL 7.5  6.9  7.2    Albumin 3.5 - 5.2 g/dL 4.2  4.0  4.1          Latest Ref Rng & Units 6/11/2024     8:12 AM 11/27/2023     2:06 PM   Pancreas   A1C 0.0 - 5.6 % 5.4  4.6          Latest Ref Rng & Units 12/21/2023     7:55 AM 11/27/2023     2:06 PM   Iron studies   Iron 37 - 145 ug/dL 70  73    Iron Sat Index 15 - 46 % 34  42    Ferritin 6 - 175 ng/mL 902  1,549          Latest Ref Rng & Units 11/27/2023     2:06 PM 2/28/2022     1:08 PM   [facility] T[facility] Virology   EBV CAPSID ANTIBODY IGG No detectable antibody. Positive  Positive      Failed to redirect to the Timeline version of the REVFS SmartLink.  Recent Labs   Lab Test 05/14/24  0818 05/28/24  0816 06/11/24  0812   DOSTAC 5/13/2024 5/27/2024 6/10/2024   TACROL 10.4 10.8 7.1     Recent Labs   Lab Test 12/13/23  0723 12/26/23  0757 01/04/24  0803 01/10/24  1003 02/06/24  0811   DOSMPA  --   --  1/3/2024   9:00 PM  --   --    MPACID 3.31   < > 5.13* 2.61 2.34    MPAG 45.4   < > 79.3 52.7 82.5    < > = values in this interval not displayed.        Mike Noguera MD

## 2024-06-24 NOTE — LETTER
6/24/2024      Nuzhat Lopez  [address] [facility] [address]      Dear Colleague,    Thank you for referring your patient, Nuzhat Lopez, to the [facility]. Please see a copy of my visit note below.    TRANSPLANT NEPHROLOGY CLINIC VISIT     Assessment & Plan  # LDKT: CKD Stage 3a - Stable   - Baseline Creatinine: ~ 1.2-1.5   - Proteinuria: Normal (<0.2 grams)   - DSA Hx: No DSA   - Last cPRA: 0%   - BK Viremia: No   - Kidney Tx Biopsy Hx: No biopsy history.    # IgA Nephropathy: No evidence of recurrence.    # Immunosuppression: Tacrolimus immediate release (goal 6-8) and Mycophenolic acid (dose 540 mg every 12 hours)   - Induction with Recent Transplant:  Low Intensity Protocol   - Continue with intensive monitoring of immunosuppression for efficacy and toxicity.   - Historical Changes in Immunosuppression: None   - Changes: Not at this time    # Infection Prevention:      - PJP: Sulfa/TMP (Bactrim)  - CMV: None, prophylaxis completed; CMV IgG Ab positive   - CMV IgG Ab High Risk Discordance (D+/R-): No  - EBV IgG Ab High Risk Discordance (D+/R-): No    # Blood Pressure: Controlled;  Goal BP: < 130/80   - Changes: No    # Mineral Bone Disorder:    - Secondary renal hyperparathyroidism; PTH level: Minimally elevated ( pg/ml)        On treatment: None  - Vitamin D; level: Low normal        On supplement: No  - Calcium; level: Normal        On supplement: No    # Electrolytes:   - Potassium; level: Normal        On supplement: No  - Magnesium; level: Normal        On supplement: No  - Bicarbonate; level: Stable low        On supplement: Yes; Will increase sodium bicarbonate to 1300 mg bid.    # Hepatitis B core Ab Positive in Kidney Donor: Patient with Hep B core Ab negative and Hep B surface Ag negative, and Hep B surface Ab positive.  Hep B DNA Quant negative with last check May/2024.  Started on Entecavir (Baraclude) for hepatitis B prophylaxis.   - Will refer to Hepatology to  discuss risk/benefit of long-term prophylaxis.     # Other Significant PMH:   - Headaches: Somewhat improved symptoms, especially when sleeping well.   - Hair Loss: Improved symptoms on biotin.      # Skin Cancer Risk:    - Discussed sun protection and recommend regular follow up with Dermatology.    # Transplant History:  Etiology of Kidney Failure: IgA nephropathy  Tx: LDKT  Transplant: 12/8/2023 (Kidney)  Significant transplant-related complications: None    Transplant Office Phone Number: [phone]    Assessment and plan was discussed with the patient and she voiced her understanding and agreement.    Return visit: Return for previously scheduled visit.    Mike Noguera MD    The longitudinal plan of care for the diagnosis(es)/condition(s) as documented were addressed during this visit. Due to the added complexity in care, I will continue to support Nuzhat Troy in the subsequent management and with ongoing continuity of care.      Chief Complaint  Ms. Lopez is a 28 year old here for kidney transplant and immunosuppression management.     History of Present Illness   Ms. Lopez reports feeling good overall.  Since last clinic visit:   Hospitalizations: No   New Medical Issues: No  Chest pain or shortness of breath: No  Lower extremity swelling: No  Weight change: No  Nausea and vomiting: Yes; Rare nausea with headaches, but improved overall.  No vomiting.  Diarrhea: No  Heartburn symptoms: No  Fever, sweats or chills: No  Urinary complaints: No    Home BP:  120/80s    Problem List  Patient Active Problem List   Diagnosis     IgA nephropathy     Stage 3a chronic kidney disease (H)     Iron deficiency anemia, unspecified     Secondary renal hyperparathyroidism (H24)     Kidney replaced by transplant     Immunosuppressed status (H24)     Metabolic acidosis     Aftercare following organ transplant     Need for pneumocystis prophylaxis     Need for post exposure prophylaxis for hepatitis B       Allergies  Allergies    Allergen Reactions     Cephalexin Rash     Heparin Flush Rash       Medications  Current Outpatient Medications   Medication Sig Dispense Refill     acetaminophen (TYLENOL) 325 MG tablet Take 3 tablets (975 mg) by mouth every 8 hours as needed for pain 100 tablet 0     atorvastatin (LIPITOR) 10 MG tablet Take 1 tablet (10 mg) by mouth daily 30 tablet 11     biotin 1000 MCG TABS tablet Take 1,000 mcg by mouth daily       entecavir (BARACLUDE) 0.5 MG tablet Take 1 tablet (0.5 mg) by mouth daily 30 tablet 5     mycophenolic acid (GENERIC EQUIVALENT) 180 MG EC tablet Take 3 tablets (540 mg) by mouth 2 times daily Take in place of mycophenolate 180 tablet 11     sodium bicarbonate 650 MG tablet Take 2 tablets (1,300 mg) by mouth 2 times daily 360 tablet 1     sulfamethoxazole-trimethoprim (BACTRIM) 400-80 MG tablet Take 1 tablet by mouth daily 30 tablet 11     tacrolimus (GENERIC EQUIVALENT) 0.5 MG capsule HOLD FOR FUTURE DOSE CHANGES. 60 capsule 11     tacrolimus (GENERIC EQUIVALENT) 1 MG capsule Take 3 capsules (3 mg) by mouth 2 times daily 180 capsule 11     No current facility-administered medications for this visit.     Medications Discontinued During This Encounter   Medication Reason     psyllium (METAMUCIL/KONSYL) 58.6 % powder      sodium bicarbonate 650 MG tablet        Physical Exam  Vital Signs: /83   Pulse 103   Wt 79.5 kg (175 lb 4.8 oz)   SpO2 98%   BMI 33.12 kg/m      GENERAL APPEARANCE: alert and no distress  HENT: mouth without ulcers or lesions  RESP: lungs clear to auscultation - no rales, rhonchi or wheezes  CV: regular rhythm, normal rate, no rub, no murmur  EDEMA: no LE edema bilaterally  ABDOMEN: soft, nondistended, nontender, bowel sounds normal  MS: extremities normal - no gross deformities noted, no evidence of inflammation in joints, no muscle tenderness  SKIN: no rash  TX KIDNEY: normal  DIALYSIS ACCESS:  RUE AV fistula with good thrill    Data        Latest Ref Rng & Units  6/24/2024     8:48 AM 5/28/2024     8:16 AM 5/14/2024     8:18 AM   Renal   Sodium 135 - 145 mmol/L 135  135  138    K 3.4 - 5.3 mmol/L 4.1  4.6  4.3    Cl 98 - 107 mmol/L 103  103  106    Cl (external) 98 - 107 mmol/L 103  103  106    CO2 22 - 29 mmol/L 21  22  21    Urea Nitrogen 6.0 - 20.0 mg/dL 18.5  14.8  17.7    Creatinine 0.51 - 0.95 mg/dL 1.26  1.47  1.27    Glucose 70 - 99 mg/dL 98  100  96    Calcium 8.6 - 10.0 mg/dL 9.4  9.6  9.5          Latest Ref Rng & Units 4/12/2024     9:04 AM 3/26/2024     8:24 AM 2/14/2024     9:38 AM   Bone Health   Phosphorus 2.5 - 4.5 mg/dL  3.3  3.2    Parathyroid Hormone Intact 15 - 65 pg/mL 114            Latest Ref Rng & Units 6/24/2024     8:48 AM 5/28/2024     8:16 AM 5/14/2024     8:18 AM   Heme   WBC 4.0 - 11.0 10e3/uL 8.1  5.9  7.3    Hgb 11.7 - 15.7 g/dL 14.1  14.5  13.9    Plt 150 - 450 10e3/uL 255  256  184          Latest Ref Rng & Units 6/24/2024     8:48 AM 6/11/2024     8:12 AM 5/14/2024     8:18 AM   Liver   AP 40 - 150 U/L 97  95  100    TBili <=1.2 mg/dL 0.5  0.2  0.3    Bilirubin Direct 0.00 - 0.30 mg/dL <0.20  <0.20  <0.20    ALT 0 - 50 U/L 9  15  26    AST 0 - 45 U/L 17  23  32    Tot Protein 6.4 - 8.3 g/dL 7.5  6.9  7.2    Albumin 3.5 - 5.2 g/dL 4.2  4.0  4.1          Latest Ref Rng & Units 6/11/2024     8:12 AM 11/27/2023     2:06 PM   Pancreas   A1C 0.0 - 5.6 % 5.4  4.6          Latest Ref Rng & Units 12/21/2023     7:55 AM 11/27/2023     2:06 PM   Iron studies   Iron 37 - 145 ug/dL 70  73    Iron Sat Index 15 - 46 % 34  42    Ferritin 6 - 175 ng/mL 902  1,549          Latest Ref Rng & Units 11/27/2023     2:06 PM 2/28/2022     1:08 PM   [facility] Txp Virology   EBV CAPSID ANTIBODY IGG No detectable antibody. Positive  Positive      Failed to redirect to the Timeline version of the REVFS SmartLink.  Recent Labs   Lab Test 05/14/24  0818 05/28/24  0816 06/11/24  0812   DOSTAC 5/13/2024 5/27/2024 6/10/2024   TACROL 10.4 10.8 7.1     Recent Labs   Lab Test  12/13/23  0723 12/26/23  0757 01/04/24  0803 01/10/24  1003 02/06/24  0811   DOSMPA  --   --  1/3/2024   9:00 PM  --   --    MPACID 3.31   < > 5.13* 2.61 2.34   MPAG 45.4   < > 79.3 52.7 82.5    < > = values in this interval not displayed.          Again, thank you for allowing me to participate in the care of your patient.        Sincerely,        Mike Noguera MD

## 2024-06-24 NOTE — PATIENT INSTRUCTIONS
Patient Recommendations:  - Increase sodium bicarbonate to 1300 mg twice daily.    Transplant Patient Information  Your Post Transplant Coordinator is: Nely Alonso  For non urgent items, we encourage you to contact your coordinator/care team online via [employer]  You and your care team can also contact your transplant coordinator Monday - Friday, 8am - 5pm at [phone] (Option 2 to reach the coordinator or Option 4 to schedule an appointment).  After hours for urgent matters, please call [facility] at [phone].

## 2024-06-24 NOTE — PROGRESS NOTES
TRANSPLANT NEPHROLOGY CLINIC VISIT     Assessment & Plan   # LDKT: CKD Stage 3a - Stable   - Baseline Creatinine: ~ 1.2-1.5   - Proteinuria: Normal (<0.2 grams)   - DSA Hx: No DSA   - Last cPRA: 0%   - BK Viremia: No   - Kidney Tx Biopsy Hx: No biopsy history.    # IgA Nephropathy: No evidence of recurrence.    # Immunosuppression: Tacrolimus immediate release (goal 6-8) and Mycophenolic acid (dose 540 mg every 12 hours)   - Induction with Recent Transplant:  Low Intensity Protocol   - Continue with intensive monitoring of immunosuppression for efficacy and toxicity.   - Historical Changes in Immunosuppression: None   - Changes: Not at this time    # Infection Prevention:      - PJP: Sulfa/TMP (Bactrim)  - CMV: None, prophylaxis completed; CMV IgG Ab positive   - CMV IgG Ab High Risk Discordance (D+/R-): No  - EBV IgG Ab High Risk Discordance (D+/R-): No    # Blood Pressure: Controlled;  Goal BP: < 130/80   - Changes: No    # Mineral Bone Disorder:    - Secondary renal hyperparathyroidism; PTH level: Minimally elevated ( pg/ml)        On treatment: None  - Vitamin D; level: Low normal        On supplement: No  - Calcium; level: Normal        On supplement: No    # Electrolytes:   - Potassium; level: Normal        On supplement: No  - Magnesium; level: Normal        On supplement: No  - Bicarbonate; level: Stable low        On supplement: Yes; Will increase sodium bicarbonate to 1300 mg bid.    # Hepatitis B core Ab Positive in Kidney Donor: Patient with Hep B core Ab negative and Hep B surface Ag negative, and Hep B surface Ab positive.  Hep B DNA Quant negative with last check May/2024.  Started on Entecavir (Baraclude) for hepatitis B prophylaxis.   - Will refer to Hepatology to discuss risk/benefit of long-term prophylaxis.     # Other Significant PMH:   - Headaches: Somewhat improved symptoms, especially when sleeping well.   - Hair Loss: Improved symptoms on biotin.      # Skin Cancer Risk:    - Discussed  sun protection and recommend regular follow up with Dermatology.    # Transplant History:  Etiology of Kidney Failure: IgA nephropathy  Tx: LDKT  Transplant: 12/8/2023 (Kidney)  Significant transplant-related complications: None    Transplant Office Phone Number: [phone]    Assessment and plan was discussed with the patient and she voiced her understanding and agreement.    Return visit: Return for previously scheduled visit.    Mike Noguera MD    The longitudinal plan of care for the diagnosis(es)/condition(s) as documented were addressed during this visit. Due to the added complexity in care, I will continue to support Nuzhat Simmons in the subsequent management and with ongoing continuity of care.      Chief Complaint   Ms. Lopez is a 28 year old here for kidney transplant and immunosuppression management.     History of Present Illness    Ms. Lopez reports feeling good overall.  Since last clinic visit:   Hospitalizations: No   New Medical Issues: No  Chest pain or shortness of breath: No  Lower extremity swelling: No  Weight change: No  Nausea and vomiting: Yes; Rare nausea with headaches, but improved overall.  No vomiting.  Diarrhea: No  Heartburn symptoms: No  Fever, sweats or chills: No  Urinary complaints: No    Home BP:  120/80s    Problem List   Patient Active Problem List   Diagnosis    IgA nephropathy    Stage 3a chronic kidney disease (H)    Iron deficiency anemia, unspecified    Secondary renal hyperparathyroidism (H24)    Kidney replaced by transplant    Immunosuppressed status (H24)    Metabolic acidosis    Aftercare following organ transplant    Need for pneumocystis prophylaxis    Need for post exposure prophylaxis for hepatitis B       Allergies   Allergies   Allergen Reactions    Cephalexin Rash    Heparin Flush Rash       Medications   Current Outpatient Medications   Medication Sig Dispense Refill    acetaminophen (TYLENOL) 325 MG tablet Take 3 tablets (975 mg) by mouth every 8 hours as  needed for pain 100 tablet 0    atorvastatin (LIPITOR) 10 MG tablet Take 1 tablet (10 mg) by mouth daily 30 tablet 11    biotin 1000 MCG TABS tablet Take 1,000 mcg by mouth daily      entecavir (BARACLUDE) 0.5 MG tablet Take 1 tablet (0.5 mg) by mouth daily 30 tablet 5    mycophenolic acid (GENERIC EQUIVALENT) 180 MG EC tablet Take 3 tablets (540 mg) by mouth 2 times daily Take in place of mycophenolate 180 tablet 11    sodium bicarbonate 650 MG tablet Take 2 tablets (1,300 mg) by mouth 2 times daily 360 tablet 1    sulfamethoxazole-trimethoprim (BACTRIM) 400-80 MG tablet Take 1 tablet by mouth daily 30 tablet 11    tacrolimus (GENERIC EQUIVALENT) 0.5 MG capsule HOLD FOR FUTURE DOSE CHANGES. 60 capsule 11    tacrolimus (GENERIC EQUIVALENT) 1 MG capsule Take 3 capsules (3 mg) by mouth 2 times daily 180 capsule 11     No current facility-administered medications for this visit.     Medications Discontinued During This Encounter   Medication Reason    psyllium (METAMUCIL/KONSYL) 58.6 % powder     sodium bicarbonate 650 MG tablet        Physical Exam   Vital Signs: /83   Pulse 103   Wt 79.5 kg (175 lb 4.8 oz)   SpO2 98%   BMI 33.12 kg/m      GENERAL APPEARANCE: alert and no distress  HENT: mouth without ulcers or lesions  RESP: lungs clear to auscultation - no rales, rhonchi or wheezes  CV: regular rhythm, normal rate, no rub, no murmur  EDEMA: no LE edema bilaterally  ABDOMEN: soft, nondistended, nontender, bowel sounds normal  MS: extremities normal - no gross deformities noted, no evidence of inflammation in joints, no muscle tenderness  SKIN: no rash  TX KIDNEY: normal  DIALYSIS ACCESS:  RUE AV fistula with good thrill    Data         Latest Ref Rng & Units 6/24/2024     8:48 AM 5/28/2024     8:16 AM 5/14/2024     8:18 AM   Renal   Sodium 135 - 145 mmol/L 135  135  138    K 3.4 - 5.3 mmol/L 4.1  4.6  4.3    Cl 98 - 107 mmol/L 103  103  106    Cl (external) 98 - 107 mmol/L 103  103  106    CO2 22 - 29  mmol/L 21  22  21    Urea Nitrogen 6.0 - 20.0 mg/dL 18.5  14.8  17.7    Creatinine 0.51 - 0.95 mg/dL 1.26  1.47  1.27    Glucose 70 - 99 mg/dL 98  100  96    Calcium 8.6 - 10.0 mg/dL 9.4  9.6  9.5          Latest Ref Rng & Units 4/12/2024     9:04 AM 3/26/2024     8:24 AM 2/14/2024     9:38 AM   Bone Health   Phosphorus 2.5 - 4.5 mg/dL  3.3  3.2    Parathyroid Hormone Intact 15 - 65 pg/mL 114            Latest Ref Rng & Units 6/24/2024     8:48 AM 5/28/2024     8:16 AM 5/14/2024     8:18 AM   Heme   WBC 4.0 - 11.0 10e3/uL 8.1  5.9  7.3    Hgb 11.7 - 15.7 g/dL 14.1  14.5  13.9    Plt 150 - 450 10e3/uL 255  256  184          Latest Ref Rng & Units 6/24/2024     8:48 AM 6/11/2024     8:12 AM 5/14/2024     8:18 AM   Liver   AP 40 - 150 U/L 97  95  100    TBili <=1.2 mg/dL 0.5  0.2  0.3    Bilirubin Direct 0.00 - 0.30 mg/dL <0.20  <0.20  <0.20    ALT 0 - 50 U/L 9  15  26    AST 0 - 45 U/L 17  23  32    Tot Protein 6.4 - 8.3 g/dL 7.5  6.9  7.2    Albumin 3.5 - 5.2 g/dL 4.2  4.0  4.1          Latest Ref Rng & Units 6/11/2024     8:12 AM 11/27/2023     2:06 PM   Pancreas   A1C 0.0 - 5.6 % 5.4  4.6          Latest Ref Rng & Units 12/21/2023     7:55 AM 11/27/2023     2:06 PM   Iron studies   Iron 37 - 145 ug/dL 70  73    Iron Sat Index 15 - 46 % 34  42    Ferritin 6 - 175 ng/mL 902  1,549          Latest Ref Rng & Units 11/27/2023     2:06 PM 2/28/2022     1:08 PM   [facility]   EBV CAPSID ANTIBODY IGG No detectable antibody. Positive  Positive      Failed to redirect to the Timeline version of the REVFS SmartLink.  Recent Labs   Lab Test 05/14/24  0818 05/28/24  0816 06/11/24  0812   DOSTAC 5/13/2024 5/27/2024 6/10/2024   TACROL 10.4 10.8 7.1     Recent Labs   Lab Test 12/13/23  0723 12/26/23  0757 01/04/24  0803 01/10/24  1003 02/06/24  0811   DOSMPA  --   --  1/3/2024   9:00 PM  --   --    MPACID 3.31   < > 5.13* 2.61 2.34   MPAG 45.4   < > 79.3 52.7 82.5    < > = values in this interval not displayed.

## 2024-06-26 LAB — HBV DNA SERPL NAA+PROBE-ACNC: NOT DETECTED IU/ML

## 2024-07-10 NOTE — CONFIDENTIAL NOTE
DIAGNOSIS:  Exposure to hepatitis B    Appt Date:  09.10.2024    NOTES STATUS DETAILS   OFFICE NOTE from referring provider Internal 06.24.2024 Mike Noguera MD    DISCHARGE SUMMARY from hospital Internal 12.08.2023 [facility]   MEDICATION LIST Internal    LABS     HEPATIC PANEL (LIVER PANEL) Internal 06.24.2024    BASIC METABOLIC PANEL Internal 06.24.2024    COMPLETE METABOLIC PANEL Internal 05.28.2024   COMPLETE BLOOD COUNT (CBC) Internal 05.28.2024    HEPATITIS C ANTIBODY Internal 12.08.2023    HEPATITIS B SURFACE ANTIGEN Internal 12.08.2023   HEPATITIS B SURFACE ANTIBODY Internal 12.08.2023   HEPATITIS B CORE ANTIBODY Internal 12.08.2023

## 2024-07-23 ENCOUNTER — LAB (OUTPATIENT)
Dept: LAB | Facility: CLINIC | Age: 29
End: 2024-07-23
Payer: MEDICARE

## 2024-07-23 DIAGNOSIS — Z48.298 AFTERCARE FOLLOWING ORGAN TRANSPLANT: ICD-10-CM

## 2024-07-23 DIAGNOSIS — Z20.828 CONTACT WITH AND (SUSPECTED) EXPOSURE TO OTHER VIRAL COMMUNICABLE DISEASES: ICD-10-CM

## 2024-07-23 DIAGNOSIS — Z98.890 OTHER SPECIFIED POSTPROCEDURAL STATES: ICD-10-CM

## 2024-07-23 DIAGNOSIS — Z79.899 ENCOUNTER FOR LONG-TERM CURRENT USE OF MEDICATION: ICD-10-CM

## 2024-07-23 DIAGNOSIS — Z94.0 KIDNEY REPLACED BY TRANSPLANT: ICD-10-CM

## 2024-07-23 LAB
ANION GAP SERPL CALCULATED.3IONS-SCNC: 8 MMOL/L (ref 7–15)
BUN SERPL-MCNC: 24.3 MG/DL (ref 6–20)
CALCIUM SERPL-MCNC: 9.8 MG/DL (ref 8.8–10.4)
CHLORIDE SERPL-SCNC: 104 MMOL/L (ref 98–107)
CREAT SERPL-MCNC: 1.35 MG/DL (ref 0.51–0.95)
EGFRCR SERPLBLD CKD-EPI 2021: 54 ML/MIN/1.73M2
ERYTHROCYTE [DISTWIDTH] IN BLOOD BY AUTOMATED COUNT: 12.4 % (ref 10–15)
GLUCOSE SERPL-MCNC: 94 MG/DL (ref 70–99)
HCO3 SERPL-SCNC: 24 MMOL/L (ref 22–29)
HCT VFR BLD AUTO: 45 % (ref 35–47)
HGB BLD-MCNC: 14.5 G/DL (ref 11.7–15.7)
MCH RBC QN AUTO: 29.3 PG (ref 26.5–33)
MCHC RBC AUTO-ENTMCNC: 32.2 G/DL (ref 31.5–36.5)
MCV RBC AUTO: 91 FL (ref 78–100)
PLATELET # BLD AUTO: 236 10E3/UL (ref 150–450)
POTASSIUM SERPL-SCNC: 4.3 MMOL/L (ref 3.4–5.3)
RBC # BLD AUTO: 4.95 10E6/UL (ref 3.8–5.2)
SODIUM SERPL-SCNC: 136 MMOL/L (ref 135–145)
TACROLIMUS BLD-MCNC: 6.6 UG/L (ref 5–15)
TME LAST DOSE: NORMAL H
TME LAST DOSE: NORMAL H
WBC # BLD AUTO: 7 10E3/UL (ref 4–11)

## 2024-07-23 PROCEDURE — 86833 HLA CLASS II HIGH DEFIN QUAL: CPT

## 2024-07-23 PROCEDURE — 86832 HLA CLASS I HIGH DEFIN QUAL: CPT

## 2024-07-23 PROCEDURE — 85027 COMPLETE CBC AUTOMATED: CPT

## 2024-07-23 PROCEDURE — 80197 ASSAY OF TACROLIMUS: CPT

## 2024-07-23 PROCEDURE — 80048 BASIC METABOLIC PNL TOTAL CA: CPT

## 2024-07-23 PROCEDURE — 36415 COLL VENOUS BLD VENIPUNCTURE: CPT

## 2024-07-24 LAB
DONOR IDENTIFICATION: NORMAL
DSA COMMENTS: NORMAL
DSA PRESENT: NO
DSA TEST METHOD: NORMAL
ORGAN: NORMAL
SA 1  COMMENTS: NORMAL
SA 1 CELL: NORMAL
SA 1 TEST METHOD: NORMAL
SA 2 CELL: NORMAL
SA 2 COMMENTS: NORMAL
SA 2 TEST METHOD: NORMAL
SA1 HI RISK ABY: NORMAL
SA1 MOD RISK ABY: NORMAL
SA2 HI RISK ABY: NORMAL
SA2 MOD RISK ABY: NORMAL
UNACCEPTABLE ANTIGENS: NORMAL
UNOS CPRA: 0

## 2024-08-13 ENCOUNTER — LAB (OUTPATIENT)
Dept: LAB | Facility: CLINIC | Age: 29
End: 2024-08-13
Payer: MEDICARE

## 2024-08-13 DIAGNOSIS — Z79.899 ENCOUNTER FOR LONG-TERM CURRENT USE OF MEDICATION: ICD-10-CM

## 2024-08-13 DIAGNOSIS — Z98.890 OTHER SPECIFIED POSTPROCEDURAL STATES: ICD-10-CM

## 2024-08-13 DIAGNOSIS — Z48.298 AFTERCARE FOLLOWING ORGAN TRANSPLANT: ICD-10-CM

## 2024-08-13 DIAGNOSIS — Z94.0 KIDNEY REPLACED BY TRANSPLANT: ICD-10-CM

## 2024-08-13 DIAGNOSIS — Z20.828 CONTACT WITH AND (SUSPECTED) EXPOSURE TO OTHER VIRAL COMMUNICABLE DISEASES: ICD-10-CM

## 2024-08-13 LAB
ANION GAP SERPL CALCULATED.3IONS-SCNC: 9 MMOL/L (ref 7–15)
BUN SERPL-MCNC: 21.9 MG/DL (ref 6–20)
CALCIUM SERPL-MCNC: 9.6 MG/DL (ref 8.8–10.4)
CHLORIDE SERPL-SCNC: 105 MMOL/L (ref 98–107)
CREAT SERPL-MCNC: 1.48 MG/DL (ref 0.51–0.95)
EGFRCR SERPLBLD CKD-EPI 2021: 49 ML/MIN/1.73M2
ERYTHROCYTE [DISTWIDTH] IN BLOOD BY AUTOMATED COUNT: 12.7 % (ref 10–15)
GLUCOSE SERPL-MCNC: 98 MG/DL (ref 70–99)
HCO3 SERPL-SCNC: 22 MMOL/L (ref 22–29)
HCT VFR BLD AUTO: 44.1 % (ref 35–47)
HGB BLD-MCNC: 14.2 G/DL (ref 11.7–15.7)
MCH RBC QN AUTO: 28.7 PG (ref 26.5–33)
MCHC RBC AUTO-ENTMCNC: 32.2 G/DL (ref 31.5–36.5)
MCV RBC AUTO: 89 FL (ref 78–100)
PLATELET # BLD AUTO: 253 10E3/UL (ref 150–450)
POTASSIUM SERPL-SCNC: 4.5 MMOL/L (ref 3.4–5.3)
RBC # BLD AUTO: 4.94 10E6/UL (ref 3.8–5.2)
SODIUM SERPL-SCNC: 136 MMOL/L (ref 135–145)
TACROLIMUS BLD-MCNC: 9.5 UG/L (ref 5–15)
TME LAST DOSE: NORMAL H
TME LAST DOSE: NORMAL H
WBC # BLD AUTO: 6.9 10E3/UL (ref 4–11)

## 2024-08-13 PROCEDURE — 36415 COLL VENOUS BLD VENIPUNCTURE: CPT

## 2024-08-13 PROCEDURE — 80197 ASSAY OF TACROLIMUS: CPT

## 2024-08-13 PROCEDURE — 85027 COMPLETE CBC AUTOMATED: CPT

## 2024-08-13 PROCEDURE — 80048 BASIC METABOLIC PNL TOTAL CA: CPT

## 2024-08-14 ENCOUNTER — TELEPHONE (OUTPATIENT)
Dept: TRANSPLANT | Facility: CLINIC | Age: 29
End: 2024-08-14
Payer: MEDICARE

## 2024-08-14 DIAGNOSIS — Z94.0 KIDNEY TRANSPLANT RECIPIENT: ICD-10-CM

## 2024-08-14 DIAGNOSIS — Z48.298 AFTERCARE FOLLOWING ORGAN TRANSPLANT: Primary | ICD-10-CM

## 2024-08-14 RX ORDER — TACROLIMUS 0.5 MG/1
0.5 CAPSULE ORAL 2 TIMES DAILY
Qty: 60 CAPSULE | Refills: 11 | Status: SHIPPED | OUTPATIENT
Start: 2024-08-14

## 2024-08-14 RX ORDER — TACROLIMUS 1 MG/1
2 CAPSULE ORAL 2 TIMES DAILY
Qty: 180 CAPSULE | Refills: 11 | Status: SHIPPED | OUTPATIENT
Start: 2024-08-14

## 2024-09-04 DIAGNOSIS — B19.10 HEPATITIS B INFECTION WITHOUT DELTA AGENT WITHOUT HEPATIC COMA, UNSPECIFIED CHRONICITY: ICD-10-CM

## 2024-09-04 RX ORDER — ENTECAVIR 0.5 MG/1
0.5 TABLET, FILM COATED ORAL DAILY
Qty: 30 TABLET | Refills: 5 | Status: SHIPPED | OUTPATIENT
Start: 2024-09-04

## 2024-09-09 NOTE — PROGRESS NOTES
TRANSPLANT NEPHROLOGY CLINIC VISIT     Assessment & Plan   # LDKT: CKD Stage 3a - Stable   - Baseline Creatinine: ~ 1.2-1.5   - Proteinuria: Normal (<0.2 grams)   - DSA Hx: No DSA   - Last cPRA: 0%   - BK Viremia: No   - Kidney Tx Biopsy Hx: No biopsy history.    # IgA Nephropathy: No evidence of recurrence.    # Immunosuppression: Tacrolimus immediate release (goal 6-8) and Mycophenolic acid (dose 540 mg every 12 hours)   - Induction with Recent Transplant:  Low Intensity Protocol   - Continue with intensive monitoring of immunosuppression for efficacy and toxicity.   - Historical Changes in Immunosuppression: None   - Changes: Not at this time as we await today's tacrolimus level.    # Tachycardia: New today. Her dad has Brugada Syndrome. EKG today doesn't have features of brugada syndrome such as RBBB, ST elevations or T wave inversions. It's sinus tachycardia with HR of 109 (120s before EKG).    - Given her family history and new onset tachycardia we'll send her to see cardiology.     # Infection Prevention:      - PJP: Sulfa/TMP (Bactrim)  - CMV: None, prophylaxis completed; CMV IgG Ab positive   - CMV IgG Ab High Risk Discordance (D+/R-): No  - EBV IgG Ab High Risk Discordance (D+/R-): No    # Blood Pressure: Controlled;  Goal BP: < 130/80   - Changes: No    # Mineral Bone Disorder:    - Secondary renal hyperparathyroidism; PTH level: Minimally elevated ( pg/ml)        On treatment: None  - Vitamin D; level: Low normal        On supplement: No  - Calcium; level: Normal        On supplement: No    # Electrolytes:   - Potassium; level: Normal        On supplement: No  - Magnesium; level: Normal        On supplement: No  - Bicarbonate; level: Stable low        On supplement: Yes; sodium bicarbonate to 1300 mg bid.    # Hepatitis B core Ab Positive in Kidney Donor: Patient with Hep B core Ab negative and Hep B surface Ag negative, and Hep B surface Ab positive.  Hep B DNA Quant negative with last check  May/2024.  Started on Entecavir (Baraclude) for hepatitis B prophylaxis.   - Will refer to Hepatology to discuss risk/benefit of long-term prophylaxis.     # Other Significant PMH:   - Headaches: Somewhat improved symptoms, especially when sleeping well.   - Hair Loss: Improved symptoms on biotin.      # Skin Cancer Risk:    - Discussed sun protection and recommend regular follow up with Dermatology.    # Transplant History:  Etiology of Kidney Failure: IgA nephropathy  Tx: LDKT  Transplant: 12/8/2023 (Kidney)  Significant transplant-related complications: None    Transplant Office Phone Number: [phone]    Assessment and plan was discussed with the patient and she voiced her understanding and agreement.    Return visit: Return for appointment scheduled for 12/10/2024.    Jonnie Hills MD    The longitudinal plan of care for the diagnosis(es)/condition(s) as documented were addressed during this visit. Due to the added complexity in care, I will continue to support Nuzhat Simmons in the subsequent management and with ongoing continuity of care.      Chief Complaint   Ms. Lopez is a 28 year old here for kidney transplant and immunosuppression management.     History of Present Illness    Ms. Lopez reports feeling good overall.    Since last clinic visit:   Hospitalizations: No   New Medical Issues: No    Doesn't report any palpitations or feelings of skipping a beat. No exertional chest pain or shortness of breath.    Activity: Walking and hiking. Walking about 45 minutes each day.   Chest pain or shortness of breath: No  Lower extremity swelling: No  Weight change: No  Nausea and vomiting: No  Diarrhea: No  Heartburn symptoms: No  Fever, sweats or chills: No  Urinary complaints: No    Home BP:  120-125/80s    Problem List   Patient Active Problem List   Diagnosis    IgA nephropathy    Stage 3a chronic kidney disease (H)    Iron deficiency anemia, unspecified    Secondary renal hyperparathyroidism (H24)    Kidney  replaced by transplant    Immunosuppressed status (H24)    Metabolic acidosis    Aftercare following organ transplant    Need for pneumocystis prophylaxis    Need for post exposure prophylaxis for hepatitis B       Allergies   Allergies   Allergen Reactions    Cephalexin Rash    Heparin Flush Rash       Medications   Current Outpatient Medications   Medication Sig Dispense Refill    acetaminophen (TYLENOL) 325 MG tablet Take 3 tablets (975 mg) by mouth every 8 hours as needed for pain 100 tablet 0    atorvastatin (LIPITOR) 10 MG tablet Take 1 tablet (10 mg) by mouth daily 30 tablet 11    biotin 1000 MCG TABS tablet Take 1,000 mcg by mouth daily      cyanocobalamin (VITAMIN B-12) 1000 MCG tablet Take 1,000 mcg by mouth daily.      entecavir (BARACLUDE) 0.5 MG tablet Take 1 tablet (0.5 mg) by mouth daily. 30 tablet 5    mycophenolic acid (GENERIC EQUIVALENT) 180 MG EC tablet Take 3 tablets (540 mg) by mouth 2 times daily Take in place of mycophenolate 180 tablet 11    sodium bicarbonate 650 MG tablet Take 2 tablets (1,300 mg) by mouth 2 times daily 360 tablet 1    sulfamethoxazole-trimethoprim (BACTRIM) 400-80 MG tablet Take 1 tablet by mouth daily 30 tablet 11    tacrolimus (GENERIC EQUIVALENT) 0.5 MG capsule Take 1 capsule (0.5 mg) by mouth 2 times daily Total dose: 2.5mg twice daily 60 capsule 11    tacrolimus (GENERIC EQUIVALENT) 1 MG capsule Take 2 capsules (2 mg) by mouth 2 times daily Total dose: 2.5mg twice daily 180 capsule 11     No current facility-administered medications for this visit.     There are no discontinued medications.      Physical Exam   Vital Signs: BP (!) 136/94 (BP Location: Right arm, Patient Position: Sitting, Cuff Size: Adult Regular)   Pulse 119   Temp 98  F (36.7  C) (Oral)   Wt 81.1 kg (178 lb 14.4 oz)   SpO2 98%   BMI 33.80 kg/m      GENERAL APPEARANCE: alert and no distress  HENT: mouth without ulcers or lesions  RESP: lungs clear to auscultation - no rales, rhonchi or  wheezes  CV: regular rhythm, normal rate, no rub, no murmur  EDEMA: no LE edema bilaterally  ABDOMEN: soft, nondistended, nontender, bowel sounds normal  MS: extremities normal - no gross deformities noted, no evidence of inflammation in joints, no muscle tenderness  SKIN: no rash  TX KIDNEY: normal  DIALYSIS ACCESS:  RUE AV fistula with good thrill    Data         Latest Ref Rng & Units 9/10/2024     8:10 AM 8/13/2024     8:12 AM 7/23/2024     8:16 AM   Renal   Sodium 135 - 145 mmol/L 135  136  136    K 3.4 - 5.3 mmol/L 4.1  4.5  4.3    Cl 98 - 107 mmol/L 103  105  104    Cl (external) 98 - 107 mmol/L 103  105  104    CO2 22 - 29 mmol/L 23  22  24    Urea Nitrogen 6.0 - 20.0 mg/dL 14.9  21.9  24.3    Creatinine 0.51 - 0.95 mg/dL 1.29  1.48  1.35    Glucose 70 - 99 mg/dL 101  98  94    Calcium 8.8 - 10.4 mg/dL 9.0  9.6  9.8          Latest Ref Rng & Units 4/12/2024     9:04 AM 3/26/2024     8:24 AM 2/14/2024     9:38 AM   Bone Health   Phosphorus 2.5 - 4.5 mg/dL  3.3  3.2    Parathyroid Hormone Intact 15 - 65 pg/mL 114            Latest Ref Rng & Units 9/10/2024     8:10 AM 8/13/2024     8:12 AM 7/23/2024     8:16 AM   Heme   WBC 4.0 - 11.0 10e3/uL 4.7  6.9  7.0    Hgb 11.7 - 15.7 g/dL 14.2  14.2  14.5    Plt 150 - 450 10e3/uL 201  253  236          Latest Ref Rng & Units 6/24/2024     8:48 AM 6/11/2024     8:12 AM 5/14/2024     8:18 AM   Liver   AP 40 - 150 U/L 97  95  100    TBili <=1.2 mg/dL 0.5  0.2  0.3    Bilirubin Direct 0.00 - 0.30 mg/dL <0.20  <0.20  <0.20    ALT 0 - 50 U/L 9  15  26    AST 0 - 45 U/L 17  23  32    Tot Protein 6.4 - 8.3 g/dL 7.5  6.9  7.2    Albumin 3.5 - 5.2 g/dL 4.2  4.0  4.1          Latest Ref Rng & Units 6/11/2024     8:12 AM 11/27/2023     2:06 PM   Pancreas   A1C 0.0 - 5.6 % 5.4  4.6          Latest Ref Rng & Units 12/21/2023     7:55 AM 11/27/2023     2:06 PM   Iron studies   Iron 37 - 145 ug/dL 70  73    Iron Sat Index 15 - 46 % 34  42    Ferritin 6 - 175 ng/mL 902  1,497           Latest Ref Rng & Units 11/27/2023     2:06 PM 2/28/2022     1:08 PM   [facility] Virology   EBV CAPSID ANTIBODY IGG No detectable antibody. Positive  Positive      Failed to redirect to the Timeline version of the REVFS SmartLink.  Recent Labs   Lab Test 06/11/24  0812 06/24/24  0848 07/23/24  0816 08/13/24  0812   DOSTAC 6/10/2024 6/23/2024 7/22/2024  --    TACROL 7.1 8.0 6.6 9.5     Recent Labs   Lab Test 12/13/23  0723 12/26/23  0757 01/04/24  0803 01/10/24  1003 02/06/24  0811   DOSMPA  --   --  1/3/2024   9:00 PM  --   --    MPACID 3.31   < > 5.13* 2.61 2.34   MPAG 45.4   < > 79.3 52.7 82.5    < > = values in this interval not displayed.

## 2024-09-10 ENCOUNTER — VIRTUAL VISIT (OUTPATIENT)
Dept: PHARMACY | Facility: CLINIC | Age: 29
End: 2024-09-10
Payer: COMMERCIAL

## 2024-09-10 ENCOUNTER — OFFICE VISIT (OUTPATIENT)
Dept: TRANSPLANT | Facility: CLINIC | Age: 29
End: 2024-09-10
Attending: INTERNAL MEDICINE
Payer: MEDICARE

## 2024-09-10 ENCOUNTER — LAB (OUTPATIENT)
Dept: LAB | Facility: CLINIC | Age: 29
End: 2024-09-10
Payer: MEDICARE

## 2024-09-10 ENCOUNTER — PRE VISIT (OUTPATIENT)
Dept: GASTROENTEROLOGY | Facility: CLINIC | Age: 29
End: 2024-09-10
Payer: MEDICARE

## 2024-09-10 VITALS
HEART RATE: 119 BPM | WEIGHT: 178.9 LBS | OXYGEN SATURATION: 98 % | SYSTOLIC BLOOD PRESSURE: 136 MMHG | BODY MASS INDEX: 33.8 KG/M2 | DIASTOLIC BLOOD PRESSURE: 94 MMHG | TEMPERATURE: 98 F

## 2024-09-10 DIAGNOSIS — Z23 NEED FOR POST EXPOSURE PROPHYLAXIS FOR HEPATITIS B: ICD-10-CM

## 2024-09-10 DIAGNOSIS — Z82.49 FAMILY HISTORY OF BRUGADA SYNDROME: ICD-10-CM

## 2024-09-10 DIAGNOSIS — N25.81 SECONDARY RENAL HYPERPARATHYROIDISM (H): ICD-10-CM

## 2024-09-10 DIAGNOSIS — N18.31 STAGE 3A CHRONIC KIDNEY DISEASE (H): ICD-10-CM

## 2024-09-10 DIAGNOSIS — N02.B9 IGA NEPHROPATHY: Primary | ICD-10-CM

## 2024-09-10 DIAGNOSIS — D84.9 IMMUNOSUPPRESSED STATUS (H): Chronic | ICD-10-CM

## 2024-09-10 DIAGNOSIS — Z48.298 AFTERCARE FOLLOWING ORGAN TRANSPLANT: ICD-10-CM

## 2024-09-10 DIAGNOSIS — Z29.89 NEED FOR PNEUMOCYSTIS PROPHYLAXIS: ICD-10-CM

## 2024-09-10 DIAGNOSIS — E87.20 METABOLIC ACIDOSIS: ICD-10-CM

## 2024-09-10 DIAGNOSIS — Z78.9 TAKES DIETARY SUPPLEMENTS: ICD-10-CM

## 2024-09-10 DIAGNOSIS — Z94.0 KIDNEY TRANSPLANT RECIPIENT: Primary | ICD-10-CM

## 2024-09-10 DIAGNOSIS — R00.0 TACHYCARDIA: ICD-10-CM

## 2024-09-10 DIAGNOSIS — Z94.0 KIDNEY REPLACED BY TRANSPLANT: ICD-10-CM

## 2024-09-10 DIAGNOSIS — Z20.828 NEED FOR POST EXPOSURE PROPHYLAXIS FOR HEPATITIS B: ICD-10-CM

## 2024-09-10 LAB
ANION GAP SERPL CALCULATED.3IONS-SCNC: 9 MMOL/L (ref 7–15)
BK VIRUS SPECIMEN TYPE: NORMAL
BKV DNA # SPEC NAA+PROBE: NOT DETECTED IU/ML
BUN SERPL-MCNC: 14.9 MG/DL (ref 6–20)
CALCIUM SERPL-MCNC: 9 MG/DL (ref 8.8–10.4)
CHLORIDE SERPL-SCNC: 103 MMOL/L (ref 98–107)
CREAT SERPL-MCNC: 1.29 MG/DL (ref 0.51–0.95)
EGFRCR SERPLBLD CKD-EPI 2021: 57 ML/MIN/1.73M2
ERYTHROCYTE [DISTWIDTH] IN BLOOD BY AUTOMATED COUNT: 12.4 % (ref 10–15)
GLUCOSE SERPL-MCNC: 101 MG/DL (ref 70–99)
HCO3 SERPL-SCNC: 23 MMOL/L (ref 22–29)
HCT VFR BLD AUTO: 44.3 % (ref 35–47)
HGB BLD-MCNC: 14.2 G/DL (ref 11.7–15.7)
MAGNESIUM SERPL-MCNC: 1.7 MG/DL (ref 1.7–2.3)
MCH RBC QN AUTO: 28.9 PG (ref 26.5–33)
MCHC RBC AUTO-ENTMCNC: 32.1 G/DL (ref 31.5–36.5)
MCV RBC AUTO: 90 FL (ref 78–100)
PLATELET # BLD AUTO: 201 10E3/UL (ref 150–450)
POTASSIUM SERPL-SCNC: 4.1 MMOL/L (ref 3.4–5.3)
RBC # BLD AUTO: 4.92 10E6/UL (ref 3.8–5.2)
SODIUM SERPL-SCNC: 135 MMOL/L (ref 135–145)
TACROLIMUS BLD-MCNC: 5.2 UG/L (ref 5–15)
TME LAST DOSE: NORMAL H
TME LAST DOSE: NORMAL H
WBC # BLD AUTO: 4.7 10E3/UL (ref 4–11)

## 2024-09-10 PROCEDURE — G2211 COMPLEX E/M VISIT ADD ON: HCPCS | Performed by: STUDENT IN AN ORGANIZED HEALTH CARE EDUCATION/TRAINING PROGRAM

## 2024-09-10 PROCEDURE — 87799 DETECT AGENT NOS DNA QUANT: CPT

## 2024-09-10 PROCEDURE — 80197 ASSAY OF TACROLIMUS: CPT

## 2024-09-10 PROCEDURE — 99207 PR NO CHARGE LOS: CPT | Mod: 93 | Performed by: PHARMACIST

## 2024-09-10 PROCEDURE — 83735 ASSAY OF MAGNESIUM: CPT

## 2024-09-10 PROCEDURE — G0463 HOSPITAL OUTPT CLINIC VISIT: HCPCS | Performed by: STUDENT IN AN ORGANIZED HEALTH CARE EDUCATION/TRAINING PROGRAM

## 2024-09-10 PROCEDURE — 99214 OFFICE O/P EST MOD 30 MIN: CPT | Performed by: STUDENT IN AN ORGANIZED HEALTH CARE EDUCATION/TRAINING PROGRAM

## 2024-09-10 PROCEDURE — 93005 ELECTROCARDIOGRAM TRACING: CPT | Mod: RTG

## 2024-09-10 PROCEDURE — 85027 COMPLETE CBC AUTOMATED: CPT

## 2024-09-10 PROCEDURE — 80048 BASIC METABOLIC PNL TOTAL CA: CPT

## 2024-09-10 PROCEDURE — 36415 COLL VENOUS BLD VENIPUNCTURE: CPT

## 2024-09-10 RX ORDER — LANOLIN ALCOHOL/MO/W.PET/CERES
1000 CREAM (GRAM) TOPICAL DAILY
COMMUNITY

## 2024-09-10 ASSESSMENT — PAIN SCALES - GENERAL: PAINLEVEL: NO PAIN (0)

## 2024-09-10 NOTE — PROGRESS NOTES
Disease State Management Encounter:                          Nuzhat Lopez is a 28 year old female called for a follow-up visit.      Reason for visit: > 6 months.    Allergies/ADRs: Reviewed in chart  Past Medical History: Reviewed in chart  Tobacco: She reports that she has never smoked. She has never used smokeless tobacco.  Alcohol: not currently using     Medication Adherence/Access: no issues reported    Kidney Transplant:    Tacrolimus 2.5 mg every morning and 2.5mg every evening.   Myfortic 540mg twice daily   Pt reports headaches on and off, hair loss and loose stools improved.   Transplant date: 12/8/23  CMV prophylaxis: completed.   PJP prophylaxis: Bactrim SS daily  Tx Coordinator: Nely Alonso, Tx MD: Dr. Noguera, Using Med Card: Yes  Recent Infections:  HBcAb donor positive: Entecavir 0.5mg every morning  Immunizations: annual flu shot 2022; Pneumovax 23:  unknown; Prevnar 20: 2022; TDaP:  6/2016; Shingrix: unknown, HBV: immunity per last titers, COVID: Primary x 3    Estimated Creatinine Clearance: 53.6 mL/min (A) (based on SCr of 1.48 mg/dL (H)).     Supplements:   Sodium Bicarbonate 1300mg 2 TIMES DAILY.   Biotin 1mg daily for hair loss.  B12 1000mcg daily.   Lab Results   Component Value Date    CO2 22 08/13/2024     Today's Vitals: There were no vitals taken for this visit.    Assessment/Plan:    Due for Shingrix (2 doses 8 weeks apart).     Follow-up: as needed.     I spent 5 minutes with this patient today. A copy of the visit note was provided to the patient's provider(s).    A summary of these recommendations was sent via [employer].    Timur Carney, PharmD  [facility] Pharmacist    Phone: [phone]      Medication Therapy Recommendations  Aftercare following organ transplant    Rationale: Preventive therapy - Needs additional medication therapy - Indication   Recommendation: Order Vaccine - Shingrix 50 MCG/0.5ML Susr   Status: Accepted - no CPA Needed

## 2024-09-10 NOTE — LETTER
9/10/2024      Nuzhat Lopez  [address] [facility] [address]      Dear Colleague,    Thank you for referring your patient, Nuzhat Lopez, to the [facility] TRANSPLANT CLINIC. Please see a copy of my visit note below.    TRANSPLANT NEPHROLOGY CLINIC VISIT     Assessment & Plan  # LDKT: CKD Stage 3a - Stable   - Baseline Creatinine: ~ 1.2-1.5   - Proteinuria: Normal (<0.2 grams)   - DSA Hx: No DSA   - Last cPRA: 0%   - BK Viremia: No   - Kidney Tx Biopsy Hx: No biopsy history.    # IgA Nephropathy: No evidence of recurrence.    # Immunosuppression: Tacrolimus immediate release (goal 6-8) and Mycophenolic acid (dose 540 mg every 12 hours)   - Induction with Recent Transplant:  Low Intensity Protocol   - Continue with intensive monitoring of immunosuppression for efficacy and toxicity.   - Historical Changes in Immunosuppression: None   - Changes: Not at this time as we await today's tacrolimus level.    # Tachycardia: New today. Her dad has Brugada Syndrome. EKG today doesn't have features of brugada syndrome such as RBBB, ST elevations or T wave inversions. It's sinus tachycardia with HR of 109 (120s before EKG).    - Given her family history and new onset tachycardia we'll send her to see cardiology.     # Infection Prevention:      - PJP: Sulfa/TMP (Bactrim)  - CMV: None, prophylaxis completed; CMV IgG Ab positive   - CMV IgG Ab High Risk Discordance (D+/R-): No  - EBV IgG Ab High Risk Discordance (D+/R-): No    # Blood Pressure: Controlled;  Goal BP: < 130/80   - Changes: No    # Mineral Bone Disorder:    - Secondary renal hyperparathyroidism; PTH level: Minimally elevated ( pg/ml)        On treatment: None  - Vitamin D; level: Low normal        On supplement: No  - Calcium; level: Normal        On supplement: No    # Electrolytes:   - Potassium; level: Normal        On supplement: No  - Magnesium; level: Normal        On supplement: No  - Bicarbonate; level: Stable low        On supplement: Yes;  sodium bicarbonate to 1300 mg bid.    # Hepatitis B core Ab Positive in Kidney Donor: Patient with Hep B core Ab negative and Hep B surface Ag negative, and Hep B surface Ab positive.  Hep B DNA Quant negative with last check May/2024.  Started on Entecavir (Baraclude) for hepatitis B prophylaxis.   - Will refer to Hepatology to discuss risk/benefit of long-term prophylaxis.     # Other Significant PMH:   - Headaches: Somewhat improved symptoms, especially when sleeping well.   - Hair Loss: Improved symptoms on biotin.      # Skin Cancer Risk:    - Discussed sun protection and recommend regular follow up with Dermatology.    # Transplant History:  Etiology of Kidney Failure: IgA nephropathy  Tx: LDKT  Transplant: 12/8/2023 (Kidney)  Significant transplant-related complications: None    Transplant Office Phone Number: [phone]    Assessment and plan was discussed with the patient and she voiced her understanding and agreement.    Return visit: Return for appointment scheduled for 12/10/2024.    Jonnie Hills MD    The longitudinal plan of care for the diagnosis(es)/condition(s) as documented were addressed during this visit. Due to the added complexity in care, I will continue to support Nuzhat Simmons in the subsequent management and with ongoing continuity of care.      Chief Complaint  Ms. Lopez is a 28 year old here for kidney transplant and immunosuppression management.     History of Present Illness   Ms. Lopez reports feeling good overall.    Since last clinic visit:   Hospitalizations: No   New Medical Issues: No    Doesn't report any palpitations or feelings of skipping a beat. No exertional chest pain or shortness of breath.    Activity: Walking and hiking. Walking about 45 minutes each day.   Chest pain or shortness of breath: No  Lower extremity swelling: No  Weight change: No  Nausea and vomiting: No  Diarrhea: No  Heartburn symptoms: No  Fever, sweats or chills: No  Urinary complaints: No    Home BP:   120-125/80s    Problem List  Patient Active Problem List   Diagnosis     IgA nephropathy     Stage 3a chronic kidney disease (H)     Iron deficiency anemia, unspecified     Secondary renal hyperparathyroidism (H24)     Kidney replaced by transplant     Immunosuppressed status (H24)     Metabolic acidosis     Aftercare following organ transplant     Need for pneumocystis prophylaxis     Need for post exposure prophylaxis for hepatitis B       Allergies  Allergies   Allergen Reactions     Cephalexin Rash     Heparin Flush Rash       Medications  Current Outpatient Medications   Medication Sig Dispense Refill     acetaminophen (TYLENOL) 325 MG tablet Take 3 tablets (975 mg) by mouth every 8 hours as needed for pain 100 tablet 0     atorvastatin (LIPITOR) 10 MG tablet Take 1 tablet (10 mg) by mouth daily 30 tablet 11     biotin 1000 MCG TABS tablet Take 1,000 mcg by mouth daily       cyanocobalamin (VITAMIN B-12) 1000 MCG tablet Take 1,000 mcg by mouth daily.       entecavir (BARACLUDE) 0.5 MG tablet Take 1 tablet (0.5 mg) by mouth daily. 30 tablet 5     mycophenolic acid (GENERIC EQUIVALENT) 180 MG EC tablet Take 3 tablets (540 mg) by mouth 2 times daily Take in place of mycophenolate 180 tablet 11     sodium bicarbonate 650 MG tablet Take 2 tablets (1,300 mg) by mouth 2 times daily 360 tablet 1     sulfamethoxazole-trimethoprim (BACTRIM) 400-80 MG tablet Take 1 tablet by mouth daily 30 tablet 11     tacrolimus (GENERIC EQUIVALENT) 0.5 MG capsule Take 1 capsule (0.5 mg) by mouth 2 times daily Total dose: 2.5mg twice daily 60 capsule 11     tacrolimus (GENERIC EQUIVALENT) 1 MG capsule Take 2 capsules (2 mg) by mouth 2 times daily Total dose: 2.5mg twice daily 180 capsule 11     No current facility-administered medications for this visit.     There are no discontinued medications.      Physical Exam  Vital Signs: BP (!) 136/94 (BP Location: Right arm, Patient Position: Sitting, Cuff Size: Adult Regular)   Pulse 119    Temp 98  F (36.7  C) (Oral)   Wt 81.1 kg (178 lb 14.4 oz)   SpO2 98%   BMI 33.80 kg/m      GENERAL APPEARANCE: alert and no distress  HENT: mouth without ulcers or lesions  RESP: lungs clear to auscultation - no rales, rhonchi or wheezes  CV: regular rhythm, normal rate, no rub, no murmur  EDEMA: no LE edema bilaterally  ABDOMEN: soft, nondistended, nontender, bowel sounds normal  MS: extremities normal - no gross deformities noted, no evidence of inflammation in joints, no muscle tenderness  SKIN: no rash  TX KIDNEY: normal  DIALYSIS ACCESS:  RUE AV fistula with good thrill    Data        Latest Ref Rng & Units 9/10/2024     8:10 AM 8/13/2024     8:12 AM 7/23/2024     8:16 AM   Renal   Sodium 135 - 145 mmol/L 135  136  136    K 3.4 - 5.3 mmol/L 4.1  4.5  4.3    Cl 98 - 107 mmol/L 103  105  104    Cl (external) 98 - 107 mmol/L 103  105  104    CO2 22 - 29 mmol/L 23  22  24    Urea Nitrogen 6.0 - 20.0 mg/dL 14.9  21.9  24.3    Creatinine 0.51 - 0.95 mg/dL 1.29  1.48  1.35    Glucose 70 - 99 mg/dL 101  98  94    Calcium 8.8 - 10.4 mg/dL 9.0  9.6  9.8          Latest Ref Rng & Units 4/12/2024     9:04 AM 3/26/2024     8:24 AM 2/14/2024     9:38 AM   Bone Health   Phosphorus 2.5 - 4.5 mg/dL  3.3  3.2    Parathyroid Hormone Intact 15 - 65 pg/mL 114            Latest Ref Rng & Units 9/10/2024     8:10 AM 8/13/2024     8:12 AM 7/23/2024     8:16 AM   Heme   WBC 4.0 - 11.0 10e3/uL 4.7  6.9  7.0    Hgb 11.7 - 15.7 g/dL 14.2  14.2  14.5    Plt 150 - 450 10e3/uL 201  253  236          Latest Ref Rng & Units 6/24/2024     8:48 AM 6/11/2024     8:12 AM 5/14/2024     8:18 AM   Liver   AP 40 - 150 U/L 97  95  100    TBili <=1.2 mg/dL 0.5  0.2  0.3    Bilirubin Direct 0.00 - 0.30 mg/dL <0.20  <0.20  <0.20    ALT 0 - 50 U/L 9  15  26    AST 0 - 45 U/L 17  23  32    Tot Protein 6.4 - 8.3 g/dL 7.5  6.9  7.2    Albumin 3.5 - 5.2 g/dL 4.2  4.0  4.1          Latest Ref Rng & Units 6/11/2024     8:12 AM 11/27/2023     2:06 PM    Pancreas   A1C 0.0 - 5.6 % 5.4  4.6          Latest Ref Rng & Units 12/21/2023     7:55 AM 11/27/2023     2:06 PM   Iron studies   Iron 37 - 145 ug/dL 70  73    Iron Sat Index 15 - 46 % 34  42    Ferritin 6 - 175 ng/mL 902  1,549          Latest Ref Rng & Units 11/27/2023     2:06 PM 2/28/2022     1:08 PM   [facility] Txp Virology   EBV CAPSID ANTIBODY IGG No detectable antibody. Positive  Positive      Failed to redirect to the Timeline version of the REVFS SmartLink.  Recent Labs   Lab Test 06/11/24  0812 06/24/24  0848 07/23/24  0816 08/13/24  0812   DOSTAC 6/10/2024 6/23/2024 7/22/2024  --    TACROL 7.1 8.0 6.6 9.5     Recent Labs   Lab Test 12/13/23  0723 12/26/23  0757 01/04/24  0803 01/10/24  1003 02/06/24  0811   DOSMPA  --   --  1/3/2024   9:00 PM  --   --    MPACID 3.31   < > 5.13* 2.61 2.34   MPAG 45.4   < > 79.3 52.7 82.5    < > = values in this interval not displayed.          Again, thank you for allowing me to participate in the care of your patient.        Sincerely,        Jonnie Hills MD

## 2024-09-10 NOTE — NURSING NOTE
Chief Complaint   Patient presents with    RECHECK     TXP follow up.      Vitals:    09/10/24 1353 09/10/24 1358   BP: (!) 143/94 (!) 136/94   BP Location: Left arm Right arm   Patient Position: Sitting Sitting   Cuff Size: Adult Regular Adult Regular   Pulse: 119    Temp: 98  F (36.7  C)    TempSrc: Oral    SpO2: 98%    Weight: 81.1 kg (178 lb 14.4 oz)        BP Readings from Last 3 Encounters:   09/10/24 (!) 136/94   06/24/24 117/83   04/12/24 123/85       BP (!) 136/94 (BP Location: Right arm, Patient Position: Sitting, Cuff Size: Adult Regular)   Pulse 119   Temp 98  F (36.7  C) (Oral)   Wt 81.1 kg (178 lb 14.4 oz)   SpO2 98%   BMI 33.80 kg/m       Sunshine Heymans

## 2024-09-10 NOTE — PATIENT INSTRUCTIONS
"Recommendations from today's MTM visit:                                                      Due for Shingrix (2 doses 8 weeks apart).     Follow-up: as needed.      It was great speaking with you today.  I value your experience and would be very thankful for your time in providing feedback in our clinic survey. In the next few days, you may receive an email or text message from [facility] [employer] with a link to a survey related to your  clinical pharmacist.\"     To schedule another MTM appointment, please call the clinic directly or you may call the MTM scheduling line at [phone] or toll-free at [phone].     My Clinical Pharmacist's contact information:                                                      Please feel free to contact me with any questions or concerns you have.      Timur Carney, PharmD  MTM Pharmacist    Phone: [phone]     "

## 2024-09-11 ENCOUNTER — TELEPHONE (OUTPATIENT)
Dept: TRANSPLANT | Facility: CLINIC | Age: 29
End: 2024-09-11
Payer: MEDICARE

## 2024-09-11 LAB
ATRIAL RATE - MUSE: 109 BPM
DIASTOLIC BLOOD PRESSURE - MUSE: NORMAL MMHG
INTERPRETATION ECG - MUSE: NORMAL
P AXIS - MUSE: 38 DEGREES
PR INTERVAL - MUSE: 116 MS
QRS DURATION - MUSE: 78 MS
QT - MUSE: 330 MS
QTC - MUSE: 444 MS
R AXIS - MUSE: -3 DEGREES
SYSTOLIC BLOOD PRESSURE - MUSE: NORMAL MMHG
T AXIS - MUSE: 51 DEGREES
VENTRICULAR RATE- MUSE: 109 BPM

## 2024-09-11 NOTE — TELEPHONE ENCOUNTER
ISSUE:   Tacrolimus IR level 5.2 on 9/10, goal 6-8, dose 2.5 mg BID.    PLAN:   Call Patient and confirm this was an accurate 12-hour trough.   Verify Tacrolimus IR dose 2.5 mg BID.   Confirm no new medications or or missed doses.   Confirm no new illness / infection / diarrhea.   If accurate trough and accurate dose, increase Tacrolimus IR dose to 3 mg BID     Repeat labs as scheduled.

## 2024-09-12 DIAGNOSIS — Z94.0 KIDNEY TRANSPLANT RECIPIENT: ICD-10-CM

## 2024-09-13 NOTE — PATIENT INSTRUCTIONS
"Thank you for visiting the Adult Congenital and Cardiovascular Genetics Clinic at the [facility].    Cardiology Providers you saw during your visit:  LISE Marques MD    Diagnosis:  family hx of Brugada, tachycardia    Results:  LISE Marques MD reviewed the results of your EKG testing today in clinic.    If you have questions or concerns, please call us at [phone] or contact us through [employer].  ______________________________________________________________________________    Recommendations from your Cardiology Provider TODAY:    Speak with dad about testing and let us know   Complete echocardiogram  Complete heart monitor      ____________________________________________________________________________________________________    Follow-up Plan:  See Dr Chaney     ____________________________________________________________________________________________________    If you have questions or concerns, please call us at [phone] or contact us through [employer].    Brissa Harris RN, BSN    Delia Naqvi (Scheduling)  Nurse Care Coordinator     Clinic   Adult Congenital and CV Genetics  Adult Congenital   [facility]  [facility]  (P) [phone]     (P) [phone]  (F) [phone]     (F) [phone]        For after hours urgent needs, call [phone] and ask to speak to the \"On-Call Cardiologist.\"    For emergencies call 196.    ____________________________________________________________________________________________________    Additional Important Information for Your Heart Health      General Cardiac Recommendations:  Continue to eat a heart healthy, low salt diet.  Continue to get 20-30 minutes of aerobic activity, 4-5 days per week.  Examples of aerobic activity include walking, running, swimming, cycling, etc.  Continue to observe good oral hygiene, with regular dental visits.        SBE prophylaxis (\"Do " "you need antibiotics before dentist appointments?\"):   Yes____  No__X__    SBE prophylaxis applies to patients with certain heart conditions who are recommended to take antibiotics before dental appointments and other specific procedures. These antibiotics are to help prevent an infection of the heart (endocarditis) that certain patients are at higher risk of developing. The guidelines used come from the American Heart Association and are periodically updated.          FASTING CHOLESTEROL was checked in the last 5 years YES__X__  NO____ (2024)  If no, please follow up with your primary care physician. You should have a cholesterol screening every 5 years at minimum, and every year if taking a medication for your cholesterol levels.       "

## 2024-09-14 ENCOUNTER — ORDERS ONLY (AUTO-RELEASED) (OUTPATIENT)
Dept: CARDIOLOGY | Facility: CLINIC | Age: 29
End: 2024-09-14

## 2024-09-14 ENCOUNTER — OFFICE VISIT (OUTPATIENT)
Dept: CARDIOLOGY | Facility: CLINIC | Age: 29
End: 2024-09-14
Attending: INTERNAL MEDICINE
Payer: MEDICARE

## 2024-09-14 VITALS
HEART RATE: 117 BPM | HEIGHT: 63 IN | BODY MASS INDEX: 31.54 KG/M2 | SYSTOLIC BLOOD PRESSURE: 118 MMHG | WEIGHT: 178 LBS | DIASTOLIC BLOOD PRESSURE: 77 MMHG | OXYGEN SATURATION: 100 %

## 2024-09-14 DIAGNOSIS — R00.0 TACHYCARDIA: ICD-10-CM

## 2024-09-14 DIAGNOSIS — Z82.49 FAMILY HISTORY OF BRUGADA SYNDROME: ICD-10-CM

## 2024-09-14 PROCEDURE — G0463 HOSPITAL OUTPT CLINIC VISIT: HCPCS | Performed by: INTERNAL MEDICINE

## 2024-09-14 PROCEDURE — 99203 OFFICE O/P NEW LOW 30 MIN: CPT | Mod: 25 | Performed by: INTERNAL MEDICINE

## 2024-09-14 ASSESSMENT — PAIN SCALES - GENERAL: PAINLEVEL: NO PAIN (0)

## 2024-09-14 NOTE — NURSING NOTE
Zio heart monitor sent home with pt today.     [id number]  Exp 01/21/2025    To Do    Pt to complete an echocardiogram after heart monitor removed    Pt to wear 7 day zio heart monitor, sent home with pt today    Pt to follow up with Dr Chaney    Pt to speak with dad and respond to [employer] message if he would like to complete genetic testing    Meg Humphries RN

## 2024-09-14 NOTE — PROGRESS NOTES
CARDIOLOGY CONSULTATION:    Jessica Veronica is a pleasant 28 year old woman with a past medical history significant for IgA nephropathy with subsequent renal transplant 23 (donor hepB core Ab positive; patient on Entecavir prophylaxis).   She is on Tacrolimus immunosuppression and is followed by renal transplant team here at the [facility]. Her creatinine is 1.29 with GFR 57.  She had an AV fistula placed in the right arm in , which is still functioning.  She was on dialysis for 1.5 years between 2022 and 2023.  She is a  but stopped working when she went on dialysis but hopes to go back. She is single with no children.      Ms. Lopez's dad has a diagnosis of Brugada syndrome that was made around 6 years ago after he got a routine ECG after a car accident. There was no concern that the car accident was related to an arrhythmia. He has not followed up and never had genetic testing.  She is unsure whether he would be willing to have genetic testing.  She is the 3rd born of nine siblings, the youngest of which is 11.  Her father is from [place] and he had several siblings that  young in [place], but details are unknown.  His mom is living. There are no other family members with known brugada syndrome. There is no history of syncope or sudden cardiac death that is known. She has never had syncope. She had some chest pain only during dialysis. SOB only when on dialysis.     She was seen in nephrology clinic 9/10/24 where she was noted on ECG to have sinus tachycardia at , so cardiology consultation was ordered.     Ms. Lopez does not carry the diagnosis of Brugada syndrome with no evidence on prior ECGS (10/5/21. 22, 22, 22. 23, 9/10/24). She has not had a monitor. She did have an echo last 2022, before her fistula, and that showed a structurally normal heart.  She is not on any medications that are contraindicated with Brugada syndrome.     PAST MEDICAL HISTORY:  Past Medical History:    Diagnosis Date    Anemia in chronic kidney disease     At risk for infection transmitted from donor 12/08/2023    Donor + HBV    CKD (chronic kidney disease) stage 5, GFR less than 15 ml/min (H)     HTN (hypertension)     IgA nephropathy     Kidney replaced by transplant 12/08/2023    LDKT. Induction w/ basiliximab & steroids.    Metabolic acidosis        CURRENT MEDICATIONS:  Current Outpatient Medications   Medication Sig Dispense Refill    acetaminophen (TYLENOL) 325 MG tablet Take 3 tablets (975 mg) by mouth every 8 hours as needed for pain 100 tablet 0    atorvastatin (LIPITOR) 10 MG tablet Take 1 tablet (10 mg) by mouth daily 30 tablet 11    biotin 1000 MCG TABS tablet Take 1,000 mcg by mouth daily      cyanocobalamin (VITAMIN B-12) 1000 MCG tablet Take 1,000 mcg by mouth daily.      entecavir (BARACLUDE) 0.5 MG tablet Take 1 tablet (0.5 mg) by mouth daily. 30 tablet 5    mycophenolic acid (GENERIC EQUIVALENT) 180 MG EC tablet Take 3 tablets (540 mg) by mouth 2 times daily Take in place of mycophenolate 180 tablet 11    sodium bicarbonate 650 MG tablet Take 2 tablets (1,300 mg) by mouth 2 times daily 360 tablet 1    sulfamethoxazole-trimethoprim (BACTRIM) 400-80 MG tablet Take 1 tablet by mouth daily 30 tablet 11    tacrolimus (GENERIC EQUIVALENT) 0.5 MG capsule Take 1 capsule (0.5 mg) by mouth 2 times daily Total dose: 2.5mg twice daily 60 capsule 11    tacrolimus (GENERIC EQUIVALENT) 1 MG capsule Take 2 capsules (2 mg) by mouth 2 times daily Total dose: 2.5mg twice daily 180 capsule 11       PAST SURGICAL HISTORY:  Past Surgical History:   Procedure Laterality Date    BENCH KIDNEY  12/8/2023    Procedure: Bench kidney;  Surgeon: Osbaldo Hurtado MD;  Location: [facility] OR    BIOPSY  2021    [facility]    NO PAST SURGERIES         ALLERGIES  Cephalexin and Heparin flush    FAMILY HX:  Family History   Problem Relation Age of Onset    Impaired Fasting Glucose Mother     Kidney Disease No family hx of      Hypertension No family hx of     Cancer No family hx of        SOCIAL HX:  Social History     Socioeconomic History    Marital status: Single   Tobacco Use    Smoking status: Never    Smokeless tobacco: Never   Substance and Sexual Activity    Alcohol use: No    Drug use: No     Social Determinants of Health     Financial Resource Strain: Low Risk  (4/27/2022)    Received from [facility]    Overall Financial Resource Strain (CARDIA)     Difficulty of Paying Living Expenses: Not very hard   Food Insecurity: No Food Insecurity (4/27/2022)    Received from [facility]    Hunger Vital Sign     Worried About Running Out of Food in the Last Year: Never true     Ran Out of Food in the Last Year: Never true   Transportation Needs: No Transportation Needs (4/27/2022)    Received from [facility]    PRAPARE - Transportation     Lack of Transportation (Medical): No     Lack of Transportation (Non-Medical): No   Physical Activity: Insufficiently Active (4/27/2022)    Received from [facility]    Exercise Vital Sign     Days of Exercise per Week: 3 days     Minutes of Exercise per Session: 20 min   Social Connections: Unknown (4/27/2022)    Received from [facility]    Social Connection and Isolation Panel [NHANES]     Frequency of Communication with Friends and Family: More than three times a week     Frequency of Social Gatherings with Friends and Family: Patient refused     Attends Jainism Services: Never     Active Member of Clubs or Organizations: No     Attends Club or Organization Meetings: Patient refused     Marital Status: Patient refused   Interpersonal Safety: Not At Risk (4/27/2022)    Received from [facility]    Humiliation, Afraid, Rape, and Kick questionnaire     Fear of Current or Ex-Partner: No     Emotionally Abused: No     Physically Abused: No     Sexually Abused: No   Housing Stability: Low Risk  (4/27/2022)    Received from [facility]    Housing Stability Vital Sign     Unable to Pay for Housing in  "the Last Year: No     Number of Places Lived in the Last Year: 2     In the last 12 months, was there a time when you did not have a steady place to sleep or slept in a shelter (including now)?: No       ROS:  Constitutional: No fever, chills, or sweats. No weight gain/loss.   ENT: No visual disturbance, ear ache, epistaxis, sore throat.   Allergies/Immunologic: Negative.   Respiratory: No cough, hemoptysis.   Cardiovascular: As per HPI.   GI: No nausea, vomiting, hematemesis, melena, or hematochezia.   : No urinary frequency, dysuria, or hematuria.   Integument: Negative.   Psychiatric: Negative.   Neuro: Negative.   Endocrinology: Negative.   Musculoskeletal: No myalgia.    VITAL SIGNS:  /77 (BP Location: Left arm, Patient Position: Sitting, Cuff Size: Adult Regular)   Pulse 117   Ht 1.588 m (5' 2.5\")   Wt 80.7 kg (178 lb)   SpO2 100%   BMI 32.04 kg/m    Body mass index is 32.04 kg/m .  Wt Readings from Last 2 Encounters:   09/14/24 80.7 kg (178 lb)   09/10/24 81.1 kg (178 lb 14.4 oz)       PHYSICAL EXAM  Nuzhat Lopez IS A 28 year old female.in no acute distress.  HEENT: Unremarkable.  Carotids +4/4 bilaterally with pan murmur consistent with AV fistula.   Lungs: CTA.  Cor: Tachycardic. Normal S1 and S2. She has a pan murmur.    Abd: Soft   Extremities: No C/C/E.  Neuro: Grossly intact.    LABS    Lab Results   Component Value Date    WBC 4.7 09/10/2024     Lab Results   Component Value Date    RBC 4.92 09/10/2024     Lab Results   Component Value Date    HGB 14.2 09/10/2024     Lab Results   Component Value Date    HCT 44.3 09/10/2024     No components found for: \"MCT\"  Lab Results   Component Value Date    MCV 90 09/10/2024     Lab Results   Component Value Date    MCH 28.9 09/10/2024     Lab Results   Component Value Date    MCHC 32.1 09/10/2024     Lab Results   Component Value Date    RDW 12.4 09/10/2024     Lab Results   Component Value Date     09/10/2024      Recent Labs   Lab Test " 09/10/24  0810 08/13/24  0812    136   POTASSIUM 4.1 4.5   CHLORIDE 103 105   CO2 23 22   ANIONGAP 9 9   * 98   BUN 14.9 21.9*   CR 1.29* 1.48*   DEEPTI 9.0 9.6     Recent Labs   Lab Test 06/11/24  0812   CHOL 96   HDL 37*   LDL 25   TRIG 172*   NHDL 59        ASSESSMENT AND PLAN:  #1 IgA nephropathy, S/P renal transplant 12/2023  #2 Stage 3a renal disease with creatinine 1.2-1.5, stable. 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 No genetic testing done. No family history of sudden cardiac death  #5 AV fistula functioning in the right arm  #6 Murmur throughout the precordium consistent with AV fistula  #7 Sinus tachycardia    It was a pleasure to be involved in the care of Ms. Lopez.  I reviewed her history and symptoms in detail and testing as outlined.  She has not had any ECGs that show evidence of Brugada syndrome and she has not had any concerning cardiac symptoms.  Her dad does carry the diagnosis and we discussed that genetic testing is available and that we would usually recommend he be the one tested; she is going to talk to him about this.  If her dad does carry the genetic defect associated with Brugada, she understands that this could help her and her siblings in determining who may be at risk.  We discussed doing a 7-day Zio patch and having her see our electrophysiologist, Dr. Chaney.      With her AV fistula and transmitted murmur I would like her to have a repeat echo to evaluate her heart with consideration it could be a high-output fistula and that this could lead to heart failure.     She understands and is agreement with the plan. It was a pleasure to see her. Please do not hesitate to contact me with questions.     LISE Marques MD   40 minutes face-face, documentation and review of records on day of visit

## 2024-09-14 NOTE — LETTER
2024      RE: Nuzhat Lopez  [address] [address]       Dear Colleague,    Thank you for the opportunity to participate in the care of your patient, Nuzhat Lopez, at the [facility] [place] at [facility]. Please see a copy of my visit note below.    CARDIOLOGY CONSULTATION:    Jessica Veronica is a pleasant 28 year old woman with a past medical history significant for IgA nephropathy with subsequent renal transplant 23 (donor hepB core Ab positive; patient on Entecavir prophylaxis).   She is on Tacrolimus immunosuppression and is followed by renal transplant team here at the [facility]. Her creatinine is 1.29 with GFR 57.  She had an AV fistula placed in the right arm in , which is still functioning.  She was on dialysis for 1.5 years between 2022 and 2023.  She is a  but stopped working when she went on dialysis but hopes to go back. She is single with no children.      Ms. Lopez's dad has a diagnosis of Brugada syndrome that was made around 6 years ago after he got a routine ECG after a car accident. There was no concern that the car accident was related to an arrhythmia. He has not followed up and never had genetic testing.  She is unsure whether he would be willing to have genetic testing.  She is the 3rd born of nine siblings, the youngest of which is 11.  Her father is from [place] and he had several siblings that  young in [place], but details are unknown.  His mom is living. There are no other family members with known brugada syndrome. There is no history of syncope or sudden cardiac death that is known. She has never had syncope. She had some chest pain only during dialysis. SOB only when on dialysis.     She was seen in nephrology clinic 9/10/24 where she was noted on ECG to have sinus tachycardia at , so cardiology consultation was ordered.     Ms. Lopez does not carry the diagnosis of Brugada syndrome with no  evidence on prior ECGS (10/5/21. 2/28/22, 5/20/22, 7/11/22. 11/27/23, 9/10/24). She has not had a monitor. She did have an echo last 2/2022, before her fistula, and that showed a structurally normal heart.  She is not on any medications that are contraindicated with Brugada syndrome.     PAST MEDICAL HISTORY:  Past Medical History:   Diagnosis Date     Anemia in chronic kidney disease      At risk for infection transmitted from donor 12/08/2023    Donor + HBV     CKD (chronic kidney disease) stage 5, GFR less than 15 ml/min (H)      HTN (hypertension)      IgA nephropathy      Kidney replaced by transplant 12/08/2023    LDKT. Induction w/ basiliximab & steroids.     Metabolic acidosis        CURRENT MEDICATIONS:  Current Outpatient Medications   Medication Sig Dispense Refill     acetaminophen (TYLENOL) 325 MG tablet Take 3 tablets (975 mg) by mouth every 8 hours as needed for pain 100 tablet 0     atorvastatin (LIPITOR) 10 MG tablet Take 1 tablet (10 mg) by mouth daily 30 tablet 11     biotin 1000 MCG TABS tablet Take 1,000 mcg by mouth daily       cyanocobalamin (VITAMIN B-12) 1000 MCG tablet Take 1,000 mcg by mouth daily.       entecavir (BARACLUDE) 0.5 MG tablet Take 1 tablet (0.5 mg) by mouth daily. 30 tablet 5     mycophenolic acid (GENERIC EQUIVALENT) 180 MG EC tablet Take 3 tablets (540 mg) by mouth 2 times daily Take in place of mycophenolate 180 tablet 11     sodium bicarbonate 650 MG tablet Take 2 tablets (1,300 mg) by mouth 2 times daily 360 tablet 1     sulfamethoxazole-trimethoprim (BACTRIM) 400-80 MG tablet Take 1 tablet by mouth daily 30 tablet 11     tacrolimus (GENERIC EQUIVALENT) 0.5 MG capsule Take 1 capsule (0.5 mg) by mouth 2 times daily Total dose: 2.5mg twice daily 60 capsule 11     tacrolimus (GENERIC EQUIVALENT) 1 MG capsule Take 2 capsules (2 mg) by mouth 2 times daily Total dose: 2.5mg twice daily 180 capsule 11       PAST SURGICAL HISTORY:  Past Surgical History:   Procedure Laterality  Date     BENCH KIDNEY  12/8/2023    Procedure: Bench kidney;  Surgeon: Osbaldo Hurtado MD;  Location: [facility] OR     [facility]OPSY  2021    [facility]     NO PAST SURGERIES         ALLERGIES  Cephalexin and Heparin flush    FAMILY HX:  Family History   Problem Relation Age of Onset     Impaired Fasting Glucose Mother      Kidney Disease No family hx of      Hypertension No family hx of      Cancer No family hx of        SOCIAL HX:  Social History     Socioeconomic History     Marital status: Single   Tobacco Use     Smoking status: Never     Smokeless tobacco: Never   Substance and Sexual Activity     Alcohol use: No     Drug use: No     Social Determinants of Health     Financial Resource Strain: Low Risk  (4/27/2022)    Received from [facility]    Overall Financial Resource Strain (CARDIA)      Difficulty of Paying Living Expenses: Not very hard   Food Insecurity: No Food Insecurity (4/27/2022)    Received from [facility]    Hunger Vital Sign      Worried About Running Out of Food in the Last Year: Never true      Ran Out of Food in the Last Year: Never true   Transportation Needs: No Transportation Needs (4/27/2022)    Received from [facility]    PRAPARE - Transportation      Lack of Transportation (Medical): No      Lack of Transportation (Non-Medical): No   Physical Activity: Insufficiently Active (4/27/2022)    Received from [facility]    Exercise Vital Sign      Days of Exercise per Week: 3 days      Minutes of Exercise per Session: 20 min   Social Connections: Unknown (4/27/2022)    Received from [facility]    Social Connection and Isolation Panel [NHANES]      Frequency of Communication with Friends and Family: More than three times a week      Frequency of Social Gatherings with Friends and Family: Patient refused      Attends Sabianist Services: Never      Active Member of Clubs or Organizations: No      Attends Club or Organization Meetings: Patient refused      Marital Status: Patient refused  "  Interpersonal Safety: Not At Risk (4/27/2022)    Received from [facility]    Humiliation, Afraid, Rape, and Kick questionnaire      Fear of Current or Ex-Partner: No      Emotionally Abused: No      Physically Abused: No      Sexually Abused: No   Housing Stability: Low Risk  (4/27/2022)    Received from [facility]    Housing Stability Vital Sign      Unable to Pay for Housing in the Last Year: No      Number of Places Lived in the Last Year: 2      In the last 12 months, was there a time when you did not have a steady place to sleep or slept in a shelter (including now)?: No       ROS:  Constitutional: No fever, chills, or sweats. No weight gain/loss.   ENT: No visual disturbance, ear ache, epistaxis, sore throat.   Allergies/Immunologic: Negative.   Respiratory: No cough, hemoptysis.   Cardiovascular: As per HPI.   GI: No nausea, vomiting, hematemesis, melena, or hematochezia.   : No urinary frequency, dysuria, or hematuria.   Integument: Negative.   Psychiatric: Negative.   Neuro: Negative.   Endocrinology: Negative.   Musculoskeletal: No myalgia.    VITAL SIGNS:  /77 (BP Location: Left arm, Patient Position: Sitting, Cuff Size: Adult Regular)   Pulse 117   Ht 1.588 m (5' 2.5\")   Wt 80.7 kg (178 lb)   SpO2 100%   BMI 32.04 kg/m    Body mass index is 32.04 kg/m .  Wt Readings from Last 2 Encounters:   09/14/24 80.7 kg (178 lb)   09/10/24 81.1 kg (178 lb 14.4 oz)       PHYSICAL EXAM  Nuzhat Lopez IS A 28 year old female.in no acute distress.  HEENT: Unremarkable.  Carotids +4/4 bilaterally with pan murmur consistent with AV fistula.   Lungs: CTA.  Cor: Tachycardic. Normal S1 and S2. She has a pan murmur.    Abd: Soft   Extremities: No C/C/E.  Neuro: Grossly intact.    LABS    Lab Results   Component Value Date    WBC 4.7 09/10/2024     Lab Results   Component Value Date    RBC 4.92 09/10/2024     Lab Results   Component Value Date    HGB 14.2 09/10/2024     Lab Results   Component Value Date    HCT " "44.3 09/10/2024     No components found for: \"MCT\"  Lab Results   Component Value Date    MCV 90 09/10/2024     Lab Results   Component Value Date    MCH 28.9 09/10/2024     Lab Results   Component Value Date    MCHC 32.1 09/10/2024     Lab Results   Component Value Date    RDW 12.4 09/10/2024     Lab Results   Component Value Date     09/10/2024      Recent Labs   Lab Test 09/10/24  0810 08/13/24  0812    136   POTASSIUM 4.1 4.5   CHLORIDE 103 105   CO2 23 22   ANIONGAP 9 9   * 98   BUN 14.9 21.9*   CR 1.29* 1.48*   DEEPTI 9.0 9.6     Recent Labs   Lab Test 06/11/24  0812   CHOL 96   HDL 37*   LDL 25   TRIG 172*   NHDL 59        ASSESSMENT AND PLAN:  #1 IgA nephropathy, S/P renal transplant 12/2023  #2 Stage 3a renal disease with creatinine 1.2-1.5, stable. 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 No genetic testing done. No family history of sudden cardiac death  #5 AV fistula functioning in the right arm  #6 Murmur throughout the precordium consistent with AV fistula  #7 Sinus tachycardia    It was a pleasure to be involved in the care of Ms. Lopez.  I reviewed her history and symptoms in detail and testing as outlined.  She has not had any ECGs that show evidence of Brugada syndrome and she has not had any concerning cardiac symptoms.  Her dad does carry the diagnosis and we discussed that genetic testing is available and that we would usually recommend he be the one tested; she is going to talk to him about this.  If her dad does carry the genetic defect associated with Brugada, she understands that this could help her and her siblings in determining who may be at risk.  We discussed doing a 7-day Zio patch and having her see our electrophysiologist, Dr. Chaney.      With her AV fistula and transmitted murmur I would like her to have a repeat echo to evaluate her heart with consideration it could be " a high-output fistula and that this could lead to heart failure.     She understands and is agreement with the plan. It was a pleasure to see her. Please do not hesitate to contact me with questions.     LISE Marques MD   40 minutes face-face, documentation and review of records on day of visit       Please do not hesitate to contact me if you have any questions/concerns.     Sincerely,     Daiana Marques MD

## 2024-09-23 ENCOUNTER — TELEPHONE (OUTPATIENT)
Dept: CARDIOLOGY | Facility: CLINIC | Age: 29
End: 2024-09-23
Payer: MEDICARE

## 2024-09-25 ENCOUNTER — OFFICE VISIT (OUTPATIENT)
Dept: GASTROENTEROLOGY | Facility: CLINIC | Age: 29
End: 2024-09-25
Attending: STUDENT IN AN ORGANIZED HEALTH CARE EDUCATION/TRAINING PROGRAM
Payer: MEDICARE

## 2024-09-25 VITALS
OXYGEN SATURATION: 98 % | BODY MASS INDEX: 32.56 KG/M2 | TEMPERATURE: 97.6 F | DIASTOLIC BLOOD PRESSURE: 84 MMHG | HEART RATE: 100 BPM | WEIGHT: 180.9 LBS | SYSTOLIC BLOOD PRESSURE: 127 MMHG

## 2024-09-25 DIAGNOSIS — Z91.89 AT INCREASED RISK FOR EXPOSURE TO HEPATITIS B VIRUS: Primary | ICD-10-CM

## 2024-09-25 PROCEDURE — 99204 OFFICE O/P NEW MOD 45 MIN: CPT | Performed by: STUDENT IN AN ORGANIZED HEALTH CARE EDUCATION/TRAINING PROGRAM

## 2024-09-25 PROCEDURE — G0463 HOSPITAL OUTPT CLINIC VISIT: HCPCS | Performed by: STUDENT IN AN ORGANIZED HEALTH CARE EDUCATION/TRAINING PROGRAM

## 2024-09-25 RX ORDER — TACROLIMUS 1 MG/1
3 CAPSULE ORAL 2 TIMES DAILY
Qty: 180 CAPSULE | Refills: 11 | Status: CANCELLED | OUTPATIENT
Start: 2024-09-25

## 2024-09-25 ASSESSMENT — PAIN SCALES - GENERAL: PAINLEVEL: NO PAIN (0)

## 2024-09-25 NOTE — NURSING NOTE
Chief Complaint   Patient presents with    Consult     NEW LIVER - r/s from 9/10 per chart notes with pt. Complete RM     /84 (BP Location: Left arm, Patient Position: Sitting, Cuff Size: Adult Regular)   Pulse 100   Temp 97.6  F (36.4  C) (Oral)   Wt 82.1 kg (180 lb 14.4 oz)   SpO2 98%   BMI 32.56 kg/m      Dalton Hinojosa CMA on 9/25/2024 at 9:55 AM

## 2024-09-25 NOTE — LETTER
9/25/2024      Nuzhat Lopez  [address] [address]      Dear Colleague,    Thank you for referring your patient, Nuzhat Lopez, to the [facility]PATOLOGY [facility] [place]. Please see a copy of my visit note below.    [facility] New Patient Visit    Date of Visit: September 25, 2024    Reason for referral: History of kidney transplant from a donor with a hepatitis B core antibody positive    Subjective: Ms. Lopez is a 28 year old with a history of IgA nephropathy status post kidney transplant 1223 who presents for follow-up of potential hepatitis B.    She has received several hepatitis B vaccines.  Her testing prior to her kidney transplant showed that she is immune and had protective antibody.  She received an unrelated kidney transplant from a donor that was hepatitis B core antibody positive.  Was surface antigen negative.  I do not know the hepatitis B DNA of the donor.    She has been on Entecavir since her transplant and has had undetectable viral loads.  She is doing well.  She does not drink alcohol.  Liver enzymes are normal    ROS: 14 point ROS negative except for positives noted in HPI.    PMHx:  Past Medical History:   Diagnosis Date     Anemia in chronic kidney disease      At risk for infection transmitted from donor 12/08/2023    Donor + HBV     CKD (chronic kidney disease) stage 5, GFR less than 15 ml/min (H)      HTN (hypertension)      IgA nephropathy      Kidney replaced by transplant 12/08/2023    LDKT. Induction w/ basiliximab & steroids.     Metabolic acidosis        PSHx:  Past Surgical History:   Procedure Laterality Date     BENCH KIDNEY  12/8/2023    Procedure: Bench kidney;  Surgeon: Osbaldo Hurtado MD;  Location: [facility] OR     BIOPSY  2021    [facility]     NO PAST SURGERIES         FamHx:  Family History   Problem Relation Age of Onset     Impaired Fasting Glucose Mother      Kidney Disease No family hx of      Hypertension No family  hx of      Cancer No family hx of      No family history of liver disease, liver cancer    SocHx:  Social History     Socioeconomic History     Marital status: Single     Spouse name: Not on file     Number of children: Not on file     Years of education: Not on file     Highest education level: Not on file   Occupational History     Not on file   Tobacco Use     Smoking status: Never     Passive exposure: Never     Smokeless tobacco: Never   Vaping Use     Vaping status: Never Used   Substance and Sexual Activity     Alcohol use: No     Drug use: Never     Sexual activity: Not on file   Other Topics Concern     Parent/sibling w/ CABG, MI or angioplasty before 65F 55M? Not Asked   Social History Narrative     Not on file     Social Determinants of Health     Financial Resource Strain: Low Risk  (4/27/2022)    Received from [facility]    Overall Financial Resource Strain (CARDIA)      Difficulty of Paying Living Expenses: Not very hard   Food Insecurity: No Food Insecurity (4/27/2022)    Received from [facility]    Hunger Vital Sign      Worried About Running Out of Food in the Last Year: Never true      Ran Out of Food in the Last Year: Never true   Transportation Needs: No Transportation Needs (4/27/2022)    Received from [facility]    PRAPARE - Transportation      Lack of Transportation (Medical): No      Lack of Transportation (Non-Medical): No   Physical Activity: Insufficiently Active (4/27/2022)    Received from [facility]    Exercise Vital Sign      Days of Exercise per Week: 3 days      Minutes of Exercise per Session: 20 min   Stress: Not on file   Social Connections: Unknown (4/27/2022)    Received from [facility]    Social Connection and Isolation Panel [NHANES]      Frequency of Communication with Friends and Family: More than three times a week      Frequency of Social Gatherings with Friends and Family: Patient refused      Attends Restorationism Services: Never      Active Member of Clubs or  Organizations: No      Attends Club or Organization Meetings: Patient refused      Marital Status: Patient refused   Interpersonal Safety: Not At Risk (4/27/2022)    Received from [facility]    Humiliation, Afraid, Rape, and Kick questionnaire      Fear of Current or Ex-Partner: No      Emotionally Abused: No      Physically Abused: No      Sexually Abused: No   Housing Stability: Low Risk  (4/27/2022)    Received from [facility]    Housing Stability Vital Sign      Unable to Pay for Housing in the Last Year: No      Number of Places Lived in the Last Year: 2      In the last 12 months, was there a time when you did not have a steady place to sleep or slept in a shelter (including now)?: No       Medications:  Current Outpatient Medications   Medication Sig Dispense Refill     acetaminophen (TYLENOL) 325 MG tablet Take 3 tablets (975 mg) by mouth every 8 hours as needed for pain 100 tablet 0     atorvastatin (LIPITOR) 10 MG tablet Take 1 tablet (10 mg) by mouth daily 30 tablet 11     biotin 1000 MCG TABS tablet Take 1,000 mcg by mouth daily       cyanocobalamin (VITAMIN B-12) 1000 MCG tablet Take 1,000 mcg by mouth daily.       entecavir (BARACLUDE) 0.5 MG tablet Take 1 tablet (0.5 mg) by mouth daily. 30 tablet 5     mycophenolic acid (GENERIC EQUIVALENT) 180 MG EC tablet Take 3 tablets (540 mg) by mouth 2 times daily Take in place of mycophenolate 180 tablet 11     sodium bicarbonate 650 MG tablet Take 2 tablets (1,300 mg) by mouth 2 times daily 360 tablet 1     sulfamethoxazole-trimethoprim (BACTRIM) 400-80 MG tablet Take 1 tablet by mouth daily 30 tablet 11     tacrolimus (GENERIC EQUIVALENT) 1 MG capsule Take 2 capsules (2 mg) by mouth 2 times daily Total dose: 2.5mg twice daily (Patient taking differently: Take 3 mg by mouth 2 times daily. Total dose: 3 mg twice daily) 180 capsule 11     tacrolimus (GENERIC EQUIVALENT) 0.5 MG capsule Take 1 capsule (0.5 mg) by mouth 2 times daily Total dose: 2.5mg twice daily  (Patient not taking: Reported on 9/25/2024) 60 capsule 11     No current facility-administered medications for this visit.     No OTCs, herbals    Allergies:  Allergies   Allergen Reactions     Cephalexin Rash     Heparin Flush Rash       Objective:  /84 (BP Location: Left arm, Patient Position: Sitting, Cuff Size: Adult Regular)   Pulse 100   Temp 97.6  F (36.4  C) (Oral)   Wt 82.1 kg (180 lb 14.4 oz)   SpO2 98%   BMI 32.56 kg/m    Constitutional: pleasant woman in NAD  Eyes: non icteric  Respiratory: Normal respiratory excursion   MSK: normal range of motion of visualized extremities  Abd: Non distended  Skin: No jaundice  Psychiatric: normal mood and orientation    Labs:  Last Comprehensive Metabolic Panel:  Sodium   Date Value Ref Range Status   09/10/2024 135 135 - 145 mmol/L Final     Potassium   Date Value Ref Range Status   09/10/2024 4.1 3.4 - 5.3 mmol/L Final   07/11/2022 5.0 3.5 - 5.0 mmol/L Final     Potassium POCT   Date Value Ref Range Status   12/08/2023 4.6 3.5 - 5.0 mmol/L Final     Comment:     CRITICAL VALUES NOTED AND REPORTED TO MD     Chloride   Date Value Ref Range Status   09/10/2024 103 98 - 107 mmol/L Final   07/11/2022 102 98 - 107 mmol/L Final     Carbon Dioxide (CO2)   Date Value Ref Range Status   09/10/2024 23 22 - 29 mmol/L Final   07/11/2022 26 22 - 31 mmol/L Final     Anion Gap   Date Value Ref Range Status   09/10/2024 9 7 - 15 mmol/L Final   07/11/2022 9 5 - 18 mmol/L Final     Glucose   Date Value Ref Range Status   09/10/2024 101 (H) 70 - 99 mg/dL Final   07/11/2022 85 70 - 125 mg/dL Final     GLUCOSE BY METER POCT   Date Value Ref Range Status   12/10/2023 120 (H) 70 - 99 mg/dL Final     Urea Nitrogen   Date Value Ref Range Status   09/10/2024 14.9 6.0 - 20.0 mg/dL Final   07/11/2022 68 (H) 8 - 22 mg/dL Final     Creatinine   Date Value Ref Range Status   09/10/2024 1.29 (H) 0.51 - 0.95 mg/dL Final     GFR Estimate   Date Value Ref Range Status   09/10/2024 57 (L)  >60 mL/min/1.73m2 Final     Comment:     eGFR calculated using 2021 CKD-EPI equation.     Calcium   Date Value Ref Range Status   09/10/2024 9.0 8.8 - 10.4 mg/dL Final     Comment:     Reference intervals for this test were updated on 7/16/2024 to reflect our healthy population more accurately. There may be differences in the flagging of prior results with similar values performed with this method. Those prior results can be interpreted in the context of the updated reference intervals.     Bilirubin Total   Date Value Ref Range Status   06/24/2024 0.5 <=1.2 mg/dL Final     Alkaline Phosphatase   Date Value Ref Range Status   06/24/2024 97 40 - 150 U/L Final     ALT   Date Value Ref Range Status   06/24/2024 9 0 - 50 U/L Final     Comment:     Reference intervals for this test were updated on 6/12/2023 to more accurately reflect our healthy population. There may be differences in the flagging of prior results with similar values performed with this method. Interpretation of those prior results can be made in the context of the updated reference intervals.       AST   Date Value Ref Range Status   06/24/2024 17 0 - 45 U/L Final     Comment:     Reference intervals for this test were updated on 6/12/2023 to more accurately reflect our healthy population. There may be differences in the flagging of prior results with similar values performed with this method. Interpretation of those prior results can be made in the context of the updated reference intervals.       Lab Results   Component Value Date    WBC 4.7 09/10/2024     Lab Results   Component Value Date    RBC 4.92 09/10/2024     Lab Results   Component Value Date    HGB 14.2 09/10/2024     Lab Results   Component Value Date    HCT 44.3 09/10/2024     Lab Results   Component Value Date    MCV 90 09/10/2024     Lab Results   Component Value Date    MCH 28.9 09/10/2024     Lab Results   Component Value Date    MCHC 32.1 09/10/2024     Lab Results   Component Value  Date    RDW 12.4 09/10/2024     Lab Results   Component Value Date     09/10/2024       INR   Date Value Ref Range Status   11/27/2023 1.09 0.85 - 1.15 Final        Previous work-up:   Lab Results   Component Value Date    HEPBANG Nonreactive 12/08/2023    HBCAB Nonreactive 12/08/2023    AUSAB 43.23 12/08/2023    HCVAB Nonreactive 12/08/2023    LUCÍA 902 (H) 12/21/2023    IRONSAT 34 12/21/2023    JOSIAS Negative 10/05/2021    CHOL 96 06/11/2024    HDL 37 (L) 06/11/2024    LDL 25 06/11/2024    TRIG 172 (H) 06/11/2024    A1C 5.4 06/11/2024      Lab Results   Component Value Date    SPECDES  02/28/2022     Blood: ACD      LDRESULTS  02/28/2022       Factor V 1691G>A ([place])  RESULTS:  Mutation analyzed: 1691G>A  Factor V 1691G>A ([place])  Interpretation:  ABSENT  Factor V 1691G>A ([place]) mutation  genotype:  G/G    FACTOR 2/PROTHROMBIN RESULTS:  Mutation analyzed: [id number]  Factor 2 Mutation Interpretation:  ABSENT  Factor 2 Mutation genotype:  G/G           Imaging: CT scan in the past showing unremarkable abdomen    Independently reviewed labs and imaging.     Assessment/Plan: Ms. Lopez is a 28 year old woman with a history of IgA nephropathy status post kidney transplant who presents for evaluation of potential hepatitis B and discussion of the need for further prophylaxis.  She was hepatitis B immune prior to transplant.  She received a kidney transplant from patient who had a positive core antibody.  Assuming that hepatitis B surface antigen and DNA of the donor were negative, she has a very low risk of transmission with a kidney transplant and I would recommend stopping her Entecavir.  I will touch base with her transplant team about if this is the case.  If her donor hepatitis B DNA was unknown, would continue an additional 3 months and then check DNA and serologies afterwards.    Discussed general measures of liver health.  She does not drink alcohol.    No orders of the defined types were placed in this  encounter.      RTC as needed    Dina Blanchard MD MS  Hepatology/Liver Transplant  [facility]        Again, thank you for allowing me to participate in the care of your patient.        Sincerely,        Dina Blanchard MD

## 2024-09-25 NOTE — PROGRESS NOTES
[facility] New Patient Visit    Date of Visit: September 25, 2024    Reason for referral: History of kidney transplant from a donor with a hepatitis B core antibody positive    Subjective: Ms. Lopez is a 28 year old with a history of IgA nephropathy status post kidney transplant 1223 who presents for follow-up of potential hepatitis B.    She has received several hepatitis B vaccines.  Her testing prior to her kidney transplant showed that she is immune and had protective antibody.  She received an unrelated kidney transplant from a donor that was hepatitis B core antibody and surface antigen positive. Donor hepatitis B dna was negative.     She has been on Entecavir since her transplant and has had undetectable viral loads.  She is doing well.  She does not drink alcohol.  Liver enzymes are normal    ROS: 14 point ROS negative except for positives noted in HPI.    PMHx:  Past Medical History:   Diagnosis Date    Anemia in chronic kidney disease     At risk for infection transmitted from donor 12/08/2023    Donor + HBV    CKD (chronic kidney disease) stage 5, GFR less than 15 ml/min (H)     HTN (hypertension)     IgA nephropathy     Kidney replaced by transplant 12/08/2023    LDKT. Induction w/ basiliximab & steroids.    Metabolic acidosis        PSHx:  Past Surgical History:   Procedure Laterality Date    BENCH KIDNEY  12/8/2023    Procedure: Bench kidney;  Surgeon: Osbaldo Hurtado MD;  Location: [facility] OR    BIOPSY  2021    [facility]    NO PAST SURGERIES         FamHx:  Family History   Problem Relation Age of Onset    Impaired Fasting Glucose Mother     Kidney Disease No family hx of     Hypertension No family hx of     Cancer No family hx of      No family history of liver disease, liver cancer    SocHx:  Social History     Socioeconomic History    Marital status: Single     Spouse name: Not on file    Number of children: Not on file    Years of education: Not on file    Highest  education level: Not on file   Occupational History    Not on file   Tobacco Use    Smoking status: Never     Passive exposure: Never    Smokeless tobacco: Never   Vaping Use    Vaping status: Never Used   Substance and Sexual Activity    Alcohol use: No    Drug use: Never    Sexual activity: Not on file   Other Topics Concern    Parent/sibling w/ CABG, MI or angioplasty before 65F 55M? Not Asked   Social History Narrative    Not on file     Social Determinants of Health     Financial Resource Strain: Low Risk  (4/27/2022)    Received from [facility]    Overall Financial Resource Strain (CARDIA)     Difficulty of Paying Living Expenses: Not very hard   Food Insecurity: No Food Insecurity (4/27/2022)    Received from [facility]    Hunger Vital Sign     Worried About Running Out of Food in the Last Year: Never true     Ran Out of Food in the Last Year: Never true   Transportation Needs: No Transportation Needs (4/27/2022)    Received from [facility]    PRAPARE - Transportation     Lack of Transportation (Medical): No     Lack of Transportation (Non-Medical): No   Physical Activity: Insufficiently Active (4/27/2022)    Received from [facility]    Exercise Vital Sign     Days of Exercise per Week: 3 days     Minutes of Exercise per Session: 20 min   Stress: Not on file   Social Connections: Unknown (4/27/2022)    Received from [facility]    Social Connection and Isolation Panel [NHANES]     Frequency of Communication with Friends and Family: More than three times a week     Frequency of Social Gatherings with Friends and Family: Patient refused     Attends Zoroastrian Services: Never     Active Member of Clubs or Organizations: No     Attends Club or Organization Meetings: Patient refused     Marital Status: Patient refused   Interpersonal Safety: Not At Risk (4/27/2022)    Received from [facility]    Humiliation, Afraid, Rape, and Kick questionnaire     Fear of Current or Ex-Partner: No     Emotionally Abused: No      Physically Abused: No     Sexually Abused: No   Housing Stability: Low Risk  (4/27/2022)    Received from [facility]    Housing Stability Vital Sign     Unable to Pay for Housing in the Last Year: No     Number of Places Lived in the Last Year: 2     In the last 12 months, was there a time when you did not have a steady place to sleep or slept in a shelter (including now)?: No       Medications:  Current Outpatient Medications   Medication Sig Dispense Refill    acetaminophen (TYLENOL) 325 MG tablet Take 3 tablets (975 mg) by mouth every 8 hours as needed for pain 100 tablet 0    atorvastatin (LIPITOR) 10 MG tablet Take 1 tablet (10 mg) by mouth daily 30 tablet 11    biotin 1000 MCG TABS tablet Take 1,000 mcg by mouth daily      cyanocobalamin (VITAMIN B-12) 1000 MCG tablet Take 1,000 mcg by mouth daily.      entecavir (BARACLUDE) 0.5 MG tablet Take 1 tablet (0.5 mg) by mouth daily. 30 tablet 5    mycophenolic acid (GENERIC EQUIVALENT) 180 MG EC tablet Take 3 tablets (540 mg) by mouth 2 times daily Take in place of mycophenolate 180 tablet 11    sodium bicarbonate 650 MG tablet Take 2 tablets (1,300 mg) by mouth 2 times daily 360 tablet 1    sulfamethoxazole-trimethoprim (BACTRIM) 400-80 MG tablet Take 1 tablet by mouth daily 30 tablet 11    tacrolimus (GENERIC EQUIVALENT) 1 MG capsule Take 2 capsules (2 mg) by mouth 2 times daily Total dose: 2.5mg twice daily (Patient taking differently: Take 3 mg by mouth 2 times daily. Total dose: 3 mg twice daily) 180 capsule 11    tacrolimus (GENERIC EQUIVALENT) 0.5 MG capsule Take 1 capsule (0.5 mg) by mouth 2 times daily Total dose: 2.5mg twice daily (Patient not taking: Reported on 9/25/2024) 60 capsule 11     No current facility-administered medications for this visit.     No OTCs, herbals    Allergies:  Allergies   Allergen Reactions    Cephalexin Rash    Heparin Flush Rash       Objective:  /84 (BP Location: Left arm, Patient Position: Sitting, Cuff Size: Adult  Regular)   Pulse 100   Temp 97.6  F (36.4  C) (Oral)   Wt 82.1 kg (180 lb 14.4 oz)   SpO2 98%   BMI 32.56 kg/m    Constitutional: pleasant woman in NAD  Eyes: non icteric  Respiratory: Normal respiratory excursion   MSK: normal range of motion of visualized extremities  Abd: Non distended  Skin: No jaundice  Psychiatric: normal mood and orientation    Labs:  Last Comprehensive Metabolic Panel:  Sodium   Date Value Ref Range Status   09/10/2024 135 135 - 145 mmol/L Final     Potassium   Date Value Ref Range Status   09/10/2024 4.1 3.4 - 5.3 mmol/L Final   07/11/2022 5.0 3.5 - 5.0 mmol/L Final     Potassium POCT   Date Value Ref Range Status   12/08/2023 4.6 3.5 - 5.0 mmol/L Final     Comment:     CRITICAL VALUES NOTED AND REPORTED TO MD     Chloride   Date Value Ref Range Status   09/10/2024 103 98 - 107 mmol/L Final   07/11/2022 102 98 - 107 mmol/L Final     Carbon Dioxide (CO2)   Date Value Ref Range Status   09/10/2024 23 22 - 29 mmol/L Final   07/11/2022 26 22 - 31 mmol/L Final     Anion Gap   Date Value Ref Range Status   09/10/2024 9 7 - 15 mmol/L Final   07/11/2022 9 5 - 18 mmol/L Final     Glucose   Date Value Ref Range Status   09/10/2024 101 (H) 70 - 99 mg/dL Final   07/11/2022 85 70 - 125 mg/dL Final     GLUCOSE BY METER POCT   Date Value Ref Range Status   12/10/2023 120 (H) 70 - 99 mg/dL Final     Urea Nitrogen   Date Value Ref Range Status   09/10/2024 14.9 6.0 - 20.0 mg/dL Final   07/11/2022 68 (H) 8 - 22 mg/dL Final     Creatinine   Date Value Ref Range Status   09/10/2024 1.29 (H) 0.51 - 0.95 mg/dL Final     GFR Estimate   Date Value Ref Range Status   09/10/2024 57 (L) >60 mL/min/1.73m2 Final     Comment:     eGFR calculated using 2021 CKD-EPI equation.     Calcium   Date Value Ref Range Status   09/10/2024 9.0 8.8 - 10.4 mg/dL Final     Comment:     Reference intervals for this test were updated on 7/16/2024 to reflect our healthy population more accurately. There may be differences in the  flagging of prior results with similar values performed with this method. Those prior results can be interpreted in the context of the updated reference intervals.     Bilirubin Total   Date Value Ref Range Status   06/24/2024 0.5 <=1.2 mg/dL Final     Alkaline Phosphatase   Date Value Ref Range Status   06/24/2024 97 40 - 150 U/L Final     ALT   Date Value Ref Range Status   06/24/2024 9 0 - 50 U/L Final     Comment:     Reference intervals for this test were updated on 6/12/2023 to more accurately reflect our healthy population. There may be differences in the flagging of prior results with similar values performed with this method. Interpretation of those prior results can be made in the context of the updated reference intervals.       AST   Date Value Ref Range Status   06/24/2024 17 0 - 45 U/L Final     Comment:     Reference intervals for this test were updated on 6/12/2023 to more accurately reflect our healthy population. There may be differences in the flagging of prior results with similar values performed with this method. Interpretation of those prior results can be made in the context of the updated reference intervals.       Lab Results   Component Value Date    WBC 4.7 09/10/2024     Lab Results   Component Value Date    RBC 4.92 09/10/2024     Lab Results   Component Value Date    HGB 14.2 09/10/2024     Lab Results   Component Value Date    HCT 44.3 09/10/2024     Lab Results   Component Value Date    MCV 90 09/10/2024     Lab Results   Component Value Date    MCH 28.9 09/10/2024     Lab Results   Component Value Date    MCHC 32.1 09/10/2024     Lab Results   Component Value Date    RDW 12.4 09/10/2024     Lab Results   Component Value Date     09/10/2024       INR   Date Value Ref Range Status   11/27/2023 1.09 0.85 - 1.15 Final        Previous work-up:   Lab Results   Component Value Date    HEPBANG Nonreactive 12/08/2023    HBCAB Nonreactive 12/08/2023    AUSAB 43.23 12/08/2023    HCVAB  Nonreactive 12/08/2023    LUCÍA 902 (H) 12/21/2023    IRONSAT 34 12/21/2023    JOSIAS Negative 10/05/2021    CHOL 96 06/11/2024    HDL 37 (L) 06/11/2024    LDL 25 06/11/2024    TRIG 172 (H) 06/11/2024    A1C 5.4 06/11/2024      Lab Results   Component Value Date    SPECDES  02/28/2022     Blood: ACD      LDRESULTS  02/28/2022       Factor V 1691G>A ([place])  RESULTS:  Mutation analyzed: 1691G>A  Factor V 1691G>A ([place])  Interpretation:  ABSENT  Factor V 1691G>A ([place]) mutation  genotype:  G/G    FACTOR 2/PROTHROMBIN RESULTS:  Mutation analyzed: [id number]  Factor 2 Mutation Interpretation:  ABSENT  Factor 2 Mutation genotype:  G/G           Imaging: CT scan in the past showing unremarkable abdomen    Independently reviewed labs and imaging.     Assessment/Plan: Ms. Lopez is a 28 year old woman with a history of IgA nephropathy status post kidney transplant who presents for evaluation of potential hepatitis B and discussion of the need for further prophylaxis.  She was hepatitis B immune prior to transplant.  She received a kidney transplant from patient who had a positive core antibody with positive  hepatitis B surface antigen and negative dna.  I would continue the Entecavir for 1 year and then stop.  After this would get hepatitis B labs including surface antigen, core antibody and DNA every 3 months.    Discussed general measures of liver health.  She does not drink alcohol.    No orders of the defined types were placed in this encounter.      RTC as needed    Dina Blanchard MD MS  Hepatology/Liver Transplant  [facility]

## 2024-10-03 ENCOUNTER — HOSPITAL ENCOUNTER (OUTPATIENT)
Dept: CARDIOLOGY | Facility: CLINIC | Age: 29
Discharge: HOME | End: 2024-10-03
Attending: INTERNAL MEDICINE | Admitting: INTERNAL MEDICINE
Payer: MEDICARE

## 2024-10-03 DIAGNOSIS — Z82.49 FAMILY HISTORY OF BRUGADA SYNDROME: ICD-10-CM

## 2024-10-03 DIAGNOSIS — R00.0 TACHYCARDIA: ICD-10-CM

## 2024-10-03 LAB — LVEF ECHO: NORMAL

## 2024-10-03 PROCEDURE — 93306 TTE W/DOPPLER COMPLETE: CPT | Mod: 26 | Performed by: INTERNAL MEDICINE

## 2024-10-03 PROCEDURE — 93306 TTE W/DOPPLER COMPLETE: CPT

## 2024-10-08 ENCOUNTER — LAB (OUTPATIENT)
Dept: LAB | Facility: CLINIC | Age: 29
End: 2024-10-08
Payer: MEDICARE

## 2024-10-08 DIAGNOSIS — Z48.298 AFTERCARE FOLLOWING ORGAN TRANSPLANT: ICD-10-CM

## 2024-10-08 LAB
ALBUMIN MFR UR ELPH: 14.9 MG/DL
ALBUMIN UR-MCNC: NEGATIVE MG/DL
ANION GAP SERPL CALCULATED.3IONS-SCNC: 9 MMOL/L (ref 7–15)
APPEARANCE UR: CLEAR
BACTERIA #/AREA URNS HPF: ABNORMAL /HPF
BILIRUB UR QL STRIP: NEGATIVE
BUN SERPL-MCNC: 24.1 MG/DL (ref 6–20)
CALCIUM SERPL-MCNC: 9.3 MG/DL (ref 8.8–10.4)
CHLORIDE SERPL-SCNC: 104 MMOL/L (ref 98–107)
COLOR UR AUTO: YELLOW
CREAT SERPL-MCNC: 1.31 MG/DL (ref 0.51–0.95)
CREAT UR-MCNC: 67.3 MG/DL
EGFRCR SERPLBLD CKD-EPI 2021: 56 ML/MIN/1.73M2
ERYTHROCYTE [DISTWIDTH] IN BLOOD BY AUTOMATED COUNT: 12 % (ref 10–15)
GLUCOSE SERPL-MCNC: 103 MG/DL (ref 70–99)
GLUCOSE UR STRIP-MCNC: NEGATIVE MG/DL
HCO3 SERPL-SCNC: 23 MMOL/L (ref 22–29)
HCT VFR BLD AUTO: 45.1 % (ref 35–47)
HGB BLD-MCNC: 14.3 G/DL (ref 11.7–15.7)
HGB UR QL STRIP: ABNORMAL
KETONES UR STRIP-MCNC: NEGATIVE MG/DL
LEUKOCYTE ESTERASE UR QL STRIP: NEGATIVE
MCH RBC QN AUTO: 28.3 PG (ref 26.5–33)
MCHC RBC AUTO-ENTMCNC: 31.7 G/DL (ref 31.5–36.5)
MCV RBC AUTO: 89 FL (ref 78–100)
NITRATE UR QL: NEGATIVE
PH UR STRIP: 5.5 [PH] (ref 5–8)
PLATELET # BLD AUTO: 225 10E3/UL (ref 150–450)
POTASSIUM SERPL-SCNC: 4.3 MMOL/L (ref 3.4–5.3)
PROT/CREAT 24H UR: 0.22 MG/MG CR (ref 0–0.2)
RBC # BLD AUTO: 5.06 10E6/UL (ref 3.8–5.2)
RBC #/AREA URNS AUTO: ABNORMAL /HPF
SODIUM SERPL-SCNC: 136 MMOL/L (ref 135–145)
SP GR UR STRIP: 1.01 (ref 1–1.03)
SQUAMOUS #/AREA URNS AUTO: ABNORMAL /LPF
TACROLIMUS BLD-MCNC: 7.2 UG/L (ref 5–15)
TME LAST DOSE: NORMAL H
TME LAST DOSE: NORMAL H
UROBILINOGEN UR STRIP-ACNC: 0.2 E.U./DL
WBC # BLD AUTO: 6.6 10E3/UL (ref 4–11)
WBC #/AREA URNS AUTO: ABNORMAL /HPF

## 2024-10-08 PROCEDURE — 80048 BASIC METABOLIC PNL TOTAL CA: CPT

## 2024-10-08 PROCEDURE — 84156 ASSAY OF PROTEIN URINE: CPT

## 2024-10-08 PROCEDURE — 80197 ASSAY OF TACROLIMUS: CPT

## 2024-10-08 PROCEDURE — 81001 URINALYSIS AUTO W/SCOPE: CPT

## 2024-10-08 PROCEDURE — 36415 COLL VENOUS BLD VENIPUNCTURE: CPT

## 2024-10-08 PROCEDURE — 87799 DETECT AGENT NOS DNA QUANT: CPT

## 2024-10-08 PROCEDURE — 85027 COMPLETE CBC AUTOMATED: CPT

## 2024-10-09 LAB
BK VIRUS SPECIMEN TYPE: NORMAL
BKV DNA # SPEC NAA+PROBE: NOT DETECTED IU/ML

## 2024-10-11 ENCOUNTER — DOCUMENTATION ONLY (OUTPATIENT)
Dept: CARDIOLOGY | Facility: CLINIC | Age: 29
End: 2024-10-11
Payer: MEDICARE

## 2024-10-16 DIAGNOSIS — Z94.0 KIDNEY TRANSPLANT RECIPIENT: ICD-10-CM

## 2024-10-16 RX ORDER — TACROLIMUS 1 MG/1
2 CAPSULE ORAL 2 TIMES DAILY
Qty: 180 CAPSULE | Refills: 11 | Status: SHIPPED | OUTPATIENT
Start: 2024-10-16

## 2024-11-12 ENCOUNTER — LAB (OUTPATIENT)
Dept: LAB | Facility: CLINIC | Age: 29
End: 2024-11-12
Payer: MEDICARE

## 2024-11-12 DIAGNOSIS — Z48.298 AFTERCARE FOLLOWING ORGAN TRANSPLANT: ICD-10-CM

## 2024-11-12 LAB
ANION GAP SERPL CALCULATED.3IONS-SCNC: 10 MMOL/L (ref 7–15)
BUN SERPL-MCNC: 17 MG/DL (ref 6–20)
CALCIUM SERPL-MCNC: 9.6 MG/DL (ref 8.8–10.4)
CHLORIDE SERPL-SCNC: 103 MMOL/L (ref 98–107)
CREAT SERPL-MCNC: 1.2 MG/DL (ref 0.51–0.95)
EGFRCR SERPLBLD CKD-EPI 2021: 63 ML/MIN/1.73M2
ERYTHROCYTE [DISTWIDTH] IN BLOOD BY AUTOMATED COUNT: 12.1 % (ref 10–15)
GLUCOSE SERPL-MCNC: 107 MG/DL (ref 70–99)
HCO3 SERPL-SCNC: 23 MMOL/L (ref 22–29)
HCT VFR BLD AUTO: 46.2 % (ref 35–47)
HGB BLD-MCNC: 14.5 G/DL (ref 11.7–15.7)
MCH RBC QN AUTO: 28 PG (ref 26.5–33)
MCHC RBC AUTO-ENTMCNC: 31.4 G/DL (ref 31.5–36.5)
MCV RBC AUTO: 89 FL (ref 78–100)
PLATELET # BLD AUTO: 269 10E3/UL (ref 150–450)
POTASSIUM SERPL-SCNC: 4.5 MMOL/L (ref 3.4–5.3)
RBC # BLD AUTO: 5.17 10E6/UL (ref 3.8–5.2)
SODIUM SERPL-SCNC: 136 MMOL/L (ref 135–145)
TACROLIMUS BLD-MCNC: 7.9 UG/L (ref 5–15)
TME LAST DOSE: NORMAL H
TME LAST DOSE: NORMAL H
WBC # BLD AUTO: 7.4 10E3/UL (ref 4–11)

## 2024-11-12 PROCEDURE — 36415 COLL VENOUS BLD VENIPUNCTURE: CPT

## 2024-11-12 PROCEDURE — 87799 DETECT AGENT NOS DNA QUANT: CPT

## 2024-11-12 PROCEDURE — 80048 BASIC METABOLIC PNL TOTAL CA: CPT

## 2024-11-12 PROCEDURE — 80197 ASSAY OF TACROLIMUS: CPT

## 2024-11-12 PROCEDURE — 85027 COMPLETE CBC AUTOMATED: CPT

## 2024-11-13 LAB
BK VIRUS SPECIMEN TYPE: NORMAL
BKV DNA # SPEC NAA+PROBE: NOT DETECTED IU/ML

## 2024-11-21 DIAGNOSIS — Z48.298 AFTERCARE FOLLOWING ORGAN TRANSPLANT: Primary | ICD-10-CM

## 2024-11-25 ENCOUNTER — TELEPHONE (OUTPATIENT)
Dept: TRANSPLANT | Facility: CLINIC | Age: 29
End: 2024-11-25
Payer: MEDICARE

## 2024-11-25 NOTE — TELEPHONE ENCOUNTER
[facility] Call Center    Phone Message    May a detailed message be left on voicemail: yes     Reason for Call: Other: Called patient to schedule follow up SW appt. She had questions regarding her tacrolimus and the correct dosage to take as she has been taking 3. Please call patient to discuss, thanks.      Action Taken: Message routed to:  [facility] ([facility] EILEEN PAVON    Travel Screening: Not Applicable     Date of Service:

## 2024-12-10 ENCOUNTER — MYC MEDICAL ADVICE (OUTPATIENT)
Dept: TRANSPLANT | Facility: CLINIC | Age: 29
End: 2024-12-10

## 2024-12-10 ENCOUNTER — TELEPHONE (OUTPATIENT)
Dept: TRANSPLANT | Facility: CLINIC | Age: 29
End: 2024-12-10

## 2024-12-10 ENCOUNTER — LAB (OUTPATIENT)
Dept: LAB | Facility: CLINIC | Age: 29
End: 2024-12-10
Payer: MEDICARE

## 2024-12-10 ENCOUNTER — LAB (OUTPATIENT)
Dept: LAB | Facility: CLINIC | Age: 29
End: 2024-12-10
Attending: INTERNAL MEDICINE
Payer: MEDICARE

## 2024-12-10 ENCOUNTER — OFFICE VISIT (OUTPATIENT)
Dept: TRANSPLANT | Facility: CLINIC | Age: 29
End: 2024-12-10
Attending: INTERNAL MEDICINE
Payer: MEDICARE

## 2024-12-10 VITALS
HEART RATE: 99 BPM | WEIGHT: 180.2 LBS | SYSTOLIC BLOOD PRESSURE: 131 MMHG | OXYGEN SATURATION: 100 % | TEMPERATURE: 98.3 F | BODY MASS INDEX: 34.02 KG/M2 | DIASTOLIC BLOOD PRESSURE: 86 MMHG | HEIGHT: 61 IN

## 2024-12-10 DIAGNOSIS — E55.9 VITAMIN D DEFICIENCY, UNSPECIFIED: ICD-10-CM

## 2024-12-10 DIAGNOSIS — R79.89 LOW SERUM VITAMIN D: ICD-10-CM

## 2024-12-10 DIAGNOSIS — R00.0 TACHYCARDIA: ICD-10-CM

## 2024-12-10 DIAGNOSIS — Z98.890 OTHER SPECIFIED POSTPROCEDURAL STATES: ICD-10-CM

## 2024-12-10 DIAGNOSIS — Z11.59 NEED FOR HEPATITIS B SCREENING TEST: ICD-10-CM

## 2024-12-10 DIAGNOSIS — R00.0 TACHYCARDIA: Primary | ICD-10-CM

## 2024-12-10 DIAGNOSIS — Z20.828 NEED FOR POST EXPOSURE PROPHYLAXIS FOR HEPATITIS B: ICD-10-CM

## 2024-12-10 DIAGNOSIS — N18.2 CKD (CHRONIC KIDNEY DISEASE) STAGE 2, GFR 60-89 ML/MIN: ICD-10-CM

## 2024-12-10 DIAGNOSIS — Z48.298 AFTERCARE FOLLOWING ORGAN TRANSPLANT: ICD-10-CM

## 2024-12-10 DIAGNOSIS — E87.20 METABOLIC ACIDOSIS: ICD-10-CM

## 2024-12-10 DIAGNOSIS — Z48.298 AFTERCARE FOLLOWING ORGAN TRANSPLANT: Primary | ICD-10-CM

## 2024-12-10 DIAGNOSIS — Z23 NEED FOR POST EXPOSURE PROPHYLAXIS FOR HEPATITIS B: ICD-10-CM

## 2024-12-10 DIAGNOSIS — Z20.828 CONTACT WITH AND (SUSPECTED) EXPOSURE TO OTHER VIRAL COMMUNICABLE DISEASES: ICD-10-CM

## 2024-12-10 DIAGNOSIS — Z79.899 ENCOUNTER FOR LONG-TERM CURRENT USE OF MEDICATION: ICD-10-CM

## 2024-12-10 DIAGNOSIS — Z94.0 KIDNEY REPLACED BY TRANSPLANT: ICD-10-CM

## 2024-12-10 DIAGNOSIS — Z94.0 KIDNEY TRANSPLANTED: Primary | ICD-10-CM

## 2024-12-10 DIAGNOSIS — Z29.89 NEED FOR PNEUMOCYSTIS PROPHYLAXIS: ICD-10-CM

## 2024-12-10 DIAGNOSIS — D84.9 IMMUNOSUPPRESSED STATUS (H): Chronic | ICD-10-CM

## 2024-12-10 LAB
ALBUMIN MFR UR ELPH: <6 MG/DL
ALBUMIN SERPL BCG-MCNC: 4 G/DL (ref 3.5–5.2)
ALBUMIN UR-MCNC: NEGATIVE MG/DL
ALP SERPL-CCNC: 71 U/L (ref 40–150)
ALT SERPL W P-5'-P-CCNC: 12 U/L (ref 0–50)
ANION GAP SERPL CALCULATED.3IONS-SCNC: 7 MMOL/L (ref 7–15)
APPEARANCE UR: CLEAR
AST SERPL W P-5'-P-CCNC: 16 U/L (ref 0–45)
BACTERIA #/AREA URNS HPF: ABNORMAL /HPF
BILIRUB DIRECT SERPL-MCNC: <0.2 MG/DL (ref 0–0.3)
BILIRUB SERPL-MCNC: 0.5 MG/DL
BILIRUB UR QL STRIP: NEGATIVE
BUN SERPL-MCNC: 10.8 MG/DL (ref 6–20)
CALCIUM SERPL-MCNC: 8.9 MG/DL (ref 8.8–10.4)
CHLORIDE SERPL-SCNC: 105 MMOL/L (ref 98–107)
CHOLEST SERPL-MCNC: 103 MG/DL
COLOR UR AUTO: ABNORMAL
CREAT SERPL-MCNC: 1.12 MG/DL (ref 0.51–0.95)
CREAT UR-MCNC: 19.8 MG/DL
EGFRCR SERPLBLD CKD-EPI 2021: 68 ML/MIN/1.73M2
ERYTHROCYTE [DISTWIDTH] IN BLOOD BY AUTOMATED COUNT: 12.8 % (ref 10–15)
EST. AVERAGE GLUCOSE BLD GHB EST-MCNC: 114 MG/DL
FASTING STATUS PATIENT QL REPORTED: YES
FASTING STATUS PATIENT QL REPORTED: YES
GLUCOSE SERPL-MCNC: 96 MG/DL (ref 70–99)
GLUCOSE UR STRIP-MCNC: NEGATIVE MG/DL
HBA1C MFR BLD: 5.6 %
HBV CORE AB SERPL QL IA: REACTIVE
HBV SURFACE AB SERPL IA-ACNC: >1000 M[IU]/ML
HBV SURFACE AB SERPL IA-ACNC: REACTIVE M[IU]/ML
HBV SURFACE AG SERPL QL IA: NONREACTIVE
HCO3 SERPL-SCNC: 24 MMOL/L (ref 22–29)
HCT VFR BLD AUTO: 43.5 % (ref 35–47)
HDLC SERPL-MCNC: 38 MG/DL
HGB BLD-MCNC: 13.9 G/DL (ref 11.7–15.7)
HGB UR QL STRIP: ABNORMAL
KETONES UR STRIP-MCNC: NEGATIVE MG/DL
LDLC SERPL CALC-MCNC: 43 MG/DL
LEUKOCYTE ESTERASE UR QL STRIP: NEGATIVE
MCH RBC QN AUTO: 28 PG (ref 26.5–33)
MCHC RBC AUTO-ENTMCNC: 32 G/DL (ref 31.5–36.5)
MCV RBC AUTO: 88 FL (ref 78–100)
NITRATE UR QL: NEGATIVE
NONHDLC SERPL-MCNC: 65 MG/DL
PH UR STRIP: 5.5 [PH] (ref 5–7)
PLATELET # BLD AUTO: 215 10E3/UL (ref 150–450)
POTASSIUM SERPL-SCNC: 4 MMOL/L (ref 3.4–5.3)
PROT SERPL-MCNC: 7.2 G/DL (ref 6.4–8.3)
PROT/CREAT 24H UR: NORMAL MG/G{CREAT}
PTH-INTACT SERPL-MCNC: 98 PG/ML (ref 15–65)
RBC # BLD AUTO: 4.96 10E6/UL (ref 3.8–5.2)
RBC URINE: 1 /HPF
SODIUM SERPL-SCNC: 136 MMOL/L (ref 135–145)
SP GR UR STRIP: 1 (ref 1–1.03)
SQUAMOUS EPITHELIAL: 3 /HPF
TRIGL SERPL-MCNC: 111 MG/DL
TSH SERPL DL<=0.005 MIU/L-ACNC: 2.83 UIU/ML (ref 0.3–4.2)
URATE SERPL-MCNC: 4.8 MG/DL (ref 2.4–5.7)
UROBILINOGEN UR STRIP-MCNC: NORMAL MG/DL
VIT D+METAB SERPL-MCNC: 11 NG/ML (ref 20–50)
VIT D+METAB SERPL-MCNC: 13 NG/ML (ref 20–50)
WBC # BLD AUTO: 6.3 10E3/UL (ref 4–11)
WBC URINE: 1 /HPF

## 2024-12-10 PROCEDURE — 82248 BILIRUBIN DIRECT: CPT | Performed by: PATHOLOGY

## 2024-12-10 PROCEDURE — 87517 HEPATITIS B DNA QUANT: CPT

## 2024-12-10 PROCEDURE — 87340 HEPATITIS B SURFACE AG IA: CPT

## 2024-12-10 PROCEDURE — 84443 ASSAY THYROID STIM HORMONE: CPT | Performed by: PATHOLOGY

## 2024-12-10 PROCEDURE — 87799 DETECT AGENT NOS DNA QUANT: CPT | Performed by: INTERNAL MEDICINE

## 2024-12-10 PROCEDURE — 83036 HEMOGLOBIN GLYCOSYLATED A1C: CPT | Performed by: INTERNAL MEDICINE

## 2024-12-10 PROCEDURE — 99000 SPECIMEN HANDLING OFFICE-LAB: CPT | Performed by: PATHOLOGY

## 2024-12-10 PROCEDURE — 82306 VITAMIN D 25 HYDROXY: CPT

## 2024-12-10 PROCEDURE — 85027 COMPLETE CBC AUTOMATED: CPT | Performed by: PATHOLOGY

## 2024-12-10 PROCEDURE — 83970 ASSAY OF PARATHORMONE: CPT | Performed by: PATHOLOGY

## 2024-12-10 PROCEDURE — 80053 COMPREHEN METABOLIC PANEL: CPT | Performed by: PATHOLOGY

## 2024-12-10 PROCEDURE — 80197 ASSAY OF TACROLIMUS: CPT | Performed by: INTERNAL MEDICINE

## 2024-12-10 PROCEDURE — 84550 ASSAY OF BLOOD/URIC ACID: CPT | Performed by: PATHOLOGY

## 2024-12-10 PROCEDURE — 86359 T CELLS TOTAL COUNT: CPT | Performed by: INTERNAL MEDICINE

## 2024-12-10 PROCEDURE — 82306 VITAMIN D 25 HYDROXY: CPT | Performed by: INTERNAL MEDICINE

## 2024-12-10 PROCEDURE — 86706 HEP B SURFACE ANTIBODY: CPT

## 2024-12-10 PROCEDURE — 81001 URINALYSIS AUTO W/SCOPE: CPT | Performed by: PATHOLOGY

## 2024-12-10 PROCEDURE — 80061 LIPID PANEL: CPT | Performed by: PATHOLOGY

## 2024-12-10 PROCEDURE — G0463 HOSPITAL OUTPT CLINIC VISIT: HCPCS | Performed by: INTERNAL MEDICINE

## 2024-12-10 PROCEDURE — 86704 HEP B CORE ANTIBODY TOTAL: CPT

## 2024-12-10 PROCEDURE — 84156 ASSAY OF PROTEIN URINE: CPT | Performed by: PATHOLOGY

## 2024-12-10 PROCEDURE — 36415 COLL VENOUS BLD VENIPUNCTURE: CPT | Performed by: PATHOLOGY

## 2024-12-10 PROCEDURE — 86360 T CELL ABSOLUTE COUNT/RATIO: CPT | Performed by: INTERNAL MEDICINE

## 2024-12-10 ASSESSMENT — PAIN SCALES - GENERAL: PAINLEVEL_OUTOF10: NO PAIN (0)

## 2024-12-10 NOTE — LETTER
12/10/2024      Nuzhat Lopez  [address] [address]      Dear Colleague,    Thank you for referring your patient, Nuzhat Lopez, to the [facility]PL[facility]. Please see a copy of my visit note below.    TRANSPLANT NEPHROLOGY CLINIC VISIT     Assessment & Plan  # LDKT: CKD Stage 2 - Stable   - Baseline Creatinine: ~ 1.1-1.3   - Proteinuria: Minimal (0.2-0.5 grams)   - DSA Hx: No DSA   - Last cPRA: 0%   - BK Viremia: No   - Kidney Tx Biopsy Hx: No biopsy history.    # IgA Nephropathy:  Has microscopic hematuria on prior UA, however it appears that samples not best sample ,contaminated often with multiple skin cells. UPCR 0.22 on 10/08. Unclear if she has true hematuria. Would repeat UA  and UPCR. Advised on procedure to take appropriate urine sample.      # Immunosuppression: Tacrolimus immediate release (goal 4-6) and Mycophenolic acid (dose 540 mg every 12 hours)   - Induction with Recent Transplant:  Low Intensity Protocol   - Continue with intensive monitoring of immunosuppression for efficacy and toxicity.   - Historical Changes in Immunosuppression: None   - Changes: No    # Infection Prevention:      - PJP: Sulfa/TMP (Bactrim)  - CMV: None, prophylaxis completed      - CMV IgG Ab High Risk Discordance (D+/R-): No  CMV Serostatus: Positive  - EBV IgG Ab High Risk Discordance (D+/R-): No  EBV Serostatus: Positive    # Blood Pressure: Controlled;  Goal BP: < 130/80   - Changes: Not at this time    # Mineral Bone Disorder:    - Secondary renal hyperparathyroidism; PTH level: Minimally elevated ( pg/ml)        On treatment: None  - Vitamin D; level: Low normal        On supplement: No  - Calcium; level: Normal        On supplement: No    # Electrolytes:   - Potassium; level: Normal        On supplement: No  - Magnesium; level: Normal        On supplement: No  - Bicarbonate; level: Normal        On supplement: Yes    # Obesity, Class I (BMI = 34.1): Weight is up a few pounds.   -  Recommend weight loss for overall health by increasing exercise and watching caloric intake.   - Patient may benefit from GLP1 agonists or SGLT2 inhibitors.    # Other Significant PMH:   - Tachycardia: Appears to be sinus tachycardia.  Patient's father does have Brugada syndrome, but no evidence in patient with several EKGs reviewed by Cardiology.  Unremarkable cardiac echo 10/2024. Had Zio-patch done.results reassuring. Followed by Cardiology. She does still report persistent tachycardia at home too ,in 90'-100'. Would check TSH.    - Hep B core Ab Positive in Kidney Donor: Patient has moderately positive Hep B surface Ab and Hep B DNA has remained negative.  She was started on entecavir for prophylaxis.  Seen by Hepatology and recommended stopping entecavir at one year post transplant and follow Heb B surface Ag, Heb B core Ab and Hep B DNA PCR q 3 months.   - Alopecia: on biotin      # Skin Cancer Risk:    - Discussed sun protection and recommend regular follow up with Dermatology.    # Transplant History:  Etiology of Kidney Failure: IgA nephropathy  Tx: LDKT  Transplant: 12/8/2023 (Kidney)  Significant transplant-related complications: None    Transplant Office Phone Number: [phone]    Assessment and plan was discussed with the patient and she voiced her understanding and agreement.    Return visit: Return in about 6 months (around 6/10/2025).    Alvaro Hampton MD  Neph Fellow     Mike Noguera MD    The longitudinal plan of care for the diagnosis(es)/condition(s) as documented were addressed during this visit. Due to the added complexity in care, I will continue to support Nuzhat Simmons in the subsequent management and with ongoing continuity of care.    Attestation:  This patient has been seen and evaluated by me, Mike Noguera MD.  I have reviewed the note and agree with plan of care as documented by the fellow.     Chief Complaint  Ms. Lopez is a 28 year old here for kidney transplant and  immunosuppression management.     History of Present Illness  Ms. Lopez reports feeling well overall. She noted that she continued gaining weight and appears one of new year plan to work on weight loss.   Since last clinic visit:   Hospitalizations: No   New Medical Issues: No  Chest pain or shortness of breath: No  Lower extremity swelling: No  Weight change: Yes, gained few lbs  Nausea and vomiting: No  Diarrhea: No  Heartburn symptoms: No  Fever, sweats or chills: No  Urinary complaints: No    Home BP:  SBP mostly in 120' , and DBP in 80'    Problem List  Patient Active Problem List   Diagnosis     IgA nephropathy     CKD (chronic kidney disease) stage 2, GFR 60-89 ml/min     Iron deficiency anemia, unspecified     Secondary renal hyperparathyroidism (H)     Kidney replaced by transplant     Immunosuppressed status (H)     Metabolic acidosis     Aftercare following organ transplant     Need for pneumocystis prophylaxis     Need for post exposure prophylaxis for hepatitis B       Allergies  Allergies   Allergen Reactions     Cephalexin Rash     Heparin Flush Rash       Medications  Current Outpatient Medications   Medication Sig Dispense Refill     acetaminophen (TYLENOL) 325 MG tablet Take 3 tablets (975 mg) by mouth every 8 hours as needed for pain 100 tablet 0     atorvastatin (LIPITOR) 10 MG tablet Take 1 tablet (10 mg) by mouth daily 30 tablet 11     biotin 1000 MCG TABS tablet Take 1,000 mcg by mouth daily       cyanocobalamin (VITAMIN B-12) 1000 MCG tablet Take 1,000 mcg by mouth daily.       mycophenolic acid (GENERIC EQUIVALENT) 180 MG EC tablet Take 3 tablets (540 mg) by mouth 2 times daily Take in place of mycophenolate 180 tablet 11     sodium bicarbonate 650 MG tablet Take 2 tablets (1,300 mg) by mouth 2 times daily 360 tablet 1     sulfamethoxazole-trimethoprim (BACTRIM) 400-80 MG tablet Take 1 tablet by mouth daily 30 tablet 11     tacrolimus (GENERIC EQUIVALENT) 0.5 MG capsule Take 1 capsule (0.5 mg)  "by mouth 2 times daily Total dose: 2.5mg twice daily 60 capsule 11     tacrolimus (GENERIC EQUIVALENT) 1 MG capsule Take 2 capsules (2 mg) by mouth 2 times daily. Total dose: 2.5mg twice daily 180 capsule 11     No current facility-administered medications for this visit.     Medications Discontinued During This Encounter   Medication Reason     entecavir (BARACLUDE) 0.5 MG tablet        Physical Exam  Vital Signs: /86   Pulse 99   Temp 98.3  F (36.8  C) (Oral)   Ht 1.549 m (5' 1\")   Wt 81.7 kg (180 lb 3.2 oz)   SpO2 100%   BMI 34.05 kg/m      GENERAL APPEARANCE: alert and no distress  HENT: mouth without ulcers or lesions  RESP: lungs clear to auscultation - no rales, rhonchi or wheezes  CV: regular rhythm, normal rate, no rub, no murmur  EDEMA: no LE edema bilaterally  ABDOMEN: soft, nondistended, nontender, bowel sounds normal  MS: extremities normal - no gross deformities noted, no evidence of inflammation in joints, no muscle tenderness  SKIN: no rash  TX KIDNEY: normal  DIALYSIS ACCESS:  LUE AV fistula with good thrill    Data        Latest Ref Rng & Units 12/10/2024    10:38 AM 11/12/2024     8:20 AM 10/8/2024     8:12 AM   Renal   Sodium 135 - 145 mmol/L 136  136  136    K 3.4 - 5.3 mmol/L 4.0  4.5  4.3    Cl 98 - 107 mmol/L 105  103  104    Cl (external) 98 - 107 mmol/L 105  103  104    CO2 22 - 29 mmol/L 24  23  23    Urea Nitrogen 6.0 - 20.0 mg/dL 10.8  17.0  24.1    Creatinine 0.51 - 0.95 mg/dL 1.12  1.20  1.31    Glucose 70 - 99 mg/dL 96  107  103    Calcium 8.8 - 10.4 mg/dL 8.9  9.6  9.3          Latest Ref Rng & Units 12/10/2024    10:38 AM 4/12/2024     9:04 AM 3/26/2024     8:24 AM   Bone Health   Phosphorus 2.5 - 4.5 mg/dL   3.3    Parathyroid Hormone Intact 15 - 65 pg/mL 98  114           Latest Ref Rng & Units 12/10/2024    10:38 AM 11/12/2024     8:20 AM 10/8/2024     8:12 AM   Heme   WBC 4.0 - 11.0 10e3/uL 6.3  7.4  6.6    Hgb 11.7 - 15.7 g/dL 13.9  14.5  14.3    Plt 150 - 450 " 10e3/uL 215  269  225          Latest Ref Rng & Units 12/10/2024    10:38 AM 6/24/2024     8:48 AM 6/11/2024     8:12 AM   Liver   AP 40 - 150 U/L 71  97  95    TBili <=1.2 mg/dL 0.5  0.5  0.2    Bilirubin Direct 0.00 - 0.30 mg/dL <0.20  <0.20  <0.20    ALT 0 - 50 U/L 12  9  15    AST 0 - 45 U/L 16  17  23    Tot Protein 6.4 - 8.3 g/dL 7.2  7.5  6.9    Albumin 3.5 - 5.2 g/dL 4.0  4.2  4.0          Latest Ref Rng & Units 6/11/2024     8:12 AM 11/27/2023     2:06 PM   Pancreas   A1C 0.0 - 5.6 % 5.4  4.6          Latest Ref Rng & Units 12/21/2023     7:55 AM 11/27/2023     2:06 PM   Iron studies   Iron 37 - 145 ug/dL 70  73    Iron Sat Index 15 - 46 % 34  42    Ferritin 6 - 175 ng/mL 902  1,549          Latest Ref Rng & Units 11/27/2023     2:06 PM 2/28/2022     1:08 PM   [facility] T[facility] Virology   EBV CAPSID ANTIBODY IGG No detectable antibody. Positive  Positive      Failed to redirect to the Timeline version of the REVFS SmartLink.  Recent Labs   Lab Test 09/10/24  0810 10/08/24  0812 11/12/24  0820   DOSTAC 9/9/2024 10/7/2024 11/11/2024   TACROL 5.2 7.2 7.9     Recent Labs   Lab Test 12/13/23  0723 12/26/23  0757 01/04/24  0803 01/10/24  1003 02/06/24  0811   DOSMPA  --   --  1/3/2024   9:00 PM  --   --    MPACID 3.31   < > 5.13* 2.61 2.34   MPAG 45.4   < > 79.3 52.7 82.5    < > = values in this interval not displayed.          Again, thank you for allowing me to participate in the care of your patient.        Sincerely,        Mike Noguera MD

## 2024-12-10 NOTE — PATIENT INSTRUCTIONS
Patient Recommendations:  - Stop entecavir.  - Recommend weight loss for overall health by increasing exercise and watching caloric intake.  - Recommend routine follow up with Dr. Ramos in 3 months or so to resume care with a goal of ongoing co-management between your primary Nephrologist and the Transplant Team.     Transplant Patient Information  Your Post Transplant Coordinator is: Nely Alonso  For non urgent items, we encourage you to contact your coordinator/care team online via [employer]  You and your care team can also contact your transplant coordinator Monday - Friday, 8am - 5pm at [phone] (Option 2 to reach the coordinator or Option 4 to schedule an appointment).  After hours for urgent matters, please call [facility] at [phone].    Kidney Transplant Lab Frequency Protocol  0 to 2 Months:  Twice weekly  2 to 4 Months:  Once weekly  4 to 7 Months:  Every other week  7 Months to 2 Years: Monthly  2 to 5 Years:  Every 2 months  > 5 Years:  Every 3 months

## 2024-12-10 NOTE — NURSING NOTE
"Chief Complaint   Patient presents with    RECHECK     Post txp follow up.      Vitals:    12/10/24 1048 12/10/24 1052 12/10/24 1053 12/10/24 1055   BP: 134/85 126/88 132/86 131/86   BP Location: Left arm Left arm Left arm    Patient Position: Sitting Sitting Sitting    Cuff Size: Adult Regular Adult Regular Adult Regular    Pulse: 99      Temp: 98.3  F (36.8  C)      TempSrc: Oral      SpO2: 100%      Weight: 81.7 kg (180 lb 3.2 oz)      Height: 1.549 m (5' 1\")          BP Readings from Last 3 Encounters:   12/10/24 131/86   09/25/24 127/84   09/14/24 118/77       /86   Pulse 99   Temp 98.3  F (36.8  C) (Oral)   Ht 1.549 m (5' 1\")   Wt 81.7 kg (180 lb 3.2 oz)   SpO2 100%   BMI 34.05 kg/m       Adamaris Jiménez    "

## 2024-12-10 NOTE — PROGRESS NOTES
TRANSPLANT NEPHROLOGY CLINIC VISIT     Assessment & Plan   # LDKT: CKD Stage 2 - Stable   - Baseline Creatinine: ~ 1.1-1.3   - Proteinuria: Minimal (0.2-0.5 grams)   - DSA Hx: No DSA   - Last cPRA: 0%   - BK Viremia: No   - Kidney Tx Biopsy Hx: No biopsy history.    # IgA Nephropathy:  Has microscopic hematuria on prior UA, however it appears that samples not best sample ,contaminated often with multiple skin cells. UPCR 0.22 on 10/08. Unclear if she has true hematuria. Would repeat UA  and UPCR. Advised on procedure to take appropriate urine sample.      # Immunosuppression: Tacrolimus immediate release (goal 4-6) and Mycophenolic acid (dose 540 mg every 12 hours)   - Induction with Recent Transplant:  Low Intensity Protocol   - Continue with intensive monitoring of immunosuppression for efficacy and toxicity.   - Historical Changes in Immunosuppression: None   - Changes: No    # Infection Prevention:      - PJP: Sulfa/TMP (Bactrim)  - CMV: None, prophylaxis completed      - CMV IgG Ab High Risk Discordance (D+/R-): No  CMV Serostatus: Positive  - EBV IgG Ab High Risk Discordance (D+/R-): No  EBV Serostatus: Positive    # Blood Pressure: Controlled;  Goal BP: < 130/80   - Changes: Not at this time    # Mineral Bone Disorder:    - Secondary renal hyperparathyroidism; PTH level: Minimally elevated ( pg/ml)        On treatment: None  - Vitamin D; level: Low normal        On supplement: No  - Calcium; level: Normal        On supplement: No    # Electrolytes:   - Potassium; level: Normal        On supplement: No  - Magnesium; level: Normal        On supplement: No  - Bicarbonate; level: Normal        On supplement: Yes    # Obesity, Class I (BMI = 34.1): Weight is up a few pounds.   - Recommend weight loss for overall health by increasing exercise and watching caloric intake.   - Patient may benefit from GLP1 agonists or SGLT2 inhibitors.    # Other Significant PMH:   - Tachycardia: Appears to be sinus  tachycardia.  Patient's father does have Brugada syndrome, but no evidence in patient with several EKGs reviewed by Cardiology.  Unremarkable cardiac echo 10/2024. Had Zio-patch done.results reassuring. Followed by Cardiology. She does still report persistent tachycardia at home too ,in 90'-100'. Would check TSH.    - Hep B core Ab Positive in Kidney Donor: Patient has moderately positive Hep B surface Ab and Hep B DNA has remained negative.  She was started on entecavir for prophylaxis.  Seen by Hepatology and recommended stopping entecavir at one year post transplant and follow Heb B surface Ag, Heb B core Ab and Hep B DNA PCR q 3 months.   - Alopecia: on biotin      # Skin Cancer Risk:    - Discussed sun protection and recommend regular follow up with Dermatology.    # Transplant History:  Etiology of Kidney Failure: IgA nephropathy  Tx: LDKT  Transplant: 12/8/2023 (Kidney)  Significant transplant-related complications: None    Transplant Office Phone Number: [phone]    Assessment and plan was discussed with the patient and she voiced her understanding and agreement.    Return visit: Return in about 6 months (around 6/10/2025).    Alvaro Hampton MD  Neph Fellow     Mike Noguera MD    The longitudinal plan of care for the diagnosis(es)/condition(s) as documented were addressed during this visit. Due to the added complexity in care, I will continue to support Nuzhat Simmons in the subsequent management and with ongoing continuity of care.    Attestation:  This patient has been seen and evaluated by me, Mike Noguera MD.  I have reviewed the note and agree with plan of care as documented by the fellow.     Chief Complaint   Ms. Lopez is a 28 year old here for kidney transplant and immunosuppression management.     History of Present Illness   Ms. Lopez reports feeling well overall. She noted that she continued gaining weight and appears one of new year plan to work on weight loss.   Since last clinic  visit:   Hospitalizations: No   New Medical Issues: No  Chest pain or shortness of breath: No  Lower extremity swelling: No  Weight change: Yes, gained few lbs  Nausea and vomiting: No  Diarrhea: No  Heartburn symptoms: No  Fever, sweats or chills: No  Urinary complaints: No    Home BP:  SBP mostly in 120' , and DBP in 80'    Problem List   Patient Active Problem List   Diagnosis    IgA nephropathy    CKD (chronic kidney disease) stage 2, GFR 60-89 ml/min    Iron deficiency anemia, unspecified    Secondary renal hyperparathyroidism (H)    Kidney replaced by transplant    Immunosuppressed status (H)    Metabolic acidosis    Aftercare following organ transplant    Need for pneumocystis prophylaxis    Need for post exposure prophylaxis for hepatitis B       Allergies   Allergies   Allergen Reactions    Cephalexin Rash    Heparin Flush Rash       Medications   Current Outpatient Medications   Medication Sig Dispense Refill    acetaminophen (TYLENOL) 325 MG tablet Take 3 tablets (975 mg) by mouth every 8 hours as needed for pain 100 tablet 0    atorvastatin (LIPITOR) 10 MG tablet Take 1 tablet (10 mg) by mouth daily 30 tablet 11    biotin 1000 MCG TABS tablet Take 1,000 mcg by mouth daily      cyanocobalamin (VITAMIN B-12) 1000 MCG tablet Take 1,000 mcg by mouth daily.      mycophenolic acid (GENERIC EQUIVALENT) 180 MG EC tablet Take 3 tablets (540 mg) by mouth 2 times daily Take in place of mycophenolate 180 tablet 11    sodium bicarbonate 650 MG tablet Take 2 tablets (1,300 mg) by mouth 2 times daily 360 tablet 1    sulfamethoxazole-trimethoprim (BACTRIM) 400-80 MG tablet Take 1 tablet by mouth daily 30 tablet 11    tacrolimus (GENERIC EQUIVALENT) 0.5 MG capsule Take 1 capsule (0.5 mg) by mouth 2 times daily Total dose: 2.5mg twice daily 60 capsule 11    tacrolimus (GENERIC EQUIVALENT) 1 MG capsule Take 2 capsules (2 mg) by mouth 2 times daily. Total dose: 2.5mg twice daily 180 capsule 11     No current  "facility-administered medications for this visit.     Medications Discontinued During This Encounter   Medication Reason    entecavir (BARACLUDE) 0.5 MG tablet        Physical Exam   Vital Signs: /86   Pulse 99   Temp 98.3  F (36.8  C) (Oral)   Ht 1.549 m (5' 1\")   Wt 81.7 kg (180 lb 3.2 oz)   SpO2 100%   BMI 34.05 kg/m      GENERAL APPEARANCE: alert and no distress  HENT: mouth without ulcers or lesions  RESP: lungs clear to auscultation - no rales, rhonchi or wheezes  CV: regular rhythm, normal rate, no rub, no murmur  EDEMA: no LE edema bilaterally  ABDOMEN: soft, nondistended, nontender, bowel sounds normal  MS: extremities normal - no gross deformities noted, no evidence of inflammation in joints, no muscle tenderness  SKIN: no rash  TX KIDNEY: normal  DIALYSIS ACCESS:  LUE AV fistula with good thrill    Data         Latest Ref Rng & Units 12/10/2024    10:38 AM 11/12/2024     8:20 AM 10/8/2024     8:12 AM   Renal   Sodium 135 - 145 mmol/L 136  136  136    K 3.4 - 5.3 mmol/L 4.0  4.5  4.3    Cl 98 - 107 mmol/L 105  103  104    Cl (external) 98 - 107 mmol/L 105  103  104    CO2 22 - 29 mmol/L 24  23  23    Urea Nitrogen 6.0 - 20.0 mg/dL 10.8  17.0  24.1    Creatinine 0.51 - 0.95 mg/dL 1.12  1.20  1.31    Glucose 70 - 99 mg/dL 96  107  103    Calcium 8.8 - 10.4 mg/dL 8.9  9.6  9.3          Latest Ref Rng & Units 12/10/2024    10:38 AM 4/12/2024     9:04 AM 3/26/2024     8:24 AM   Bone Health   Phosphorus 2.5 - 4.5 mg/dL   3.3    Parathyroid Hormone Intact 15 - 65 pg/mL 98  114           Latest Ref Rng & Units 12/10/2024    10:38 AM 11/12/2024     8:20 AM 10/8/2024     8:12 AM   Heme   WBC 4.0 - 11.0 10e3/uL 6.3  7.4  6.6    Hgb 11.7 - 15.7 g/dL 13.9  14.5  14.3    Plt 150 - 450 10e3/uL 215  269  225          Latest Ref Rng & Units 12/10/2024    10:38 AM 6/24/2024     8:48 AM 6/11/2024     8:12 AM   Liver   AP 40 - 150 U/L 71  97  95    TBili <=1.2 mg/dL 0.5  0.5  0.2    Bilirubin Direct 0.00 - 0.30 " mg/dL <0.20  <0.20  <0.20    ALT 0 - 50 U/L 12  9  15    AST 0 - 45 U/L 16  17  23    Tot Protein 6.4 - 8.3 g/dL 7.2  7.5  6.9    Albumin 3.5 - 5.2 g/dL 4.0  4.2  4.0          Latest Ref Rng & Units 6/11/2024     8:12 AM 11/27/2023     2:06 PM   Pancreas   A1C 0.0 - 5.6 % 5.4  4.6          Latest Ref Rng & Units 12/21/2023     7:55 AM 11/27/2023     2:06 PM   Iron studies   Iron 37 - 145 ug/dL 70  73    Iron Sat Index 15 - 46 % 34  42    Ferritin 6 - 175 ng/mL 902  1,549          Latest Ref Rng & Units 11/27/2023     2:06 PM 2/28/2022     1:08 PM   [facility] Virology   EBV CAPSID ANTIBODY IGG No detectable antibody. Positive  Positive      Failed to redirect to the Timeline version of the REVFS SmartLink.  Recent Labs   Lab Test 09/10/24  0810 10/08/24  0812 11/12/24  0820   DOSTAC 9/9/2024 10/7/2024 11/11/2024   TACROL 5.2 7.2 7.9     Recent Labs   Lab Test 12/13/23  0723 12/26/23  0757 01/04/24  0803 01/10/24  1003 02/06/24  0811   DOSMPA  --   --  1/3/2024   9:00 PM  --   --    MPACID 3.31   < > 5.13* 2.61 2.34   MPAG 45.4   < > 79.3 52.7 82.5    < > = values in this interval not displayed.

## 2024-12-10 NOTE — LETTER
12/10/2024      RE: Nuzhat Lopez  [address] [facility] [address]       TRANSPLANT NEPHROLOGY CLINIC VISIT     Assessment & Plan  # LDKT: CKD Stage 2 - Stable   - Baseline Creatinine: ~ 1.1-1.3   - Proteinuria: Minimal (0.2-0.5 grams)   - DSA Hx: No DSA   - Last cPRA: 0%   - BK Viremia: No   - Kidney Tx Biopsy Hx: No biopsy history.    # IgA Nephropathy:  Has microscopic hematuria on prior UA, however it appears that samples not best sample ,contaminated often with multiple skin cells. UPCR 0.22 on 10/08. Unclear if she has true hematuria. Would repeat UA  and UPCR. Advised on procedure to take appropriate urine sample.      # Immunosuppression: Tacrolimus immediate release (goal 4-6) and Mycophenolic acid (dose 540 mg every 12 hours)   - Induction with Recent Transplant:  Low Intensity Protocol   - Continue with intensive monitoring of immunosuppression for efficacy and toxicity.   - Historical Changes in Immunosuppression: None   - Changes: No    # Infection Prevention:      - PJP: Sulfa/TMP (Bactrim)  - CMV: None, prophylaxis completed      - CMV IgG Ab High Risk Discordance (D+/R-): No  CMV Serostatus: Positive  - EBV IgG Ab High Risk Discordance (D+/R-): No  EBV Serostatus: Positive    # Blood Pressure: Controlled;  Goal BP: < 130/80   - Changes: Not at this time    # Mineral Bone Disorder:    - Secondary renal hyperparathyroidism; PTH level: Minimally elevated ( pg/ml)        On treatment: None  - Vitamin D; level: Low normal        On supplement: No  - Calcium; level: Normal        On supplement: No    # Electrolytes:   - Potassium; level: Normal        On supplement: No  - Magnesium; level: Normal        On supplement: No  - Bicarbonate; level: Normal        On supplement: Yes    # Obesity, Class I (BMI = 34.1): Weight is up a few pounds.   - Recommend weight loss for overall health by increasing exercise and watching caloric intake.   - Patient may benefit from GLP1 agonists or SGLT2  inhibitors.    # Other Significant PMH:   - Tachycardia: Appears to be sinus tachycardia.  Patient's father does have Brugada syndrome, but no evidence in patient with several EKGs reviewed by Cardiology.  Unremarkable cardiac echo 10/2024. Had Zio-patch done.results reassuring. Followed by Cardiology. She does still report persistent tachycardia at home too ,in 90'-100'. Would check TSH.    - Hep B core Ab Positive in Kidney Donor: Patient has moderately positive Hep B surface Ab and Hep B DNA has remained negative.  She was started on entecavir for prophylaxis.  Seen by Hepatology and recommended stopping entecavir at one year post transplant and follow Heb B surface Ag, Heb B core Ab and Hep B DNA PCR q 3 months.   - Alopecia: on biotin      # Skin Cancer Risk:    - Discussed sun protection and recommend regular follow up with Dermatology.    # Transplant History:  Etiology of Kidney Failure: IgA nephropathy  Tx: LDKT  Transplant: 12/8/2023 (Kidney)  Significant transplant-related complications: None    Transplant Office Phone Number: [phone]    Assessment and plan was discussed with the patient and she voiced her understanding and agreement.    Return visit: Return in about 6 months (around 6/10/2025).    Alvaro Hampton MD  Neph Fellow     Mike Noguera MD    The longitudinal plan of care for the diagnosis(es)/condition(s) as documented were addressed during this visit. Due to the added complexity in care, I will continue to support Nuzhat Simmons in the subsequent management and with ongoing continuity of care.    Attestation:  This patient has been seen and evaluated by me, Mike Noguera MD.  I have reviewed the note and agree with plan of care as documented by the fellow.     Chief Complaint  Ms. Lopez is a 28 year old here for kidney transplant and immunosuppression management.     History of Present Illness  Ms. Lopez reports feeling well overall. She noted that she continued gaining weight and  appears one of new year plan to work on weight loss.   Since last clinic visit:   Hospitalizations: No   New Medical Issues: No  Chest pain or shortness of breath: No  Lower extremity swelling: No  Weight change: Yes, gained few lbs  Nausea and vomiting: No  Diarrhea: No  Heartburn symptoms: No  Fever, sweats or chills: No  Urinary complaints: No    Home BP:  SBP mostly in 120' , and DBP in 80'    Problem List  Patient Active Problem List   Diagnosis     IgA nephropathy     CKD (chronic kidney disease) stage 2, GFR 60-89 ml/min     Iron deficiency anemia, unspecified     Secondary renal hyperparathyroidism (H)     Kidney replaced by transplant     Immunosuppressed status (H)     Metabolic acidosis     Aftercare following organ transplant     Need for pneumocystis prophylaxis     Need for post exposure prophylaxis for hepatitis B       Allergies  Allergies   Allergen Reactions     Cephalexin Rash     Heparin Flush Rash       Medications  Current Outpatient Medications   Medication Sig Dispense Refill     acetaminophen (TYLENOL) 325 MG tablet Take 3 tablets (975 mg) by mouth every 8 hours as needed for pain 100 tablet 0     atorvastatin (LIPITOR) 10 MG tablet Take 1 tablet (10 mg) by mouth daily 30 tablet 11     biotin 1000 MCG TABS tablet Take 1,000 mcg by mouth daily       cyanocobalamin (VITAMIN B-12) 1000 MCG tablet Take 1,000 mcg by mouth daily.       mycophenolic acid (GENERIC EQUIVALENT) 180 MG EC tablet Take 3 tablets (540 mg) by mouth 2 times daily Take in place of mycophenolate 180 tablet 11     sodium bicarbonate 650 MG tablet Take 2 tablets (1,300 mg) by mouth 2 times daily 360 tablet 1     sulfamethoxazole-trimethoprim (BACTRIM) 400-80 MG tablet Take 1 tablet by mouth daily 30 tablet 11     tacrolimus (GENERIC EQUIVALENT) 0.5 MG capsule Take 1 capsule (0.5 mg) by mouth 2 times daily Total dose: 2.5mg twice daily 60 capsule 11     tacrolimus (GENERIC EQUIVALENT) 1 MG capsule Take 2 capsules (2 mg) by mouth  "2 times daily. Total dose: 2.5mg twice daily 180 capsule 11     No current facility-administered medications for this visit.     Medications Discontinued During This Encounter   Medication Reason     entecavir (BARACLUDE) 0.5 MG tablet        Physical Exam  Vital Signs: /86   Pulse 99   Temp 98.3  F (36.8  C) (Oral)   Ht 1.549 m (5' 1\")   Wt 81.7 kg (180 lb 3.2 oz)   SpO2 100%   BMI 34.05 kg/m      GENERAL APPEARANCE: alert and no distress  HENT: mouth without ulcers or lesions  RESP: lungs clear to auscultation - no rales, rhonchi or wheezes  CV: regular rhythm, normal rate, no rub, no murmur  EDEMA: no LE edema bilaterally  ABDOMEN: soft, nondistended, nontender, bowel sounds normal  MS: extremities normal - no gross deformities noted, no evidence of inflammation in joints, no muscle tenderness  SKIN: no rash  TX KIDNEY: normal  DIALYSIS ACCESS:  LUE AV fistula with good thrill    Data        Latest Ref Rng & Units 12/10/2024    10:38 AM 11/12/2024     8:20 AM 10/8/2024     8:12 AM   Renal   Sodium 135 - 145 mmol/L 136  136  136    K 3.4 - 5.3 mmol/L 4.0  4.5  4.3    Cl 98 - 107 mmol/L 105  103  104    Cl (external) 98 - 107 mmol/L 105  103  104    CO2 22 - 29 mmol/L 24  23  23    Urea Nitrogen 6.0 - 20.0 mg/dL 10.8  17.0  24.1    Creatinine 0.51 - 0.95 mg/dL 1.12  1.20  1.31    Glucose 70 - 99 mg/dL 96  107  103    Calcium 8.8 - 10.4 mg/dL 8.9  9.6  9.3          Latest Ref Rng & Units 12/10/2024    10:38 AM 4/12/2024     9:04 AM 3/26/2024     8:24 AM   Bone Health   Phosphorus 2.5 - 4.5 mg/dL   3.3    Parathyroid Hormone Intact 15 - 65 pg/mL 98  114           Latest Ref Rng & Units 12/10/2024    10:38 AM 11/12/2024     8:20 AM 10/8/2024     8:12 AM   Heme   WBC 4.0 - 11.0 10e3/uL 6.3  7.4  6.6    Hgb 11.7 - 15.7 g/dL 13.9  14.5  14.3    Plt 150 - 450 10e3/uL 215  269  225          Latest Ref Rng & Units 12/10/2024    10:38 AM 6/24/2024     8:48 AM 6/11/2024     8:12 AM   Liver   AP 40 - 150 U/L 71  97 "  95    TBili <=1.2 mg/dL 0.5  0.5  0.2    Bilirubin Direct 0.00 - 0.30 mg/dL <0.20  <0.20  <0.20    ALT 0 - 50 U/L 12  9  15    AST 0 - 45 U/L 16  17  23    Tot Protein 6.4 - 8.3 g/dL 7.2  7.5  6.9    Albumin 3.5 - 5.2 g/dL 4.0  4.2  4.0          Latest Ref Rng & Units 6/11/2024     8:12 AM 11/27/2023     2:06 PM   Pancreas   A1C 0.0 - 5.6 % 5.4  4.6          Latest Ref Rng & Units 12/21/2023     7:55 AM 11/27/2023     2:06 PM   Iron studies   Iron 37 - 145 ug/dL 70  73    Iron Sat Index 15 - 46 % 34  42    Ferritin 6 - 175 ng/mL 902  1,549          Latest Ref Rng & Units 11/27/2023     2:06 PM 2/28/2022     1:08 PM   [facility] Virology   EBV CAPSID ANTIBODY IGG No detectable antibody. Positive  Positive      Failed to redirect to the Timeline version of the REVFS SmartLink.  Recent Labs   Lab Test 09/10/24  0810 10/08/24  0812 11/12/24  0820   DOSTAC 9/9/2024 10/7/2024 11/11/2024   TACROL 5.2 7.2 7.9     Recent Labs   Lab Test 12/13/23  0723 12/26/23  0757 01/04/24  0803 01/10/24  1003 02/06/24  0811   DOSMPA  --   --  1/3/2024   9:00 PM  --   --    MPACID 3.31   < > 5.13* 2.61 2.34   MPAG 45.4   < > 79.3 52.7 82.5    < > = values in this interval not displayed.        Mike Noguera MD

## 2024-12-11 LAB
BK VIRUS SPECIMEN TYPE: NORMAL
BKV DNA # SPEC NAA+PROBE: NOT DETECTED IU/ML
CD3 CELLS # BLD: 1173 CELLS/UL (ref 603–2990)
CD3 CELLS NFR BLD: 68 % (ref 49–84)
CD3+CD4+ CELLS # BLD: 683 CELLS/UL (ref 441–2156)
CD3+CD4+ CELLS NFR BLD: 40 % (ref 28–63)
CD3+CD4+ CELLS/CD3+CD8+ CLL BLD: 1.71 % (ref 1.4–2.6)
CD3+CD8+ CELLS # BLD: 399 CELLS/UL (ref 125–1312)
CD3+CD8+ CELLS NFR BLD: 23 % (ref 10–40)
HBV DNA SERPL NAA+PROBE-ACNC: NOT DETECTED IU/ML
T CELL COMMENT: NORMAL
TACROLIMUS BLD-MCNC: 7.9 UG/L (ref 5–15)
TME LAST DOSE: NORMAL H
TME LAST DOSE: NORMAL H

## 2024-12-11 RX ORDER — VITAMIN B COMPLEX
50 TABLET ORAL DAILY
Qty: 60 TABLET | Refills: 3 | Status: SHIPPED | OUTPATIENT
Start: 2024-12-11

## 2024-12-12 ENCOUNTER — TELEPHONE (OUTPATIENT)
Dept: PHARMACY | Facility: CLINIC | Age: 29
End: 2024-12-12
Payer: MEDICARE

## 2024-12-12 ENCOUNTER — TELEPHONE (OUTPATIENT)
Dept: TRANSPLANT | Facility: CLINIC | Age: 29
End: 2024-12-12
Payer: MEDICARE

## 2024-12-12 NOTE — TELEPHONE ENCOUNTER
Clinical Pharmacy Consult:                                                      Transplant Specific: 12 month post transplant medication review  Date of Transplant: 12/08/2023  Type of Transplant: kidney  First Transplant: yes  History of rejection: no    Immunosuppression Regimen   TAC 2.5mg qAM & 2.5mg qPM and Myforitic 540mg qAM & 540mg qPM  Patient specific goal: 4-6  Most recent level: 7.9 on 12/10/2024 goal and dose were reduced  Immunosuppressant Levels:  supratherapeutic (compared to brand new goal)  Pt adherent to lab draws: yes  Scr:   Lab Results   Component Value Date    CR 1.22 12/28/2023     Side effects: None    Prophylactic Medications  PJP:  Bactrim SS daily  Scheduled Discontinue Date: 12 months - Therapy complete    Antifungal: Not needed thus far  Scheduled Discontinue Date: N/A    CMV: CrCl >/=60 mL/minute: Valcyte 900mg daily   Scheduled Discontinue Date: 3 months - completed    Acid Reducer: Not needed  Scheduled Reviewed Date: N/A    Thrombosis Prevention: Not needed  Scheduled Discontinue Date:  N/A    Blood Pressure Management  Frequency of home Blood Pressure checks: twice a week  Most recent home BP:  130/80  Patient Blood pressure goal: <130/80  Patient blood pressure at goal:  yes, borderline  Hospitalizations/ER visits since last assessment: 0    Med rec/DUR performed: yes  Med Rec Discrepancies: no    Spoke to Nuzhat Simmons today via phone. She reports she is doing well. She denies any side effects to her medications or difficulties getting refills. She knew the doses of her medications from memory including changes made earlier in the day. Most recent tacrolimus level was at goal for her old goal, but since the goal was reduced so was her dose.    Nuzhat Simmons reports checking her blood pressure twice weekly. It is usually at goal.    Next pharmacy follow up in 1 year.    Time Spent: 10 minutes    Piotr Adair, PharmD  [phone]

## 2024-12-12 NOTE — TELEPHONE ENCOUNTER
ISSUE:   Absolute CD4 683, 1 year since transplant    PLAN:  Discontinue bactrim per protocol    OUTCOME:   No answer, VM left. Message sent on my chart.     Elke Szymanski RN   Transplant Coordinator  [phone]    ----- Message from Mike Noguera sent at 12/12/2024 10:43 AM CST -----  Yes  ----- Message -----  From: Elke Szymanski RN  Sent: 12/12/2024   8:26 AM CST  To: Mike Noguera MD    Absolute CD4 683, stop bactrim?

## 2024-12-14 ENCOUNTER — HEALTH MAINTENANCE LETTER (OUTPATIENT)
Age: 29
End: 2024-12-14

## 2024-12-16 ENCOUNTER — TELEPHONE (OUTPATIENT)
Dept: GASTROENTEROLOGY | Facility: CLINIC | Age: 29
End: 2024-12-16
Payer: MEDICARE

## 2024-12-16 NOTE — TELEPHONE ENCOUNTER
Left Voicemail (1st Attempt) and Sent Mychart (1st Attempt) for the patient to call back and schedule the following:    Appointment Type: Return Liver  Provider: Dr. Dina Blanchard  Appt date: Approx. Sept 2025  Specialty phone number: [phone]  Additional appointment(s) needed: Lab  Additional Notes:

## 2024-12-18 ENCOUNTER — TELEPHONE (OUTPATIENT)
Dept: GASTROENTEROLOGY | Facility: CLINIC | Age: 29
End: 2024-12-18
Payer: MEDICARE

## 2024-12-18 NOTE — TELEPHONE ENCOUNTER
Sent Mychart (1st Attempt) and Left Voicemail (2nd Attempt) for the patient to call back and schedule the following:    Appointment Type: Return Liver  Provider: Dr. Dina Blanchard  Appt date: Approx. Sept 2025  Specialty phone number: [phone]  Additional appointment(s) needed: Lab  Additional Notes:         L hip flex 70

## 2024-12-19 DIAGNOSIS — E87.20 METABOLIC ACIDOSIS: ICD-10-CM

## 2024-12-19 DIAGNOSIS — Z94.0 KIDNEY TRANSPLANT RECIPIENT: ICD-10-CM

## 2024-12-19 RX ORDER — MYCOPHENOLIC ACID 180 MG/1
540 TABLET, DELAYED RELEASE ORAL 2 TIMES DAILY
Qty: 180 TABLET | Refills: 11 | Status: SHIPPED | OUTPATIENT
Start: 2024-12-19

## 2024-12-19 RX ORDER — SODIUM BICARBONATE 650 MG/1
1300 TABLET ORAL 2 TIMES DAILY
Qty: 360 TABLET | Refills: 1 | Status: SHIPPED | OUTPATIENT
Start: 2024-12-19

## 2024-12-19 RX ORDER — ATORVASTATIN CALCIUM 10 MG/1
10 TABLET, FILM COATED ORAL DAILY
Qty: 30 TABLET | Refills: 11 | Status: SHIPPED | OUTPATIENT
Start: 2024-12-19

## 2025-02-03 NOTE — PROGRESS NOTES
ELECTROPHYSIOLOGY CONGENITAL HEART DISEASE CLINIC VISIT    Assessment/Recommendations   Ms. Nuzhat Lopez is a 29 year old female with complex past medical history. She was referred to us for evaluation of brugada syndrome.     Jessica Veronica is a pleasant 29 year old woman with a past medical history significant for IgA nephropathy with subsequent renal transplant 23 (donor hepB core Ab positive; patient on Entecavir prophylaxis).   She is on Tacrolimus immunosuppression and is followed by renal transplant team here at the [facility]. Her creatinine is 1.29 with GFR 57.  She had an AV fistula placed in the right arm in , which is still functioning.  She was on dialysis for 1.5 years between 2022 and 2023.  She is a  but stopped working when she went on dialysis but hopes to go back. She is single with no children.      Ms. Lopez's dad has a diagnosis of Brugada syndrome that was made around 6 years ago after he got a routine ECG after a car accident. There was no concern that the car accident was related to an arrhythmia. He has not followed up and never had genetic testing. She is the 3rd born of nine siblings, the youngest of which is 11. Her father is from [facility] and he had several siblings that  young in [facility], but details are unknown how ever she says that siblings most likely  in the war. His mom is living. There are no other family members with known brugada syndrome. There is no history of syncope or sudden cardiac death that is known. She has never had syncope. She had some chest pain only during dialysis. SOB only when on dialysis.     Previous ECGs without brugada pattern including the one from today    At this point, we will recommend her father to complete genetic testing.  If genetic testing is positive, then we will recommend genetic testing for her and  and if this is positive, we will risk stratify her.  If her father is negative, we recommended phenotypical screening of first  degree relatives           History of Present Illness/Subjective    Ms. Nuzhat Lopez is a 29 year old female with complex past medical history. She was referred to us for evaluation of brugada syndrome.     Jessica Veronica is a pleasant 29 year old woman with a past medical history significant for IgA nephropathy with subsequent renal transplant 23 (donor hepB core Ab positive; patient on Entecavir prophylaxis).   She is on Tacrolimus immunosuppression and is followed by renal transplant team here at the [facility]. Her creatinine is 1.29 with GFR 57.  She had an AV fistula placed in the right arm in , which is still functioning.  She was on dialysis for 1.5 years between 2022 and 2023.  She is a  but stopped working when she went on dialysis but hopes to go back. She is single with no children.      Ms. Lopez's dad has a diagnosis of Brugada syndrome that was made around 6 years ago after he got a routine ECG after a car accident. There was no concern that the car accident was related to an arrhythmia. He has not followed up and never had genetic testing. She is the 3rd born of nine siblings, the youngest of which is 11. Her father is from [place] and he had several siblings that  young in [place], but details are unknown. His mom is living. There are no other family members with known brugada syndrome however when talking to Ms. Noland she says that her father's brothers  in the war. There is no history of syncope or sudden cardiac death that is known. She has never had syncope. She had some chest pain only during dialysis. SOB only when on dialysis.     I spoke to the patient this morning.  She reports doing well.  She does not have any history of lightheadedness, or fainting spell.  She is currently not working but is planning to go back to work in the next month as a -she is still recovering from kidney transplant.  She tells me that her father is planning to pursue genetic testing.        I have reviewed and updated the patient's Past Medical History, Social History, Family History and Medication List.     Cardiographics (Personally Reviewed) :   Echo 2/2022:   Interpretation Summary  Global and regional left ventricular function is normal with an EF of 60-65%.  Global right ventricular function is normal. The right ventricle is normal  size.  No significant valvular abnormalities.  The estimated PA systolic pressure is 18 mmHg.  This study was compared with the study from 10/06/2021 (resting images from  stress study). No significant changes noted.  ECG:           Physical Examination   There were no vitals taken for this visit.  Wt Readings from Last 3 Encounters:   12/10/24 81.7 kg (180 lb 3.2 oz)   09/25/24 82.1 kg (180 lb 14.4 oz)   09/14/24 80.7 kg (178 lb)     General Appearance:   Alert, well-appearing and in no acute distress.   HEENT: Atraumatic, normocephalic. PERRL.  MMM.   Chest/Lungs:   Respirations unlabored.  Lungs are clear to auscultation.   Cardiovascular:   Regular rate and rhythm.  S1/S2. No murmur.    Abdomen:  Soft, nontender, nondistended.   Extremities: No cyanosis or clubbing. No edema. .    Musculoskeletal: Moves all extremities.  Gait normal.   Skin: Warm, dry, intact.    Neurologic: Mood and affect are appropriate.  Alert and oriented to person, place, time, and situation.          Medications  Allergies   Current Outpatient Medications   Medication Sig Dispense Refill    acetaminophen (TYLENOL) 325 MG tablet Take 3 tablets (975 mg) by mouth every 8 hours as needed for pain 100 tablet 0    atorvastatin (LIPITOR) 10 MG tablet TAKE ONE TABLET BY MOUTH ONCE DAILY 30 tablet 11    biotin 1000 MCG TABS tablet Take 1,000 mcg by mouth daily      cyanocobalamin (VITAMIN B-12) 1000 MCG tablet Take 1,000 mcg by mouth daily.      mycophenolic acid (GENERIC EQUIVALENT) 180 MG EC tablet TAKE 3 TABLETS (540 MG) BY MOUTH 2 TIMES DAILY TAKE IN PLACE OF MYCOPHENOLATE 180 tablet 11     sodium bicarbonate 650 MG tablet TAKE TWO TABLETS BY MOUTH TWICE A  tablet 1    tacrolimus (GENERIC EQUIVALENT) 0.5 MG capsule Take 1 capsule (0.5 mg) by mouth 2 times daily Total dose: 2.5mg twice daily 60 capsule 11    tacrolimus (GENERIC EQUIVALENT) 1 MG capsule Take 2 capsules (2 mg) by mouth 2 times daily. Total dose: 2.5mg twice daily 180 capsule 11    tenofovir (VIREAD) 300 MG tablet Take 1 tablet (300 mg) by mouth daily. 90 tablet 3    Vitamin D3 (CHOLECALCIFEROL) 25 mcg (1000 units) tablet Take 2 tablets (50 mcg) by mouth daily. 60 tablet 3    Allergies   Allergen Reactions    Cephalexin Rash    Heparin Flush Rash         Lab Results (Personally Reviewed)    Chemistry/lipid CBC Cardiac Enzymes/BNP/TSH/INR   Lab Results   Component Value Date    BUN 10.8 12/10/2024     12/10/2024    CO2 24 12/10/2024     Creatinine   Date Value Ref Range Status   12/10/2024 1.12 (H) 0.51 - 0.95 mg/dL Final       Lab Results   Component Value Date    CHOL 103 12/10/2024    HDL 38 (L) 12/10/2024    LDL 43 12/10/2024      Lab Results   Component Value Date    WBC 6.3 12/10/2024    HGB 13.9 12/10/2024    HCT 43.5 12/10/2024    MCV 88 12/10/2024     12/10/2024    Lab Results   Component Value Date    TROPONINI 0.01 07/11/2022     (H) 07/11/2022    TSH 2.83 12/10/2024    INR 1.09 11/27/2023        Jorge Reyes Castro, MD, MS  Cardiac Electrophysiology fellow     EP STAFF NOTE  Patient seen and examined and management plan personally reviewed with the patient. I agree with the note above by the CV/EP fellow.  Chapo Chaney MD [facility]  Cardiology - Electrophysiology  Total time spent on patient visit, reviewing notes, imaging, labs, orders, and completing necessary documentation: 45 minutes.  >50% of visit spent on counseling patient and/or coordination of care.

## 2025-02-07 ENCOUNTER — OFFICE VISIT (OUTPATIENT)
Dept: CARDIOLOGY | Facility: CLINIC | Age: 30
End: 2025-02-07
Attending: INTERNAL MEDICINE
Payer: MEDICARE

## 2025-02-07 VITALS
HEART RATE: 120 BPM | BODY MASS INDEX: 35.07 KG/M2 | WEIGHT: 185.6 LBS | SYSTOLIC BLOOD PRESSURE: 146 MMHG | OXYGEN SATURATION: 99 % | DIASTOLIC BLOOD PRESSURE: 88 MMHG

## 2025-02-07 DIAGNOSIS — R00.0 TACHYCARDIA: ICD-10-CM

## 2025-02-07 DIAGNOSIS — Z82.49 FAMILY HISTORY OF BRUGADA SYNDROME: ICD-10-CM

## 2025-02-07 PROCEDURE — G0463 HOSPITAL OUTPT CLINIC VISIT: HCPCS | Performed by: INTERNAL MEDICINE

## 2025-02-07 PROCEDURE — 99215 OFFICE O/P EST HI 40 MIN: CPT | Performed by: INTERNAL MEDICINE

## 2025-02-07 ASSESSMENT — PAIN SCALES - GENERAL: PAINLEVEL_OUTOF10: NO PAIN (0)

## 2025-02-07 NOTE — NURSING NOTE
Chief Complaint   Patient presents with    New Patient     CV Genetics (Ritu) 2/7/25: 28 year old female with family history of Brugada presenting for evaluation.       Vitals were taken, medications reconciled.    SURENDRA Mendoza    10:00 AM

## 2025-02-07 NOTE — LETTER
2025      RE: Nuzhat Lopez  [address] [address]       Dear Colleague,    Thank you for the opportunity to participate in the care of your patient, Nuzhat Lopez, at the [facility]. Please see a copy of my visit note below.        ELECTROPHYSIOLOGY CONGENITAL HEART DISEASE CLINIC VISIT    Assessment/Recommendations   Ms. Nuzhat Lopez is a 29 year old female with complex past medical history. She was referred to us for evaluation of brugada syndrome.     Jessica Veronica is a pleasant 29 year old woman with a past medical history significant for IgA nephropathy with subsequent renal transplant 23 (donor hepB core Ab positive; patient on Entecavir prophylaxis).   She is on Tacrolimus immunosuppression and is followed by renal transplant team here at the [facility]. Her creatinine is 1.29 with GFR 57.  She had an AV fistula placed in the right arm in , which is still functioning.  She was on dialysis for 1.5 years between 2022 and 2023.  She is a  but stopped working when she went on dialysis but hopes to go back. She is single with no children.      Ms. Lopez's dad has a diagnosis of Brugada syndrome that was made around 6 years ago after he got a routine ECG after a car accident. There was no concern that the car accident was related to an arrhythmia. He has not followed up and never had genetic testing. She is the 3rd born of nine siblings, the youngest of which is 11. Her father is from [place] and he had several siblings that  young in [facility], but details are unknown how ever she says that siblings most likely  in the war. His mom is living. There are no other family members with known brugada syndrome. There is no history of syncope or sudden cardiac death that is known. She has never had syncope. She had some chest pain only during dialysis. SOB only when on dialysis.     Previous ECGs without  brugada pattern including the one from today    At this point, we will recommend her father to complete genetic testing.  If genetic testing is positive, then we will recommend genetic testing for her and  and if this is positive, we will risk stratify her.  If her father is negative, we recommended phenotypical screening of first degree relatives           History of Present Illness/Subjective    Ms. Nuzhat Lopez is a 29 year old female with complex past medical history. She was referred to us for evaluation of brugada syndrome.     Jessica Veronica is a pleasant 29 year old woman with a past medical history significant for IgA nephropathy with subsequent renal transplant 23 (donor hepB core Ab positive; patient on Entecavir prophylaxis).   She is on Tacrolimus immunosuppression and is followed by renal transplant team here at the [facility]. Her creatinine is 1.29 with GFR 57.  She had an AV fistula placed in the right arm in , which is still functioning.  She was on dialysis for 1.5 years between 2022 and 2023.  She is a  but stopped working when she went on dialysis but hopes to go back. She is single with no children.      Ms. Lopez's dad has a diagnosis of Brugada syndrome that was made around 6 years ago after he got a routine ECG after a car accident. There was no concern that the car accident was related to an arrhythmia. He has not followed up and never had genetic testing. She is the 3rd born of nine siblings, the youngest of which is 11. Her father is from [facility] and he had several siblings that  young in [facility], but details are unknown. His mom is living. There are no other family members with known brugada syndrome however when talking to Ms. Noland she says that her father's brothers  in the war. There is no history of syncope or sudden cardiac death that is known. She has never had syncope. She had some chest pain only during dialysis. SOB only when on dialysis.     I spoke to the patient  this morning.  She reports doing well.  She does not have any history of lightheadedness, or fainting spell.  She is currently not working but is planning to go back to work in the next month as a -she is still recovering from kidney transplant.  She tells me that her father is planning to pursue genetic testing.       I have reviewed and updated the patient's Past Medical History, Social History, Family History and Medication List.     Cardiographics (Personally Reviewed) :   Echo 2/2022:   Interpretation Summary  Global and regional left ventricular function is normal with an EF of 60-65%.  Global right ventricular function is normal. The right ventricle is normal  size.  No significant valvular abnormalities.  The estimated PA systolic pressure is 18 mmHg.  This study was compared with the study from 10/06/2021 (resting images from  stress study). No significant changes noted.  ECG:           Physical Examination   There were no vitals taken for this visit.  Wt Readings from Last 3 Encounters:   12/10/24 81.7 kg (180 lb 3.2 oz)   09/25/24 82.1 kg (180 lb 14.4 oz)   09/14/24 80.7 kg (178 lb)     General Appearance:   Alert, well-appearing and in no acute distress.   HEENT: Atraumatic, normocephalic. PERRL.  MMM.   Chest/Lungs:   Respirations unlabored.  Lungs are clear to auscultation.   Cardiovascular:   Regular rate and rhythm.  S1/S2. No murmur.    Abdomen:  Soft, nontender, nondistended.   Extremities: No cyanosis or clubbing. No edema. .    Musculoskeletal: Moves all extremities.  Gait normal.   Skin: Warm, dry, intact.    Neurologic: Mood and affect are appropriate.  Alert and oriented to person, place, time, and situation.          Medications  Allergies   Current Outpatient Medications   Medication Sig Dispense Refill     acetaminophen (TYLENOL) 325 MG tablet Take 3 tablets (975 mg) by mouth every 8 hours as needed for pain 100 tablet 0     atorvastatin (LIPITOR) 10 MG tablet TAKE ONE TABLET  BY MOUTH ONCE DAILY 30 tablet 11     biotin 1000 MCG TABS tablet Take 1,000 mcg by mouth daily       cyanocobalamin (VITAMIN B-12) 1000 MCG tablet Take 1,000 mcg by mouth daily.       mycophenolic acid (GENERIC EQUIVALENT) 180 MG EC tablet TAKE 3 TABLETS (540 MG) BY MOUTH 2 TIMES DAILY TAKE IN PLACE OF MYCOPHENOLATE 180 tablet 11     sodium bicarbonate 650 MG tablet TAKE TWO TABLETS BY MOUTH TWICE A  tablet 1     tacrolimus (GENERIC EQUIVALENT) 0.5 MG capsule Take 1 capsule (0.5 mg) by mouth 2 times daily Total dose: 2.5mg twice daily 60 capsule 11     tacrolimus (GENERIC EQUIVALENT) 1 MG capsule Take 2 capsules (2 mg) by mouth 2 times daily. Total dose: 2.5mg twice daily 180 capsule 11     tenofovir (VIREAD) 300 MG tablet Take 1 tablet (300 mg) by mouth daily. 90 tablet 3     Vitamin D3 (CHOLECALCIFEROL) 25 mcg (1000 units) tablet Take 2 tablets (50 mcg) by mouth daily. 60 tablet 3    Allergies   Allergen Reactions     Cephalexin Rash     Heparin Flush Rash         Lab Results (Personally Reviewed)    Chemistry/lipid CBC Cardiac Enzymes/BNP/TSH/INR   Lab Results   Component Value Date    BUN 10.8 12/10/2024     12/10/2024    CO2 24 12/10/2024     Creatinine   Date Value Ref Range Status   12/10/2024 1.12 (H) 0.51 - 0.95 mg/dL Final       Lab Results   Component Value Date    CHOL 103 12/10/2024    HDL 38 (L) 12/10/2024    LDL 43 12/10/2024      Lab Results   Component Value Date    WBC 6.3 12/10/2024    HGB 13.9 12/10/2024    HCT 43.5 12/10/2024    MCV 88 12/10/2024     12/10/2024    Lab Results   Component Value Date    TROPONINI 0.01 07/11/2022     (H) 07/11/2022    TSH 2.83 12/10/2024    INR 1.09 11/27/2023        Jorge Reyes Castro, MD, MS  Cardiac Electrophysiology fellow     EP STAFF NOTE  Patient seen and examined and management plan personally reviewed with the patient. I agree with the note above by the CV/EP fellow.  Chapo Chaney MD [facility]  Cardiology -  Electrophysiology  Total time spent on patient visit, reviewing notes, imaging, labs, orders, and completing necessary documentation: 45 minutes.  >50% of visit spent on counseling patient and/or coordination of care.                 Please do not hesitate to contact me if you have any questions/concerns.     Sincerely,     Chapo Chaney MD

## 2025-02-12 LAB
ATRIAL RATE - MUSE: 109 BPM
DIASTOLIC BLOOD PRESSURE - MUSE: NORMAL MMHG
INTERPRETATION ECG - MUSE: NORMAL
P AXIS - MUSE: 38 DEGREES
PR INTERVAL - MUSE: 136 MS
QRS DURATION - MUSE: 72 MS
QT - MUSE: 324 MS
QTC - MUSE: 436 MS
R AXIS - MUSE: -7 DEGREES
SYSTOLIC BLOOD PRESSURE - MUSE: NORMAL MMHG
T AXIS - MUSE: 50 DEGREES
VENTRICULAR RATE- MUSE: 109 BPM

## 2025-04-02 ENCOUNTER — LAB (OUTPATIENT)
Dept: LAB | Facility: CLINIC | Age: 30
End: 2025-04-02
Payer: MEDICARE

## 2025-04-02 DIAGNOSIS — Z48.298 AFTERCARE FOLLOWING ORGAN TRANSPLANT: ICD-10-CM

## 2025-04-02 DIAGNOSIS — Z20.5 EXPOSURE TO HEPATITIS B: ICD-10-CM

## 2025-04-02 LAB
ALBUMIN MFR UR ELPH: 37.2 MG/DL
ALBUMIN SERPL BCG-MCNC: 3.9 G/DL (ref 3.5–5.2)
ALBUMIN UR-MCNC: 100 MG/DL
ALP SERPL-CCNC: 73 U/L (ref 40–150)
ALT SERPL W P-5'-P-CCNC: 10 U/L (ref 0–50)
ANION GAP SERPL CALCULATED.3IONS-SCNC: 9 MMOL/L (ref 7–15)
APPEARANCE UR: CLEAR
AST SERPL W P-5'-P-CCNC: 16 U/L (ref 0–45)
BACTERIA #/AREA URNS HPF: ABNORMAL /HPF
BILIRUB DIRECT SERPL-MCNC: 0.09 MG/DL (ref 0–0.3)
BILIRUB SERPL-MCNC: 0.3 MG/DL
BILIRUB UR QL STRIP: NEGATIVE
BUN SERPL-MCNC: 18.3 MG/DL (ref 6–20)
CALCIUM SERPL-MCNC: 9.4 MG/DL (ref 8.8–10.4)
CHLORIDE SERPL-SCNC: 103 MMOL/L (ref 98–107)
COLOR UR AUTO: YELLOW
CREAT SERPL-MCNC: 1.06 MG/DL (ref 0.51–0.95)
CREAT UR-MCNC: 104 MG/DL
EGFRCR SERPLBLD CKD-EPI 2021: 72 ML/MIN/1.73M2
ERYTHROCYTE [DISTWIDTH] IN BLOOD BY AUTOMATED COUNT: 12.2 % (ref 10–15)
GLUCOSE SERPL-MCNC: 103 MG/DL (ref 70–99)
GLUCOSE UR STRIP-MCNC: NEGATIVE MG/DL
HBV SURFACE AB SERPL IA-ACNC: >1000 M[IU]/ML
HBV SURFACE AB SERPL IA-ACNC: REACTIVE M[IU]/ML
HBV SURFACE AG SERPL QL IA: NONREACTIVE
HCO3 SERPL-SCNC: 23 MMOL/L (ref 22–29)
HCT VFR BLD AUTO: 43.2 % (ref 35–47)
HGB BLD-MCNC: 14 G/DL (ref 11.7–15.7)
HGB UR QL STRIP: ABNORMAL
KETONES UR STRIP-MCNC: NEGATIVE MG/DL
LEUKOCYTE ESTERASE UR QL STRIP: NEGATIVE
MCH RBC QN AUTO: 28.3 PG (ref 26.5–33)
MCHC RBC AUTO-ENTMCNC: 32.4 G/DL (ref 31.5–36.5)
MCV RBC AUTO: 87 FL (ref 78–100)
NITRATE UR QL: NEGATIVE
PH UR STRIP: 6 [PH] (ref 5–8)
PLATELET # BLD AUTO: 282 10E3/UL (ref 150–450)
POTASSIUM SERPL-SCNC: 4 MMOL/L (ref 3.4–5.3)
PROT SERPL-MCNC: 7.1 G/DL (ref 6.4–8.3)
PROT/CREAT 24H UR: 0.36 MG/MG CR (ref 0–0.2)
RBC # BLD AUTO: 4.95 10E6/UL (ref 3.8–5.2)
RBC #/AREA URNS AUTO: ABNORMAL /HPF
SODIUM SERPL-SCNC: 135 MMOL/L (ref 135–145)
SP GR UR STRIP: 1.02 (ref 1–1.03)
SQUAMOUS #/AREA URNS AUTO: ABNORMAL /LPF
TACROLIMUS BLD-MCNC: 5.1 UG/L (ref 5–15)
TME LAST DOSE: NORMAL H
TME LAST DOSE: NORMAL H
UROBILINOGEN UR STRIP-ACNC: 0.2 E.U./DL
WBC # BLD AUTO: 9 10E3/UL (ref 4–11)
WBC #/AREA URNS AUTO: ABNORMAL /HPF

## 2025-04-02 PROCEDURE — 84156 ASSAY OF PROTEIN URINE: CPT

## 2025-04-02 PROCEDURE — 87799 DETECT AGENT NOS DNA QUANT: CPT

## 2025-04-02 PROCEDURE — 87340 HEPATITIS B SURFACE AG IA: CPT

## 2025-04-02 PROCEDURE — 36415 COLL VENOUS BLD VENIPUNCTURE: CPT

## 2025-04-02 PROCEDURE — 85027 COMPLETE CBC AUTOMATED: CPT

## 2025-04-02 PROCEDURE — 86706 HEP B SURFACE ANTIBODY: CPT

## 2025-04-02 PROCEDURE — 81001 URINALYSIS AUTO W/SCOPE: CPT

## 2025-04-02 PROCEDURE — 80053 COMPREHEN METABOLIC PANEL: CPT

## 2025-04-02 PROCEDURE — 80197 ASSAY OF TACROLIMUS: CPT

## 2025-04-02 PROCEDURE — 82248 BILIRUBIN DIRECT: CPT

## 2025-04-02 PROCEDURE — 87517 HEPATITIS B DNA QUANT: CPT

## 2025-04-03 LAB
BK VIRUS SPECIMEN TYPE: NORMAL
BKV DNA # SPEC NAA+PROBE: NOT DETECTED IU/ML
HBV DNA SERPL NAA+PROBE-ACNC: NOT DETECTED IU/ML

## 2025-05-07 DIAGNOSIS — E87.20 METABOLIC ACIDOSIS: ICD-10-CM

## 2025-05-07 RX ORDER — SODIUM BICARBONATE 650 MG/1
1300 TABLET ORAL 2 TIMES DAILY
Qty: 360 TABLET | Refills: 1 | Status: SHIPPED | OUTPATIENT
Start: 2025-05-07

## 2025-05-08 ENCOUNTER — APPOINTMENT (OUTPATIENT)
Dept: RADIOLOGY | Facility: CLINIC | Age: 30
End: 2025-05-08
Attending: EMERGENCY MEDICINE
Payer: MEDICARE

## 2025-05-08 ENCOUNTER — HOSPITAL ENCOUNTER (EMERGENCY)
Facility: CLINIC | Age: 30
Discharge: HOME | End: 2025-05-09
Attending: EMERGENCY MEDICINE
Payer: MEDICARE

## 2025-05-08 DIAGNOSIS — R51.9 NONINTRACTABLE HEADACHE, UNSPECIFIED CHRONICITY PATTERN, UNSPECIFIED HEADACHE TYPE: ICD-10-CM

## 2025-05-08 DIAGNOSIS — R06.00 DYSPNEA, UNSPECIFIED TYPE: ICD-10-CM

## 2025-05-08 LAB
ANION GAP SERPL CALCULATED.3IONS-SCNC: 14 MMOL/L (ref 7–15)
BASOPHILS # BLD AUTO: 0.1 10E3/UL (ref 0–0.2)
BASOPHILS NFR BLD AUTO: 1 %
BUN SERPL-MCNC: 12.4 MG/DL (ref 6–20)
CALCIUM SERPL-MCNC: 9.2 MG/DL (ref 8.8–10.4)
CHLORIDE SERPL-SCNC: 105 MMOL/L (ref 98–107)
CREAT SERPL-MCNC: 1.14 MG/DL (ref 0.51–0.95)
EGFRCR SERPLBLD CKD-EPI 2021: 66 ML/MIN/1.73M2
EOSINOPHIL # BLD AUTO: 0.3 10E3/UL (ref 0–0.7)
EOSINOPHIL NFR BLD AUTO: 3 %
ERYTHROCYTE [DISTWIDTH] IN BLOOD BY AUTOMATED COUNT: 12.6 % (ref 10–15)
GLUCOSE SERPL-MCNC: 113 MG/DL (ref 70–99)
HCO3 SERPL-SCNC: 20 MMOL/L (ref 22–29)
HCT VFR BLD AUTO: 41.6 % (ref 35–47)
HGB BLD-MCNC: 13.6 G/DL (ref 11.7–15.7)
IMM GRANULOCYTES # BLD: 0 10E3/UL
IMM GRANULOCYTES NFR BLD: 0 %
LYMPHOCYTES # BLD AUTO: 2 10E3/UL (ref 0.8–5.3)
LYMPHOCYTES NFR BLD AUTO: 20 %
MCH RBC QN AUTO: 27.7 PG (ref 26.5–33)
MCHC RBC AUTO-ENTMCNC: 32.7 G/DL (ref 31.5–36.5)
MCV RBC AUTO: 85 FL (ref 78–100)
MONOCYTES # BLD AUTO: 0.7 10E3/UL (ref 0–1.3)
MONOCYTES NFR BLD AUTO: 7 %
NEUTROPHILS # BLD AUTO: 7 10E3/UL (ref 1.6–8.3)
NEUTROPHILS NFR BLD AUTO: 70 %
NRBC # BLD AUTO: 0 10E3/UL
NRBC BLD AUTO-RTO: 0 /100
PLATELET # BLD AUTO: 263 10E3/UL (ref 150–450)
POTASSIUM SERPL-SCNC: 3.8 MMOL/L (ref 3.4–5.3)
RBC # BLD AUTO: 4.91 10E6/UL (ref 3.8–5.2)
SODIUM SERPL-SCNC: 139 MMOL/L (ref 135–145)
TROPONIN T SERPL HS-MCNC: <6 NG/L
WBC # BLD AUTO: 10.1 10E3/UL (ref 4–11)

## 2025-05-08 PROCEDURE — 71045 X-RAY EXAM CHEST 1 VIEW: CPT

## 2025-05-08 PROCEDURE — 96374 THER/PROPH/DIAG INJ IV PUSH: CPT

## 2025-05-08 PROCEDURE — 36415 COLL VENOUS BLD VENIPUNCTURE: CPT | Performed by: EMERGENCY MEDICINE

## 2025-05-08 PROCEDURE — 99285 EMERGENCY DEPT VISIT HI MDM: CPT | Mod: 25

## 2025-05-08 PROCEDURE — 96361 HYDRATE IV INFUSION ADD-ON: CPT

## 2025-05-08 PROCEDURE — 85025 COMPLETE CBC W/AUTO DIFF WBC: CPT | Performed by: EMERGENCY MEDICINE

## 2025-05-08 PROCEDURE — 85379 FIBRIN DEGRADATION QUANT: CPT | Performed by: EMERGENCY MEDICINE

## 2025-05-08 PROCEDURE — 93005 ELECTROCARDIOGRAM TRACING: CPT | Performed by: EMERGENCY MEDICINE

## 2025-05-08 PROCEDURE — 84484 ASSAY OF TROPONIN QUANT: CPT | Performed by: EMERGENCY MEDICINE

## 2025-05-08 PROCEDURE — 250N000011 HC RX IP 250 OP 636: Performed by: EMERGENCY MEDICINE

## 2025-05-08 PROCEDURE — 258N000003 HC RX IP 258 OP 636: Performed by: EMERGENCY MEDICINE

## 2025-05-08 PROCEDURE — 80048 BASIC METABOLIC PNL TOTAL CA: CPT | Performed by: EMERGENCY MEDICINE

## 2025-05-08 RX ORDER — LORAZEPAM 2 MG/ML
0.5 INJECTION INTRAMUSCULAR ONCE
Status: COMPLETED | OUTPATIENT
Start: 2025-05-08 | End: 2025-05-08

## 2025-05-08 RX ADMIN — SODIUM CHLORIDE 500 ML: 0.9 INJECTION, SOLUTION INTRAVENOUS at 23:00

## 2025-05-08 RX ADMIN — LORAZEPAM 0.5 MG: 2 INJECTION INTRAMUSCULAR; INTRAVENOUS at 22:58

## 2025-05-08 ASSESSMENT — ACTIVITIES OF DAILY LIVING (ADL): ADLS_ACUITY_SCORE: 59

## 2025-05-08 ASSESSMENT — COLUMBIA-SUICIDE SEVERITY RATING SCALE - C-SSRS
1. IN THE PAST MONTH, HAVE YOU WISHED YOU WERE DEAD OR WISHED YOU COULD GO TO SLEEP AND NOT WAKE UP?: NO
6. HAVE YOU EVER DONE ANYTHING, STARTED TO DO ANYTHING, OR PREPARED TO DO ANYTHING TO END YOUR LIFE?: NO
2. HAVE YOU ACTUALLY HAD ANY THOUGHTS OF KILLING YOURSELF IN THE PAST MONTH?: NO

## 2025-05-09 VITALS
WEIGHT: 180 LBS | DIASTOLIC BLOOD PRESSURE: 95 MMHG | TEMPERATURE: 98 F | HEIGHT: 61 IN | SYSTOLIC BLOOD PRESSURE: 144 MMHG | RESPIRATION RATE: 10 BRPM | BODY MASS INDEX: 33.99 KG/M2 | HEART RATE: 110 BPM | OXYGEN SATURATION: 99 %

## 2025-05-09 LAB
ATRIAL RATE - MUSE: 87 BPM
D DIMER PPP FEU-MCNC: 0.34 UG/ML FEU (ref 0–0.5)
DIASTOLIC BLOOD PRESSURE - MUSE: NORMAL MMHG
HOLD SPECIMEN: NORMAL
HOLD SPECIMEN: NORMAL
INTERPRETATION ECG - MUSE: NORMAL
P AXIS - MUSE: 49 DEGREES
PR INTERVAL - MUSE: 128 MS
QRS DURATION - MUSE: 70 MS
QT - MUSE: 354 MS
QTC - MUSE: 425 MS
R AXIS - MUSE: 1 DEGREES
SYSTOLIC BLOOD PRESSURE - MUSE: NORMAL MMHG
T AXIS - MUSE: 51 DEGREES
VENTRICULAR RATE- MUSE: 87 BPM

## 2025-05-09 PROCEDURE — 96375 TX/PRO/DX INJ NEW DRUG ADDON: CPT

## 2025-05-09 PROCEDURE — 250N000011 HC RX IP 250 OP 636: Mod: JZ | Performed by: EMERGENCY MEDICINE

## 2025-05-09 RX ORDER — DIPHENHYDRAMINE HYDROCHLORIDE 50 MG/ML
50 INJECTION, SOLUTION INTRAMUSCULAR; INTRAVENOUS ONCE
Status: COMPLETED | OUTPATIENT
Start: 2025-05-09 | End: 2025-05-09

## 2025-05-09 RX ORDER — METOCLOPRAMIDE HYDROCHLORIDE 5 MG/ML
10 INJECTION INTRAMUSCULAR; INTRAVENOUS ONCE
Status: COMPLETED | OUTPATIENT
Start: 2025-05-09 | End: 2025-05-09

## 2025-05-09 RX ADMIN — DIPHENHYDRAMINE HYDROCHLORIDE 50 MG: 50 INJECTION INTRAMUSCULAR; INTRAVENOUS at 00:26

## 2025-05-09 RX ADMIN — METOCLOPRAMIDE 10 MG: 5 INJECTION, SOLUTION INTRAMUSCULAR; INTRAVENOUS at 00:26

## 2025-05-09 ASSESSMENT — ACTIVITIES OF DAILY LIVING (ADL): ADLS_ACUITY_SCORE: 59

## 2025-05-09 NOTE — ED TRIAGE NOTES
C/O shortness of breath since 8:30 this evening. Also endorsing neck pain for 3 weeks now.      Triage Assessment (Adult)       Row Name 05/08/25 1730          Triage Assessment    Airway WDL WDL        Respiratory WDL    Respiratory WDL X;rhythm/pattern     Rhythm/Pattern, Respiratory shortness of breath        Skin Circulation/Temperature WDL    Skin Circulation/Temperature WDL WDL        Cardiac WDL    Cardiac WDL X;rhythm     Pulse Rate & Regularity tachycardic        Peripheral/Neurovascular WDL    Peripheral Neurovascular WDL WDL        Cognitive/Neuro/Behavioral WDL    Cognitive/Neuro/Behavioral WDL WDL

## 2025-05-09 NOTE — ED PROVIDER NOTES
"EMERGENCY DEPARTMENT ENCOUNTER      NAME: Nuzhat Lopez  AGE: 29 year old female  YOB: 1995  MRN: [id number]  EVALUATION DATE & TIME: 5/8/2025 10:28 PM    PCP: Roro Block    ED PROVIDER: Aaron Aguirre D.O.      Chief Complaint   Patient presents with    Shortness of Breath    Neck Pain       FINAL IMPRESSION:  No diagnosis found.    ED COURSE & MEDICAL DECISION MAKING:    10:35 PM I met with the patient to gather history and to perform my initial exam. I discussed the plan for care while in the Emergency Department.  11:50 PM Rechecked and updated patient on labs and imaging. Her shortness of breath is improving, but she is still tachycardic.         Pertinent Labs & Imaging studies reviewed. (See chart for details)  29 year old female presents to the Emergency Department for evaluation of ***    Medical Decision Making  {DID YOU REMEMBER TO DOCUMENT...?:980872}  {ADMIT VS D/C:206942}    MIPS (CTPE, Dental pain, [place], Sinusitis, Asthma/COPD, Head Trauma): {ECC MIPS DOCUMENTATION:150971}    SEPSIS: {Sepsis/Stemi/Stroke:062181::\"None\"}          At the conclusion of the encounter I discussed the results of all of the tests and the disposition. The questions were answered. The patient or family acknowledged understanding and was agreeable with the care plan.      CRITICAL CARE:  *** minutes of critical care time    HPI    Patient information was obtained from: patient    Use of : N/A      Nuzhat Lopez is a 29 year old female who presents via walk-in for evaluation of shortness of breath and neck pain.    Today, the patient was noticing some mild shortness of breath and trouble breathing throughout the day. However around 8:30 PM, the patient started to have lightheadedness, a headache, and further shortness of breath; in particular walking was difficult as tilting her head forward exacerbated her lightheadedness. She also reports possible mild anxiety with it. Concerned for her continuing " symptoms, the patient decided to present to the ER for evaluation.    2 weeks ago, the patient also started noticing neck pain that has persisted since.    She has a history of kidney transplant 1+ year ago with immunosuppression since.     The patient denies fever, nausea, vomiting, abdominal pain, chest pain, back pain, cough, sore throat, and rhinorrhea.     Per Chart Review:   History of chronic kidney disease, immunosuppressed state, kidney transplantation, anemia,       PAST MEDICAL HISTORY:  Past Medical History:   Diagnosis Date    Anemia in chronic kidney disease     At risk for infection transmitted from donor 12/08/2023    Donor + HBV    CKD (chronic kidney disease) stage 5, GFR less than 15 ml/min (H)     HTN (hypertension)     IgA nephropathy     Kidney replaced by transplant 12/08/2023    LDKT. Induction w/ basiliximab & steroids.    Metabolic acidosis        PAST SURGICAL HISTORY:  Past Surgical History:   Procedure Laterality Date    BENCH KIDNEY  12/8/2023    Procedure: Bench kidney;  Surgeon: Osbaldo Hurtado MD;  Location: [facility] OR    BIOPSY  2021    [facility]    NO PAST SURGERIES           CURRENT MEDICATIONS:    Current Facility-Administered Medications   Medication Dose Route Frequency Provider Last Rate Last Admin    LORazepam (ATIVAN) injection 0.5 mg  0.5 mg Intravenous Once Aaron Aguirre, DO        sodium chloride 0.9% BOLUS 500 mL  500 mL Intravenous Once Aaron Aguirre, DO         Current Outpatient Medications   Medication Sig Dispense Refill    acetaminophen (TYLENOL) 325 MG tablet Take 3 tablets (975 mg) by mouth every 8 hours as needed for pain 100 tablet 0    atorvastatin (LIPITOR) 10 MG tablet TAKE ONE TABLET BY MOUTH ONCE DAILY 30 tablet 11    biotin 1000 MCG TABS tablet Take 1,000 mcg by mouth daily (Patient not taking: Reported on 2/7/2025)      cyanocobalamin (VITAMIN B-12) 1000 MCG tablet Take 1,000 mcg by mouth daily.      mycophenolic acid (GENERIC  EQUIVALENT) 180 MG EC tablet TAKE 3 TABLETS (540 MG) BY MOUTH 2 TIMES DAILY TAKE IN PLACE OF MYCOPHENOLATE 180 tablet 11    sodium bicarbonate 650 MG tablet TAKE TWO TABLETS BY MOUTH TWICE A  tablet 1    tacrolimus (GENERIC EQUIVALENT) 0.5 MG capsule Take 1 capsule (0.5 mg) by mouth 2 times daily Total dose: 2.5mg twice daily 60 capsule 11    tacrolimus (GENERIC EQUIVALENT) 1 MG capsule Take 2 capsules (2 mg) by mouth 2 times daily. Total dose: 2.5mg twice daily 180 capsule 11    tenofovir (VIREAD) 300 MG tablet Take 1 tablet (300 mg) by mouth daily. (Patient not taking: Reported on 2/7/2025) 90 tablet 3    VITAMIN D3 25 MCG (1000 UT) tablet TAKE TWO TABLETS BY MOUTH ONCE DAILY 60 tablet 3         ALLERGIES:  Allergies   Allergen Reactions    Cephalexin Rash    Heparin Flush Rash       FAMILY HISTORY:  Family History   Problem Relation Age of Onset    Impaired Fasting Glucose Mother     Kidney Disease No family hx of     Hypertension No family hx of     Cancer No family hx of        SOCIAL HISTORY:  Social History     Socioeconomic History    Marital status: Single   Tobacco Use    Smoking status: Never     Passive exposure: Never    Smokeless tobacco: Never   Vaping Use    Vaping status: Never Used   Substance and Sexual Activity    Alcohol use: No    Drug use: Never     Social Drivers of Health     Financial Resource Strain: Low Risk  (4/27/2022)    Received from [facility]    Overall Financial Resource Strain (CARDIA)     Difficulty of Paying Living Expenses: Not very hard   Food Insecurity: No Food Insecurity (4/27/2022)    Received from [facility]    Hunger Vital Sign     Worried About Running Out of Food in the Last Year: Never true     Ran Out of Food in the Last Year: Never true   Transportation Needs: No Transportation Needs (4/27/2022)    Received from [facility]    PRAPARE - Transportation     Lack of Transportation (Medical): No     Lack of Transportation (Non-Medical): No   Physical Activity:  "Insufficiently Active (4/27/2022)    Received from [facility]    Exercise Vital Sign     Days of Exercise per Week: 3 days     Minutes of Exercise per Session: 20 min   Social Connections: Unknown (4/27/2022)    Received from [facility]    Social Connection and Isolation Panel [NHANES]     Frequency of Communication with Friends and Family: More than three times a week     Frequency of Social Gatherings with Friends and Family: Patient refused     Attends Protestant Services: Never     Active Member of Clubs or Organizations: No     Attends Club or Organization Meetings: Patient refused     Marital Status: Patient refused   Interpersonal Safety: Not At Risk (4/27/2022)    Received from [facility]    Humiliation, Afraid, Rape, and Kick questionnaire     Fear of Current or Ex-Partner: No     Emotionally Abused: No     Physically Abused: No     Sexually Abused: No   Housing Stability: Low Risk  (4/27/2022)    Received from [facility]    Housing Stability Vital Sign     Unable to Pay for Housing in the Last Year: No     Number of Places Lived in the Last Year: 2     In the last 12 months, was there a time when you did not have a steady place to sleep or slept in a shelter (including now)?: No       VITALS:  Patient Vitals for the past 24 hrs:   BP Temp Temp src Pulse Resp SpO2 Height Weight   05/08/25 2218 (!) 175/93 98  F (36.7  C) Oral 107 23 100 % 1.549 m (5' 1\") 81.6 kg (180 lb)       PHYSICAL EXAM    VITAL SIGNS: BP (!) 175/93   Pulse 107   Temp 98  F (36.7  C) (Oral)   Resp 23   Ht 1.549 m (5' 1\")   Wt 81.6 kg (180 lb)   SpO2 100%   BMI 34.01 kg/m      General Appearance:  well-nourished, anxious appearing.   Head:  Normocephalic, without obvious abnormality, atraumatic  Eyes:  PERRL, conjunctiva/corneas clear, EOM's intact,  ENT:  Lips, mucosa, and tongue normal, membranes are moist without pallor  Neck:  Normal ROM, symmetrical, trachea midline    Chest:  No tenderness or deformity, no " crepitus  Cardio:  Borderline tachycardic. Regular rhythm, no murmur, rub or gallop, 2+ pulses symmetric in all extremities  Pulm:  Clear to auscultation bilaterally, respirations unlabored,  Back:  ROM normal, no CVA tenderness, no spinal tenderness, no paraspinal tenderness  Abdomen:  Soft, non-tender, no rebound or guarding.  Musculoskeletal: Full ROM, no edema, no cyanosis, good ROM of major joints  Integument:  Warm, Dry, No erythema, No rash.    Neurologic:  Alert & oriented.  No focal deficits appreciated.    Psychiatric:  Affect normal, Judgment normal, Mood normal.      LABS  No results found for this or any previous visit (from the past 24 hours).      RADIOLOGY  XR Chest Port 1 View    (Results Pending)        I have independently interpreted the above image, ***. See radiology report for detail.    EKG:    Rate: ***  Rhythm: Normal Sinus Rhythm  Axis: Normal  Interval: Normal  Conduction: Normal  QRS: Normal  ST: Normal  T-wave: Normal  QT: Not prolonged  Comparison EKG: ***  Impression:  No acute ischemic change ***  I have independently reviewed and interpreted today's EKG, pending Cardiologist read    PROCEDURES:  ***        MEDICATIONS GIVEN IN THE EMERGENCY:  Medications   sodium chloride 0.9% BOLUS 500 mL (has no administration in time range)   LORazepam (ATIVAN) injection 0.5 mg (has no administration in time range)       NEW PRESCRIPTIONS STARTED AT TODAY'S ER VISIT  New Prescriptions    No medications on file        I, Tye Aldrich, am serving as a scribe to document services personally performed by Aaron Aguirre D.O., based on my observations and the provider's statements to me.  I, Aaron Aguirre D.O., attest that Tye Aldrich is acting in a scribe capacity, has observed my performance of the services and has documented them in accordance with my direction.     Aaron Aguirre D.O.  Emergency Medicine  [facility] EMERGENCY ROOM  [address]  [address]  [phone]  Dept: [phone]                                 Final result                 Please view results for these tests on the individual orders.   CBC with platelets and differential   Result Value Ref Range    WBC Count 10.1 4.0 - 11.0 10e3/uL    RBC Count 4.91 3.80 - 5.20 10e6/uL    Hemoglobin 13.6 11.7 - 15.7 g/dL    Hematocrit 41.6 35.0 - 47.0 %    MCV 85 78 - 100 fL    MCH 27.7 26.5 - 33.0 pg    MCHC 32.7 31.5 - 36.5 g/dL    RDW 12.6 10.0 - 15.0 %    Platelet Count 263 150 - 450 10e3/uL    % Neutrophils 70 %    % Lymphocytes 20 %    % Monocytes 7 %    % Eosinophils 3 %    % Basophils 1 %    % Immature Granulocytes 0 %    NRBCs per 100 WBC 0 <1 /100    Absolute Neutrophils 7.0 1.6 - 8.3 10e3/uL    Absolute Lymphocytes 2.0 0.8 - 5.3 10e3/uL    Absolute Monocytes 0.7 0.0 - 1.3 10e3/uL    Absolute Eosinophils 0.3 0.0 - 0.7 10e3/uL    Absolute Basophils 0.1 0.0 - 0.2 10e3/uL    Absolute Immature Granulocytes 0.0 <=0.4 10e3/uL    Absolute NRBCs 0.0 10e3/uL   Extra Blue Top Tube   Result Value Ref Range    Hold Specimen JIC    Extra Red Top Tube   Result Value Ref Range    Hold Specimen JIC    D dimer quantitative   Result Value Ref Range    D-Dimer Quantitative 0.34 0.00 - 0.50 ug/mL FEU    Narrative    This D-dimer assay is intended for use in conjunction with a clinical pretest probability assessment model to exclude pulmonary embolism (PE) and deep venous thrombosis (DVT) in outpatients suspected of PE or DVT. The cut-off value is 0.50 ug/mL FEU.   XR Chest Port 1 View    Narrative    EXAM: XR CHEST PORT 1 VIEW  LOCATION: [facility]  DATE: 5/8/2025    INDICATION: Dyspnea  COMPARISON: 12/9/2023      Impression    IMPRESSION: Heart size is normal. Clear lungs. No pneumothorax or pleural effusion.         RADIOLOGY  XR Chest Port 1 View   Final Result   IMPRESSION: Heart size is normal. Clear lungs. No pneumothorax or pleural effusion.               MEDICATIONS GIVEN IN THE EMERGENCY:  Medications   sodium chloride  0.9% BOLUS 500 mL (0 mLs Intravenous Stopped 5/8/25 2348)   LORazepam (ATIVAN) injection 0.5 mg (0.5 mg Intravenous $Given 5/8/25 2258)   metoclopramide (REGLAN) injection 10 mg (10 mg Intravenous $Given 5/9/25 0026)   diphenhydrAMINE (BENADRYL) injection 50 mg (50 mg Intravenous $Given 5/9/25 0026)       NEW PRESCRIPTIONS STARTED AT TODAY'S ER VISIT  Discharge Medication List as of 5/9/2025 12:59 AM           I, Tye Aldrich, am serving as a scribe to document services personally performed by Aaron Aguirre D.O., based on my observations and the provider's statements to me.  I, Aaron Aguirre D.O., attest that Tye Aldrich is acting in a scribe capacity, has observed my performance of the services and has documented them in accordance with my direction.     Aaron Aguirre D.O.  Emergency Medicine  [facility] EMERGENCY ROOM  Counts include 234 beds at the [facility] [facility] [address] [phone]  Dept: [phone]       Aaron Aguirre DO  05/09/25 0152      Addendum:    EKG    Rate: 87 bpm  Rhythm: Normal Sinus Rhythm  Axis: Normal  Interval: Normal  Conduction: Normal  QRS: Normal  ST: Normal  T-wave: Normal  QT: Not prolonged  Comparison EKG: No significant change when compared to 7 February 2025  Impression:  No acute ischemic change   I have independently reviewed and interpreted today's EKG, pending Cardiologist read       Aaron Aguirre DO  05/09/25 0155

## 2025-07-08 DIAGNOSIS — E87.20 METABOLIC ACIDOSIS: ICD-10-CM

## 2025-07-08 DIAGNOSIS — Z94.0 KIDNEY TRANSPLANTED: Primary | ICD-10-CM

## 2025-07-09 RX ORDER — SODIUM BICARBONATE 650 MG/1
1300 TABLET ORAL 2 TIMES DAILY
Qty: 120 TABLET | Refills: 0 | Status: SHIPPED | OUTPATIENT
Start: 2025-07-09

## 2025-07-28 ENCOUNTER — LAB (OUTPATIENT)
Dept: LAB | Facility: CLINIC | Age: 30
End: 2025-07-28
Payer: MEDICARE

## 2025-07-28 DIAGNOSIS — Z48.298 AFTERCARE FOLLOWING ORGAN TRANSPLANT: ICD-10-CM

## 2025-07-28 LAB
ANION GAP SERPL CALCULATED.3IONS-SCNC: 11 MMOL/L (ref 7–15)
BK VIRUS SPECIMEN TYPE: NORMAL
BKV DNA # SPEC NAA+PROBE: NOT DETECTED IU/ML
BUN SERPL-MCNC: 14.8 MG/DL (ref 6–20)
CALCIUM SERPL-MCNC: 9.6 MG/DL (ref 8.8–10.4)
CHLORIDE SERPL-SCNC: 102 MMOL/L (ref 98–107)
CREAT SERPL-MCNC: 1.03 MG/DL (ref 0.51–0.95)
EGFRCR SERPLBLD CKD-EPI 2021: 75 ML/MIN/1.73M2
ERYTHROCYTE [DISTWIDTH] IN BLOOD BY AUTOMATED COUNT: 12.6 % (ref 10–15)
GLUCOSE SERPL-MCNC: 98 MG/DL (ref 70–99)
HCO3 SERPL-SCNC: 22 MMOL/L (ref 22–29)
HCT VFR BLD AUTO: 43.2 % (ref 35–47)
HGB BLD-MCNC: 14 G/DL (ref 11.7–15.7)
MCH RBC QN AUTO: 27.8 PG (ref 26.5–33)
MCHC RBC AUTO-ENTMCNC: 32.4 G/DL (ref 31.5–36.5)
MCV RBC AUTO: 86 FL (ref 78–100)
PLATELET # BLD AUTO: 263 10E3/UL (ref 150–450)
POTASSIUM SERPL-SCNC: 4.7 MMOL/L (ref 3.4–5.3)
RBC # BLD AUTO: 5.03 10E6/UL (ref 3.8–5.2)
SODIUM SERPL-SCNC: 135 MMOL/L (ref 135–145)
TACROLIMUS BLD-MCNC: 6 UG/L (ref 5–15)
TME LAST DOSE: NORMAL H
TME LAST DOSE: NORMAL H
WBC # BLD AUTO: 7.7 10E3/UL (ref 4–11)

## 2025-07-28 PROCEDURE — 85027 COMPLETE CBC AUTOMATED: CPT

## 2025-07-28 PROCEDURE — 87799 DETECT AGENT NOS DNA QUANT: CPT

## 2025-07-28 PROCEDURE — 80048 BASIC METABOLIC PNL TOTAL CA: CPT

## 2025-07-28 PROCEDURE — 36415 COLL VENOUS BLD VENIPUNCTURE: CPT

## 2025-07-28 PROCEDURE — 80197 ASSAY OF TACROLIMUS: CPT

## 2025-07-29 ENCOUNTER — OFFICE VISIT (OUTPATIENT)
Dept: TRANSPLANT | Facility: CLINIC | Age: 30
End: 2025-07-29
Attending: INTERNAL MEDICINE
Payer: MEDICARE

## 2025-07-29 VITALS
DIASTOLIC BLOOD PRESSURE: 96 MMHG | HEIGHT: 61 IN | BODY MASS INDEX: 33.04 KG/M2 | SYSTOLIC BLOOD PRESSURE: 128 MMHG | OXYGEN SATURATION: 99 % | WEIGHT: 175 LBS | TEMPERATURE: 98.4 F | HEART RATE: 98 BPM

## 2025-07-29 DIAGNOSIS — N18.2 CKD (CHRONIC KIDNEY DISEASE) STAGE 2, GFR 60-89 ML/MIN: ICD-10-CM

## 2025-07-29 DIAGNOSIS — Z94.0 HTN, KIDNEY TRANSPLANT RELATED: Primary | ICD-10-CM

## 2025-07-29 DIAGNOSIS — I15.1 HTN, KIDNEY TRANSPLANT RELATED: Primary | ICD-10-CM

## 2025-07-29 DIAGNOSIS — Z94.0 KIDNEY REPLACED BY TRANSPLANT: ICD-10-CM

## 2025-07-29 DIAGNOSIS — Z48.298 AFTERCARE FOLLOWING ORGAN TRANSPLANT: ICD-10-CM

## 2025-07-29 DIAGNOSIS — D84.9 IMMUNOSUPPRESSED STATUS: Chronic | ICD-10-CM

## 2025-07-29 DIAGNOSIS — E55.9 VITAMIN D DEFICIENCY: ICD-10-CM

## 2025-07-29 DIAGNOSIS — E87.20 METABOLIC ACIDOSIS: ICD-10-CM

## 2025-07-29 PROCEDURE — G2211 COMPLEX E/M VISIT ADD ON: HCPCS | Performed by: INTERNAL MEDICINE

## 2025-07-29 PROCEDURE — 3080F DIAST BP >= 90 MM HG: CPT | Performed by: INTERNAL MEDICINE

## 2025-07-29 PROCEDURE — 3074F SYST BP LT 130 MM HG: CPT | Performed by: INTERNAL MEDICINE

## 2025-07-29 PROCEDURE — 1126F AMNT PAIN NOTED NONE PRSNT: CPT | Performed by: INTERNAL MEDICINE

## 2025-07-29 PROCEDURE — G0463 HOSPITAL OUTPT CLINIC VISIT: HCPCS | Performed by: INTERNAL MEDICINE

## 2025-07-29 PROCEDURE — 99214 OFFICE O/P EST MOD 30 MIN: CPT | Performed by: INTERNAL MEDICINE

## 2025-07-29 RX ORDER — LOSARTAN POTASSIUM 25 MG/1
25 TABLET ORAL AT BEDTIME
Qty: 90 TABLET | Refills: 3 | Status: SHIPPED | OUTPATIENT
Start: 2025-07-29

## 2025-07-29 ASSESSMENT — PAIN SCALES - GENERAL: PAINLEVEL_OUTOF10: NO PAIN (0)

## 2025-07-30 DIAGNOSIS — Z94.0 KIDNEY TRANSPLANTED: Primary | ICD-10-CM

## 2025-08-05 PROBLEM — Z29.89 NEED FOR PNEUMOCYSTIS PROPHYLAXIS: Status: RESOLVED | Noted: 2024-06-24 | Resolved: 2025-08-05

## 2025-08-05 PROBLEM — I49.8 BRUGADA SYNDROME: Status: RESOLVED | Noted: 2025-08-05 | Resolved: 2025-08-05

## 2025-08-05 PROBLEM — D50.9 IRON DEFICIENCY ANEMIA, UNSPECIFIED: Status: RESOLVED | Noted: 2022-06-03 | Resolved: 2025-08-05

## 2025-08-05 PROBLEM — E55.9 VITAMIN D DEFICIENCY: Status: ACTIVE | Noted: 2025-08-05

## 2025-08-05 PROBLEM — I49.8 BRUGADA SYNDROME: Status: ACTIVE | Noted: 2025-08-05

## 2025-08-05 PROBLEM — E66.9 OBESITY: Status: ACTIVE | Noted: 2025-08-05

## 2025-08-08 DIAGNOSIS — Z94.0 KIDNEY TRANSPLANT RECIPIENT: ICD-10-CM

## 2025-08-11 RX ORDER — TACROLIMUS 0.5 MG/1
0.5 CAPSULE ORAL 2 TIMES DAILY
Qty: 60 CAPSULE | Refills: 11 | Status: SHIPPED | OUTPATIENT
Start: 2025-08-11

## 2025-08-28 ENCOUNTER — TELEPHONE (OUTPATIENT)
Dept: GASTROENTEROLOGY | Facility: CLINIC | Age: 30
End: 2025-08-28
Payer: MEDICARE

## (undated) DEVICE — SU PROLENE 6-0 RB-2DA 30" 8711H

## (undated) DEVICE — TUBING IRRIG CYSTO/BLADDER SET 81" LF 2C4040

## (undated) DEVICE — SU DERMABOND ADVANCED .7ML DNX12

## (undated) DEVICE — SU PDS II 0 TP-1 60" Z991G

## (undated) DEVICE — SU PDS II 5-0 RB-1 27" Z303H

## (undated) DEVICE — SURGICEL ABSORBABLE HEMOSTAT SNOW 4"X4" 2083

## (undated) DEVICE — INSERT FOGARTY 33MM TRACTION HYDRAJAW HYDRA33

## (undated) DEVICE — SU SILK 1 TIE 6X30" A307H

## (undated) DEVICE — ESU PENCIL SMOKE EVAC W/ROCKER SWITCH 0703-047-000

## (undated) DEVICE — RX SURGIFLO HEMOSTATIC MATRIX W/THROMBIN 8ML 2994

## (undated) DEVICE — SU SILK 3-0 TIE 12X30" A304H

## (undated) DEVICE — SU PROLENE 4-0 RB-1DA 36" 8557H

## (undated) DEVICE — Device

## (undated) DEVICE — SU PDS II 4-0 RB-1 27" Z304H

## (undated) DEVICE — SU SILK 0 TIE 6X30" A306H

## (undated) DEVICE — DRAPE IOBAN INCISE 23X17" 6650EZ

## (undated) DEVICE — SU SILK 3-0 SH CR 8X18" C013D

## (undated) DEVICE — SU SILK 4-0 TIE 12X30" A303H

## (undated) DEVICE — ESU CLEANER TIP 31142717

## (undated) DEVICE — SU PDS II 6-0 RB-2DA 30" Z149H

## (undated) DEVICE — CATH FOLEY 3WAY 16FR 30ML LATEX 0167SI16

## (undated) DEVICE — PUNCH AORTIC 5MM SINGLE USE 1001-626

## (undated) DEVICE — SU VICRYL 3-0 SH 27" UND J416H

## (undated) DEVICE — SU SILK 2-0 TIE 12X30" A305H

## (undated) DEVICE — DRAPE SHEET REV FOLD 3/4 9349

## (undated) DEVICE — SU MONOCRYL 4-0 PS-2 27" UND Y426H

## (undated) DEVICE — SU PROLENE 5-0 RB-1DA 36"  8556H

## (undated) RX ORDER — DIPHENHYDRAMINE HYDROCHLORIDE 50 MG/ML
INJECTION INTRAMUSCULAR; INTRAVENOUS
Status: DISPENSED
Start: 2023-12-08

## (undated) RX ORDER — SODIUM CHLORIDE 450 MG/100ML
INJECTION, SOLUTION INTRAVENOUS
Status: DISPENSED
Start: 2023-12-08

## (undated) RX ORDER — ONDANSETRON 2 MG/ML
INJECTION INTRAMUSCULAR; INTRAVENOUS
Status: DISPENSED
Start: 2023-12-08

## (undated) RX ORDER — FENTANYL CITRATE-0.9 % NACL/PF 10 MCG/ML
PLASTIC BAG, INJECTION (ML) INTRAVENOUS
Status: DISPENSED
Start: 2023-12-08

## (undated) RX ORDER — FENTANYL CITRATE 50 UG/ML
INJECTION, SOLUTION INTRAMUSCULAR; INTRAVENOUS
Status: DISPENSED
Start: 2023-12-08

## (undated) RX ORDER — PROPOFOL 10 MG/ML
INJECTION, EMULSION INTRAVENOUS
Status: DISPENSED
Start: 2023-12-08

## (undated) RX ORDER — HYDROMORPHONE HYDROCHLORIDE 1 MG/ML
INJECTION, SOLUTION INTRAMUSCULAR; INTRAVENOUS; SUBCUTANEOUS
Status: DISPENSED
Start: 2023-12-08

## (undated) RX ORDER — DEXTROSE, SODIUM CHLORIDE, SODIUM LACTATE, POTASSIUM CHLORIDE, AND CALCIUM CHLORIDE 5; .6; .31; .03; .02 G/100ML; G/100ML; G/100ML; G/100ML; G/100ML
INJECTION, SOLUTION INTRAVENOUS
Status: DISPENSED
Start: 2023-12-08

## (undated) RX ORDER — CALCIUM CHLORIDE 100 MG/ML
INJECTION INTRAVENOUS; INTRAVENTRICULAR
Status: DISPENSED
Start: 2023-12-08

## (undated) RX ORDER — SODIUM CHLORIDE 9 MG/ML
INJECTION, SOLUTION INTRAVENOUS
Status: DISPENSED
Start: 2023-12-08

## (undated) RX ORDER — DEXAMETHASONE SODIUM PHOSPHATE 4 MG/ML
INJECTION, SOLUTION INTRA-ARTICULAR; INTRALESIONAL; INTRAMUSCULAR; INTRAVENOUS; SOFT TISSUE
Status: DISPENSED
Start: 2023-12-08

## (undated) RX ORDER — HEPARIN SODIUM 1000 [USP'U]/ML
INJECTION, SOLUTION INTRAVENOUS; SUBCUTANEOUS
Status: DISPENSED
Start: 2023-12-08

## (undated) RX ORDER — LEVOFLOXACIN 5 MG/ML
INJECTION, SOLUTION INTRAVENOUS
Status: DISPENSED
Start: 2023-12-08

## (undated) RX ORDER — PAPAVERINE HYDROCHLORIDE 30 MG/ML
INJECTION INTRAMUSCULAR; INTRAVENOUS
Status: DISPENSED
Start: 2023-12-08